# Patient Record
Sex: FEMALE | Race: WHITE | Employment: OTHER | URBAN - METROPOLITAN AREA
[De-identification: names, ages, dates, MRNs, and addresses within clinical notes are randomized per-mention and may not be internally consistent; named-entity substitution may affect disease eponyms.]

---

## 2021-06-20 ENCOUNTER — APPOINTMENT (OUTPATIENT)
Dept: CT IMAGING | Age: 61
End: 2021-06-20
Payer: MEDICAID

## 2021-06-20 ENCOUNTER — HOSPITAL ENCOUNTER (EMERGENCY)
Age: 61
Discharge: HOME | End: 2021-06-20
Attending: EMERGENCY MEDICINE
Payer: MEDICAID

## 2021-06-20 VITALS
DIASTOLIC BLOOD PRESSURE: 115 MMHG | RESPIRATION RATE: 18 BRPM | HEIGHT: 65 IN | BODY MASS INDEX: 40.32 KG/M2 | TEMPERATURE: 98.8 F | SYSTOLIC BLOOD PRESSURE: 169 MMHG | WEIGHT: 242 LBS | OXYGEN SATURATION: 99 % | HEART RATE: 100 BPM

## 2021-06-20 DIAGNOSIS — K45.8 RECURRENT ABDOMINAL HERNIA WITHOUT OBSTRUCTION OR GANGRENE, UNSPECIFIED HERNIA TYPE: Primary | ICD-10-CM

## 2021-06-20 DIAGNOSIS — R10.84 GENERALIZED ABDOMINAL PAIN: ICD-10-CM

## 2021-06-20 LAB
A/G RATIO: 1.3 (ref 1.1–2.2)
ALBUMIN SERPL-MCNC: 3.9 G/DL (ref 3.4–5)
ALP BLD-CCNC: 83 U/L (ref 40–129)
ALT SERPL-CCNC: 13 U/L (ref 10–40)
ANION GAP SERPL CALCULATED.3IONS-SCNC: 10 MMOL/L (ref 3–16)
AST SERPL-CCNC: 11 U/L (ref 15–37)
BACTERIA: ABNORMAL /HPF
BASE EXCESS VENOUS: 2.2 MMOL/L (ref -2–3)
BASOPHILS ABSOLUTE: 0 K/UL (ref 0–0.2)
BASOPHILS RELATIVE PERCENT: 0.4 %
BILIRUB SERPL-MCNC: <0.2 MG/DL (ref 0–1)
BILIRUBIN URINE: NEGATIVE
BLOOD, URINE: NEGATIVE
BUN BLDV-MCNC: 18 MG/DL (ref 7–20)
CALCIUM SERPL-MCNC: 9.3 MG/DL (ref 8.3–10.6)
CARBOXYHEMOGLOBIN: 1.3 % (ref 0–1.5)
CHLORIDE BLD-SCNC: 101 MMOL/L (ref 99–110)
CLARITY: CLEAR
CO2: 25 MMOL/L (ref 21–32)
COLOR: YELLOW
CREAT SERPL-MCNC: 0.8 MG/DL (ref 0.6–1.1)
EOSINOPHILS ABSOLUTE: 0.4 K/UL (ref 0–0.6)
EOSINOPHILS RELATIVE PERCENT: 6 %
EPITHELIAL CELLS, UA: ABNORMAL /HPF (ref 0–5)
GFR AFRICAN AMERICAN: >60
GFR NON-AFRICAN AMERICAN: >60
GLOBULIN: 2.9 G/DL
GLUCOSE BLD-MCNC: 305 MG/DL (ref 70–99)
GLUCOSE URINE: NEGATIVE MG/DL
HCO3 VENOUS: 27.9 MMOL/L (ref 24–28)
HCT VFR BLD CALC: 36.7 % (ref 36–48)
HEMOGLOBIN, VEN, REDUCED: 12.9 %
HEMOGLOBIN: 12.4 G/DL (ref 12–16)
KETONES, URINE: NEGATIVE MG/DL
LACTIC ACID, SEPSIS: 1.7 MMOL/L (ref 0.4–1.9)
LEUKOCYTE ESTERASE, URINE: ABNORMAL
LIPASE: 101 U/L (ref 13–60)
LYMPHOCYTES ABSOLUTE: 1.5 K/UL (ref 1–5.1)
LYMPHOCYTES RELATIVE PERCENT: 24.7 %
MCH RBC QN AUTO: 28.9 PG (ref 26–34)
MCHC RBC AUTO-ENTMCNC: 33.7 G/DL (ref 31–36)
MCV RBC AUTO: 85.7 FL (ref 80–100)
METHEMOGLOBIN VENOUS: 0.5 % (ref 0–1.5)
MICROSCOPIC EXAMINATION: YES
MONOCYTES ABSOLUTE: 0.5 K/UL (ref 0–1.3)
MONOCYTES RELATIVE PERCENT: 8 %
NEUTROPHILS ABSOLUTE: 3.8 K/UL (ref 1.7–7.7)
NEUTROPHILS RELATIVE PERCENT: 60.9 %
NITRITE, URINE: NEGATIVE
O2 SAT, VEN: 87 %
PCO2, VEN: 46.9 MMHG (ref 41–51)
PDW BLD-RTO: 13.8 % (ref 12.4–15.4)
PH UA: 6.5 (ref 5–8)
PH VENOUS: 7.38 (ref 7.35–7.45)
PLATELET # BLD: 153 K/UL (ref 135–450)
PMV BLD AUTO: 6.5 FL (ref 5–10.5)
PO2, VEN: 51.5 MMHG (ref 25–40)
POTASSIUM REFLEX MAGNESIUM: 4.3 MMOL/L (ref 3.5–5.1)
PROTEIN UA: NEGATIVE MG/DL
RBC # BLD: 4.28 M/UL (ref 4–5.2)
RBC UA: ABNORMAL /HPF (ref 0–4)
SODIUM BLD-SCNC: 136 MMOL/L (ref 136–145)
SPECIFIC GRAVITY UA: <=1.005 (ref 1–1.03)
TCO2 CALC VENOUS: 29 MMOL/L
TOTAL PROTEIN: 6.8 G/DL (ref 6.4–8.2)
URINE TYPE: ABNORMAL
UROBILINOGEN, URINE: 0.2 E.U./DL
WBC # BLD: 6.2 K/UL (ref 4–11)
WBC UA: ABNORMAL /HPF (ref 0–5)

## 2021-06-20 PROCEDURE — 85025 COMPLETE CBC W/AUTO DIFF WBC: CPT

## 2021-06-20 PROCEDURE — 6360000004 HC RX CONTRAST MEDICATION: Performed by: PHYSICIAN ASSISTANT

## 2021-06-20 PROCEDURE — 82803 BLOOD GASES ANY COMBINATION: CPT

## 2021-06-20 PROCEDURE — 99284 EMERGENCY DEPT VISIT MOD MDM: CPT

## 2021-06-20 PROCEDURE — 2580000003 HC RX 258: Performed by: PHYSICIAN ASSISTANT

## 2021-06-20 PROCEDURE — 83690 ASSAY OF LIPASE: CPT

## 2021-06-20 PROCEDURE — 80053 COMPREHEN METABOLIC PANEL: CPT

## 2021-06-20 PROCEDURE — 6370000000 HC RX 637 (ALT 250 FOR IP): Performed by: PHYSICIAN ASSISTANT

## 2021-06-20 PROCEDURE — 83605 ASSAY OF LACTIC ACID: CPT

## 2021-06-20 PROCEDURE — 81001 URINALYSIS AUTO W/SCOPE: CPT

## 2021-06-20 PROCEDURE — 74177 CT ABD & PELVIS W/CONTRAST: CPT

## 2021-06-20 PROCEDURE — 6360000002 HC RX W HCPCS: Performed by: PHYSICIAN ASSISTANT

## 2021-06-20 PROCEDURE — 96374 THER/PROPH/DIAG INJ IV PUSH: CPT

## 2021-06-20 RX ORDER — MORPHINE SULFATE 4 MG/ML
4 INJECTION, SOLUTION INTRAMUSCULAR; INTRAVENOUS ONCE
Status: COMPLETED | OUTPATIENT
Start: 2021-06-20 | End: 2021-06-20

## 2021-06-20 RX ORDER — 0.9 % SODIUM CHLORIDE 0.9 %
1000 INTRAVENOUS SOLUTION INTRAVENOUS ONCE
Status: COMPLETED | OUTPATIENT
Start: 2021-06-20 | End: 2021-06-20

## 2021-06-20 RX ORDER — OXYCODONE HYDROCHLORIDE 5 MG/1
5 TABLET ORAL ONCE
Status: COMPLETED | OUTPATIENT
Start: 2021-06-20 | End: 2021-06-20

## 2021-06-20 RX ADMIN — OXYCODONE HYDROCHLORIDE 5 MG: 5 TABLET ORAL at 20:15

## 2021-06-20 RX ADMIN — SODIUM CHLORIDE 1000 ML: 9 INJECTION, SOLUTION INTRAVENOUS at 17:01

## 2021-06-20 RX ADMIN — IOPAMIDOL 80 ML: 755 INJECTION, SOLUTION INTRAVENOUS at 18:07

## 2021-06-20 RX ADMIN — MORPHINE SULFATE 4 MG: 4 INJECTION INTRAVENOUS at 17:00

## 2021-06-20 ASSESSMENT — ENCOUNTER SYMPTOMS
CHEST TIGHTNESS: 0
ABDOMINAL PAIN: 1
DIARRHEA: 0
SHORTNESS OF BREATH: 0
COUGH: 0
NAUSEA: 0
VOMITING: 0
WHEEZING: 0

## 2021-06-20 ASSESSMENT — PAIN DESCRIPTION - LOCATION: LOCATION: ABDOMEN

## 2021-06-20 ASSESSMENT — PAIN SCALES - GENERAL
PAINLEVEL_OUTOF10: 9
PAINLEVEL_OUTOF10: 8

## 2021-06-20 ASSESSMENT — PAIN DESCRIPTION - ORIENTATION: ORIENTATION: RIGHT

## 2021-06-20 ASSESSMENT — PAIN DESCRIPTION - PAIN TYPE: TYPE: ACUTE PAIN

## 2021-06-20 NOTE — ED NOTES
Update per family-pt passed out at home; Dr Velazco Cure in to speak with daughter and feels based on overall picture not leaning toward a stroke diagnosis     Demarco Banda RN  06/20/21 5621

## 2021-06-20 NOTE — ED PROVIDER NOTES
[address] [facility] [address] ENCOUNTER          PHYSICIAN ASSISTANT NOTE       Date of evaluation: 6/20/2021    Chief Complaint     Abdominal Pain (sudden onset of RLQ pain 3 hours ago)    History of Present Illness     Sailaja Nance is a 62 y.o. female who presents with chief complaint of abdominal pain that started approximately 3 hours ago. Patient has had what sounds to be ischemic gut with colectomy and ultimate reversal of her colostomy \"several years ago\". States that she has also had multiple hernias for which she is scheduled to see a GI doctor this upcoming week. Endorses cholecystectomy and hysterectomy as well. States that approximately 3 hours ago she had sudden onset of right lower quadrant abdominal pain. This is in the area where her prior colectomy was performed. Denies any fever or chills. No shortness of breath or chest pain. No urinary symptoms. Normal bowel movement this morning. Review of Systems     Review of Systems   Constitutional: Negative for chills, diaphoresis, fatigue and fever. Respiratory: Negative for cough, chest tightness, shortness of breath and wheezing. Cardiovascular: Negative for chest pain and palpitations. Gastrointestinal: Positive for abdominal pain. Negative for diarrhea, nausea and vomiting. Genitourinary: Negative. Musculoskeletal: Negative. Skin: Negative for rash. Neurological: Negative for dizziness, weakness, light-headedness and headaches. All other systems reviewed and are negative. Past Medical, Surgical, Family, and Social History     She has a past medical history of Anxiety, Diabetes mellitus ([address] Hernia, abdominal, Hyperlipidemia, Hypertension, and PTSD (post-traumatic stress disorder). She has a past surgical history that includes Hysterectomy; Cholecystectomy; colectomy; and Revision Colostomy. Her family history is not on file. She reports that she has never smoked.  She does not have any smokeless tobacco history on file. She reports that she does not drink alcohol and does not use drugs. Medications     Previous Medications    No medications on file       Allergies     She is allergic to azithromycin, metronidazole, clindamycin, penicillin g, penicillin v, tetanus immune globulin, and codeine. Physical Exam     INITIAL VITALS: BP: (!) 185/106, Temp: 98.8 °F (37.1 °C), Pulse: 88, Resp: 18, SpO2: 99 %  Physical Exam  Vitals and nursing note reviewed. Constitutional:       General: She is not in acute distress. Appearance: She is well-developed. She is obese. She is not diaphoretic. HENT:      Head: Normocephalic and atraumatic. Eyes:      Conjunctiva/sclera: Conjunctivae normal.      Pupils: Pupils are equal, round, and reactive to light. Cardiovascular:      Rate and Rhythm: Normal rate and regular rhythm. Heart sounds: Normal heart sounds. No murmur heard. No friction rub. Pulmonary:      Effort: Pulmonary effort is normal. No respiratory distress. Breath sounds: Normal breath sounds. No wheezing or rales. Abdominal:      Palpations: Abdomen is soft. Tenderness: There is generalized abdominal tenderness and tenderness in the right lower quadrant. There is no guarding or rebound. Comments: Obese abdomen   Musculoskeletal:         General: Normal range of motion. Cervical back: Normal range of motion. Skin:     General: Skin is warm and dry. Neurological:      Mental Status: She is alert and oriented to person, place, and time. Psychiatric:         Behavior: Behavior normal.         Thought Content: Thought content normal.         Judgment: Judgment normal.       Diagnostic Results     EKG   none    RADIOLOGY:  CT ABDOMEN PELVIS W IV CONTRAST Additional Contrast? None   Final Result      1. Large right abdominal wall spigelian hernia containing bowel loops without evidence of acute complication.    2. Upper anterior abdominal wall right paramedian small fat-containing ventral hernia with fat stranding and small amount of fluid, nonspecific, may relate to incarceration/strangulation. Recommended clinical correlation. .   3. Additional moderate size ventral hernias as above without evidence of acute complication.           LABS:   Results for orders placed or performed during the hospital encounter of 06/20/21   CBC Auto Differential   Result Value Ref Range    WBC 6.2 4.0 - 11.0 K/uL    RBC 4.28 4.00 - 5.20 M/uL    Hemoglobin 12.4 12.0 - 16.0 g/dL    Hematocrit 36.7 36.0 - 48.0 %    MCV 85.7 80.0 - 100.0 fL    MCH 28.9 26.0 - 34.0 pg    MCHC 33.7 31.0 - 36.0 g/dL    RDW 13.8 12.4 - 15.4 %    Platelets 472 037 - 874 K/uL    MPV 6.5 5.0 - 10.5 fL    Neutrophils % 60.9 %    Lymphocytes % 24.7 %    Monocytes % 8.0 %    Eosinophils % 6.0 %    Basophils % 0.4 %    Neutrophils Absolute 3.8 1.7 - 7.7 K/uL    Lymphocytes Absolute 1.5 1.0 - 5.1 K/uL    Monocytes Absolute 0.5 0.0 - 1.3 K/uL    Eosinophils Absolute 0.4 0.0 - 0.6 K/uL    Basophils Absolute 0.0 0.0 - 0.2 K/uL   Comprehensive Metabolic Panel w/ Reflex to MG   Result Value Ref Range    Sodium 136 136 - 145 mmol/L    Potassium reflex Magnesium 4.3 3.5 - 5.1 mmol/L    Chloride 101 99 - 110 mmol/L    CO2 25 21 - 32 mmol/L    Anion Gap 10 3 - 16    Glucose 305 (H) 70 - 99 mg/dL    BUN 18 7 - 20 mg/dL    CREATININE 0.8 0.6 - 1.1 mg/dL    GFR Non-African American >60 >60    GFR African American >60 >60    Calcium 9.3 8.3 - 10.6 mg/dL    Total Protein 6.8 6.4 - 8.2 g/dL    Albumin 3.9 3.4 - 5.0 g/dL    Albumin/Globulin Ratio 1.3 1.1 - 2.2    Total Bilirubin <0.2 0.0 - 1.0 mg/dL    Alkaline Phosphatase 83 40 - 129 U/L    ALT 13 10 - 40 U/L    AST 11 (L) 15 - 37 U/L    Globulin 2.9 g/dL   Lipase   Result Value Ref Range    Lipase 101.0 (H) 13.0 - 60.0 U/L   Urinalysis, reflex to microscopic   Result Value Ref Range    Color, UA Yellow Straw/Yellow    Clarity, UA Clear Clear    Glucose, Ur Negative Negative mg/dL    Bilirubin Urine Negative Negative    Ketones, Urine Negative Negative mg/dL    Specific Gravity, UA <=1.005 1.005 - 1.030    Blood, Urine Negative Negative    pH, UA 6.5 5.0 - 8.0    Protein, UA Negative Negative mg/dL    Urobilinogen, Urine 0.2 <2.0 E.U./dL    Nitrite, Urine Negative Negative    Leukocyte Esterase, Urine SMALL (A) Negative    Microscopic Examination YES     Urine Type NotGiven    Blood Gas, Venous   Result Value Ref Range    pH, Ru 7.382 7.350 - 7.450    pCO2, Ru 46.9 41.0 - 51.0 mmHg    pO2, Ru 51.5 (H) 25 - 40 mmHg    HCO3, Venous 27.9 24.0 - 28.0 mmol/L    Base Excess, Ru 2.2 -2.0 - 3.0 mmol/L    O2 Sat, Ru 87 Not established %    Carboxyhemoglobin 1.3 0.0 - 1.5 %    MetHgb, Ru 0.5 0.0 - 1.5 %    TC02 (Calc), Ru 29 mmol/L    Hemoglobin, Ru, Reduced 12.90 %   Lactate, Sepsis   Result Value Ref Range    Lactic Acid, Sepsis 1.7 0.4 - 1.9 mmol/L   Microscopic Urinalysis   Result Value Ref Range    WBC, UA 0-2 0 - 5 /HPF    RBC, UA None seen 0 - 4 /HPF    Epithelial Cells, UA 0-1 0 - 5 /HPF    Bacteria, UA Rare (A) None Seen /HPF       ED BEDSIDE ULTRASOUND:  none    RECENT VITALS:  BP: (!) 196/86, Temp: 98.8 °F (37.1 °C), Pulse: 100, Resp: 18, SpO2: 100 %     Procedures     none    ED Course     Nursing Notes, Past Medical Hx,Past Surgical Hx, Social Hx, Allergies, and Family Hx were reviewed. The patient was given the following medications:  Orders Placed This Encounter   Medications    0.9 % sodium chloride bolus    morphine injection 4 mg    iopamidol (ISOVUE-370) 76 % injection 80 mL    oxyCODONE (ROXICODONE) immediate release tablet 5 mg       CONSULTS:  DAVE/ Franklyn He 19 / ASSESSMENT / Kathrine Melo is a 62 y.o. female presenting with abdominal pain in the setting of multiple prior abdominal surgeries.   She is overall very well-appearing and in no acute distress on arrival.  Abdominal examination does reveal multiple scars as well as a very large right-sided hernia which is entirely nontender to palpation. She does have some tenderness to palpation just superior to this large hernia. Remainder of abdomen is nonperitoneal.  IV access established and basic laboratory gnosis was initiated. These are overall reassuring including normal white blood cell count, negative lactate. Due to her multitude of surgeries, cross-sectional imaging of the abdomen and pelvis was initiated. This revealed the large hernia that was noted on physical examination as well as an upper anterior abdominal wall paramedian ventral hernia with potential fat stranding which could be related to incarceration/strangulation. Due to these findings, general surgery was consulted and evaluated patient in the emergency department. With reassuring physical examination and laboratory studies, there is no clinical evidence of obstruction or strangulation today. There is no indication for admission to the hospital at this time and patient will follow up electively with general surgery to discuss difficult hernia repair in the future. Patient is understanding of this plan. Return precautions emergency department in the meantime were discussed, discharged in stable condition    This patient was also evaluated by the attending physician. All care plans were discussed and agreed upon. Clinical Impression     1. Recurrent abdominal hernia without obstruction or gangrene, unspecified hernia type    2. Generalized abdominal pain        Disposition     PATIENT REFERRED TO:  Semaj Guillen MD  [address]  Sherley Bloch [address]  [phone]    Schedule an appointment as soon as possible for a visit         DISCHARGE MEDICATIONS:  New Prescriptions    No medications on file       [address], [address]  06/20/21 2022

## 2021-06-20 NOTE — CONSULTS
Resident Consult Note  General Surgery       Reason for Consult: Ventral hernia    Chief Complaint: Abdominal pain     History of Present Illness:   Adrienne Caruso is a 62 y.o. female with morbid obesity, HTN, DM, and a multiple hernias status post remote Mon's procedure with subsequent reversal, cholecystectomy, hysterectomy and 3  > 5 years ago. The patient reports that she has been experiencing abdominal pain for quite some time; however, she has had recent increase in pain which led her to seeking emergency medical attention today. The patient has been evaluated at [facility] in 2017, at which time she was recommended to pursue weight loss prior to operative intervention. Since that time she reports that she has lost approximately 150lbs. She states that she originally had plans to establish care at [address] [place] [address] for hernia repair this upcoming week, which would entail colonoscopic evaluation followed by outpatient Surgical consultation. She reports that her pain, however, acutely worsened to the point at which over-the-counter analgesics were not longer adequate for relief, leading her to seek further pain control. The patient reports no obstructive symptoms-- she is tolerating PO intake well, passing BMs, and passing flatus. She does have a fungal rash in the fold of her pannus, but no skin changes in the areas of her multiple ventral hernias. The patient also is reporting no fevers, no chills, no nausea, and no vomiting. The patient reports that she is a never smoker, does not drink alcohol, and has never used any other drugs. Prednisone 10 daily was listed in her medication list, but pt stated she has not taken this medication for sometime. She does not remember why she took it.      Past Medical History:        Diagnosis Date    Anxiety     Diabetes mellitus ([facility] [address]     Hernia, abdominal     Hyperlipidemia     Hypertension     PTSD (post-traumatic stress disorder)      Past Surgical History: Labs     06/20/21  1642      K 4.3      CO2 25   BUN 18   CREATININE 0.8     PT/INR: No results for input(s): PROTIME, INR in the last 72 hours. APTT: No results for input(s): APTT in the last 72 hours. Liver Profile:  Lab Results   Component Value Date    AST 11 06/20/2021    ALT 13 06/20/2021    BILITOT <0.2 06/20/2021    ALKPHOS 83 06/20/2021   No results found for: CHOL, HDL, TRIG  UA:   Lab Results   Component Value Date    COLORU Yellow 06/20/2021    PHUR 6.5 06/20/2021    WBCUA 0-2 06/20/2021    RBCUA None seen 06/20/2021    BACTERIA Rare 06/20/2021    CLARITYU Clear 06/20/2021    SPECGRAV <=1.005 06/20/2021    LEUKOCYTESUR SMALL 06/20/2021    UROBILINOGEN 0.2 06/20/2021    BILIRUBINUR Negative 06/20/2021    BLOODU Negative 06/20/2021    GLUCOSEU Negative 06/20/2021       Imaging:   CT ABDOMEN PELVIS W IV CONTRAST Additional Contrast? None   Final Result      1. Large right abdominal wall spigelian hernia containing bowel loops without evidence of acute complication. 2. Upper anterior abdominal wall right paramedian small fat-containing ventral hernia with fat stranding and small amount of fluid, nonspecific, may relate to incarceration/strangulation. Recommended clinical correlation. .   3. Additional moderate size ventral hernias as above without evidence of acute complication. Assessment/Plan: This is a 62 y.o. female with complex ventral hernia due to previous Mon's with reversal and other abdominal surgeries. Loop containing hernia in the midline and previous colostomy site are wide mouth and these bowel loops looks unremarkable without sign of strangulation. Multiple other fascial defect was noted to contain fat. No obstructive signs. Labs within normal limit. Therefore, there is no indication for surgical intervention. Patient would benefit from pre-habilitation prior elective hernia repair surgery.  This most likely will require component separation and TAR due to the

## 2021-06-20 NOTE — ED PROVIDER NOTES
ED Attending Attestation Note     Date of evaluation: 6/20/2021    This patient was seen by the advance practice provider. I have seen and examined the patient, agree with the workup, evaluation, management and diagnosis. The care plan has been discussed. My assessment reveals patient with prior ischemic bowel here with abdominal pain. Found to have questionable incarcerated abdominal wall hernia. Surgery consulted and patient determined not to have an impending surgical emergency and patient will be discharged.      Oneida Bass MD  06/22/21 4022

## 2021-07-05 ENCOUNTER — HOSPITAL ENCOUNTER (EMERGENCY)
Age: 61
Discharge: HOME | End: 2021-07-05
Attending: EMERGENCY MEDICINE
Payer: MEDICAID

## 2021-07-05 ENCOUNTER — HOSPITAL ENCOUNTER (EMERGENCY)
Age: 61
Discharge: HOME | End: 2021-07-06
Attending: EMERGENCY MEDICINE
Payer: MEDICAID

## 2021-07-05 VITALS
SYSTOLIC BLOOD PRESSURE: 148 MMHG | TEMPERATURE: 99 F | HEIGHT: 65 IN | HEART RATE: 87 BPM | OXYGEN SATURATION: 99 % | WEIGHT: 240 LBS | RESPIRATION RATE: 16 BRPM | DIASTOLIC BLOOD PRESSURE: 82 MMHG | BODY MASS INDEX: 39.99 KG/M2

## 2021-07-05 DIAGNOSIS — R22.0 FACIAL SWELLING: Primary | ICD-10-CM

## 2021-07-05 PROCEDURE — 6370000000 HC RX 637 (ALT 250 FOR IP): Performed by: NURSE PRACTITIONER

## 2021-07-05 PROCEDURE — 99284 EMERGENCY DEPT VISIT MOD MDM: CPT

## 2021-07-05 PROCEDURE — 99283 EMERGENCY DEPT VISIT LOW MDM: CPT

## 2021-07-05 RX ORDER — SULFAMETHOXAZOLE AND TRIMETHOPRIM 800; 160 MG/1; MG/1
1 TABLET ORAL 2 TIMES DAILY
Qty: 14 TABLET | Refills: 0 | Status: SHIPPED | OUTPATIENT
Start: 2021-07-05 | End: 2021-07-12

## 2021-07-05 RX ORDER — CHLORHEXIDINE GLUCONATE 0.12 MG/ML
10 RINSE ORAL 2 TIMES DAILY
Qty: 140 ML | Refills: 0 | Status: SHIPPED | OUTPATIENT
Start: 2021-07-05 | End: 2021-07-12

## 2021-07-05 RX ORDER — DIPHENHYDRAMINE HCL 25 MG
50 TABLET ORAL EVERY 6 HOURS PRN
Status: DISCONTINUED | OUTPATIENT
Start: 2021-07-05 | End: 2021-07-06 | Stop reason: HOSPADM

## 2021-07-05 RX ORDER — PREDNISONE 20 MG/1
60 TABLET ORAL ONCE
Status: COMPLETED | OUTPATIENT
Start: 2021-07-05 | End: 2021-07-05

## 2021-07-05 RX ORDER — SULFAMETHOXAZOLE AND TRIMETHOPRIM 800; 160 MG/1; MG/1
1 TABLET ORAL ONCE
Status: COMPLETED | OUTPATIENT
Start: 2021-07-05 | End: 2021-07-05

## 2021-07-05 RX ORDER — FAMOTIDINE 20 MG/1
20 TABLET, FILM COATED ORAL ONCE
Status: COMPLETED | OUTPATIENT
Start: 2021-07-05 | End: 2021-07-05

## 2021-07-05 RX ADMIN — DIPHENHYDRAMINE HYDROCHLORIDE 50 MG: 25 TABLET ORAL at 21:25

## 2021-07-05 RX ADMIN — FAMOTIDINE 20 MG: 20 TABLET, FILM COATED ORAL at 21:25

## 2021-07-05 RX ADMIN — PREDNISONE 60 MG: 20 TABLET ORAL at 21:25

## 2021-07-05 RX ADMIN — SULFAMETHOXAZOLE AND TRIMETHOPRIM 1 TABLET: 800; 160 TABLET ORAL at 21:30

## 2021-07-05 ASSESSMENT — ENCOUNTER SYMPTOMS
VOMITING: 0
EYES NEGATIVE: 1
RESPIRATORY NEGATIVE: 1
NAUSEA: 0

## 2021-07-05 NOTE — ED PROVIDER NOTES
ED Attending Attestation Note     Date of evaluation: 7/5/2021    This patient was seen by the advance practice provider. I have seen and examined the patient, agree with the workup, evaluation, management and diagnosis. The care plan has been discussed. My assessment reveals a woman who is awake and alert, no acute distress. Normal speech and swallowing. Mostly edentulous. Minimal left upper lip swelling, without focal abscess seen. Early dental infection suspected.      Dequan Millan MD  07/05/21 2844

## 2021-07-05 NOTE — ED NOTES
Patient prepared for and ready to be discharged. Patient discharged at this time to home in care of self in no acute distress after verbalizing understanding of discharge instructions. Patient left after receiving After Visit Summary instructions.         Eleonora Valentino RN  07/05/21 9942

## 2021-07-05 NOTE — ED PROVIDER NOTES
Notes, Past Medical Hx, Past Surgical Hx, Social Hx, Allergies, and Family Hx were reviewed. The patient was given the following medications:  Orders Placed This Encounter   Medications    sulfamethoxazole-trimethoprim (BACTRIM DS) 800-160 MG per tablet     Sig: Take 1 tablet by mouth 2 times daily for 7 days     Dispense:  14 tablet     Refill:  0    chlorhexidine (PERIDEX) 0.12 % solution     Sig: Take 10 mLs by mouth 2 times daily for 7 days     Dispense:  140 mL     Refill:  0            CONSULTS:  None    MEDICAL DECISION MAKING / Zuly Juarez / PLAN     Briefly, Rafael Wong is a 62 y.o. female who presented to the emergency department with left-sided facial swelling. On presentation, is well-appearing, no acute distress. His temp to 99 with otherwise stable vital signs. Suspect early dental infection. She did have a little bit of asymmetry of her left upper lip and considered angioedema, but not on any culprit meds, with no signs of respiratory compromise, swelling of the tongue or uvula. Think this is early infection. We will start her on antibiotics. Unfortunately, she has a long list of renal allergies including to penicillin, clindamycin, azithromycin. We ultimately decided on Bactrim and Peridex. She will be encouraged to follow-up with dentistry. At this point in time, patient is stable for discharge. Patient was given strict return precautions as outlined in the AVS. Patient was agreeable and understanding to this plan of care. Prior to discharge, patient was ambulatory and PO tolerant. The patient was evaluated by myself and the ED Attending Physician, Dr. Soham Tse All management and disposition plans were discussed and agreed upon. Clinical Impression     1.  Facial swelling        Disposition     DC    DISCHARGE MEDICATIONS:  Discharge Medication List as of 7/5/2021  4:00 PM      START taking these medications    Details   sulfamethoxazole-trimethoprim (BACTRIM DS) 800-160 MG per tablet Take 1 tablet by mouth 2 times daily for 7 days, Disp-14 tablet, R-0Print      chlorhexidine (PERIDEX) 0.12 % solution Take 10 mLs by mouth 2 times daily for 7 days, Disp-140 mL, R-0Print             PATIENT REFERRED TO:  No follow-up provider specified.     EUGENIE Scott - CNP, CNP  Department of Emergency Medicine     EUGENIE Scott - [facility]  07/05/21 4478

## 2021-07-06 VITALS
SYSTOLIC BLOOD PRESSURE: 163 MMHG | HEART RATE: 100 BPM | OXYGEN SATURATION: 99 % | TEMPERATURE: 98 F | RESPIRATION RATE: 18 BRPM | DIASTOLIC BLOOD PRESSURE: 143 MMHG

## 2021-07-06 RX ORDER — PREDNISONE 20 MG/1
40 TABLET ORAL DAILY
Qty: 6 TABLET | Refills: 0 | Status: SHIPPED | OUTPATIENT
Start: 2021-07-06 | End: 2021-07-09

## 2021-07-06 NOTE — ED PROVIDER NOTES
[facility] ENCOUNTER          NURSE PRACTITIONER NOTE       Date of evaluation: 7/5/2021    Chief Complaint     Facial Swelling (pt wass just d/c for c/o, states her pharmacy is closed and she could not get her prescription filled.)      History of Present Illness     Sheldon Lange is a 62 y.o. female with a past medical history of anxiety, diabetes, hyperlipidemia, hypertension; who presents to the emergency department with a complaint of worsening facial swelling. Patient was evaluated earlier today for left-sided facial swelling and upper lip swelling; diagnosed with a dental infection, given tenderness and poor dentition present. Patient is not on an ACE inhibitor or an ARB. She was prescribed Bactrim however states that she was unable to get this from her pharmacy due to it being closed. She denies history of angioedema, denies difficulty swallowing or breathing. However is concerned because her swelling has worsened, she also states she is having swelling of her chin and tenderness and firmness of the chin. Denies associated chest pain or shortness of breath, denies other complaints at this time. Review of Systems     Review of Systems   Constitutional: Negative. HENT:        Facial swelling, lip swelling, chin swelling   Eyes: Negative. Respiratory: Negative. Cardiovascular: Negative. Gastrointestinal: Negative for nausea and vomiting. Musculoskeletal: Negative. Skin: Negative. Allergic/Immunologic: Negative for immunocompromised state. Neurological: Negative. Hematological: Does not bruise/bleed easily. Psychiatric/Behavioral: Negative. Past Medical, Surgical, Family, and Social History     She has a past medical history of Anxiety, Diabetes mellitus ([address] Hernia, abdominal, Hyperlipidemia, Hypertension, and PTSD (post-traumatic stress disorder). She has a past surgical history that includes Hysterectomy;  Cholecystectomy; colectomy; and Revision Colostomy. Her family history is not on file. She reports that she has never smoked. She has never used smokeless tobacco. She reports that she does not drink alcohol and does not use drugs. Medications     Previous Medications    CHLORHEXIDINE (PERIDEX) 0.12 % SOLUTION    Take 10 mLs by mouth 2 times daily for 7 days    SULFAMETHOXAZOLE-TRIMETHOPRIM (BACTRIM DS) 800-160 MG PER TABLET    Take 1 tablet by mouth 2 times daily for 7 days       Allergies     She is allergic to azithromycin, metronidazole, clindamycin, penicillin g, penicillin v, tetanus immune globulin, and codeine. Physical Exam     INITIAL VITALS: BP: 139/76, Temp: 98 °F (36.7 °C), Pulse: 100, Resp: 18, SpO2: 98 %   Physical Exam  Vitals and nursing note reviewed. Constitutional:       Appearance: Normal appearance. She is obese. HENT:      Head: Normocephalic and atraumatic. Mouth/Throat:      Mouth: Mucous membranes are moist.      Pharynx: Oropharynx is clear. No oropharyngeal exudate or posterior oropharyngeal erythema. Comments: TTP of the left upper and lower jaw lines without fluctuance or stepoffs appreciated, no trismus or drooling. Edema noted of the upper lip, more pronounced on the right than left and edema and tenderness of the chin, floor of the mouth soft. Cardiovascular:      Rate and Rhythm: Normal rate and regular rhythm. Pulses: Normal pulses. Heart sounds: Normal heart sounds. Pulmonary:      Effort: Pulmonary effort is normal. No respiratory distress. Breath sounds: Normal breath sounds. Musculoskeletal:         General: Normal range of motion. Skin:     General: Skin is warm and dry. Capillary Refill: Capillary refill takes less than 2 seconds. Neurological:      Mental Status: She is alert and oriented to person, place, and time.    Psychiatric:         Mood and Affect: Mood normal.         Behavior: Behavior normal.           Diagnostic Results stable. I do feel patient is stable for d/c home. She has already received rx's for peridex and bactrim which can be picked up tomorrow. I will add prednisone for the next 3 days on the chance that there may be an idiopathic angioedema component to this as well. Patient was given strict return precautions which she verbalized understanding of. Patient was given strict return precautions as outlined in the AVS. Patient was agreeable and understanding to this plan of care. This patient was also evaluated by the attending physician. All care plans were discussed and agreed upon. Clinical Impression     1.  Facial swelling        Disposition     PATIENT REFERRED TO:  EUGENIE Waters CNP  [address] [place] [phone]      in 1 week, sooner if needed      DISCHARGE MEDICATIONS:  New Prescriptions    PREDNISONE (DELTASONE) 20 MG TABLET    Take 2 tablets by mouth daily for 3 days       DISPOSITION Decision To Discharge 07/06/2021 12:23:14 AM        EUGENIE Ordoñez CNP  07/06/21 0037

## 2021-08-02 ENCOUNTER — HOSPITAL ENCOUNTER (EMERGENCY)
Age: 61
Discharge: HOME | End: 2021-08-02
Attending: STUDENT IN AN ORGANIZED HEALTH CARE EDUCATION/TRAINING PROGRAM
Payer: MEDICAID

## 2021-08-02 ENCOUNTER — APPOINTMENT (OUTPATIENT)
Dept: CT IMAGING | Age: 61
End: 2021-08-02
Payer: MEDICAID

## 2021-08-02 VITALS
OXYGEN SATURATION: 99 % | RESPIRATION RATE: 16 BRPM | HEART RATE: 98 BPM | TEMPERATURE: 98.6 F | HEIGHT: 65 IN | SYSTOLIC BLOOD PRESSURE: 172 MMHG | BODY MASS INDEX: 42.5 KG/M2 | WEIGHT: 255.07 LBS | DIASTOLIC BLOOD PRESSURE: 74 MMHG

## 2021-08-02 DIAGNOSIS — L03.213 PRESEPTAL CELLULITIS OF LEFT EYE: Primary | ICD-10-CM

## 2021-08-02 LAB
ANION GAP SERPL CALCULATED.3IONS-SCNC: 11 MMOL/L (ref 3–16)
BASOPHILS ABSOLUTE: 0.1 K/UL (ref 0–0.2)
BASOPHILS RELATIVE PERCENT: 0.8 %
BUN BLDV-MCNC: 24 MG/DL (ref 7–20)
CALCIUM SERPL-MCNC: 8.8 MG/DL (ref 8.3–10.6)
CHLORIDE BLD-SCNC: 103 MMOL/L (ref 99–110)
CO2: 22 MMOL/L (ref 21–32)
CREAT SERPL-MCNC: 0.8 MG/DL (ref 0.6–1.1)
EOSINOPHILS ABSOLUTE: 1.3 K/UL (ref 0–0.6)
EOSINOPHILS RELATIVE PERCENT: 19.8 %
GFR AFRICAN AMERICAN: >60
GFR NON-AFRICAN AMERICAN: >60
GLUCOSE BLD-MCNC: 249 MG/DL (ref 70–99)
HCT VFR BLD CALC: 33 % (ref 36–48)
HEMOGLOBIN: 11.2 G/DL (ref 12–16)
LYMPHOCYTES ABSOLUTE: 1.3 K/UL (ref 1–5.1)
LYMPHOCYTES RELATIVE PERCENT: 19.6 %
MCH RBC QN AUTO: 29 PG (ref 26–34)
MCHC RBC AUTO-ENTMCNC: 34.1 G/DL (ref 31–36)
MCV RBC AUTO: 85 FL (ref 80–100)
MONOCYTES ABSOLUTE: 0.4 K/UL (ref 0–1.3)
MONOCYTES RELATIVE PERCENT: 5.7 %
NEUTROPHILS ABSOLUTE: 3.7 K/UL (ref 1.7–7.7)
NEUTROPHILS RELATIVE PERCENT: 54.1 %
PDW BLD-RTO: 13.2 % (ref 12.4–15.4)
PLATELET # BLD: 212 K/UL (ref 135–450)
PMV BLD AUTO: 6.5 FL (ref 5–10.5)
POTASSIUM REFLEX MAGNESIUM: 3.9 MMOL/L (ref 3.5–5.1)
RBC # BLD: 3.88 M/UL (ref 4–5.2)
SODIUM BLD-SCNC: 136 MMOL/L (ref 136–145)
WBC # BLD: 6.8 K/UL (ref 4–11)

## 2021-08-02 PROCEDURE — 80048 BASIC METABOLIC PNL TOTAL CA: CPT

## 2021-08-02 PROCEDURE — 6360000004 HC RX CONTRAST MEDICATION: Performed by: STUDENT IN AN ORGANIZED HEALTH CARE EDUCATION/TRAINING PROGRAM

## 2021-08-02 PROCEDURE — 96374 THER/PROPH/DIAG INJ IV PUSH: CPT

## 2021-08-02 PROCEDURE — 70487 CT MAXILLOFACIAL W/DYE: CPT

## 2021-08-02 PROCEDURE — 99282 EMERGENCY DEPT VISIT SF MDM: CPT

## 2021-08-02 PROCEDURE — 85025 COMPLETE CBC W/AUTO DIFF WBC: CPT

## 2021-08-02 PROCEDURE — 6360000002 HC RX W HCPCS: Performed by: STUDENT IN AN ORGANIZED HEALTH CARE EDUCATION/TRAINING PROGRAM

## 2021-08-02 RX ORDER — SULFAMETHOXAZOLE AND TRIMETHOPRIM 800; 160 MG/1; MG/1
2 TABLET ORAL 2 TIMES DAILY
Qty: 28 TABLET | Refills: 0 | Status: SHIPPED | OUTPATIENT
Start: 2021-08-02 | End: 2021-08-02 | Stop reason: CLARIF

## 2021-08-02 RX ORDER — CEFPODOXIME PROXETIL 200 MG/1
400 TABLET, FILM COATED ORAL 2 TIMES DAILY
Qty: 28 TABLET | Refills: 0 | Status: SHIPPED | OUTPATIENT
Start: 2021-08-02 | End: 2021-08-02 | Stop reason: CLARIF

## 2021-08-02 RX ORDER — SULFAMETHOXAZOLE AND TRIMETHOPRIM 800; 160 MG/1; MG/1
2 TABLET ORAL 2 TIMES DAILY
Qty: 28 TABLET | Refills: 0 | Status: SHIPPED | OUTPATIENT
Start: 2021-08-02 | End: 2021-08-09

## 2021-08-02 RX ORDER — DIPHENHYDRAMINE HCL 25 MG
25 CAPSULE ORAL EVERY 6 HOURS PRN
Qty: 1 CAPSULE | Refills: 0 | Status: SHIPPED | OUTPATIENT
Start: 2021-08-02 | End: 2021-08-12

## 2021-08-02 RX ORDER — DIPHENHYDRAMINE HYDROCHLORIDE 50 MG/ML
25 INJECTION INTRAMUSCULAR; INTRAVENOUS ONCE
Status: COMPLETED | OUTPATIENT
Start: 2021-08-02 | End: 2021-08-02

## 2021-08-02 RX ORDER — LEVOFLOXACIN 750 MG/1
750 TABLET ORAL DAILY
Qty: 7 TABLET | Refills: 0 | Status: SHIPPED | OUTPATIENT
Start: 2021-08-02 | End: 2021-08-09

## 2021-08-02 RX ADMIN — DIPHENHYDRAMINE HYDROCHLORIDE 25 MG: 50 INJECTION, SOLUTION INTRAMUSCULAR; INTRAVENOUS at 16:26

## 2021-08-02 RX ADMIN — IOPAMIDOL 80 ML: 755 INJECTION, SOLUTION INTRAVENOUS at 16:03

## 2021-08-02 ASSESSMENT — PAIN SCALES - GENERAL: PAINLEVEL_OUTOF10: 7

## 2021-08-02 NOTE — ED NOTES
Patient discharged to home via family. Written discharge instructions reviewed with understanding. Copy of AVS and signed prescription sent home with patient. Patient able to walk from ED without assistance.        Masha Cedeño RN  08/02/21 4503

## 2021-08-02 NOTE — ED NOTES
Patient complains of left eye pain with swelling since morning. Patient reports that this has happened before.      Hollace Seip, RN  08/02/21 7330

## 2021-08-02 NOTE — ED PROVIDER NOTES
Medications    No medications on file       Allergies     She is allergic to azithromycin, metronidazole, clindamycin, penicillin g, penicillin v, tetanus immune globulin, and codeine. Physical Exam     INITIAL VITALS: BP: (!) 172/74, Temp: 98.6 °F (37 °C), Pulse: 98, Resp: 16, SpO2: 99 %   Physical Exam  Constitutional:       General: She is not in acute distress. Appearance: She is obese. HENT:      Head: Normocephalic. Comments: L forehead edema and tenderness     Nose: Nose normal. No congestion or rhinorrhea. Mouth/Throat:      Mouth: Mucous membranes are moist.      Pharynx: Oropharynx is clear. No oropharyngeal exudate or posterior oropharyngeal erythema. Eyes:      General: No scleral icterus. Right eye: No discharge. Left eye: No discharge. Extraocular Movements: Extraocular movements intact. Conjunctiva/sclera: Conjunctivae normal.      Pupils: Pupils are equal, round, and reactive to light. Comments: L periorbital edema   Neck:      Comments: Tender anterior cervical adenopathy  Cardiovascular:      Rate and Rhythm: Normal rate and regular rhythm. Pulses: Normal pulses. Heart sounds: Normal heart sounds. Pulmonary:      Effort: Pulmonary effort is normal.      Breath sounds: Normal breath sounds. Abdominal:      General: Abdomen is flat. There is no distension. Palpations: Abdomen is soft. Tenderness: There is no guarding. Hernia: A hernia is present. Musculoskeletal:         General: No swelling or tenderness. Lymphadenopathy:      Cervical: Cervical adenopathy present. Skin:     General: Skin is warm and dry. Neurological:      General: No focal deficit present. Mental Status: She is alert and oriented to person, place, and time.    Psychiatric:         Mood and Affect: Mood normal.         Behavior: Behavior normal.         Diagnostic Results       RADIOLOGY:  CT FACIAL BONES W CONTRAST   Final Result Impression:       Left periorbital cellulitis without retrobulbar extension. Incidental finding of partially visualized cervical spine: Grade 1 anterolisthesis of C3 on C4. LABS:   Results for orders placed or performed during the hospital encounter of 06/89/58   Basic Metabolic Panel w/ Reflex to MG   Result Value Ref Range    Sodium 136 136 - 145 mmol/L    Potassium reflex Magnesium 3.9 3.5 - 5.1 mmol/L    Chloride 103 99 - 110 mmol/L    CO2 22 21 - 32 mmol/L    Anion Gap 11 3 - 16    Glucose 249 (H) 70 - 99 mg/dL    BUN 24 (H) 7 - 20 mg/dL    CREATININE 0.8 0.6 - 1.1 mg/dL    GFR Non-African American >60 >60    GFR African American >60 >60    Calcium 8.8 8.3 - 10.6 mg/dL   CBC auto differential   Result Value Ref Range    WBC 6.8 4.0 - 11.0 K/uL    RBC 3.88 (L) 4.00 - 5.20 M/uL    Hemoglobin 11.2 (L) 12.0 - 16.0 g/dL    Hematocrit 33.0 (L) 36.0 - 48.0 %    MCV 85.0 80.0 - 100.0 fL    MCH 29.0 26.0 - 34.0 pg    MCHC 34.1 31.0 - 36.0 g/dL    RDW 13.2 12.4 - 15.4 %    Platelets 124 303 - 950 K/uL    MPV 6.5 5.0 - 10.5 fL    Neutrophils % 54.1 %    Lymphocytes % 19.6 %    Monocytes % 5.7 %    Eosinophils % 19.8 %    Basophils % 0.8 %    Neutrophils Absolute 3.7 1.7 - 7.7 K/uL    Lymphocytes Absolute 1.3 1.0 - 5.1 K/uL    Monocytes Absolute 0.4 0.0 - 1.3 K/uL    Eosinophils Absolute 1.3 (H) 0.0 - 0.6 K/uL    Basophils Absolute 0.1 0.0 - 0.2 K/uL       RECENT VITALS:  BP: (!) 172/74, Temp: 98.6 °F (37 °C),Pulse: 98, Resp: 16, SpO2: 99 %       ED Course     Nursing Notes, Past Medical Hx, Past Surgical Hx, Social Hx, Allergies, and FamilyHx were reviewed.     The patient was given the following medications:  Orders Placed This Encounter   Medications    diphenhydrAMINE (BENADRYL) injection 25 mg    iopamidol (ISOVUE-370) 76 % injection 80 mL    DISCONTD: sulfamethoxazole-trimethoprim (BACTRIM DS;SEPTRA DS) 800-160 MG per tablet     Sig: Take 2 tablets by mouth 2 times daily for 7 days     Dispense:  28 tablet     Refill:  0    DISCONTD: cefpodoxime (VANTIN) 200 MG tablet     Sig: Take 2 tablets by mouth 2 times daily for 7 days     Dispense:  28 tablet     Refill:  0    diphenhydrAMINE (BENADRYL) 25 MG capsule     Sig: Take 1 capsule by mouth every 6 hours as needed for Itching (Swelling of eye)     Dispense:  1 capsule     Refill:  0    sulfamethoxazole-trimethoprim (BACTRIM DS;SEPTRA DS) 800-160 MG per tablet     Sig: Take 2 tablets by mouth 2 times daily for 7 days     Dispense:  28 tablet     Refill:  0    levoFLOXacin (LEVAQUIN) 750 MG tablet     Sig: Take 1 tablet by mouth daily for 7 days     Dispense:  7 tablet     Refill:  0       CONSULTS:  None    MEDICAL DECISION MAKING / ASSESSMENT / Dione Nava is a 62 y.o. female who presents with same day onset left eye swelling. She presented July 5 2021 to the ED for chin swelling, which did resolve with Bactrim and prednisone. Her presentation of unilateral eye swelling without any preceding trauma to the area is concerning for cellulitis. However, the inclusion of her forehead is abnormal for a diagnosis of preseptal cellulitis. No leukocytosis noted on CBC. She was given 25mg IV benadryl and underwent CT face with IV contrast, which showed L periorbital cellulitis without retrobulbar extension. CT scan confirmed the patient's diagnosis of preseptal cellulitis. Patient improved significantly with ice and benadryl. Given the patient's allergy to penicillin and clindamycin, she is being discharged on levaquin and bactrim as well as benadryl as needed. She was advised to keep a low threshhold for returning to the emergency room, and if she does not see improvement in 24 hrs she should return. This patient was also evaluated by the attending physician. All care plans were discussed and agreed upon. Clinical Impression     1.  Preseptal cellulitis of left eye        Disposition     PATIENT REFERRED TO:  Yessica Montenegro, APRN - CNP  [address]  [phone]    In 3 days  Or sooner if symptoms worsen      DISCHARGE MEDICATIONS:  New Prescriptions    DIPHENHYDRAMINE (BENADRYL) 25 MG CAPSULE    Take 1 capsule by mouth every 6 hours as needed for Itching (Swelling of eye)    LEVOFLOXACIN (LEVAQUIN) 750 MG TABLET    Take 1 tablet by mouth daily for 7 days    SULFAMETHOXAZOLE-TRIMETHOPRIM (BACTRIM DS;SEPTRA DS) 800-160 MG PER TABLET    Take 2 tablets by mouth 2 times daily for 7 days          Frederic Monique MD  Resident  08/02/21 2682

## 2021-08-02 NOTE — ED NOTES
PIV placed using aseptic technique per hospital policy. Blood collected and sent to lab.      Karolina Patricio RN  08/02/21 7778

## 2021-08-02 NOTE — ED PROVIDER NOTES
ED Attending Attestation Note     Date of evaluation: 8/2/2021    This patient was seen by the resident. I have seen and examined the patient, agree with the workup, evaluation, management and diagnosis. The care plan has been discussed. My assessment reveals mild periorbital edema and erythema, which has improved on reassessment at 1804. The patient does not have definite pain with eye movements and has full range of the eyes. She has no visual deficits. I have no suspicion for angioedema, and there is no evidence of concerning findings on the oral or other exams. I also have no suspicion for any type of anaphylactic reaction given overall presentation and lack of any other rash or anaphylactic symptoms. We will administer 25 mg of Benadryl IV for a possible local allergic kin response She is managing secretions easily, there is no dysphonia or stridor and no trismus. She is breathing easily and speaking in complete sentences. CT scan does not reveal definite evidence of orbital cellulitis, and at this point I feel that her clinical syndrome is most consistent with preseptal cellulitis. She has numerous drug allergies which are presented a challenge to typical management, but after review I feel the levofloxacin and Bactrim together provide adequate coverage for trial of outpatient management. I did discuss in detail the risks of progression of preseptal cellulitis, and encouraged patient to return if her symptoms do not improve or worsen over the next 12 to 24 hours. She indicated she would do so.       Yordan Santana MD  08/02/21 9718

## 2021-10-11 ENCOUNTER — OFFICE VISIT (OUTPATIENT)
Dept: SURGERY | Age: 61
End: 2021-10-11
Payer: MEDICAID

## 2021-10-11 VITALS
TEMPERATURE: 97.6 F | HEART RATE: 98 BPM | BODY MASS INDEX: 42.32 KG/M2 | HEIGHT: 65 IN | WEIGHT: 254 LBS | SYSTOLIC BLOOD PRESSURE: 150 MMHG | DIASTOLIC BLOOD PRESSURE: 81 MMHG

## 2021-10-11 DIAGNOSIS — K43.2 INCISIONAL HERNIA, WITHOUT OBSTRUCTION OR GANGRENE: Primary | ICD-10-CM

## 2021-10-11 PROBLEM — R55 SYNCOPE AND COLLAPSE: Status: ACTIVE | Noted: 2021-10-11

## 2021-10-11 PROBLEM — T50.902A INTENTIONAL DRUG OVERDOSE (HCC): Status: ACTIVE | Noted: 2021-03-24

## 2021-10-11 PROBLEM — R68.89 HEAD PROBLEM: Status: ACTIVE | Noted: 2021-10-11

## 2021-10-11 PROCEDURE — 99204 OFFICE O/P NEW MOD 45 MIN: CPT | Performed by: SURGERY

## 2021-10-11 PROCEDURE — 3017F COLORECTAL CA SCREEN DOC REV: CPT | Performed by: SURGERY

## 2021-10-11 PROCEDURE — 1036F TOBACCO NON-USER: CPT | Performed by: SURGERY

## 2021-10-11 PROCEDURE — G8427 DOCREV CUR MEDS BY ELIG CLIN: HCPCS | Performed by: SURGERY

## 2021-10-11 PROCEDURE — G8484 FLU IMMUNIZE NO ADMIN: HCPCS | Performed by: SURGERY

## 2021-10-11 PROCEDURE — G8419 CALC BMI OUT NRM PARAM NOF/U: HCPCS | Performed by: SURGERY

## 2021-10-11 RX ORDER — SERTRALINE HYDROCHLORIDE 100 MG/1
150 TABLET, FILM COATED ORAL DAILY
Status: ON HOLD | COMMUNITY
End: 2022-06-04 | Stop reason: SDUPTHER

## 2021-10-11 RX ORDER — HYDROXYZINE HYDROCHLORIDE 25 MG/1
25 TABLET, FILM COATED ORAL 3 TIMES DAILY PRN
Status: ON HOLD | COMMUNITY
End: 2021-12-24 | Stop reason: CLARIF

## 2021-10-11 RX ORDER — LOSARTAN POTASSIUM 100 MG/1
100 TABLET ORAL DAILY
Status: ON HOLD | COMMUNITY
End: 2022-06-04 | Stop reason: SDUPTHER

## 2021-10-11 RX ORDER — ACETAMINOPHEN 325 MG/1
325 TABLET ORAL EVERY 6 HOURS PRN
Status: ON HOLD | COMMUNITY
Start: 2020-11-10 | End: 2021-12-24 | Stop reason: CLARIF

## 2021-10-11 RX ORDER — BUPROPION HYDROCHLORIDE 100 MG/1
200 TABLET ORAL 2 TIMES DAILY
COMMUNITY
End: 2021-10-11

## 2021-10-11 RX ORDER — LOVASTATIN 10 MG/1
50 TABLET ORAL DAILY
Status: ON HOLD | COMMUNITY
Start: 2020-11-02 | End: 2021-12-24 | Stop reason: CLARIF

## 2021-10-11 RX ORDER — BUSPIRONE HYDROCHLORIDE 15 MG/1
15 TABLET ORAL 3 TIMES DAILY
COMMUNITY
Start: 2021-09-08

## 2021-10-11 RX ORDER — PANTOPRAZOLE SODIUM 40 MG/1
40 TABLET, DELAYED RELEASE ORAL DAILY
Status: ON HOLD | COMMUNITY
End: 2022-04-29 | Stop reason: SDUPTHER

## 2021-10-11 RX ORDER — CARVEDILOL 6.25 MG/1
6.25 TABLET ORAL 2 TIMES DAILY WITH MEALS
COMMUNITY
End: 2021-10-11

## 2021-10-11 RX ORDER — LIDOCAINE 50 MG/G
1 PATCH TOPICAL EVERY 24 HOURS
Status: ON HOLD | COMMUNITY
Start: 2021-05-26 | End: 2021-12-24 | Stop reason: CLARIF

## 2021-10-11 RX ORDER — QUETIAPINE FUMARATE 200 MG/1
300 TABLET, FILM COATED ORAL NIGHTLY
Status: ON HOLD | COMMUNITY
End: 2021-12-24 | Stop reason: CLARIF

## 2021-10-11 RX ORDER — CETIRIZINE HYDROCHLORIDE 10 MG/1
10 TABLET ORAL DAILY
Status: ON HOLD | COMMUNITY
End: 2021-12-24 | Stop reason: CLARIF

## 2021-10-11 RX ORDER — MONTELUKAST SODIUM 10 MG/1
10 TABLET ORAL NIGHTLY
Status: ON HOLD | COMMUNITY
End: 2021-12-24 | Stop reason: CLARIF

## 2021-10-11 RX ORDER — BUPROPION HYDROCHLORIDE 200 MG/1
200 TABLET, EXTENDED RELEASE ORAL 2 TIMES DAILY
COMMUNITY
Start: 2021-10-04

## 2021-10-11 RX ORDER — CARVEDILOL 6.25 MG/1
6.25 TABLET ORAL 2 TIMES DAILY WITH MEALS
Status: ON HOLD | COMMUNITY
Start: 2021-03-27 | End: 2021-12-24 | Stop reason: CLARIF

## 2021-10-11 RX ORDER — LIRAGLUTIDE 6 MG/ML
1.8 INJECTION SUBCUTANEOUS DAILY
Status: ON HOLD | COMMUNITY
Start: 2021-10-04 | End: 2021-12-27 | Stop reason: HOSPADM

## 2021-10-14 ENCOUNTER — APPOINTMENT (OUTPATIENT)
Dept: CT IMAGING | Age: 61
End: 2021-10-14
Payer: MEDICAID

## 2021-10-14 ENCOUNTER — HOSPITAL ENCOUNTER (EMERGENCY)
Age: 61
Discharge: HOME | End: 2021-10-14
Attending: EMERGENCY MEDICINE
Payer: MEDICAID

## 2021-10-14 VITALS
RESPIRATION RATE: 16 BRPM | HEIGHT: 65 IN | DIASTOLIC BLOOD PRESSURE: 74 MMHG | HEART RATE: 86 BPM | TEMPERATURE: 99.3 F | SYSTOLIC BLOOD PRESSURE: 146 MMHG | BODY MASS INDEX: 42.49 KG/M2 | OXYGEN SATURATION: 98 % | WEIGHT: 255 LBS

## 2021-10-14 DIAGNOSIS — K43.9 ABDOMINAL WALL HERNIA: Primary | ICD-10-CM

## 2021-10-14 LAB
ALBUMIN SERPL-MCNC: 4 G/DL (ref 3.4–5)
ALP BLD-CCNC: 85 U/L (ref 40–129)
ALT SERPL-CCNC: 21 U/L (ref 10–40)
ANION GAP SERPL CALCULATED.3IONS-SCNC: 12 MMOL/L (ref 3–16)
AST SERPL-CCNC: 17 U/L (ref 15–37)
BASE EXCESS VENOUS: 2.7 MMOL/L (ref -2–3)
BASOPHILS ABSOLUTE: 0 K/UL (ref 0–0.2)
BASOPHILS RELATIVE PERCENT: 0.5 %
BILIRUB SERPL-MCNC: <0.2 MG/DL (ref 0–1)
BILIRUBIN DIRECT: <0.2 MG/DL (ref 0–0.3)
BILIRUBIN, INDIRECT: NORMAL MG/DL (ref 0–1)
BUN BLDV-MCNC: 27 MG/DL (ref 7–20)
CALCIUM SERPL-MCNC: 8.7 MG/DL (ref 8.3–10.6)
CARBOXYHEMOGLOBIN: 1.1 % (ref 0–1.5)
CHLORIDE BLD-SCNC: 99 MMOL/L (ref 99–110)
CO2: 24 MMOL/L (ref 21–32)
CREAT SERPL-MCNC: 1 MG/DL (ref 0.6–1.1)
EOSINOPHILS ABSOLUTE: 0.4 K/UL (ref 0–0.6)
EOSINOPHILS RELATIVE PERCENT: 7.1 %
GFR AFRICAN AMERICAN: >60
GFR NON-AFRICAN AMERICAN: 57
GLUCOSE BLD-MCNC: 288 MG/DL (ref 70–99)
HCO3 VENOUS: 29.6 MMOL/L (ref 24–28)
HCT VFR BLD CALC: 33.3 % (ref 36–48)
HEMOGLOBIN, VEN, REDUCED: 75.1 %
HEMOGLOBIN: 11.1 G/DL (ref 12–16)
LACTIC ACID: 1.4 MMOL/L (ref 0.4–2)
LIPASE: 45 U/L (ref 13–60)
LYMPHOCYTES ABSOLUTE: 1.3 K/UL (ref 1–5.1)
LYMPHOCYTES RELATIVE PERCENT: 24.9 %
MCH RBC QN AUTO: 26.8 PG (ref 26–34)
MCHC RBC AUTO-ENTMCNC: 33.2 G/DL (ref 31–36)
MCV RBC AUTO: 80.7 FL (ref 80–100)
METHEMOGLOBIN VENOUS: 0.3 % (ref 0–1.5)
MONOCYTES ABSOLUTE: 0.4 K/UL (ref 0–1.3)
MONOCYTES RELATIVE PERCENT: 8.4 %
NEUTROPHILS ABSOLUTE: 3.1 K/UL (ref 1.7–7.7)
NEUTROPHILS RELATIVE PERCENT: 59.1 %
O2 SAT, VEN: 24 %
PCO2, VEN: 56 MMHG (ref 41–51)
PDW BLD-RTO: 13.5 % (ref 12.4–15.4)
PH VENOUS: 7.33 (ref 7.35–7.45)
PLATELET # BLD: 246 K/UL (ref 135–450)
PMV BLD AUTO: 7 FL (ref 5–10.5)
PO2, VEN: <30 MMHG (ref 25–40)
POTASSIUM REFLEX MAGNESIUM: 4.6 MMOL/L (ref 3.5–5.1)
RBC # BLD: 4.12 M/UL (ref 4–5.2)
SODIUM BLD-SCNC: 135 MMOL/L (ref 136–145)
TCO2 CALC VENOUS: 31 MMOL/L
TOTAL PROTEIN: 6.7 G/DL (ref 6.4–8.2)
WBC # BLD: 5.3 K/UL (ref 4–11)

## 2021-10-14 PROCEDURE — 6360000004 HC RX CONTRAST MEDICATION: Performed by: EMERGENCY MEDICINE

## 2021-10-14 PROCEDURE — 83690 ASSAY OF LIPASE: CPT

## 2021-10-14 PROCEDURE — 82803 BLOOD GASES ANY COMBINATION: CPT

## 2021-10-14 PROCEDURE — 83605 ASSAY OF LACTIC ACID: CPT

## 2021-10-14 PROCEDURE — 96374 THER/PROPH/DIAG INJ IV PUSH: CPT

## 2021-10-14 PROCEDURE — 80048 BASIC METABOLIC PNL TOTAL CA: CPT

## 2021-10-14 PROCEDURE — 6360000002 HC RX W HCPCS: Performed by: EMERGENCY MEDICINE

## 2021-10-14 PROCEDURE — 74177 CT ABD & PELVIS W/CONTRAST: CPT

## 2021-10-14 PROCEDURE — 80076 HEPATIC FUNCTION PANEL: CPT

## 2021-10-14 PROCEDURE — 36415 COLL VENOUS BLD VENIPUNCTURE: CPT

## 2021-10-14 PROCEDURE — 85025 COMPLETE CBC W/AUTO DIFF WBC: CPT

## 2021-10-14 PROCEDURE — 99285 EMERGENCY DEPT VISIT HI MDM: CPT

## 2021-10-14 RX ORDER — MORPHINE SULFATE 4 MG/ML
4 INJECTION, SOLUTION INTRAMUSCULAR; INTRAVENOUS ONCE
Status: COMPLETED | OUTPATIENT
Start: 2021-10-14 | End: 2021-10-14

## 2021-10-14 RX ORDER — SODIUM CHLORIDE, SODIUM LACTATE, POTASSIUM CHLORIDE, AND CALCIUM CHLORIDE .6; .31; .03; .02 G/100ML; G/100ML; G/100ML; G/100ML
1000 INJECTION, SOLUTION INTRAVENOUS ONCE
Status: DISCONTINUED | OUTPATIENT
Start: 2021-10-14 | End: 2021-10-15 | Stop reason: HOSPADM

## 2021-10-14 RX ADMIN — IOPAMIDOL 80 ML: 755 INJECTION, SOLUTION INTRAVENOUS at 21:41

## 2021-10-14 RX ADMIN — SODIUM CHLORIDE, SODIUM LACTATE, POTASSIUM CHLORIDE, AND CALCIUM CHLORIDE 1000 ML: .6; .31; .03; .02 INJECTION, SOLUTION INTRAVENOUS at 20:59

## 2021-10-14 RX ADMIN — MORPHINE SULFATE 4 MG: 4 INJECTION INTRAVENOUS at 20:58

## 2021-10-14 RX ADMIN — IOHEXOL 50 ML: 240 INJECTION, SOLUTION INTRATHECAL; INTRAVASCULAR; INTRAVENOUS; ORAL at 21:41

## 2021-10-14 ASSESSMENT — PAIN DESCRIPTION - ONSET: ONSET: GRADUAL

## 2021-10-14 ASSESSMENT — PAIN SCALES - GENERAL
PAINLEVEL_OUTOF10: 8
PAINLEVEL_OUTOF10: 8

## 2021-10-14 ASSESSMENT — PAIN DESCRIPTION - PAIN TYPE: TYPE: CHRONIC PAIN

## 2021-10-14 ASSESSMENT — PAIN DESCRIPTION - ORIENTATION: ORIENTATION: RIGHT;LOWER;UPPER

## 2021-10-14 ASSESSMENT — PAIN DESCRIPTION - PROGRESSION: CLINICAL_PROGRESSION: GRADUALLY WORSENING

## 2021-10-14 ASSESSMENT — PAIN - FUNCTIONAL ASSESSMENT: PAIN_FUNCTIONAL_ASSESSMENT: PREVENTS OR INTERFERES SOME ACTIVE ACTIVITIES AND ADLS

## 2021-10-14 ASSESSMENT — PAIN DESCRIPTION - LOCATION: LOCATION: ABDOMEN

## 2021-10-14 ASSESSMENT — PAIN DESCRIPTION - DIRECTION: RADIATING_TOWARDS: BOTH LEGS

## 2021-10-14 ASSESSMENT — PAIN DESCRIPTION - FREQUENCY: FREQUENCY: CONTINUOUS

## 2021-10-14 ASSESSMENT — PAIN DESCRIPTION - DESCRIPTORS: DESCRIPTORS: PRESSURE;CRAMPING

## 2021-10-15 NOTE — ED PROVIDER NOTES
[address] [facility]          ATTENDING PHYSICIAN NOTE       Date of evaluation: 10/14/2021    Chief Complaint     Abdominal Pain (RUQ/RLQ. Hx of 4 hernias)      History of Present Illness     Kelly Olson is a 62 y.o. female who presents with right-sided abdominal pain that started yesterday. Reports that she has known hernias that she is currently trying to lose weight so that she can get operative repair. Pain started yesterday and is severe, does not respond to ibuprofen or IcyHot. Worse with movement and palpation. She has had diarrhea with no blood. Denies fever, nausea, vomiting. No other aggravating relieving factors or associated symptoms. Review of Systems     Positive for abdominal pain. Negative for fever, vomiting, chest pain, shortness of breath. All other systems were reviewed and are negative, except as mentioned in HPI. Past Medical, Surgical, Family, and Social History     She has a past medical history of Anxiety, Diabetes mellitus ([address] Hernia, abdominal, Hyperlipidemia, Hypertension, and PTSD (post-traumatic stress disorder). She has a past surgical history that includes Hysterectomy; Cholecystectomy; colectomy; and Revision Colostomy. Her family history is not on file. She reports that she has never smoked. She has never used smokeless tobacco. She reports that she does not drink alcohol and does not use drugs.     Medications     Previous Medications    ACETAMINOPHEN (TYLENOL) 325 MG TABLET    Take 325 mg by mouth every 6 hours as needed    BUPROPION (WELLBUTRIN SR) 200 MG EXTENDED RELEASE TABLET        BUSPIRONE (BUSPAR) 15 MG TABLET        CARVEDILOL (COREG) 6.25 MG TABLET    Take 6.25 mg by mouth 2 times daily (with meals)    CETIRIZINE (ZYRTEC) 10 MG TABLET    Take 10 mg by mouth daily    HYDROXYZINE (ATARAX) 25 MG TABLET    Take 25 mg by mouth 3 times daily as needed for Itching    LIDOCAINE (LIDODERM) 5 %    Place 1 patch onto the skin every 24 hours    LOSARTAN (COZAAR) 50 MG TABLET    Take 50 mg by mouth daily    LOVASTATIN (MEVACOR) 10 MG TABLET    Take 50 mg by mouth daily    MONTELUKAST (SINGULAIR) 10 MG TABLET    Take 10 mg by mouth nightly    PANTOPRAZOLE (PROTONIX) 40 MG TABLET    Take 40 mg by mouth daily    PSYLLIUM (KONSYL) 28.3 % PACK    Take 1 packet by mouth daily    QUETIAPINE (SEROQUEL) 200 MG TABLET    Take 300 mg by mouth 2 times daily    SERTRALINE (ZOLOFT) 100 MG TABLET    Take 100 mg by mouth daily    VICTOZA 18 MG/3ML SOPN SC INJECTION           Allergies     She is allergic to azithromycin, metronidazole, clindamycin, penicillin g, penicillin v, tetanus immune globulin, and codeine. Physical Exam     INITIAL VITALS: BP: (!) 172/94, Temp: 99.3 °F (37.4 °C), Pulse: 91, Resp: 18, SpO2: 97 %       General:  No acute distress. Eyes:  Pupils reactive. No discharge from eyes. ENT:  No discharge from nose. Neck:  Supple. Pulmonary:   Non-labored breathing. Abdomen: Mildly distended, tender along the right side without rebound or guarding    Musculoskeletal:  No CVA tenderness. Skin:  No rash. Neuro:  Alert and oriented x4. Moves all four extremities to command. No focal deficit. Extremities:  Warm and perfused. No peripheral edema. Diagnostic Results     LABS:   Please see electronic medical record for laboratory tests performed in the ED. RADIOLOGY:  Please see electronic medical record for imaging studies performed in the ED. RECENT VITALS:  BP: (!) 172/94, Temp: 99.3 °F (37.4 °C), Pulse: 91, Resp: 18     Procedures         ED Course     Nursing Notes, Past Medical Hx, Past Surgical Hx, Social Hx, Allergies, and Family Hx were reviewed.     The patient was given the following medications:  Orders Placed This Encounter   Medications    morphine injection 4 mg    lactated ringers bolus       CONSULTS:  None    MEDICAL DECISION MAKING / ASSESSMENT / Roxana Hernandez is a 62 y.o. female presenting with 1 day of right-sided abdominal pain. She was given morphine for pain and a liter of fluid for hydration. CBC with white count of 5.3 and hemoglobin of 11.1. Hemoglobin is at the patient's baseline. Renal panel with sodium of 135, BUN elevated at 27, creatinine baseline at 1. LFTs and lipase normal.  Lactate normal.  VBG with mild respiratory acidosis with pH of 7.33 and PCO2 of 56. At this point I am awaiting CT abdomen and pelvis. The patient will be signed out to oncoming physician to follow-up on the results of this and complete the disposition.                 Karen Campo MD  10/14/21 2576

## 2021-10-15 NOTE — ED PROVIDER NOTES
[address] [facility] ENCOUNTER          ATTENDING PHYSICIAN NOTE       Date of evaluation: 10/14/2021    ADDENDUM:      Care of this patient was assumed from Dr. Levi Vallecillo. The patient was seen for Abdominal Pain (RUQ/RLQ. Hx of 4 hernias)  . The patient's initial evaluation and plan have been discussed with the prior provider who initially evaluated the patient. Nursing Notes, Past Medical Hx, Past Surgical Hx, Social Hx, Allergies, and Family Hx were all reviewed. Patient is a 41-year-old female with known multiple abdominal hernias presents complaining of right-sided abdominal pain that started yesterday. Patient denies any fevers, nausea, or vomiting associated with this. Patient is a soft abdomen on exam with mild distention and tenderness on the right side. Laboratory studies are unremarkable. At time of turnover, CT scan of the abdomen and pelvis was pending. Diagnostic Results     RADIOLOGY:  CT ABDOMEN PELVIS W IV CONTRAST Additional Contrast? Oral   Final Result         1. Total of 4 , stable ventral hernias, 3 of which contain bowel loops, the largest of which is a right-sided spigelian hernia containing multiple loops of small bowel as well as a short segment of proximal colon. 2. No evidence for bowel obstruction or strangulation.           LABS:   Results for orders placed or performed during the hospital encounter of 10/14/21   CBC Auto Differential   Result Value Ref Range    WBC 5.3 4.0 - 11.0 K/uL    RBC 4.12 4.00 - 5.20 M/uL    Hemoglobin 11.1 (L) 12.0 - 16.0 g/dL    Hematocrit 33.3 (L) 36.0 - 48.0 %    MCV 80.7 80.0 - 100.0 fL    MCH 26.8 26.0 - 34.0 pg    MCHC 33.2 31.0 - 36.0 g/dL    RDW 13.5 12.4 - 15.4 %    Platelets 847 306 - 601 K/uL    MPV 7.0 5.0 - 10.5 fL    Neutrophils % 59.1 %    Lymphocytes % 24.9 %    Monocytes % 8.4 %    Eosinophils % 7.1 %    Basophils % 0.5 %    Neutrophils Absolute 3.1 1.7 - 7.7 K/uL    Lymphocytes Absolute 1.3 1.0 - 5.1 K/uL Monocytes Absolute 0.4 0.0 - 1.3 K/uL    Eosinophils Absolute 0.4 0.0 - 0.6 K/uL    Basophils Absolute 0.0 0.0 - 0.2 K/uL   Basic Metabolic Panel w/ Reflex to MG   Result Value Ref Range    Sodium 135 (L) 136 - 145 mmol/L    Potassium reflex Magnesium 4.6 3.5 - 5.1 mmol/L    Chloride 99 99 - 110 mmol/L    CO2 24 21 - 32 mmol/L    Anion Gap 12 3 - 16    Glucose 288 (H) 70 - 99 mg/dL    BUN 27 (H) 7 - 20 mg/dL    CREATININE 1.0 0.6 - 1.1 mg/dL    GFR Non- 57 (A) >60    GFR African American >60 >60    Calcium 8.7 8.3 - 10.6 mg/dL   Hepatic Function Panel   Result Value Ref Range    Total Protein 6.7 6.4 - 8.2 g/dL    Albumin 4.0 3.4 - 5.0 g/dL    Alkaline Phosphatase 85 40 - 129 U/L    ALT 21 10 - 40 U/L    AST 17 15 - 37 U/L    Total Bilirubin <0.2 0.0 - 1.0 mg/dL    Bilirubin, Direct <0.2 0.0 - 0.3 mg/dL    Bilirubin, Indirect see below 0.0 - 1.0 mg/dL   Lipase   Result Value Ref Range    Lipase 45.0 13.0 - 60.0 U/L   Lactic Acid, Plasma   Result Value Ref Range    Lactic Acid 1.4 0.4 - 2.0 mmol/L   Blood Gas, Venous   Result Value Ref Range    pH, Ru 7.332 (L) 7.350 - 7.450    pCO2, Ru 56.0 (H) 41.0 - 51.0 mmHg    pO2, Ru <30.0 25 - 40 mmHg    HCO3, Venous 29.6 (H) 24.0 - 28.0 mmol/L    Base Excess, Ru 2.7 -2.0 - 3.0 mmol/L    O2 Sat, Ru 24 Not established %    Carboxyhemoglobin 1.1 0.0 - 1.5 %    MetHgb, Ru 0.3 0.0 - 1.5 %    TC02 (Calc), Ru 31 mmol/L    Hemoglobin, Ru, Reduced 75.10 %       RECENT VITALS:  BP: (!) 142/73, Temp: 99.3 °F (37.4 °C), Pulse: 91, Resp: 18, SpO2: 98 %     Procedures     N/A    ED Course     The patient was given the following medications:  Orders Placed This Encounter   Medications    morphine injection 4 mg    lactated ringers bolus    iohexol (OMNIPAQUE 240) injection 50 mL    iopamidol (ISOVUE-370) 76 % injection 80 mL       CONSULTS:  None    MEDICAL DECISION MAKING / ASSESSMENT / Araceli Rolanda is a 62 y.o. female with known history of multiple abdominal wall hernias presents to the emerge from a complaining of abdominal pain. Patient denies any associated symptoms with this. On arrival, patient has stable vital signs. She does have reproducible tenderness along the right side of the abdomen with no rebound or guarding present. Laboratory studies are unremarkable including normal white blood cell count and lactate. CT scan of the abdomen and pelvis does show her known four hernias with no change in appearance since CT scan from 4 months ago. There is no evidence of strangulation on exam.  I feel that this is nonspecific pain over her hernia sites and not due to incarceration or strangulation of her hernias. Patient will be instructed to continue her current medications and follow-up with her surgeon as previously scheduled for definitive management. Clinical Impression     1. Abdominal wall hernia        Disposition     PATIENT REFERRED TO:  EUGENIE Erickson - ELISHA  [address] [facility]  [address]  [address]  [address]            DISCHARGE MEDICATIONS:  Current Discharge Medication List          DISPOSITION          Art Bales MD  10/14/21 7314

## 2021-10-15 NOTE — ED NOTES
Bed: B15-15  Expected date:   Expected time:   Means of arrival:   Comments:  Sandy Loera RN  10/14/21 2003

## 2021-10-17 NOTE — PROGRESS NOTES
PATIENT NAME: Delano Caban     YOB: 1960     TODAY'S DATE: 10/17/2021    Reason for Visit:  Incisional hernias    Requesting Physician:  Leander Amador    HISTORY OF PRESENT ILLNESS:              The patient is a 62 y.o. female with a PMHx as delineated below who presents with complaints of episodic right sided abdominal pain. She denies any obstructive complaints.       Chief Complaint   Patient presents with   [employer] Patient     recurrent abd. hernia without obstruction or gangrene f/u ER 6/20       REVIEW OF SYSTEMS:  CONSTITUTIONAL:  negative  HEENT:  negative  RESPIRATORY:  negative  CARDIOVASCULAR:  negative  GASTROINTESTINAL:  negative except for abdominal pain  GENITOURINARY:  negative  HEMATOLOGIC/LYMPHATIC:  negative  MUSCULOSKELETAL: negative  NEUROLOGICAL:  negative    PMH  Past Medical History:   Diagnosis Date    Anxiety     Diabetes mellitus ([address]     Hernia, abdominal     Hyperlipidemia     Hypertension     PTSD (post-traumatic stress disorder)        PSH  Past Surgical History:   Procedure Laterality Date    CHOLECYSTECTOMY      COLECTOMY      HYSTERECTOMY      REVISION COLOSTOMY         Social History  Social History     Socioeconomic History    Marital status:      Spouse name: Not on file    Number of children: Not on file    Years of education: Not on file    Highest education level: Not on file   Occupational History    Not on file   Tobacco Use    Smoking status: Never Smoker    Smokeless tobacco: Never Used   Substance and Sexual Activity    Alcohol use: Never    Drug use: Never    Sexual activity: Not Currently   Other Topics Concern    Not on file   Social History Narrative    Not on file     Social Determinants of Health     Financial Resource Strain:     Difficulty of Paying Living Expenses:    Food Insecurity:     Worried About Running Out of Food in the Last Year:     [address] in the Last Year:    Transportation Needs:     Lack of Transportation (Medical):  Lack of Transportation (Non-Medical):    Physical Activity:     Days of Exercise per Week:     Minutes of Exercise per Session:    Stress:     Feeling of Stress :    Social Connections:     Frequency of Communication with Friends and Family:     Frequency of Social Gatherings with Friends and Family:     Attends [employer] Services:     Active Member of Clubs or Organizations:     Attends Club or Organization Meetings:     Marital Status:    Intimate Partner Violence:     Fear of Current or Ex-Partner:     Emotionally Abused:     Physically Abused:     Sexually Abused:        Family History:   No family history on file. Allergy:   Allergies   Allergen Reactions    Azithromycin      Other reaction(s): Itching, Swelling    Metronidazole Hives and Itching    Clindamycin     Penicillin G      Other reaction(s): Unknown    Penicillin V     Tetanus Immune Globulin     Codeine Rash       PHYSICAL EXAM:  VITALS:  BP (!) 150/81   Pulse 98   Temp 97.6 °F (36.4 °C)   Ht 5' 5\" (1.651 m)   Wt 254 lb (115.2 kg)   BMI 42.27 kg/m²     CONSTITUTIONAL:  alert, no apparent distress and morbidly obese  EYES:  sclera clear  ENT:  normocepalic, without obvious abnormality  NECK:  supple, symmetrical, trachea midline and no carotid bruits  LUNGS:  clear to auscultation  CARDIOVASCULAR:  regular rate and rhythm and no murmur noted  ABDOMEN:  scars noted, normal bowel sounds, soft, non-distended, non-tender, voluntary guarding absent, no masses palpated and multiple hernias present  MUSCULOSKELETAL:  0+ pitting edema lower extremities  NEUROLOGIC:  Mental Status Exam:  Level of Alertness:   awake  Orientation:   person, place, time  SKIN:  no bruising or bleeding and normal skin color, texture, turgor    Radiology Review:    1. Large right abdominal wall spigelian hernia containing bowel loops without evidence of acute complication.    2. Upper anterior abdominal wall right paramedian small fat-containing ventral hernia with fat stranding and small amount of fluid, nonspecific, may relate to incarceration/strangulation. Recommended clinical correlation. .   3. Additional moderate size ventral hernias as above without evidence of acute complication. IMPRESSION/RECOMMENDATIONS:    The patient has multiple abdominal wall hernia that are not producing any obstructive symptoms. The patient will need to have these hernia repair but we will need to optimize her before proceeding. She will need to loss weight and determine her level of BS control as her HA1C was 9.6 in March. When her BMI is at least below 38 with good BS control I will see her back in the office.      Irlanda Gudino MD

## 2021-11-04 ENCOUNTER — APPOINTMENT (OUTPATIENT)
Dept: CT IMAGING | Age: 61
End: 2021-11-04
Payer: MEDICAID

## 2021-11-04 ENCOUNTER — HOSPITAL ENCOUNTER (EMERGENCY)
Age: 61
Discharge: HOME | End: 2021-11-04
Attending: EMERGENCY MEDICINE
Payer: MEDICAID

## 2021-11-04 VITALS
BODY MASS INDEX: 43.54 KG/M2 | DIASTOLIC BLOOD PRESSURE: 75 MMHG | TEMPERATURE: 98.5 F | HEART RATE: 88 BPM | SYSTOLIC BLOOD PRESSURE: 155 MMHG | OXYGEN SATURATION: 98 % | HEIGHT: 64 IN | WEIGHT: 255 LBS | RESPIRATION RATE: 16 BRPM

## 2021-11-04 DIAGNOSIS — M51.36 DEGENERATIVE DISC DISEASE, LUMBAR: ICD-10-CM

## 2021-11-04 DIAGNOSIS — M54.32 SCIATICA OF LEFT SIDE: ICD-10-CM

## 2021-11-04 DIAGNOSIS — M54.6 ACUTE LEFT-SIDED THORACIC BACK PAIN: Primary | ICD-10-CM

## 2021-11-04 DIAGNOSIS — M54.42 ACUTE LEFT-SIDED LOW BACK PAIN WITH LEFT-SIDED SCIATICA: ICD-10-CM

## 2021-11-04 LAB
ALBUMIN SERPL-MCNC: 4.4 G/DL (ref 3.4–5)
ALP BLD-CCNC: 92 U/L (ref 40–129)
ALT SERPL-CCNC: 21 U/L (ref 10–40)
ANION GAP SERPL CALCULATED.3IONS-SCNC: 13 MMOL/L (ref 3–16)
AST SERPL-CCNC: 16 U/L (ref 15–37)
BASE EXCESS VENOUS: 4.4 MMOL/L (ref -2–3)
BASOPHILS ABSOLUTE: 0 K/UL (ref 0–0.2)
BASOPHILS RELATIVE PERCENT: 0.7 %
BILIRUB SERPL-MCNC: <0.2 MG/DL (ref 0–1)
BILIRUBIN DIRECT: <0.2 MG/DL (ref 0–0.3)
BILIRUBIN, INDIRECT: NORMAL MG/DL (ref 0–1)
BUN BLDV-MCNC: 22 MG/DL (ref 7–20)
CALCIUM SERPL-MCNC: 9.1 MG/DL (ref 8.3–10.6)
CARBOXYHEMOGLOBIN: 1.8 % (ref 0–1.5)
CHLORIDE BLD-SCNC: 94 MMOL/L (ref 99–110)
CO2: 26 MMOL/L (ref 21–32)
CREAT SERPL-MCNC: 1 MG/DL (ref 0.6–1.1)
EKG ATRIAL RATE: 91 BPM
EKG DIAGNOSIS: NORMAL
EKG P AXIS: 71 DEGREES
EKG P-R INTERVAL: 182 MS
EKG Q-T INTERVAL: 354 MS
EKG QRS DURATION: 92 MS
EKG QTC CALCULATION (BAZETT): 435 MS
EKG R AXIS: 15 DEGREES
EKG T AXIS: 42 DEGREES
EKG VENTRICULAR RATE: 91 BPM
EOSINOPHILS ABSOLUTE: 0.4 K/UL (ref 0–0.6)
EOSINOPHILS RELATIVE PERCENT: 7.4 %
GFR AFRICAN AMERICAN: >60
GFR NON-AFRICAN AMERICAN: 57
GLUCOSE BLD-MCNC: 296 MG/DL (ref 70–99)
HCO3 VENOUS: 31.1 MMOL/L (ref 24–28)
HCT VFR BLD CALC: 34.7 % (ref 36–48)
HEMOGLOBIN, VEN, REDUCED: 53.4 %
HEMOGLOBIN: 11.1 G/DL (ref 12–16)
LACTIC ACID: 1.6 MMOL/L (ref 0.4–2)
LYMPHOCYTES ABSOLUTE: 1.4 K/UL (ref 1–5.1)
LYMPHOCYTES RELATIVE PERCENT: 24.2 %
MCH RBC QN AUTO: 25.1 PG (ref 26–34)
MCHC RBC AUTO-ENTMCNC: 31.9 G/DL (ref 31–36)
MCV RBC AUTO: 78.7 FL (ref 80–100)
METHEMOGLOBIN VENOUS: 0.8 % (ref 0–1.5)
MONOCYTES ABSOLUTE: 0.4 K/UL (ref 0–1.3)
MONOCYTES RELATIVE PERCENT: 7.5 %
NEUTROPHILS ABSOLUTE: 3.5 K/UL (ref 1.7–7.7)
NEUTROPHILS RELATIVE PERCENT: 60.2 %
O2 SAT, VEN: 45 %
PCO2, VEN: 56 MMHG (ref 41–51)
PDW BLD-RTO: 13.9 % (ref 12.4–15.4)
PH VENOUS: 7.35 (ref 7.35–7.45)
PLATELET # BLD: 242 K/UL (ref 135–450)
PMV BLD AUTO: 6.9 FL (ref 5–10.5)
PO2, VEN: <30 MMHG (ref 25–40)
POTASSIUM REFLEX MAGNESIUM: 4.4 MMOL/L (ref 3.5–5.1)
RBC # BLD: 4.4 M/UL (ref 4–5.2)
SODIUM BLD-SCNC: 133 MMOL/L (ref 136–145)
TCO2 CALC VENOUS: 33 MMOL/L
TOTAL PROTEIN: 7.3 G/DL (ref 6.4–8.2)
TROPONIN: <0.01 NG/ML
WBC # BLD: 5.8 K/UL (ref 4–11)

## 2021-11-04 PROCEDURE — 72131 CT LUMBAR SPINE W/O DYE: CPT

## 2021-11-04 PROCEDURE — 80048 BASIC METABOLIC PNL TOTAL CA: CPT

## 2021-11-04 PROCEDURE — 72128 CT CHEST SPINE W/O DYE: CPT

## 2021-11-04 PROCEDURE — 93005 ELECTROCARDIOGRAM TRACING: CPT | Performed by: STUDENT IN AN ORGANIZED HEALTH CARE EDUCATION/TRAINING PROGRAM

## 2021-11-04 PROCEDURE — 2580000003 HC RX 258: Performed by: STUDENT IN AN ORGANIZED HEALTH CARE EDUCATION/TRAINING PROGRAM

## 2021-11-04 PROCEDURE — 71275 CT ANGIOGRAPHY CHEST: CPT

## 2021-11-04 PROCEDURE — 36415 COLL VENOUS BLD VENIPUNCTURE: CPT

## 2021-11-04 PROCEDURE — 99284 EMERGENCY DEPT VISIT MOD MDM: CPT

## 2021-11-04 PROCEDURE — 6360000002 HC RX W HCPCS: Performed by: STUDENT IN AN ORGANIZED HEALTH CARE EDUCATION/TRAINING PROGRAM

## 2021-11-04 PROCEDURE — 85025 COMPLETE CBC W/AUTO DIFF WBC: CPT

## 2021-11-04 PROCEDURE — 6370000000 HC RX 637 (ALT 250 FOR IP): Performed by: STUDENT IN AN ORGANIZED HEALTH CARE EDUCATION/TRAINING PROGRAM

## 2021-11-04 PROCEDURE — 82803 BLOOD GASES ANY COMBINATION: CPT

## 2021-11-04 PROCEDURE — 80076 HEPATIC FUNCTION PANEL: CPT

## 2021-11-04 PROCEDURE — 6360000004 HC RX CONTRAST MEDICATION: Performed by: STUDENT IN AN ORGANIZED HEALTH CARE EDUCATION/TRAINING PROGRAM

## 2021-11-04 PROCEDURE — 83605 ASSAY OF LACTIC ACID: CPT

## 2021-11-04 PROCEDURE — 96374 THER/PROPH/DIAG INJ IV PUSH: CPT

## 2021-11-04 PROCEDURE — 84484 ASSAY OF TROPONIN QUANT: CPT

## 2021-11-04 RX ORDER — METHOCARBAMOL 500 MG/1
1000 TABLET, FILM COATED ORAL 3 TIMES DAILY
Qty: 60 TABLET | Refills: 0 | Status: SHIPPED | OUTPATIENT
Start: 2021-11-04 | End: 2021-11-14

## 2021-11-04 RX ORDER — KETOROLAC TROMETHAMINE 30 MG/ML
15 INJECTION, SOLUTION INTRAMUSCULAR; INTRAVENOUS ONCE
Status: COMPLETED | OUTPATIENT
Start: 2021-11-04 | End: 2021-11-04

## 2021-11-04 RX ORDER — SODIUM CHLORIDE, SODIUM LACTATE, POTASSIUM CHLORIDE, AND CALCIUM CHLORIDE .6; .31; .03; .02 G/100ML; G/100ML; G/100ML; G/100ML
1000 INJECTION, SOLUTION INTRAVENOUS ONCE
Status: COMPLETED | OUTPATIENT
Start: 2021-11-04 | End: 2021-11-04

## 2021-11-04 RX ORDER — ACETAMINOPHEN 500 MG
1000 TABLET ORAL ONCE
Status: COMPLETED | OUTPATIENT
Start: 2021-11-04 | End: 2021-11-04

## 2021-11-04 RX ORDER — METHYLPREDNISOLONE 4 MG/1
TABLET ORAL
Qty: 21 TABLET | Refills: 0 | Status: SHIPPED | OUTPATIENT
Start: 2021-11-04 | End: 2021-11-10

## 2021-11-04 RX ADMIN — SODIUM CHLORIDE, SODIUM LACTATE, POTASSIUM CHLORIDE, AND CALCIUM CHLORIDE 1000 ML: .6; .31; .03; .02 INJECTION, SOLUTION INTRAVENOUS at 19:32

## 2021-11-04 RX ADMIN — KETOROLAC TROMETHAMINE 15 MG: 30 INJECTION, SOLUTION INTRAMUSCULAR at 19:32

## 2021-11-04 RX ADMIN — ACETAMINOPHEN 1000 MG: 500 TABLET ORAL at 19:33

## 2021-11-04 RX ADMIN — IOPAMIDOL 80 ML: 755 INJECTION, SOLUTION INTRAVENOUS at 19:07

## 2021-11-04 ASSESSMENT — PAIN SCALES - GENERAL
PAINLEVEL_OUTOF10: 7

## 2021-11-04 ASSESSMENT — PAIN DESCRIPTION - FREQUENCY: FREQUENCY: INTERMITTENT

## 2021-11-04 ASSESSMENT — PAIN DESCRIPTION - PAIN TYPE: TYPE: ACUTE PAIN

## 2021-11-04 ASSESSMENT — PAIN DESCRIPTION - LOCATION: LOCATION: RIB CAGE

## 2021-11-04 ASSESSMENT — PAIN DESCRIPTION - ORIENTATION: ORIENTATION: RIGHT;LEFT

## 2021-11-04 ASSESSMENT — PAIN DESCRIPTION - DESCRIPTORS: DESCRIPTORS: SHARP

## 2021-11-04 NOTE — ED PROVIDER NOTES
[address] [facility] RESIDENT NOTE       Date of evaluation: 11/4/2021    Chief Complaint     Shortness of Breath (sudden onset this afternoon while sitting on the couch, sharp pain with inhalation, -fever, -cough)    History of Present Illness     Wanda Mtz is a 62 y.o. female who presents for back pain. Patient states that around 2 pm today she had sudden onset of left thoracic back pain that gets much worse with deep breaths. She states that the pain shoots down from her back into her legs. She has been having weakness in her legs, states that her legs feel like jello, for months but has not been able to get into the neurologist. She has had multiple falls secondary to her legs giving out from under her. She also thinks she may have had a syncopal event yesterday. She denies fever, chills, chest pain, significant SOB, abdominal pain, nausea, vomiting, diarrhea. Normal BM today. No blood. No urinary retention or incontinence. Review of Systems     Review of Systems   Complete review of systems was negative except as noted in HPI. Past Medical, Surgical, Family, and Social History     She has a past medical history of Anxiety, Diabetes mellitus ([address] Hernia, abdominal, Hyperlipidemia, Hypertension, and PTSD (post-traumatic stress disorder). She has a past surgical history that includes Hysterectomy; Cholecystectomy; colectomy; and Revision Colostomy. Her family history is not on file. She reports that she has never smoked. She has never used smokeless tobacco. She reports that she does not drink alcohol and does not use drugs.     Medications     Previous Medications    ACETAMINOPHEN (TYLENOL) 325 MG TABLET    Take 325 mg by mouth every 6 hours as needed    BUPROPION (WELLBUTRIN SR) 200 MG EXTENDED RELEASE TABLET        BUSPIRONE (BUSPAR) 15 MG TABLET        CARVEDILOL (COREG) 6.25 MG TABLET    Take 6.25 mg by mouth 2 times daily (with meals) CETIRIZINE (ZYRTEC) 10 MG TABLET    Take 10 mg by mouth daily    HYDROXYZINE (ATARAX) 25 MG TABLET    Take 25 mg by mouth 3 times daily as needed for Itching    LIDOCAINE (LIDODERM) 5 %    Place 1 patch onto the skin every 24 hours    LOSARTAN (COZAAR) 50 MG TABLET    Take 50 mg by mouth daily    LOVASTATIN (MEVACOR) 10 MG TABLET    Take 50 mg by mouth daily    MONTELUKAST (SINGULAIR) 10 MG TABLET    Take 10 mg by mouth nightly    PANTOPRAZOLE (PROTONIX) 40 MG TABLET    Take 40 mg by mouth daily    PSYLLIUM (KONSYL) 28.3 % PACK    Take 1 packet by mouth daily    QUETIAPINE (SEROQUEL) 200 MG TABLET    Take 300 mg by mouth 2 times daily    SERTRALINE (ZOLOFT) 100 MG TABLET    Take 100 mg by mouth daily    VICTOZA 18 MG/3ML SOPN SC INJECTION           Allergies     She is allergic to azithromycin, metronidazole, clindamycin, penicillin g, penicillin v, tetanus immune globulin, and codeine. Physical Exam     INITIAL VITALS: BP: (!) 173/66, Temp: 98.5 °F (36.9 °C), Pulse: 90, Resp: 15, SpO2: 98 %   Physical Exam  Constitutional:       General: She is not in acute distress. Appearance: She is well-developed. She is obese. HENT:      Head: Normocephalic and atraumatic. Mouth/Throat:      Pharynx: No oropharyngeal exudate. Eyes:      Conjunctiva/sclera: Conjunctivae normal.      Pupils: Pupils are equal, round, and reactive to light. Cardiovascular:      Rate and Rhythm: Normal rate and regular rhythm. Heart sounds: No murmur heard. No friction rub. No gallop. Pulmonary:      Effort: Pulmonary effort is normal.      Breath sounds: Normal breath sounds. No wheezing or rales. Abdominal:      General: There is no distension. Palpations: Abdomen is soft. Tenderness: There is no abdominal tenderness. Musculoskeletal:      Cervical back: Neck supple. Right lower leg: No edema. Left lower leg: No edema.       Comments: Tenderness of thoracic and lumbar spine, left paravertebral tenderness of thoracic spine. Lymphadenopathy:      Cervical: No cervical adenopathy. Skin:     General: Skin is warm and dry. Capillary Refill: Capillary refill takes less than 2 seconds. Findings: No erythema or rash. Neurological:      Mental Status: She is alert and oriented to person, place, and time. DiagnosticResults     EKG   NSR 91 bpm, normal axis, normal intervals.  No ST elevations or T-wave inversions     RADIOLOGY:  CTA PULMONARY W CONTRAST    (Results Pending)   CT THORACIC SPINE WO CONTRAST    (Results Pending)   CT LUMBAR SPINE WO CONTRAST    (Results Pending)       LABS:   Results for orders placed or performed during the hospital encounter of 11/04/21   CBC Auto Differential   Result Value Ref Range    WBC 5.8 4.0 - 11.0 K/uL    RBC 4.40 4.00 - 5.20 M/uL    Hemoglobin 11.1 (L) 12.0 - 16.0 g/dL    Hematocrit 34.7 (L) 36.0 - 48.0 %    MCV 78.7 (L) 80.0 - 100.0 fL    MCH 25.1 (L) 26.0 - 34.0 pg    MCHC 31.9 31.0 - 36.0 g/dL    RDW 13.9 12.4 - 15.4 %    Platelets 107 856 - 348 K/uL    MPV 6.9 5.0 - 10.5 fL    Neutrophils % 60.2 %    Lymphocytes % 24.2 %    Monocytes % 7.5 %    Eosinophils % 7.4 %    Basophils % 0.7 %    Neutrophils Absolute 3.5 1.7 - 7.7 K/uL    Lymphocytes Absolute 1.4 1.0 - 5.1 K/uL    Monocytes Absolute 0.4 0.0 - 1.3 K/uL    Eosinophils Absolute 0.4 0.0 - 0.6 K/uL    Basophils Absolute 0.0 0.0 - 0.2 K/uL   Basic Metabolic Panel w/ Reflex to MG   Result Value Ref Range    Sodium 133 (L) 136 - 145 mmol/L    Potassium reflex Magnesium 4.4 3.5 - 5.1 mmol/L    Chloride 94 (L) 99 - 110 mmol/L    CO2 26 21 - 32 mmol/L    Anion Gap 13 3 - 16    Glucose 296 (H) 70 - 99 mg/dL    BUN 22 (H) 7 - 20 mg/dL    CREATININE 1.0 0.6 - 1.1 mg/dL    GFR Non- 57 (A) >60    GFR African American >60 >60    Calcium 9.1 8.3 - 10.6 mg/dL   Hepatic Function Panel   Result Value Ref Range    Total Protein 7.3 6.4 - 8.2 g/dL    Albumin 4.4 3.4 - 5.0 g/dL    Alkaline Phosphatase 92 40 - 129 U/L    ALT 21 10 - 40 U/L    AST 16 15 - 37 U/L    Total Bilirubin <0.2 0.0 - 1.0 mg/dL    Bilirubin, Direct <0.2 0.0 - 0.3 mg/dL    Bilirubin, Indirect see below 0.0 - 1.0 mg/dL   Troponin   Result Value Ref Range    Troponin <0.01 <0.01 ng/mL   Blood Gas, Venous   Result Value Ref Range    pH, Ru 7.353 7.350 - 7.450    pCO2, Ru 56.0 (H) 41.0 - 51.0 mmHg    pO2, Ru <30.0 25 - 40 mmHg    HCO3, Venous 31.1 (H) 24.0 - 28.0 mmol/L    Base Excess, Ru 4.4 (H) -2.0 - 3.0 mmol/L    O2 Sat, Ru 45 Not established %    Carboxyhemoglobin 1.8 (H) 0.0 - 1.5 %    MetHgb, Ru 0.8 0.0 - 1.5 %    TC02 (Calc), Ru 33 mmol/L    Hemoglobin, Ru, Reduced 53.40 %   Lactic Acid, Plasma   Result Value Ref Range    Lactic Acid 1.6 0.4 - 2.0 mmol/L   EKG 12 Lead   Result Value Ref Range    Ventricular Rate 91 BPM    Atrial Rate 91 BPM    P-R Interval 182 ms    QRS Duration 92 ms    Q-T Interval 354 ms    QTc Calculation (Bazett) 435 ms    P Axis 71 degrees    R Axis 15 degrees    T Axis 42 degrees    Diagnosis       EKG performed in ER and to be interpreted by ER physician. Confirmed by MD, ER ([id number]),  Mynor Chew ([phone]) on 11/4/2021 6:56:32 PM       RECENT VITALS:  BP: (!) 156/74, Temp: 98.5 °F (36.9 °C), Pulse: 85,Resp: 15, SpO2: 99 %     Procedures     None    ED Course     Nursing Notes, Past Medical Hx, Past Surgical Hx, Social Hx, Allergies, and Family Hx were reviewed. The patient was given the followingmedications:  Orders Placed This Encounter   Medications    acetaminophen (TYLENOL) tablet 1,000 mg    ketorolac (TORADOL) injection 15 mg    lactated ringers bolus    iopamidol (ISOVUE-370) 76 % injection 80 mL       CONSULTS:  None    MEDICAL Yo Watters / CECILIA / Claudia Uma is a 62 y.o. female who presents with pleuritic back pain sudden in onset earlier today.  She also has long-standing pain in bilteral lower extremities and legs thst feel like jello leading to multiple falls at home. On exam she is in no distress, cardiopulmonary exam is normal. She has no abdominal tenderness. She does have some left paravertebral tenderness in the thoracic region. Ddx includes PE (with pleuritic back pain), T or L-spine fx given multiple recent falls, rib fx. Ddx for possible syncope includes cardiac arrhythmia, ACS, hypovolemia. We will perform labs, CTPA, CT T and L spine and reassess. Labs significant for mildly low sodium and chloride, elevated glucose, normal troponin. Hgb at baseline. VBG with chronic compensated respiratory acidosis. Signed out to my Marshall Nieto PA-C who will follow up:   - CTPA   - CT T-spine   - CT L-spine   - reassessment, appropriate disposition    This patient was also evaluated by the attending physician. All care plans werediscussed and agreed upon. Clinical Impression     1. Acute left-sided thoracic back pain        Disposition     PATIENT REFERRED TO:  No follow-up provider specified.     DISCHARGE MEDICATIONS:  New Prescriptions    No medications on file       Neno Payan MD  Resident  11/04/21 8184

## 2021-11-04 NOTE — ED NOTES
Bed: B17-17  Expected date:   Expected time:   Means of arrival:   Comments:  [facility], [facility]  11/04/21 3029

## 2021-11-05 NOTE — ED PROVIDER NOTES
ED Attending Attestation Note     Date of evaluation: 11/4/2021    This patient was seen by the resident. I have seen and examined the patient, agree with the workup, evaluation, management and diagnosis. The care plan has been discussed. I have reviewed the ECG and concur with the resident's interpretation. My assessment reveals a somewhat uncomfortable appearing patient, in no acute respiratory distress. She presents today with subacute complaints regarding vague weakness and occasional \"giving out\" of her legs, which she relates the onset of to having received her COVID-19 vaccination in July of this year. She is being evaluated for this as an outpatient by her primary care provider, and has been referred to neurology for further testing. More acutely, she complains of sudden onset of sharp, pleuritic, posterior chest wall pain, which began at rest while seated on the couch today. She otherwise does not have any respiratory infectious symptoms, or any exertional chest pain symptoms. Given the patient's relatively sedentary behavior, concern is high for possible PE.  CTPA does not show any evidence of pulmonary embolus. Imaging of the spine was performed at the same time, as the patient describes some relatively recent falls with increase in chronic mid to low back pain, and no acute traumatic injuries are noted. The patient was able to ambulate at her baseline level of mobility with her cane, without any associated hypoxia.        Virginia Casanova MD  11/04/21 4410

## 2021-11-05 NOTE — ED NOTES
Pt walked to the bathroom with her cane without abnormal gait, pulse ox lowest at 94%. Karlie Ma RN  11/04/21 2030

## 2021-11-30 NOTE — ED PROVIDER NOTES
[facility]          PHYSICIAN ASSISTANT NOTE     Date of evaluation: 11/4/2021    ADDENDUM:      Care of this patient was assumed from my colleague, Dr. Sherrin Rinne. The patient was seen for Shortness of Breath (sudden onset this afternoon while sitting on the couch, sharp pain with inhalation, -fever, -cough)  . The patient's initial evaluation and plan have been discussed with the prior provider who initially evaluated the patient. Nursing Notes, Past Medical Hx, Past Surgical Hx, Social Hx, Allergies, and Family Hx were all reviewed. Diagnostic Results     EKG   Please see the prior providers note for any ECG interpretation. RADIOLOGY:  CT LUMBAR SPINE WO CONTRAST   Final Result   1. No acute osseous abnormality of the thoracic or lumbar spine. 2. Lower lumbar degenerative disc disease most pronounced at L2-L3 and L3-L4 without definite central canal narrowing. This could be evaluated further with MRI if clinically warranted. 3. Multifactorial multilevel foraminal narrowing most pronounced bilaterally at L4-L5 with severe narrowing. This also could be evaluated further with MRI if clinically warranted. CT THORACIC SPINE WO CONTRAST   Final Result   1. No acute osseous abnormality of the thoracic or lumbar spine. 2. Lower lumbar degenerative disc disease most pronounced at L2-L3 and L3-L4 without definite central canal narrowing. This could be evaluated further with MRI if clinically warranted. 3. Multifactorial multilevel foraminal narrowing most pronounced bilaterally at L4-L5 with severe narrowing. This also could be evaluated further with MRI if clinically warranted. CTA PULMONARY W CONTRAST   Final Result      1. No evidence of pulmonary thromboembolism. No acute cardiopulmonary abnormality identified. 2. Small focus of bronchiolitis in the left lung base.           LABS:   Results for orders placed or performed during the hospital encounter of 11/04/21   CBC Auto Differential   Result Value Ref Range    WBC 5.8 4.0 - 11.0 K/uL    RBC 4.40 4.00 - 5.20 M/uL    Hemoglobin 11.1 (L) 12.0 - 16.0 g/dL    Hematocrit 34.7 (L) 36.0 - 48.0 %    MCV 78.7 (L) 80.0 - 100.0 fL    MCH 25.1 (L) 26.0 - 34.0 pg    MCHC 31.9 31.0 - 36.0 g/dL    RDW 13.9 12.4 - 15.4 %    Platelets 775 964 - 629 K/uL    MPV 6.9 5.0 - 10.5 fL    Neutrophils % 60.2 %    Lymphocytes % 24.2 %    Monocytes % 7.5 %    Eosinophils % 7.4 %    Basophils % 0.7 %    Neutrophils Absolute 3.5 1.7 - 7.7 K/uL    Lymphocytes Absolute 1.4 1.0 - 5.1 K/uL    Monocytes Absolute 0.4 0.0 - 1.3 K/uL    Eosinophils Absolute 0.4 0.0 - 0.6 K/uL    Basophils Absolute 0.0 0.0 - 0.2 K/uL   Basic Metabolic Panel w/ Reflex to MG   Result Value Ref Range    Sodium 133 (L) 136 - 145 mmol/L    Potassium reflex Magnesium 4.4 3.5 - 5.1 mmol/L    Chloride 94 (L) 99 - 110 mmol/L    CO2 26 21 - 32 mmol/L    Anion Gap 13 3 - 16    Glucose 296 (H) 70 - 99 mg/dL    BUN 22 (H) 7 - 20 mg/dL    CREATININE 1.0 0.6 - 1.1 mg/dL    GFR Non- 57 (A) >60    GFR African American >60 >60    Calcium 9.1 8.3 - 10.6 mg/dL   Hepatic Function Panel   Result Value Ref Range    Total Protein 7.3 6.4 - 8.2 g/dL    Albumin 4.4 3.4 - 5.0 g/dL    Alkaline Phosphatase 92 40 - 129 U/L    ALT 21 10 - 40 U/L    AST 16 15 - 37 U/L    Total Bilirubin <0.2 0.0 - 1.0 mg/dL    Bilirubin, Direct <0.2 0.0 - 0.3 mg/dL    Bilirubin, Indirect see below 0.0 - 1.0 mg/dL   Troponin   Result Value Ref Range    Troponin <0.01 <0.01 ng/mL   Blood Gas, Venous   Result Value Ref Range    pH, Ru 7.353 7.350 - 7.450    pCO2, Ru 56.0 (H) 41.0 - 51.0 mmHg    pO2, Ru <30.0 25.0 - 40.0 mmHg    HCO3, Venous 31.1 (H) 24.0 - 28.0 mmol/L    Base Excess, Ru 4.4 (H) -2.0 - 3.0 mmol/L    O2 Sat, Ru 45 Not established %    Carboxyhemoglobin 1.8 (H) 0.0 - 1.5 %    MetHgb, Ru 0.8 0.0 - 1.5 %    TC02 (Calc), Ru 33 mmol/L    Hemoglobin, Ru, Reduced 53.40 %   Lactic Acid, Plasma   Result Value Ref Range    Lactic Acid 1.6 0.4 - 2.0 mmol/L   EKG 12 Lead   Result Value Ref Range    Ventricular Rate 91 BPM    Atrial Rate 91 BPM    P-R Interval 182 ms    QRS Duration 92 ms    Q-T Interval 354 ms    QTc Calculation (Bazett) 435 ms    P Axis 71 degrees    R Axis 15 degrees    T Axis 42 degrees    Diagnosis       EKG performed in ER and to be interpreted by ER physician. Confirmed by MD, ER ([id number]),  Jeanmarie Swain ([phone]) on 11/4/2021 6:56:32 PM       RECENT VITALS:  BP: (!) 155/75, Temp: 98.5 °F (36.9 °C), Pulse: 88, Resp: 16, SpO2: 98 %     Procedures     N/A    ED Course     The patient was given the following medications:  Orders Placed This Encounter   Medications    acetaminophen (TYLENOL) tablet 1,000 mg    ketorolac (TORADOL) injection 15 mg    lactated ringers bolus    iopamidol (ISOVUE-370) 76 % injection 80 mL    methocarbamol (ROBAXIN) 500 MG tablet     Sig: Take 2 tablets by mouth 3 times daily for 10 days     Dispense:  60 tablet     Refill:  0    methylPREDNISolone (MEDROL, MARVA,) 4 MG tablet     Sig: Take 6 tablets by mouth daily for 1 day, THEN 5 tablets daily for 1 day, THEN 4 tablets daily for 1 day, THEN 3 tablets daily for 1 day, THEN 2 tablets daily for 1 day, THEN 1 tablet daily for 1 day. Take by mouth. .     Dispense:  21 tablet     Refill:  0       CONSULTS:  None    MEDICAL DECISION MAKING / ASSESSMENT / Alis Kiki is admitted to the Emergency Department for evaluation of her chief complaint as described in the history of present illness. Complete history and physical was performed by me and my attending. Nursing notes, past medical history, surgical history, family history and social history were reviewed and addressed in the HPI.     Bridgette Holland is a 62 y.o. female who presents to the emergency department with a complaint of acute thoracolumbar back pain in the setting of longstanding lower back pain and bilateral lower extremity pain.  Patient has experienced several falls in the past.  The initial emergency department team had worked the patient up for musculoskeletal back pain, cardiac related illness and she was pending CT scans of the thoracic spine, lumbar spine and a CT pulmonary angiogram to rule out pleuritic nature of the pain secondary to pulmonary embolism or thoracic aortic aneurysm. The patient CT scan demonstrated degenerative disc disease most pronounced at L2-L3 and L3-L4 without central canal stenosis. She also demonstrated multifactorial multilevel foraminal narrowing most pronounced at L4 over 5 with severe narrowing though without spinal nerve signal loss, which is likely a strong candidate for the cause of the patient's pain. There is no evidence of pulmonary embolism. The patient has been seen and evaluated for their complaint of back pain. Patient denies recent trauma. The patient denies urinary incontinence, urinary retention, and numbness in the extremities. The patient is neurologically intact, sensation is intact in the lower extremities, dorsalis pedal pulses 2+ bilaterally, and achilles reflex intact bilaterally. I do not feel imaging is necessary at this point bases on absence of trauma, and negative midline tenderness to the spine. It is my impression that based on history and negative physical exam findings the patient is not at risk for Cauda Equina Syndrome, or other spinal injury. At this point, the patient's nontraumatic back pain is likely musculoskeletal in nature. The patient will be instructed to continue taking anti-inflammatory medication, and given a Rx for short course of muscle relaxers and pain medication to include methocarbamol and a Medrol Dosepak. The patient will be given back stretching exercises, and instructed to follow up with their PCP or community clinic for further evaluation.   The patient should return to the ED if the back pain worsens, or if they experience incontinence, numbness or tingling in the legs, or inability to ambulate. The patient is in agreement with this plan. I discussed this plan at length the patient who verbalizes understanding and is in agreement. The patient is currently stable and will be discharged home for continued self-care. Please see patient's AVS for additional discharge instructions. The patient was seen and evaluated by myself and the attending physician, Abhinav Crook MD, who agrees with my assessment, treatment and plan. Clinical Impression     1. Acute left-sided thoracic back pain    2. Degenerative disc disease, lumbar    3. Sciatica of left side    4. Acute left-sided low back pain with left-sided sciatica        Disposition     PATIENT REFERRED TO:  Princess Segrua,     Go to   [address] [place] [address]; [phone], If symptoms worsen      DISCHARGE MEDICATIONS:  Discharge Medication List as of 11/4/2021  8:38 PM      START taking these medications    Details   methocarbamol (ROBAXIN) 500 MG tablet Take 2 tablets by mouth 3 times daily for 10 days, Disp-60 tablet, R-0Print      methylPREDNISolone (MEDROL, MARVA,) 4 MG tablet Take 6 tablets by mouth daily for 1 day, THEN 5 tablets daily for 1 day, THEN 4 tablets daily for 1 day, THEN 3 tablets daily for 1 day, THEN 2 tablets daily for 1 day, THEN 1 tablet daily for 1 day. Take by mouth. ., Disp-21 tablet, R-0Print             DISPOSITION Decision To Discharge 11/04/2021 08:27:49 PM       [address] [place] [address]  11/30/21 2137

## 2021-12-23 ENCOUNTER — APPOINTMENT (OUTPATIENT)
Dept: GENERAL RADIOLOGY | Age: 61
DRG: 720 | End: 2021-12-23
Payer: MEDICAID

## 2021-12-23 ENCOUNTER — APPOINTMENT (OUTPATIENT)
Dept: CT IMAGING | Age: 61
DRG: 720 | End: 2021-12-23
Payer: MEDICAID

## 2021-12-23 ENCOUNTER — HOSPITAL ENCOUNTER (INPATIENT)
Age: 61
LOS: 5 days | Discharge: NURSING, REHAB OR LONG-TERM CARE | DRG: 720 | End: 2021-12-28
Attending: EMERGENCY MEDICINE | Admitting: INTERNAL MEDICINE
Payer: MEDICAID

## 2021-12-23 DIAGNOSIS — R50.81 FEVER IN OTHER DISEASES: Primary | ICD-10-CM

## 2021-12-23 LAB
ACETAMINOPHEN LEVEL: 7 UG/ML (ref 10–30)
ALBUMIN SERPL-MCNC: 4.1 G/DL (ref 3.4–5)
ALP BLD-CCNC: 139 U/L (ref 40–129)
ALT SERPL-CCNC: 20 U/L (ref 10–40)
AMPHETAMINE SCREEN, URINE: NORMAL
ANION GAP SERPL CALCULATED.3IONS-SCNC: 16 MMOL/L (ref 3–16)
AST SERPL-CCNC: 17 U/L (ref 15–37)
BACTERIA: ABNORMAL /HPF
BARBITURATE SCREEN URINE: NORMAL
BASE EXCESS VENOUS: 0.1 MMOL/L (ref -2–3)
BASOPHILS ABSOLUTE: 0 K/UL (ref 0–0.2)
BASOPHILS RELATIVE PERCENT: 0.1 %
BENZODIAZEPINE SCREEN, URINE: NORMAL
BILIRUB SERPL-MCNC: 0.4 MG/DL (ref 0–1)
BILIRUBIN DIRECT: <0.2 MG/DL (ref 0–0.3)
BILIRUBIN URINE: NEGATIVE
BILIRUBIN, INDIRECT: ABNORMAL MG/DL (ref 0–1)
BLOOD, URINE: ABNORMAL
BUN BLDV-MCNC: 25 MG/DL (ref 7–20)
CALCIUM SERPL-MCNC: 9.3 MG/DL (ref 8.3–10.6)
CANNABINOID SCREEN URINE: NORMAL
CARBOXYHEMOGLOBIN: 1.1 % (ref 0–1.5)
CHLORIDE BLD-SCNC: 95 MMOL/L (ref 99–110)
CLARITY: CLEAR
CO2: 23 MMOL/L (ref 21–32)
COCAINE METABOLITE SCREEN URINE: NORMAL
COLOR: YELLOW
CREAT SERPL-MCNC: 1.2 MG/DL (ref 0.6–1.2)
EOSINOPHILS ABSOLUTE: 0 K/UL (ref 0–0.6)
EOSINOPHILS RELATIVE PERCENT: 0.1 %
EPITHELIAL CELLS, UA: ABNORMAL /HPF (ref 0–5)
GFR AFRICAN AMERICAN: 55
GFR NON-AFRICAN AMERICAN: 46
GLUCOSE BLD-MCNC: 451 MG/DL (ref 70–99)
GLUCOSE URINE: >=1000 MG/DL
HCO3 VENOUS: 25.6 MMOL/L (ref 24–28)
HCT VFR BLD CALC: 33.2 % (ref 36–48)
HEMOGLOBIN, VEN, REDUCED: 72 %
HEMOGLOBIN: 10.3 G/DL (ref 12–16)
INR BLD: 1.28 (ref 0.88–1.12)
KETONES, URINE: ABNORMAL MG/DL
LACTIC ACID: 3.4 MMOL/L (ref 0.4–2)
LEUKOCYTE ESTERASE, URINE: NEGATIVE
LYMPHOCYTES ABSOLUTE: 0.6 K/UL (ref 1–5.1)
LYMPHOCYTES RELATIVE PERCENT: 2.7 %
Lab: NORMAL
MAGNESIUM: 1.1 MG/DL (ref 1.8–2.4)
MCH RBC QN AUTO: 23.9 PG (ref 26–34)
MCHC RBC AUTO-ENTMCNC: 31.2 G/DL (ref 31–36)
MCV RBC AUTO: 76.7 FL (ref 80–100)
METHADONE SCREEN, URINE: NORMAL
METHEMOGLOBIN VENOUS: 0.4 % (ref 0–1.5)
MICROSCOPIC EXAMINATION: YES
MONOCYTES ABSOLUTE: 1.4 K/UL (ref 0–1.3)
MONOCYTES RELATIVE PERCENT: 6 %
NEUTROPHILS ABSOLUTE: 20.6 K/UL (ref 1.7–7.7)
NEUTROPHILS RELATIVE PERCENT: 91.1 %
NITRITE, URINE: POSITIVE
O2 SAT, VEN: 27 %
OPIATE SCREEN URINE: NORMAL
OXYCODONE URINE: NORMAL
PCO2, VEN: 44.2 MMHG (ref 41–51)
PDW BLD-RTO: 15.6 % (ref 12.4–15.4)
PH UA: 6
PH UA: 6 (ref 5–8)
PH VENOUS: 7.37 (ref 7.35–7.45)
PHENCYCLIDINE SCREEN URINE: NORMAL
PLATELET # BLD: 253 K/UL (ref 135–450)
PMV BLD AUTO: 7.4 FL (ref 5–10.5)
PO2, VEN: <30 MMHG (ref 25–40)
POTASSIUM REFLEX MAGNESIUM: 4.7 MMOL/L (ref 3.5–5.1)
PROPOXYPHENE SCREEN: NORMAL
PROTEIN UA: 30 MG/DL
PROTHROMBIN TIME: 14.6 SEC (ref 9.9–12.7)
RAPID INFLUENZA  B AGN: NEGATIVE
RAPID INFLUENZA A AGN: NEGATIVE
RBC # BLD: 4.32 M/UL (ref 4–5.2)
RBC UA: ABNORMAL /HPF (ref 0–4)
SALICYLATE, SERUM: <0.3 MG/DL (ref 15–30)
SODIUM BLD-SCNC: 134 MMOL/L (ref 136–145)
SPECIFIC GRAVITY UA: 1.02 (ref 1–1.03)
TCO2 CALC VENOUS: 27 MMOL/L
TOTAL PROTEIN: 7.5 G/DL (ref 6.4–8.2)
TROPONIN: <0.01 NG/ML
URINE REFLEX TO CULTURE: YES
URINE TYPE: ABNORMAL
UROBILINOGEN, URINE: 0.2 E.U./DL
WBC # BLD: 22.6 K/UL (ref 4–11)
WBC UA: ABNORMAL /HPF (ref 0–5)

## 2021-12-23 PROCEDURE — U0003 INFECTIOUS AGENT DETECTION BY NUCLEIC ACID (DNA OR RNA); SEVERE ACUTE RESPIRATORY SYNDROME CORONAVIRUS 2 (SARS-COV-2) (CORONAVIRUS DISEASE [COVID-19]), AMPLIFIED PROBE TECHNIQUE, MAKING USE OF HIGH THROUGHPUT TECHNOLOGIES AS DESCRIBED BY CMS-2020-01-R: HCPCS

## 2021-12-23 PROCEDURE — 84439 ASSAY OF FREE THYROXINE: CPT

## 2021-12-23 PROCEDURE — 81001 URINALYSIS AUTO W/SCOPE: CPT

## 2021-12-23 PROCEDURE — 85025 COMPLETE CBC W/AUTO DIFF WBC: CPT

## 2021-12-23 PROCEDURE — 83605 ASSAY OF LACTIC ACID: CPT

## 2021-12-23 PROCEDURE — 87186 SC STD MICRODIL/AGAR DIL: CPT

## 2021-12-23 PROCEDURE — 85610 PROTHROMBIN TIME: CPT

## 2021-12-23 PROCEDURE — 80048 BASIC METABOLIC PNL TOTAL CA: CPT

## 2021-12-23 PROCEDURE — 1200000000 HC SEMI PRIVATE

## 2021-12-23 PROCEDURE — 71045 X-RAY EXAM CHEST 1 VIEW: CPT

## 2021-12-23 PROCEDURE — U0005 INFEC AGEN DETEC AMPLI PROBE: HCPCS

## 2021-12-23 PROCEDURE — 6370000000 HC RX 637 (ALT 250 FOR IP): Performed by: STUDENT IN AN ORGANIZED HEALTH CARE EDUCATION/TRAINING PROGRAM

## 2021-12-23 PROCEDURE — 84484 ASSAY OF TROPONIN QUANT: CPT

## 2021-12-23 PROCEDURE — 87150 DNA/RNA AMPLIFIED PROBE: CPT

## 2021-12-23 PROCEDURE — 80143 DRUG ASSAY ACETAMINOPHEN: CPT

## 2021-12-23 PROCEDURE — 87040 BLOOD CULTURE FOR BACTERIA: CPT

## 2021-12-23 PROCEDURE — 87077 CULTURE AEROBIC IDENTIFY: CPT

## 2021-12-23 PROCEDURE — 80076 HEPATIC FUNCTION PANEL: CPT

## 2021-12-23 PROCEDURE — 72100 X-RAY EXAM L-S SPINE 2/3 VWS: CPT

## 2021-12-23 PROCEDURE — 80179 DRUG ASSAY SALICYLATE: CPT

## 2021-12-23 PROCEDURE — 84443 ASSAY THYROID STIM HORMONE: CPT

## 2021-12-23 PROCEDURE — 6360000002 HC RX W HCPCS: Performed by: EMERGENCY MEDICINE

## 2021-12-23 PROCEDURE — 80307 DRUG TEST PRSMV CHEM ANLYZR: CPT

## 2021-12-23 PROCEDURE — 2580000003 HC RX 258: Performed by: EMERGENCY MEDICINE

## 2021-12-23 PROCEDURE — 83735 ASSAY OF MAGNESIUM: CPT

## 2021-12-23 PROCEDURE — 82803 BLOOD GASES ANY COMBINATION: CPT

## 2021-12-23 PROCEDURE — 6370000000 HC RX 637 (ALT 250 FOR IP): Performed by: EMERGENCY MEDICINE

## 2021-12-23 PROCEDURE — 73521 X-RAY EXAM HIPS BI 2 VIEWS: CPT

## 2021-12-23 PROCEDURE — 93005 ELECTROCARDIOGRAM TRACING: CPT | Performed by: EMERGENCY MEDICINE

## 2021-12-23 PROCEDURE — 87804 INFLUENZA ASSAY W/OPTIC: CPT

## 2021-12-23 PROCEDURE — 36415 COLL VENOUS BLD VENIPUNCTURE: CPT

## 2021-12-23 PROCEDURE — 96365 THER/PROPH/DIAG IV INF INIT: CPT

## 2021-12-23 PROCEDURE — 99284 EMERGENCY DEPT VISIT MOD MDM: CPT

## 2021-12-23 PROCEDURE — 70450 CT HEAD/BRAIN W/O DYE: CPT

## 2021-12-23 PROCEDURE — 87086 URINE CULTURE/COLONY COUNT: CPT

## 2021-12-23 RX ORDER — LOSARTAN POTASSIUM 50 MG/1
50 TABLET ORAL DAILY
Status: DISCONTINUED | OUTPATIENT
Start: 2021-12-24 | End: 2021-12-28 | Stop reason: HOSPADM

## 2021-12-23 RX ORDER — SODIUM CHLORIDE 9 MG/ML
INJECTION, SOLUTION INTRAVENOUS CONTINUOUS
Status: DISCONTINUED | OUTPATIENT
Start: 2021-12-23 | End: 2021-12-26

## 2021-12-23 RX ORDER — SODIUM CHLORIDE 9 MG/ML
INJECTION, SOLUTION INTRAVENOUS CONTINUOUS
Status: ACTIVE | OUTPATIENT
Start: 2021-12-23 | End: 2021-12-24

## 2021-12-23 RX ORDER — SODIUM CHLORIDE 9 MG/ML
25 INJECTION, SOLUTION INTRAVENOUS PRN
Status: DISCONTINUED | OUTPATIENT
Start: 2021-12-23 | End: 2021-12-28 | Stop reason: HOSPADM

## 2021-12-23 RX ORDER — INSULIN LISPRO 100 [IU]/ML
0-6 INJECTION, SOLUTION INTRAVENOUS; SUBCUTANEOUS NIGHTLY
Status: DISCONTINUED | OUTPATIENT
Start: 2021-12-24 | End: 2021-12-28 | Stop reason: HOSPADM

## 2021-12-23 RX ORDER — DEXTROSE MONOHYDRATE 25 G/50ML
12.5 INJECTION, SOLUTION INTRAVENOUS PRN
Status: DISCONTINUED | OUTPATIENT
Start: 2021-12-23 | End: 2021-12-28 | Stop reason: HOSPADM

## 2021-12-23 RX ORDER — ACETAMINOPHEN 500 MG
1000 TABLET ORAL ONCE
Status: COMPLETED | OUTPATIENT
Start: 2021-12-23 | End: 2021-12-23

## 2021-12-23 RX ORDER — SODIUM CHLORIDE, SODIUM LACTATE, POTASSIUM CHLORIDE, AND CALCIUM CHLORIDE .6; .31; .03; .02 G/100ML; G/100ML; G/100ML; G/100ML
1000 INJECTION, SOLUTION INTRAVENOUS ONCE
Status: COMPLETED | OUTPATIENT
Start: 2021-12-23 | End: 2021-12-23

## 2021-12-23 RX ORDER — ACETAMINOPHEN 650 MG/1
650 SUPPOSITORY RECTAL EVERY 6 HOURS PRN
Status: DISCONTINUED | OUTPATIENT
Start: 2021-12-23 | End: 2021-12-28 | Stop reason: HOSPADM

## 2021-12-23 RX ORDER — ACETAMINOPHEN 325 MG/1
650 TABLET ORAL EVERY 6 HOURS PRN
Status: DISCONTINUED | OUTPATIENT
Start: 2021-12-23 | End: 2021-12-28 | Stop reason: HOSPADM

## 2021-12-23 RX ORDER — INSULIN LISPRO 100 [IU]/ML
0-12 INJECTION, SOLUTION INTRAVENOUS; SUBCUTANEOUS
Status: DISCONTINUED | OUTPATIENT
Start: 2021-12-24 | End: 2021-12-28 | Stop reason: HOSPADM

## 2021-12-23 RX ORDER — 0.9 % SODIUM CHLORIDE 0.9 %
1000 INTRAVENOUS SOLUTION INTRAVENOUS ONCE
Status: COMPLETED | OUTPATIENT
Start: 2021-12-23 | End: 2021-12-24

## 2021-12-23 RX ORDER — SODIUM CHLORIDE 0.9 % (FLUSH) 0.9 %
5-40 SYRINGE (ML) INJECTION PRN
Status: DISCONTINUED | OUTPATIENT
Start: 2021-12-23 | End: 2021-12-28 | Stop reason: HOSPADM

## 2021-12-23 RX ORDER — NICOTINE POLACRILEX 4 MG
15 LOZENGE BUCCAL PRN
Status: DISCONTINUED | OUTPATIENT
Start: 2021-12-23 | End: 2021-12-28 | Stop reason: HOSPADM

## 2021-12-23 RX ORDER — CARVEDILOL 6.25 MG/1
6.25 TABLET ORAL 2 TIMES DAILY WITH MEALS
Status: DISCONTINUED | OUTPATIENT
Start: 2021-12-24 | End: 2021-12-24

## 2021-12-23 RX ORDER — HYDROXYZINE 50 MG/1
50 TABLET, FILM COATED ORAL 2 TIMES DAILY
Status: ON HOLD | COMMUNITY
Start: 2021-11-01 | End: 2021-12-24 | Stop reason: CLARIF

## 2021-12-23 RX ORDER — METOPROLOL SUCCINATE 50 MG/1
50 TABLET, EXTENDED RELEASE ORAL DAILY
Status: ON HOLD | COMMUNITY
Start: 2021-12-07 | End: 2021-12-27 | Stop reason: HOSPADM

## 2021-12-23 RX ORDER — DEXTROSE MONOHYDRATE 50 MG/ML
100 INJECTION, SOLUTION INTRAVENOUS PRN
Status: DISCONTINUED | OUTPATIENT
Start: 2021-12-23 | End: 2021-12-28 | Stop reason: HOSPADM

## 2021-12-23 RX ORDER — HYDROXYZINE HYDROCHLORIDE 25 MG/1
25 TABLET, FILM COATED ORAL 3 TIMES DAILY PRN
Status: DISCONTINUED | OUTPATIENT
Start: 2021-12-23 | End: 2021-12-28 | Stop reason: HOSPADM

## 2021-12-23 RX ORDER — SODIUM CHLORIDE 0.9 % (FLUSH) 0.9 %
5-40 SYRINGE (ML) INJECTION EVERY 12 HOURS SCHEDULED
Status: DISCONTINUED | OUTPATIENT
Start: 2021-12-23 | End: 2021-12-28 | Stop reason: HOSPADM

## 2021-12-23 RX ORDER — SERTRALINE HYDROCHLORIDE 100 MG/1
100 TABLET, FILM COATED ORAL DAILY
Status: DISCONTINUED | OUTPATIENT
Start: 2021-12-24 | End: 2021-12-28 | Stop reason: HOSPADM

## 2021-12-23 RX ORDER — INSULIN LISPRO 100 [IU]/ML
0-12 INJECTION, SOLUTION INTRAVENOUS; SUBCUTANEOUS
Status: DISCONTINUED | OUTPATIENT
Start: 2021-12-23 | End: 2021-12-23

## 2021-12-23 RX ADMIN — CEFEPIME HYDROCHLORIDE 2000 MG: 2 INJECTION, POWDER, FOR SOLUTION INTRAVENOUS at 20:48

## 2021-12-23 RX ADMIN — ACETAMINOPHEN 1000 MG: 500 TABLET ORAL at 20:45

## 2021-12-23 RX ADMIN — SODIUM CHLORIDE, POTASSIUM CHLORIDE, SODIUM LACTATE AND CALCIUM CHLORIDE 1000 ML: 600; 310; 30; 20 INJECTION, SOLUTION INTRAVENOUS at 20:47

## 2021-12-23 RX ADMIN — QUETIAPINE FUMARATE 300 MG: 200 TABLET ORAL at 23:54

## 2021-12-23 RX ADMIN — SODIUM CHLORIDE, SODIUM LACTATE, POTASSIUM CHLORIDE, AND CALCIUM CHLORIDE 1000 ML: .6; .31; .03; .02 INJECTION, SOLUTION INTRAVENOUS at 20:48

## 2021-12-23 ASSESSMENT — PAIN DESCRIPTION - LOCATION: LOCATION: BACK

## 2021-12-23 ASSESSMENT — PAIN DESCRIPTION - PAIN TYPE: TYPE: ACUTE PAIN

## 2021-12-23 ASSESSMENT — PAIN DESCRIPTION - DESCRIPTORS: DESCRIPTORS: ACHING

## 2021-12-23 ASSESSMENT — PAIN DESCRIPTION - FREQUENCY: FREQUENCY: CONTINUOUS

## 2021-12-23 ASSESSMENT — PAIN SCALES - GENERAL: PAINLEVEL_OUTOF10: 9

## 2021-12-23 ASSESSMENT — PAIN DESCRIPTION - ORIENTATION: ORIENTATION: LOWER

## 2021-12-23 NOTE — ED PROVIDER NOTES
[address] [facility]          ATTENDING PHYSICIAN NOTE       Date of evaluation: 12/23/2021    Chief Complaint     Fall and Leg Pain    History of Present Illness     Diego Chan is a 62 y.o. female past medical history of anxiety, diabetes, hyperlipidemia, hypertension and PTSD who presents today after two different falls. She is a difficult historian at times and appears very anxious and she states that is due to her anxiety. States that she was initially ill starting last week headache and had some body aches. Then she fell down twice today, EMS was called for lift assist months but then fell down again. She feels like she slipped and her legs gave out on her. Somewhat of a different called historian hearing tangential but she was found to be febrile to 103.1 tachycardic to the 130s. States she actually has been feeling some fevers possibly even the past week. Endorses a mild cough states she has chronic diarrhea. Of note on chart review in March 2012 when she had her present to [facility] emergency department with suicidal ideation there reportedly she was febrile tachycardic hypertensive there was concern for sympathomimetic ingestion and it came back positive for amphetimines. She denies any ingestion use here denies any suicidal ideation. Medications at home include Seroquel, Zoloft. She states she is taking these pills once today and adamantly denies taking any more than she was supposed to. Denies suicidal ideation either. She can tell me exactly what she takes from a medication standpoint including Seroquel and Zoloft states she does not take BuSpar but there was discussion about placing her back on it per her doctor. She can tell me all this and is alert oriented denying any ingestions. Review of Systems     Review of Systems  A full 10 point review of systems was obtained.    Pertinent positives and negatives as documented in the HPI, otherwise all other systems were reviewed and were negative. Past Medical, Surgical, Family, and Social History     She has a past medical history of Anxiety, Diabetes mellitus ([address] Hernia, abdominal, Hyperlipidemia, Hypertension, and PTSD (post-traumatic stress disorder). She has a past surgical history that includes Hysterectomy; Cholecystectomy; colectomy; and Revision Colostomy. Her family history is not on file. She reports that she has never smoked. She has never used smokeless tobacco. She reports that she does not drink alcohol and does not use drugs. Medications     Previous Medications    ACETAMINOPHEN (TYLENOL) 325 MG TABLET    Take 325 mg by mouth every 6 hours as needed    BUPROPION (WELLBUTRIN SR) 200 MG EXTENDED RELEASE TABLET        BUSPIRONE (BUSPAR) 15 MG TABLET        CARVEDILOL (COREG) 6.25 MG TABLET    Take 6.25 mg by mouth 2 times daily (with meals)    CETIRIZINE (ZYRTEC) 10 MG TABLET    Take 10 mg by mouth daily    HYDROXYZINE (ATARAX) 25 MG TABLET    Take 25 mg by mouth 3 times daily as needed for Itching    LIDOCAINE (LIDODERM) 5 %    Place 1 patch onto the skin every 24 hours    LOSARTAN (COZAAR) 50 MG TABLET    Take 50 mg by mouth daily    LOVASTATIN (MEVACOR) 10 MG TABLET    Take 50 mg by mouth daily    MONTELUKAST (SINGULAIR) 10 MG TABLET    Take 10 mg by mouth nightly    PANTOPRAZOLE (PROTONIX) 40 MG TABLET    Take 40 mg by mouth daily    PSYLLIUM (KONSYL) 28.3 % PACK    Take 1 packet by mouth daily    QUETIAPINE (SEROQUEL) 200 MG TABLET    Take 300 mg by mouth 2 times daily    SERTRALINE (ZOLOFT) 100 MG TABLET    Take 100 mg by mouth daily    VICTOZA 18 MG/3ML SOPN SC INJECTION           Allergies     She is allergic to azithromycin, metronidazole, clindamycin, penicillin g, penicillin v, tetanus immune globulin, and codeine.     Physical Exam     INITIAL VITALS: BP: (!) 153/90, Temp: 103.1 °F (39.5 °C), Pulse: 134, Resp: 20, SpO2: 95 %   Physical Exam  General: Chronically ill-appearing, anxious but speaking in full sentences  HEENT:  head is atraumatic, sclera are clear, oropharynx is nonerythematous, mucus membranes are moist  Neck: Trachea midline, full range of motion of the neck with no nuchal rigidity  Chest: Nonlabored respirations, clear to auscultation bilaterally  Cardiovascular: tachycardic rate, 2+ radial pulses bilaterally   exam without evidence of cellulitis/fornieirs gangrene  Abdominal: Nondistended abdomen, soft, nontender without rebound or gaurding  Skin: Warm, dry well perfused, no rashes  Extremities: no obvious deformities, no tenderness to palpation diffusely  Neurologic:  Alert and oriented, speech is clear and intact without dysarthria, bilateral upper and lower extremities spontaneously moving, no lower extremity weakness noted  Psychologic: appropriate mood and affect, at times tangential speech and will talk about things randomly, but able to refocus she has some mild twitching constantly moving upper and lower extremities. Diagnostic Results     EKG   Sinus tachycardia with a ventricular rate of 119, intervals within normal limits QTc 458 which appears unchanged from baseline no ST or T wave changes. RADIOLOGY:  CT Head WO Contrast   Final Result      1. No acute intracranial process. XR CHEST PORTABLE   Final Result      No acute pulmonary disease. XR LUMBAR SPINE (2-3 VIEWS)   Final Result   Impression:   No acute osseous injury. Moderate lumbar spondylosis better characterized on prior CT lumbar spine dated 11/4/2021. XR HIP BILATERAL W AP PELVIS (2 VIEWS)   Final Result   Impression:   No acute osseous injury. Severe right and mild left hip osteoarthritis.           LABS:   Results for orders placed or performed during the hospital encounter of 12/23/21   CBC Auto Differential   Result Value Ref Range    WBC 22.6 (H) 4.0 - 11.0 K/uL    RBC 4.32 4.00 - 5.20 M/uL    Hemoglobin 10.3 (L) 12.0 - 16.0 g/dL    Hematocrit 33.2 (L) 36.0 - 48.0 %    MCV 76.7 (L) 80.0 - 100.0 fL    MCH 23.9 (L) 26.0 - 34.0 pg    MCHC 31.2 31.0 - 36.0 g/dL    RDW 15.6 (H) 12.4 - 15.4 %    Platelets 634 169 - 426 K/uL    MPV 7.4 5.0 - 10.5 fL    Neutrophils % 91.1 %    Lymphocytes % 2.7 %    Monocytes % 6.0 %    Eosinophils % 0.1 %    Basophils % 0.1 %    Neutrophils Absolute 20.6 (H) 1.7 - 7.7 K/uL    Lymphocytes Absolute 0.6 (L) 1.0 - 5.1 K/uL    Monocytes Absolute 1.4 (H) 0.0 - 1.3 K/uL    Eosinophils Absolute 0.0 0.0 - 0.6 K/uL    Basophils Absolute 0.0 0.0 - 0.2 K/uL   Basic Metabolic Panel w/ Reflex to MG   Result Value Ref Range    Sodium 134 (L) 136 - 145 mmol/L    Potassium reflex Magnesium 4.7 3.5 - 5.1 mmol/L    Chloride 95 (L) 99 - 110 mmol/L    CO2 23 21 - 32 mmol/L    Anion Gap 16 3 - 16    Glucose 451 (H) 70 - 99 mg/dL    BUN 25 (H) 7 - 20 mg/dL    CREATININE 1.2 0.6 - 1.2 mg/dL    GFR Non- 46 (A) >60    GFR  55 (A) >60    Calcium 9.3 8.3 - 10.6 mg/dL   Troponin   Result Value Ref Range    Troponin <0.01 <0.01 ng/mL   Hepatic Function Panel   Result Value Ref Range    Total Protein 7.5 6.4 - 8.2 g/dL    Albumin 4.1 3.4 - 5.0 g/dL    Alkaline Phosphatase 139 (H) 40 - 129 U/L    ALT 20 10 - 40 U/L    AST 17 15 - 37 U/L    Total Bilirubin 0.4 0.0 - 1.0 mg/dL    Bilirubin, Direct <0.2 0.0 - 0.3 mg/dL    Bilirubin, Indirect see below 0.0 - 1.0 mg/dL   PT - INR   Result Value Ref Range    Protime 14.6 (H) 9.9 - 12.7 sec    INR 1.28 (H) 0.88 - 1.12   Lactic Acid, Plasma   Result Value Ref Range    Lactic Acid 3.4 (H) 0.4 - 2.0 mmol/L   Blood Gas, Venous   Result Value Ref Range    pH, Ru 7.370 7.350 - 7.450    pCO2, Ru 44.2 41.0 - 51.0 mmHg    pO2, Ru <30.0 25.0 - 40.0 mmHg    HCO3, Venous 25.6 24.0 - 28.0 mmol/L    Base Excess, Ru 0.1 -2.0 - 3.0 mmol/L    O2 Sat, Ru 27 Not established %    Carboxyhemoglobin 1.1 0.0 - 1.5 %    MetHgb, Ru 0.4 0.0 - 1.5 %    TC02 (Calc), Ru 27 mmol/L    Hemoglobin, Ru, Reduced 72.00 %   Acetaminophen level   Result Value Ref Range    Acetaminophen Level 7 (L) 10 - 30 ug/mL   Salicylate   Result Value Ref Range    Salicylate, Serum <1.4 (L) 15.0 - 30.0 mg/dL       ED BEDSIDE ULTRASOUND:    RECENT VITALS:  BP: (!) 103/48,Temp: 99.8 °F (37.7 °C), Pulse: 118, Resp: 20, SpO2: 95 %     Procedures       ED Course     Nursing Notes, Past Medical Hx, Past Surgical Hx, Social Hx,Allergies, and Family Hx were reviewed. patient was given the following medications:  Orders Placed This Encounter   Medications    lactated ringers bolus    acetaminophen (TYLENOL) tablet 1,000 mg    DISCONTD: cefepime (MAXIPIME) 2000 mg IVPB minibag     Order Specific Question:   Antimicrobial Indications     Answer:   Sepsis of Unknown Etiology    lactated ringers bolus    vancomycin (VANCOCIN) 2,000 mg in dextrose 5 % 500 mL IVPB     Order Specific Question:   Antimicrobial Indications     Answer:   Sepsis of Unknown Etiology    cefepime (MAXIPIME) 2000 mg IVPB minibag     Order Specific Question:   Antimicrobial Indications     Answer:   Sepsis of Unknown Etiology       CONSULTS:  PHARMACY TO DOSE VANCOMYCIN  IP CONSULT TO HOSPITALIST    MEDICAL DECISIONMAKING / ASSESSMENT / Stephani Josué is a 62 y.o. female who presented here after falls today with fever tachycardia. She was alert and oriented. Had no jerking and no myoclonus. Differential was broad including sepsis and potential source. She really endorsed just fever and fatigue no URI no cough. Will obtain urinalysis, chest x-ray, blood cultures, broad laboratory work-up. Differential does include serotonin syndrome however she has no obvious myoclonus she is not significantly altered at this time. Meningitis was discussed however she has no nuchal rigidity fully alert and oriented here. White blood cell came back at 22 vancomycin cefepime ordered she was given 2 L of lactated Ringer's and tylenol.      Here lactate was 3.5, white blood cell count of 22. She did defervescing temp was 99.8 blood pressure systolic in the 018P and heart rate came down to the low 110s. Reevaluation she is completely alert oriented in no acute distress. She will be given cefepime with concern for sepsis of unknown origin while urinalysis is pending no signs of rash or cellulitis. Possible bacteremia as well or still in the differential would be a sympathomimetic intoxication however she actually is fully alert and oriented now and rapidly improved from being somewhat altered on arrival. [place] time will admit to the hospitalist in stable condition. Clinical Impression     1. Fever in other diseases        Disposition     PATIENT REFERRED TO:  No follow-up provider specified.     DISCHARGE MEDICATIONS:  New Prescriptions    No medications on file       [id number] Sofia Fernandez MD  12/23/21 8012

## 2021-12-23 NOTE — ED NOTES
Bed: B16-16  Expected date:   Expected time:   Means of arrival:   Comments:  Avel Dietrich RN  12/23/21 8444

## 2021-12-24 LAB
ALBUMIN SERPL-MCNC: 3 G/DL (ref 3.4–5)
ALBUMIN SERPL-MCNC: 3.2 G/DL (ref 3.4–5)
ALP BLD-CCNC: 79 U/L (ref 40–129)
ALT SERPL-CCNC: 15 U/L (ref 10–40)
ANION GAP SERPL CALCULATED.3IONS-SCNC: 10 MMOL/L (ref 3–16)
ANION GAP SERPL CALCULATED.3IONS-SCNC: 13 MMOL/L (ref 3–16)
AST SERPL-CCNC: 12 U/L (ref 15–37)
BASOPHILS ABSOLUTE: 0 K/UL (ref 0–0.2)
BASOPHILS ABSOLUTE: 0.1 K/UL (ref 0–0.2)
BASOPHILS RELATIVE PERCENT: 0.3 %
BASOPHILS RELATIVE PERCENT: 0.9 %
BILIRUB SERPL-MCNC: 0.3 MG/DL (ref 0–1)
BILIRUBIN DIRECT: <0.2 MG/DL (ref 0–0.3)
BILIRUBIN, INDIRECT: ABNORMAL MG/DL (ref 0–1)
BUN BLDV-MCNC: 29 MG/DL (ref 7–20)
BUN BLDV-MCNC: 29 MG/DL (ref 7–20)
CALCIUM IONIZED: 1 MMOL/L (ref 1.12–1.32)
CALCIUM SERPL-MCNC: 7.6 MG/DL (ref 8.3–10.6)
CALCIUM SERPL-MCNC: 7.9 MG/DL (ref 8.3–10.6)
CHLORIDE BLD-SCNC: 101 MMOL/L (ref 99–110)
CHLORIDE BLD-SCNC: 104 MMOL/L (ref 99–110)
CO2: 19 MMOL/L (ref 21–32)
CO2: 21 MMOL/L (ref 21–32)
CREAT SERPL-MCNC: 1.2 MG/DL (ref 0.6–1.2)
CREAT SERPL-MCNC: 1.2 MG/DL (ref 0.6–1.2)
EKG ATRIAL RATE: 119 BPM
EKG DIAGNOSIS: NORMAL
EKG P AXIS: 65 DEGREES
EKG P-R INTERVAL: 158 MS
EKG Q-T INTERVAL: 326 MS
EKG QRS DURATION: 92 MS
EKG QTC CALCULATION (BAZETT): 458 MS
EKG R AXIS: 20 DEGREES
EKG T AXIS: 46 DEGREES
EKG VENTRICULAR RATE: 119 BPM
EOSINOPHILS ABSOLUTE: 0 K/UL (ref 0–0.6)
EOSINOPHILS ABSOLUTE: 0 K/UL (ref 0–0.6)
EOSINOPHILS RELATIVE PERCENT: 0.1 %
EOSINOPHILS RELATIVE PERCENT: 0.1 %
FERRITIN: 83.9 NG/ML (ref 15–150)
GAMMA GLUTAMYL TRANSFERASE: 14 U/L (ref 5–36)
GFR AFRICAN AMERICAN: 55
GFR AFRICAN AMERICAN: 55
GFR NON-AFRICAN AMERICAN: 46
GFR NON-AFRICAN AMERICAN: 46
GLUCOSE BLD-MCNC: 241 MG/DL (ref 70–99)
GLUCOSE BLD-MCNC: 243 MG/DL (ref 70–99)
GLUCOSE BLD-MCNC: 261 MG/DL (ref 70–99)
GLUCOSE BLD-MCNC: 263 MG/DL (ref 70–99)
GLUCOSE BLD-MCNC: 292 MG/DL (ref 70–99)
GLUCOSE BLD-MCNC: 312 MG/DL (ref 70–99)
GLUCOSE BLD-MCNC: 341 MG/DL (ref 70–99)
GLUCOSE BLD-MCNC: 348 MG/DL (ref 70–99)
GLUCOSE BLD-MCNC: 365 MG/DL (ref 70–99)
GLUCOSE BLD-MCNC: 496 MG/DL (ref 70–99)
HCT VFR BLD CALC: 24.5 % (ref 36–48)
HCT VFR BLD CALC: 25.9 % (ref 36–48)
HEMOGLOBIN: 7.9 G/DL (ref 12–16)
HEMOGLOBIN: 8.3 G/DL (ref 12–16)
IRON SATURATION: 3 % (ref 15–50)
IRON: 6 UG/DL (ref 37–145)
LACTIC ACID, SEPSIS: 3.9 MMOL/L (ref 0.4–1.9)
LACTIC ACID: 1.6 MMOL/L (ref 0.4–2)
LYMPHOCYTES ABSOLUTE: 0.5 K/UL (ref 1–5.1)
LYMPHOCYTES ABSOLUTE: 0.7 K/UL (ref 1–5.1)
LYMPHOCYTES RELATIVE PERCENT: 3.2 %
LYMPHOCYTES RELATIVE PERCENT: 5 %
MAGNESIUM: 1.8 MG/DL (ref 1.8–2.4)
MCH RBC QN AUTO: 24.4 PG (ref 26–34)
MCH RBC QN AUTO: 24.5 PG (ref 26–34)
MCHC RBC AUTO-ENTMCNC: 32 G/DL (ref 31–36)
MCHC RBC AUTO-ENTMCNC: 32.1 G/DL (ref 31–36)
MCV RBC AUTO: 76.2 FL (ref 80–100)
MCV RBC AUTO: 76.3 FL (ref 80–100)
MONOCYTES ABSOLUTE: 0.7 K/UL (ref 0–1.3)
MONOCYTES ABSOLUTE: 0.8 K/UL (ref 0–1.3)
MONOCYTES RELATIVE PERCENT: 4.4 %
MONOCYTES RELATIVE PERCENT: 6.2 %
NEUTROPHILS ABSOLUTE: 11.7 K/UL (ref 1.7–7.7)
NEUTROPHILS ABSOLUTE: 13.7 K/UL (ref 1.7–7.7)
NEUTROPHILS RELATIVE PERCENT: 88.4 %
NEUTROPHILS RELATIVE PERCENT: 91.4 %
PDW BLD-RTO: 15.7 % (ref 12.4–15.4)
PDW BLD-RTO: 16 % (ref 12.4–15.4)
PERFORMED ON: ABNORMAL
PH VENOUS: 7.38 (ref 7.35–7.45)
PHOSPHORUS: 2.1 MG/DL (ref 2.5–4.9)
PHOSPHORUS: 2.3 MG/DL (ref 2.5–4.9)
PLATELET # BLD: 157 K/UL (ref 135–450)
PLATELET # BLD: 183 K/UL (ref 135–450)
PMV BLD AUTO: 7 FL (ref 5–10.5)
PMV BLD AUTO: 7 FL (ref 5–10.5)
POTASSIUM SERPL-SCNC: 3.6 MMOL/L (ref 3.5–5.1)
POTASSIUM SERPL-SCNC: 4.2 MMOL/L (ref 3.5–5.1)
RBC # BLD: 3.22 M/UL (ref 4–5.2)
RBC # BLD: 3.4 M/UL (ref 4–5.2)
REPORT: NORMAL
SARS-COV-2: NOT DETECTED
SODIUM BLD-SCNC: 133 MMOL/L (ref 136–145)
SODIUM BLD-SCNC: 135 MMOL/L (ref 136–145)
T4 FREE: 1.1 NG/DL (ref 0.9–1.8)
TOTAL IRON BINDING CAPACITY: 230 UG/DL (ref 260–445)
TOTAL PROTEIN: 5.5 G/DL (ref 6.4–8.2)
TROPONIN: <0.01 NG/ML
TSH SERPL DL<=0.05 MIU/L-ACNC: 1 UIU/ML (ref 0.27–4.2)
WBC # BLD: 13.3 K/UL (ref 4–11)
WBC # BLD: 15 K/UL (ref 4–11)

## 2021-12-24 PROCEDURE — 6370000000 HC RX 637 (ALT 250 FOR IP): Performed by: STUDENT IN AN ORGANIZED HEALTH CARE EDUCATION/TRAINING PROGRAM

## 2021-12-24 PROCEDURE — 82728 ASSAY OF FERRITIN: CPT

## 2021-12-24 PROCEDURE — 93005 ELECTROCARDIOGRAM TRACING: CPT | Performed by: STUDENT IN AN ORGANIZED HEALTH CARE EDUCATION/TRAINING PROGRAM

## 2021-12-24 PROCEDURE — 36415 COLL VENOUS BLD VENIPUNCTURE: CPT

## 2021-12-24 PROCEDURE — 93005 ELECTROCARDIOGRAM TRACING: CPT | Performed by: INTERNAL MEDICINE

## 2021-12-24 PROCEDURE — 80069 RENAL FUNCTION PANEL: CPT

## 2021-12-24 PROCEDURE — 80076 HEPATIC FUNCTION PANEL: CPT

## 2021-12-24 PROCEDURE — 83605 ASSAY OF LACTIC ACID: CPT

## 2021-12-24 PROCEDURE — 83735 ASSAY OF MAGNESIUM: CPT

## 2021-12-24 PROCEDURE — 2580000003 HC RX 258: Performed by: STUDENT IN AN ORGANIZED HEALTH CARE EDUCATION/TRAINING PROGRAM

## 2021-12-24 PROCEDURE — 85025 COMPLETE CBC W/AUTO DIFF WBC: CPT

## 2021-12-24 PROCEDURE — 6360000002 HC RX W HCPCS: Performed by: STUDENT IN AN ORGANIZED HEALTH CARE EDUCATION/TRAINING PROGRAM

## 2021-12-24 PROCEDURE — 84484 ASSAY OF TROPONIN QUANT: CPT

## 2021-12-24 PROCEDURE — 1200000000 HC SEMI PRIVATE

## 2021-12-24 PROCEDURE — 83550 IRON BINDING TEST: CPT

## 2021-12-24 PROCEDURE — 83540 ASSAY OF IRON: CPT

## 2021-12-24 PROCEDURE — 82977 ASSAY OF GGT: CPT

## 2021-12-24 PROCEDURE — 83036 HEMOGLOBIN GLYCOSYLATED A1C: CPT

## 2021-12-24 PROCEDURE — 51798 US URINE CAPACITY MEASURE: CPT

## 2021-12-24 RX ORDER — METOPROLOL SUCCINATE 25 MG/1
25 TABLET, EXTENDED RELEASE ORAL DAILY
Status: DISCONTINUED | OUTPATIENT
Start: 2021-12-25 | End: 2021-12-28 | Stop reason: HOSPADM

## 2021-12-24 RX ORDER — BUPROPION HYDROCHLORIDE 100 MG/1
200 TABLET, EXTENDED RELEASE ORAL 2 TIMES DAILY
Status: DISCONTINUED | OUTPATIENT
Start: 2021-12-24 | End: 2021-12-28 | Stop reason: HOSPADM

## 2021-12-24 RX ORDER — GABAPENTIN 300 MG/1
300 CAPSULE ORAL 3 TIMES DAILY
Status: DISCONTINUED | OUTPATIENT
Start: 2021-12-24 | End: 2021-12-28 | Stop reason: HOSPADM

## 2021-12-24 RX ORDER — HYDROXYZINE 50 MG/1
100 TABLET, FILM COATED ORAL 2 TIMES DAILY
Status: ON HOLD | COMMUNITY
End: 2022-06-14 | Stop reason: HOSPADM

## 2021-12-24 RX ORDER — 0.9 % SODIUM CHLORIDE 0.9 %
500 INTRAVENOUS SOLUTION INTRAVENOUS ONCE
Status: DISCONTINUED | OUTPATIENT
Start: 2021-12-25 | End: 2021-12-28 | Stop reason: HOSPADM

## 2021-12-24 RX ORDER — SODIUM CHLORIDE, SODIUM LACTATE, POTASSIUM CHLORIDE, AND CALCIUM CHLORIDE .6; .31; .03; .02 G/100ML; G/100ML; G/100ML; G/100ML
1000 INJECTION, SOLUTION INTRAVENOUS ONCE
Status: DISCONTINUED | OUTPATIENT
Start: 2021-12-24 | End: 2021-12-26

## 2021-12-24 RX ORDER — 0.9 % SODIUM CHLORIDE 0.9 %
1000 INTRAVENOUS SOLUTION INTRAVENOUS ONCE
Status: COMPLETED | OUTPATIENT
Start: 2021-12-24 | End: 2021-12-24

## 2021-12-24 RX ORDER — GABAPENTIN 300 MG/1
600 CAPSULE ORAL 3 TIMES DAILY
COMMUNITY

## 2021-12-24 RX ORDER — PANTOPRAZOLE SODIUM 40 MG/1
40 TABLET, DELAYED RELEASE ORAL DAILY
Status: DISCONTINUED | OUTPATIENT
Start: 2021-12-24 | End: 2021-12-25

## 2021-12-24 RX ORDER — METOPROLOL SUCCINATE 25 MG/1
25 TABLET, EXTENDED RELEASE ORAL DAILY
Status: DISCONTINUED | OUTPATIENT
Start: 2021-12-24 | End: 2021-12-24

## 2021-12-24 RX ORDER — QUETIAPINE FUMARATE 300 MG/1
300 TABLET, FILM COATED ORAL NIGHTLY
Status: ON HOLD | COMMUNITY
End: 2022-06-04 | Stop reason: HOSPADM

## 2021-12-24 RX ORDER — ACETAMINOPHEN 500 MG
1000 TABLET ORAL EVERY 6 HOURS PRN
COMMUNITY

## 2021-12-24 RX ORDER — SODIUM CHLORIDE, SODIUM LACTATE, POTASSIUM CHLORIDE, AND CALCIUM CHLORIDE .6; .31; .03; .02 G/100ML; G/100ML; G/100ML; G/100ML
1000 INJECTION, SOLUTION INTRAVENOUS ONCE
Status: COMPLETED | OUTPATIENT
Start: 2021-12-24 | End: 2021-12-24

## 2021-12-24 RX ORDER — MAGNESIUM SULFATE IN WATER 40 MG/ML
4000 INJECTION, SOLUTION INTRAVENOUS ONCE
Status: COMPLETED | OUTPATIENT
Start: 2021-12-24 | End: 2021-12-24

## 2021-12-24 RX ADMIN — BUPROPION HYDROCHLORIDE 200 MG: 100 TABLET, EXTENDED RELEASE ORAL at 20:33

## 2021-12-24 RX ADMIN — MAGNESIUM SULFATE HEPTAHYDRATE 4000 MG: 4 INJECTION, SOLUTION INTRAVENOUS at 01:03

## 2021-12-24 RX ADMIN — CALCIUM GLUCONATE 1000 MG: 98 INJECTION, SOLUTION INTRAVENOUS at 20:33

## 2021-12-24 RX ADMIN — MEROPENEM 1000 MG: 1 INJECTION, POWDER, FOR SOLUTION INTRAVENOUS at 18:46

## 2021-12-24 RX ADMIN — INSULIN LISPRO 6 UNITS: 100 INJECTION, SOLUTION INTRAVENOUS; SUBCUTANEOUS at 12:13

## 2021-12-24 RX ADMIN — VANCOMYCIN HYDROCHLORIDE 2000 MG: 10 INJECTION, POWDER, LYOPHILIZED, FOR SOLUTION INTRAVENOUS at 00:10

## 2021-12-24 RX ADMIN — ACETAMINOPHEN 650 MG: 325 TABLET ORAL at 01:58

## 2021-12-24 RX ADMIN — SODIUM CHLORIDE 25 ML: 900 INJECTION INTRAVENOUS at 06:12

## 2021-12-24 RX ADMIN — INSULIN HUMAN 10 UNITS: 100 INJECTION, SOLUTION PARENTERAL at 00:12

## 2021-12-24 RX ADMIN — SODIUM CHLORIDE: 9 INJECTION, SOLUTION INTRAVENOUS at 02:00

## 2021-12-24 RX ADMIN — INSULIN HUMAN 10 UNITS: 100 INJECTION, SOLUTION PARENTERAL at 10:21

## 2021-12-24 RX ADMIN — SODIUM CHLORIDE: 9 INJECTION, SOLUTION INTRAVENOUS at 11:00

## 2021-12-24 RX ADMIN — ACETAMINOPHEN 650 MG: 325 TABLET ORAL at 17:47

## 2021-12-24 RX ADMIN — CEFEPIME HYDROCHLORIDE 2000 MG: 2 INJECTION, POWDER, FOR SOLUTION INTRAVENOUS at 13:41

## 2021-12-24 RX ADMIN — SERTRALINE HYDROCHLORIDE 100 MG: 100 TABLET ORAL at 08:01

## 2021-12-24 RX ADMIN — ACETAMINOPHEN 650 MG: 325 TABLET ORAL at 23:37

## 2021-12-24 RX ADMIN — BUSPIRONE HYDROCHLORIDE 15 MG: 10 TABLET ORAL at 20:33

## 2021-12-24 RX ADMIN — QUETIAPINE FUMARATE 300 MG: 200 TABLET ORAL at 20:34

## 2021-12-24 RX ADMIN — SODIUM CHLORIDE: 9 INJECTION, SOLUTION INTRAVENOUS at 16:22

## 2021-12-24 RX ADMIN — BUSPIRONE HYDROCHLORIDE 15 MG: 10 TABLET ORAL at 13:41

## 2021-12-24 RX ADMIN — INSULIN LISPRO 8 UNITS: 100 INJECTION, SOLUTION INTRAVENOUS; SUBCUTANEOUS at 09:11

## 2021-12-24 RX ADMIN — ACETAMINOPHEN 650 MG: 325 TABLET ORAL at 15:21

## 2021-12-24 RX ADMIN — INSULIN LISPRO 3 UNITS: 100 INJECTION, SOLUTION INTRAVENOUS; SUBCUTANEOUS at 20:35

## 2021-12-24 RX ADMIN — CEFEPIME HYDROCHLORIDE 2000 MG: 2 INJECTION, POWDER, FOR SOLUTION INTRAVENOUS at 06:12

## 2021-12-24 RX ADMIN — GABAPENTIN 300 MG: 300 CAPSULE ORAL at 20:33

## 2021-12-24 RX ADMIN — PANTOPRAZOLE SODIUM 40 MG: 40 TABLET, DELAYED RELEASE ORAL at 12:10

## 2021-12-24 RX ADMIN — SODIUM CHLORIDE 1000 ML: 9 INJECTION, SOLUTION INTRAVENOUS at 06:17

## 2021-12-24 RX ADMIN — GABAPENTIN 300 MG: 300 CAPSULE ORAL at 13:41

## 2021-12-24 RX ADMIN — SODIUM CHLORIDE 1000 ML: 9 INJECTION, SOLUTION INTRAVENOUS at 00:07

## 2021-12-24 RX ADMIN — SODIUM CHLORIDE, POTASSIUM CHLORIDE, SODIUM LACTATE AND CALCIUM CHLORIDE 1000 ML: 600; 310; 30; 20 INJECTION, SOLUTION INTRAVENOUS at 16:31

## 2021-12-24 RX ADMIN — BUPROPION HYDROCHLORIDE 200 MG: 100 TABLET, EXTENDED RELEASE ORAL at 12:10

## 2021-12-24 RX ADMIN — QUETIAPINE FUMARATE 300 MG: 200 TABLET ORAL at 08:01

## 2021-12-24 RX ADMIN — INSULIN HUMAN 10 UNITS: 100 INJECTION, SOLUTION PARENTERAL at 16:29

## 2021-12-24 RX ADMIN — INSULIN LISPRO 6 UNITS: 100 INJECTION, SOLUTION INTRAVENOUS; SUBCUTANEOUS at 17:41

## 2021-12-24 ASSESSMENT — PAIN SCALES - GENERAL
PAINLEVEL_OUTOF10: 0
PAINLEVEL_OUTOF10: 3

## 2021-12-24 ASSESSMENT — ENCOUNTER SYMPTOMS
COUGH: 1
ABDOMINAL PAIN: 0
NAUSEA: 1
BLOOD IN STOOL: 0
SHORTNESS OF BREATH: 0
VOMITING: 1
ABDOMINAL DISTENTION: 0

## 2021-12-24 ASSESSMENT — PAIN DESCRIPTION - DESCRIPTORS: DESCRIPTORS: HEADACHE

## 2021-12-24 NOTE — CONSULTS
Pharmacy Consult Note - ED Vancomycin          Pharmacy consulted by Dr. Shruthi Fernandez in ED to order first dose of vancomycin. Indication :   Sepsis    Patient Data:      Height:  5' 5\" (165.1 cm)  Weight: 262 lb (118.8 kg)   Adjusted wt = 81.4 kg    Estimated Creatinine Clearance: 64 mL/min (based on SCr of 1.2 mg/dL). Plan: Will order vancomycin  2000 mg  ( 25 mg/kg x adjusted wt of 81 kg) x 1 dose, to be administered in ED. Please note this consult covers ED vancomycin dose only. If admitting provider would like further vancomycin dose management by pharmacy, please place additional consult order. Thank you for the consult  Mark BROWN  12/23/2021    8:54 PM

## 2021-12-24 NOTE — PROGRESS NOTES
Patient sustaining 130's, temp 103.1, very confused. Tylenol given. MD notified. 1L LR bolus ordered. EKG done showed sinus tachycardia. Blood glucose 314, 10 units regular insulin given IV. Inna Eli did not catch last output, brief saturated. Bladder scan showed 150mL.

## 2021-12-24 NOTE — H&P
History & Physical      CC falls    History Obtained From:  patient    HISTORY OF PRESENT ILLNESS:  71-year-old female with past medical history of anxiety, DM 2, HTN, PTSD presented to the ED with chief complaint of back pain and left hip bilateral hip pain after endorsing multiple falls. Patient reports she walks with a walker but has a diabetic neuropathy which is why endorses multiple falls, reported she fell down 4 times at home yesterday. She denied having any lightheadedness/dizziness, palpitations, loss of consciousness prior to or after the fall. She does endorse nausea and had an episode of vomiting yesterday. Denies any chest pain, abdominal discomfort, burning urination, increased urinary frequency. In the ED, vitals febrile 103.1, pulse 134, RR 20, /90, SPO2 95% on room air. X-ray chest nonrevealing, CT head without contrast nonrevealing. X-ray lumbar spine moderate lumbar spondylosis. X-ray of bilateral hip revealing labs revealing WBC 22, LA 3.4, UA revealing +3 bacteria nitrates positive, WBC 10-20. Severe right and mild left hip osteoarthritis. Patient received 2 L LR bolus and a dose of vancomycin in the ED. Patient was admitted to the general medicine floor for the further management of urosepsis. Patient is a limited codex1, no to chest compressions. Past Medical History:        Diagnosis Date    Anxiety     Diabetes mellitus ([address]     Hernia, abdominal     Hyperlipidemia     Hypertension     PTSD (post-traumatic stress disorder)    ·     Past Surgical History:        Procedure Laterality Date    CHOLECYSTECTOMY      COLECTOMY      HYSTERECTOMY      REVISION COLOSTOMY     ·     Medications Priorto Admission:    · Not in a hospital admission. Allergies:  Azithromycin, Metronidazole, Clindamycin, Penicillin g, Penicillin v, Tetanus immune globulin, and Codeine    Social History:   · TOBACCO:   reports that she has never smoked.  She has never used smokeless tobacco.  · ETOH:   reports no history of alcohol use. · DRUGS : None  · Patient currently lives at home with roommate  ·   Family History:   · No family history on file. Review of Systems    ROS: A 10 point review of systems was conducted, significant findings as noted in HPI. Physical Exam  Constitutional:       Appearance: She is obese. She is ill-appearing. Comments: Drowsy   HENT:      Head: Normocephalic and atraumatic. Nose: Nose normal.      Mouth/Throat:      Mouth: Mucous membranes are dry. Pharynx: Oropharynx is clear. Eyes:      Extraocular Movements: Extraocular movements intact. Conjunctiva/sclera: Conjunctivae normal.      Pupils: Pupils are equal, round, and reactive to light. Cardiovascular:      Rate and Rhythm: Regular rhythm. Tachycardia present. Pulses: Normal pulses. Heart sounds: Normal heart sounds. Pulmonary:      Effort: Pulmonary effort is normal.      Breath sounds: Normal breath sounds. Abdominal:      General: Bowel sounds are normal.      Palpations: Abdomen is soft. Tenderness: There is abdominal tenderness. Musculoskeletal:         General: Tenderness (Bilateral lower extremity) present. Cervical back: Normal range of motion and neck supple. Comments: Decreased range of motion bilateral lower limbs. Unable to get up from bed given back pain   Skin:     General: Skin is warm.    Neurological:      Comments: Bilateral sciatica; radiculopathy       Physical exam:       Vitals:    12/23/21 2108   BP: 111/62   Pulse: 116   Resp:    Temp:    SpO2:        DATA:    Labs:  CBC:   Recent Labs     12/23/21 1940   WBC 22.6*   HGB 10.3*   HCT 33.2*          BMP:   Recent Labs     12/23/21 1940   *   K 4.7   CL 95*   CO2 23   BUN 25*   CREATININE 1.2   GLUCOSE 451*     LFT's:   Recent Labs     12/23/21 1940   AST 17   ALT 20   BILITOT 0.4   ALKPHOS 139*     Troponin:   Recent Labs     12/23/21 1940   TROPONINI <0.01 BNP:No results for input(s): BNP in the last 72 hours. ABGs: No results for input(s): PHART, ITM9YJD, PO2ART in the last 72 hours. INR:   Recent Labs     12/23/21 1940   INR 1.28*       U/A:  Recent Labs     12/23/21 2035   COLORU Yellow   PHUR 6.0   WBCUA 10-20*   RBCUA 0-2   BACTERIA 3+*   CLARITYU Clear   SPECGRAV 1.025   LEUKOCYTESUR Negative   UROBILINOGEN 0.2   BILIRUBINUR Negative   BLOODU SMALL*   GLUCOSEU >=1000*       CT Head WO Contrast   Final Result      1. No acute intracranial process. XR CHEST PORTABLE   Final Result      No acute pulmonary disease. XR LUMBAR SPINE (2-3 VIEWS)   Final Result   Impression:   No acute osseous injury. Moderate lumbar spondylosis better characterized on prior CT lumbar spine dated 11/4/2021. XR HIP BILATERAL W AP PELVIS (2 VIEWS)   Final Result   Impression:   No acute osseous injury. Severe right and mild left hip osteoarthritis. ASSESSMENT AND PLAN:  71-year-old female with past medical history of anxiety, diabetes type 2, neuropathy, hyperlipidemia, hypertension and PTSD presented to the ED after endorsing multiple falls at home. Patient is admitted the following concerns    Severe sepsis 2/2 uncomplicated UTI  Febrile to 103.1, tachycardic 134, WBC 22  LA 3.4  UA revealing +3 bacteria, nitrites positive, WBC 10-20  S/p 2 L LR bolus in ED  A dose of Vanc in ED  Follow-up on urine cultures, blood cultures x2  Maintenance IV fluids: NS at 100 mL/h  Start on cefepime  PT/OT    DM type II  Blood glucose 451  Received 10 units of regular insulin IV in ED  Lantus 10 units nightly  Low-dose sliding scale  Hypoglycemia treatment protocol  Follow-up on A1c    Diabetic neuropathy  Endorses multiple falls attributes to diabetic neuropathy, decreased sensation     Back pain  X-ray lumbar spine no acute osseous injury. Moderate lumbar spondylosis. Bilateral hip pain  X-ray hip bilateral no acute osseous injury. Severe right and mild left hip OA     HTN  Holding home meds    Depression/anxiety  Continue sertraline, Atarax, Seroquel       Will discuss with attending physician.      Code Status:limited   FEN: Carb controlled diet  PPX: Lovenox  DISPO: KANU Whipple MD  12/23/2021,  10:16 PM

## 2021-12-24 NOTE — PROGRESS NOTES
Oriented to room and call light use:   4 Eyes Admission Assessment     I agree as the admission nurse that 2 RN's have performed a thorough Head to Toe Skin Assessment on the patient. ALL assessment sites listed below have been assessed on admission. Areas assessed by both nurses:   [x]   Head, Face, and Ears   [x]   Shoulders, Back, and Chest  [x]   Arms, Elbows, and Hands   [x]   Coccyx, Sacrum, and Ischium - skin tear L abd fold, redness under breasts, blanchable redness to buttocks  [x]   Legs, Feet, and Heels - scattered flea bites BLE        Does the Patient have Skin Breakdown?   Yes a wound was noted on the Admission Assessment and an LDA was Initiated documentation include the Radha-wound, Wound Assessment, Measurements, Dressing Treatment, Drainage, and Color\",         Regan Prevention initiated:  No   Wound Care Orders initiated:  No      [facility] nurse consulted for Pressure Injury (Stage 3,4, Unstageable, DTI, NWPT, and Complex wounds) or Regan score 18 or lower:  No      Nurse 1 eSignature: Electronically signed by Raul Sales RN on 12/23/21 at 10:44 PM EST    **SHARE this note so that the co-signing nurse is able to place an eSignature**    Nurse 2 eSignature: Electronically signed by Armani Mccormick RN on 12/23/21 at 11:10 PM EST

## 2021-12-24 NOTE — PROGRESS NOTES
Pharmacy Note - Extended Infusion Beta-Lactam Adjustment    Merrem ordered for treatment of UTI. Per [facility] Extended Infusion Beta-Lactam Policy, merrem will be changed to 1000 mg q8h EI. Estimated Creatinine Clearance: Estimated Creatinine Clearance: 65 mL/min (based on SCr of 1.2 mg/dL). Dialysis Status, SHAYLA, CKD: none    Rationale for Adjustment: Agent demonstrates time-dependent effect on bacterial eradication. Extended-infusion dosing strategy aims to enhance microbiologic and clinical efficacy. Pharmacy will continue to monitor renal function, cultures and sensitivities and adjust dose as necessary.       Please call with any questions    Cleveland Castaneda PharmD, [facility] 12/24/2021 6:24 PM

## 2021-12-24 NOTE — PROGRESS NOTES
Pharmacy Note     Cefepime 2000 mg every 12 hours ordered for treatment of sepsis 2/2 UTI. Per [address] Renal Dose Adjustment Policy and Extended Infusion Beta-Lactam Policy, Cefepime will be changed to 2000 mg every 8 hours infused over 4 hours. Estimated Creatinine Clearance: Estimated Creatinine Clearance: 65 mL/min (based on SCr of 1.2 mg/dL). Dialysis Status, SHAYLA, CKD:     Rationale for Adjustment: Agent is renally eliminated and demonstrates time-dependent effect on bacterial eradication. Extended-infusion dosing strategy aims to enhance microbiologic and clinical efficacy. Pharmacy will continue to monitor renal function, cultures and sensitivities and adjust dose as necessary. Please call with any questions. Thanks!     Yoni Rosales, PharmD, BCPS

## 2021-12-24 NOTE — PROGRESS NOTES
Progress Note    Admit Date: 12/23/2021  Diet: ADULT DIET; Regular; 4 carb choices (60 gm/meal)    CC: fall    Interval history:   - sugars still elevated to 340s this AM: given another 10U reg insulin  - on continuous fluids + vanc/cefepime  - reports continued midline back and hip spencer    HPI: 61yoF with PMHx anxiety, T2DM, HTN, PTSD pw back pain and bilateral hip pain after endorsing multiple falls. Patient reports she walks with a walker but has diabetic neuropathy which is why endorses multiple falls, reported she fell down 4 times at home yesterday. She denied having any lightheadedness/dizziness, palpitations, LOC prior to or after the fall. She does endorse nausea and had an episode of vomiting yesterday. Denies any chest pain, abdominal discomfort, burning urination, increased urinary frequency. In the ED, vitals febrile 103.1, pulse 134, RR 20, /90, SPO2 95% on room air. Imaging largely unrevealing for acute pathology. WBC 22, LA 3.4, UA revealing +3 bacteria nitrates positive, WBC 10-20. Patient received 2 L LR bolus and a dose of vancomycin + cefepime in the ED. Patient was admitted to the general medicine floor for the further management of urosepsis.     Medications:     Scheduled Meds:   cefepime  2,000 mg IntraVENous Q8H    sodium chloride  1,000 mL IntraVENous Once    [Held by provider] losartan  50 mg Oral Daily    [Held by provider] carvedilol  6.25 mg Oral BID WC    QUEtiapine  300 mg Oral BID    sertraline  100 mg Oral Daily    sodium chloride flush  5-40 mL IntraVENous 2 times per day    [Held by provider] enoxaparin  40 mg SubCUTAneous Daily    insulin lispro  0-6 Units SubCUTAneous Nightly    insulin lispro  0-12 Units SubCUTAneous TID WC     Continuous Infusions:   sodium chloride      sodium chloride      sodium chloride 150 mL/hr at 12/24/21 0200    dextrose       PRN Meds:hydrOXYzine, sodium chloride flush, sodium chloride, acetaminophen **OR** acetaminophen, glucose, dextrose, glucagon (rDNA), dextrose    Objective:   Vitals:   T-max:  Patient Vitals for the past 8 hrs:   BP Temp Temp src Pulse Resp SpO2 Weight   12/24/21 0448 126/63  Oral 120 17 94 %    12/24/21 0202       270 lb 1 oz (122.5 kg)   12/23/21 2239 (!) 90/57 98.1 °F (36.7 °C) Oral 105 15 96 %    12/23/21 2215 91/69   112 16 93 %      No intake or output data in the 24 hours ending 12/24/21 0552    Review of Systems   Constitutional: Positive for chills, fatigue and fever. Respiratory: Positive for cough. Negative for shortness of breath. Cardiovascular: Positive for chest pain. Negative for leg swelling. Gastrointestinal: Positive for nausea and vomiting. Negative for abdominal distention, abdominal pain and blood in stool. Genitourinary: Positive for urgency. Negative for dysuria. Neurological: Positive for headaches. Negative for light-headedness. Physical Exam  Constitutional:       General: She is not in acute distress. Appearance: She is obese. HENT:      Mouth/Throat:      Mouth: Mucous membranes are dry. Pharynx: Oropharynx is clear. Eyes:      Extraocular Movements: Extraocular movements intact. Cardiovascular:      Rate and Rhythm: Regular rhythm. Tachycardia present. Pulses: Normal pulses. Heart sounds: Normal heart sounds. Pulmonary:      Effort: Pulmonary effort is normal. No respiratory distress. Breath sounds: Normal breath sounds. No wheezing, rhonchi or rales. Abdominal:      General: Bowel sounds are normal. There is distension (mild). Palpations: Abdomen is soft. Tenderness: There is no abdominal tenderness. Musculoskeletal:      Comments: Trace LE edema bilaterally   Neurological:      General: No focal deficit present. Mental Status: She is alert and oriented to person, place, and time.          LABS:    CBC:   Recent Labs     12/23/21 1940   WBC 22.6*   HGB 10.3*   HCT 33.2*      MCV 76.7*     Renal: treatment protocol  FU HbA1c    Microcytic Anemia: slowly progressive over past few months. Drop from 10.3 to 8.3 likely from correction of hemoconcentration. Reports no bleeding including GI or melena. Due for next colonoscopy for colon cancer screening in 2025  - FOBT  - iron studies  - hold AC     Diabetic neuropathy: Endorses multiple falls attributes to diabetic neuropathy, decreased sensation   Back, hip pain: Imaging negative for acute process. Severe right and mild left hip OA   - home gabapentin     HTN: pressures soft on arrival but have improved with IVF resuscitation  - toprol XR 25mg daily  - hold losartan     Depression/anxiety  PTSD  Continue sertraline, seroquel, bupropion, buspirone    Code Status: Full Code  FEN: ADULT DIET; Regular; 4 carb choices (60 gm/meal)  PPX: SCDs, protonix  DISPO: GMF    Natanael Kothari MD, PGY-1  12/24/21  5:52 AM    This patient has been staffed and discussed with Dr. Trev Nagel.

## 2021-12-24 NOTE — CONSULTS
Clinical Pharmacy Progress Note  Medication History     Admit Date: 12/24    Asked to verify home medications by Dr. Reina Riggs. List of of current medications patient is taking is complete. Home Medication list in EPIC updated to reflect changes noted below. Source of information: Patient gets weekly med packs filled by [facility] made to medication list:   Medications removed (no longer taking):  · Lovastatin - per pharmacy, had a prescription on hold but has never been filled  · Montelukast   · Cetirizine   · Lidocaine patch     Medications added:   · Gabapentin 300 mg TID  · Hydroxyzine 100 mg HS  · Metformin 1000 mg BID    Medication doses / instructions adjusted:   · Bupropion ER - increased to 200 mg BID  · Buspirone - increased to 15 mg TID  · Quetiapine - adjusted to 300 mg HS  · Sertraline - increased to 150 mg daily  · Victoza - increased to 1.8 mg daily  · Tylenol - 1000 mg q6h prn    Complete Home Medication List:  Current Outpatient Medications   Medication Instructions    acetaminophen (TYLENOL) 1,000 mg, Oral, EVERY 6 HOURS PRN    buPROPion (WELLBUTRIN SR) 200 mg, Oral, 2 TIMES DAILY    busPIRone (BUSPAR) 15 mg, Oral, 3 TIMES DAILY    gabapentin (NEURONTIN) 300 mg, Oral, 3 TIMES DAILY    hydrOXYzine (ATARAX) 100 mg, Oral, NIGHTLY    losartan (COZAAR) 50 mg, Oral, DAILY    metFORMIN (GLUCOPHAGE) 1,000 mg, Oral, 2 TIMES DAILY WITH MEALS    metoprolol succinate (TOPROL XL) 50 mg, Oral, DAILY    pantoprazole (PROTONIX) 40 mg, Oral, DAILY    QUEtiapine (SEROQUEL) 300 mg, Oral, NIGHTLY    sertraline (ZOLOFT) 150 mg, Oral, DAILY    Victoza 1.8 mg, SubCUTAneous, DAILY     Please call with questions!     Jo Ann Chawla, PharmD  Main Pharmacy: [id number]  12/24/2021 10:53 AM

## 2021-12-25 ENCOUNTER — APPOINTMENT (OUTPATIENT)
Dept: CT IMAGING | Age: 61
DRG: 720 | End: 2021-12-25
Payer: MEDICAID

## 2021-12-25 LAB
ALBUMIN SERPL-MCNC: 2.7 G/DL (ref 3.4–5)
ANION GAP SERPL CALCULATED.3IONS-SCNC: 10 MMOL/L (ref 3–16)
BASOPHILS ABSOLUTE: 0 K/UL (ref 0–0.2)
BASOPHILS RELATIVE PERCENT: 0.3 %
BUN BLDV-MCNC: 26 MG/DL (ref 7–20)
CALCIUM IONIZED: 1.08 MMOL/L (ref 1.12–1.32)
CALCIUM SERPL-MCNC: 7.7 MG/DL (ref 8.3–10.6)
CHLORIDE BLD-SCNC: 106 MMOL/L (ref 99–110)
CO2: 21 MMOL/L (ref 21–32)
CREAT SERPL-MCNC: 1 MG/DL (ref 0.6–1.2)
EKG ATRIAL RATE: 135 BPM
EKG ATRIAL RATE: 160 BPM
EKG DIAGNOSIS: NORMAL
EKG DIAGNOSIS: NORMAL
EKG P AXIS: 72 DEGREES
EKG P AXIS: 73 DEGREES
EKG P-R INTERVAL: 116 MS
EKG P-R INTERVAL: 128 MS
EKG Q-T INTERVAL: 290 MS
EKG Q-T INTERVAL: 308 MS
EKG QRS DURATION: 86 MS
EKG QRS DURATION: 94 MS
EKG QTC CALCULATION (BAZETT): 435 MS
EKG QTC CALCULATION (BAZETT): 502 MS
EKG R AXIS: 32 DEGREES
EKG R AXIS: 50 DEGREES
EKG T AXIS: 63 DEGREES
EKG T AXIS: 64 DEGREES
EKG VENTRICULAR RATE: 135 BPM
EKG VENTRICULAR RATE: 160 BPM
EOSINOPHILS ABSOLUTE: 0 K/UL (ref 0–0.6)
EOSINOPHILS RELATIVE PERCENT: 0.5 %
ESTIMATED AVERAGE GLUCOSE: 274.7 MG/DL
ESTIMATED AVERAGE GLUCOSE: 274.7 MG/DL
GFR AFRICAN AMERICAN: >60
GFR NON-AFRICAN AMERICAN: 57
GLUCOSE BLD-MCNC: 307 MG/DL (ref 70–99)
GLUCOSE BLD-MCNC: 312 MG/DL (ref 70–99)
GLUCOSE BLD-MCNC: 341 MG/DL (ref 70–99)
HBA1C MFR BLD: 11.2 %
HBA1C MFR BLD: 11.2 %
HCT VFR BLD CALC: 23.1 % (ref 36–48)
HEMOGLOBIN: 7.6 G/DL (ref 12–16)
LYMPHOCYTES ABSOLUTE: 0.6 K/UL (ref 1–5.1)
LYMPHOCYTES RELATIVE PERCENT: 6 %
MAGNESIUM: 1.9 MG/DL (ref 1.8–2.4)
MCH RBC QN AUTO: 25 PG (ref 26–34)
MCHC RBC AUTO-ENTMCNC: 33.1 G/DL (ref 31–36)
MCV RBC AUTO: 75.7 FL (ref 80–100)
MONOCYTES ABSOLUTE: 0.7 K/UL (ref 0–1.3)
MONOCYTES RELATIVE PERCENT: 6.3 %
NEUTROPHILS ABSOLUTE: 9 K/UL (ref 1.7–7.7)
NEUTROPHILS RELATIVE PERCENT: 86.9 %
OCCULT BLOOD DIAGNOSTIC: ABNORMAL
ORGANISM: ABNORMAL
PDW BLD-RTO: 16.1 % (ref 12.4–15.4)
PERFORMED ON: ABNORMAL
PERFORMED ON: ABNORMAL
PH VENOUS: 7.37 (ref 7.35–7.45)
PHOSPHORUS: 2.5 MG/DL (ref 2.5–4.9)
PLATELET # BLD: 153 K/UL (ref 135–450)
PMV BLD AUTO: 7.6 FL (ref 5–10.5)
POTASSIUM SERPL-SCNC: 3.9 MMOL/L (ref 3.5–5.1)
RBC # BLD: 3.05 M/UL (ref 4–5.2)
SODIUM BLD-SCNC: 137 MMOL/L (ref 136–145)
URINE CULTURE, ROUTINE: ABNORMAL
WBC # BLD: 10.4 K/UL (ref 4–11)

## 2021-12-25 PROCEDURE — 2580000003 HC RX 258: Performed by: STUDENT IN AN ORGANIZED HEALTH CARE EDUCATION/TRAINING PROGRAM

## 2021-12-25 PROCEDURE — 6370000000 HC RX 637 (ALT 250 FOR IP): Performed by: STUDENT IN AN ORGANIZED HEALTH CARE EDUCATION/TRAINING PROGRAM

## 2021-12-25 PROCEDURE — 97530 THERAPEUTIC ACTIVITIES: CPT

## 2021-12-25 PROCEDURE — 97116 GAIT TRAINING THERAPY: CPT

## 2021-12-25 PROCEDURE — 6360000002 HC RX W HCPCS: Performed by: STUDENT IN AN ORGANIZED HEALTH CARE EDUCATION/TRAINING PROGRAM

## 2021-12-25 PROCEDURE — 97535 SELF CARE MNGMENT TRAINING: CPT

## 2021-12-25 PROCEDURE — 80069 RENAL FUNCTION PANEL: CPT

## 2021-12-25 PROCEDURE — 85025 COMPLETE CBC W/AUTO DIFF WBC: CPT

## 2021-12-25 PROCEDURE — 51798 US URINE CAPACITY MEASURE: CPT

## 2021-12-25 PROCEDURE — 82330 ASSAY OF CALCIUM: CPT

## 2021-12-25 PROCEDURE — 83735 ASSAY OF MAGNESIUM: CPT

## 2021-12-25 PROCEDURE — 82270 OCCULT BLOOD FECES: CPT

## 2021-12-25 PROCEDURE — 1200000000 HC SEMI PRIVATE

## 2021-12-25 PROCEDURE — 93010 ELECTROCARDIOGRAM REPORT: CPT | Performed by: INTERNAL MEDICINE

## 2021-12-25 PROCEDURE — 36415 COLL VENOUS BLD VENIPUNCTURE: CPT

## 2021-12-25 PROCEDURE — C9113 INJ PANTOPRAZOLE SODIUM, VIA: HCPCS | Performed by: STUDENT IN AN ORGANIZED HEALTH CARE EDUCATION/TRAINING PROGRAM

## 2021-12-25 PROCEDURE — 97162 PT EVAL MOD COMPLEX 30 MIN: CPT

## 2021-12-25 PROCEDURE — 74176 CT ABD & PELVIS W/O CONTRAST: CPT

## 2021-12-25 PROCEDURE — 97166 OT EVAL MOD COMPLEX 45 MIN: CPT

## 2021-12-25 RX ORDER — INSULIN LISPRO 100 [IU]/ML
7 INJECTION, SOLUTION INTRAVENOUS; SUBCUTANEOUS
Status: DISCONTINUED | OUTPATIENT
Start: 2021-12-25 | End: 2021-12-26

## 2021-12-25 RX ORDER — PANTOPRAZOLE SODIUM 40 MG/10ML
40 INJECTION, POWDER, LYOPHILIZED, FOR SOLUTION INTRAVENOUS 2 TIMES DAILY
Status: DISCONTINUED | OUTPATIENT
Start: 2021-12-25 | End: 2021-12-28 | Stop reason: HOSPADM

## 2021-12-25 RX ORDER — FERROUS SULFATE 325(65) MG
325 TABLET ORAL
Status: DISCONTINUED | OUTPATIENT
Start: 2021-12-26 | End: 2021-12-28 | Stop reason: HOSPADM

## 2021-12-25 RX ORDER — OXYCODONE HYDROCHLORIDE AND ACETAMINOPHEN 5; 325 MG/1; MG/1
1 TABLET ORAL ONCE
Status: COMPLETED | OUTPATIENT
Start: 2021-12-25 | End: 2021-12-25

## 2021-12-25 RX ADMIN — INSULIN LISPRO 7 UNITS: 100 INJECTION, SOLUTION INTRAVENOUS; SUBCUTANEOUS at 13:57

## 2021-12-25 RX ADMIN — BUPROPION HYDROCHLORIDE 200 MG: 100 TABLET, EXTENDED RELEASE ORAL at 20:25

## 2021-12-25 RX ADMIN — INSULIN LISPRO 4 UNITS: 100 INJECTION, SOLUTION INTRAVENOUS; SUBCUTANEOUS at 20:27

## 2021-12-25 RX ADMIN — BUSPIRONE HYDROCHLORIDE 15 MG: 10 TABLET ORAL at 09:38

## 2021-12-25 RX ADMIN — MEROPENEM 1000 MG: 1 INJECTION, POWDER, FOR SOLUTION INTRAVENOUS at 09:35

## 2021-12-25 RX ADMIN — CEFTRIAXONE SODIUM 2000 MG: 2 INJECTION, POWDER, FOR SOLUTION INTRAMUSCULAR; INTRAVENOUS at 13:38

## 2021-12-25 RX ADMIN — BUSPIRONE HYDROCHLORIDE 15 MG: 10 TABLET ORAL at 13:36

## 2021-12-25 RX ADMIN — GABAPENTIN 300 MG: 300 CAPSULE ORAL at 13:44

## 2021-12-25 RX ADMIN — MEROPENEM 1000 MG: 1 INJECTION, POWDER, FOR SOLUTION INTRAVENOUS at 03:34

## 2021-12-25 RX ADMIN — QUETIAPINE FUMARATE 300 MG: 200 TABLET ORAL at 20:25

## 2021-12-25 RX ADMIN — SODIUM CHLORIDE 25 ML: 900 INJECTION INTRAVENOUS at 08:04

## 2021-12-25 RX ADMIN — PANTOPRAZOLE SODIUM 40 MG: 40 INJECTION, POWDER, FOR SOLUTION INTRAVENOUS at 20:26

## 2021-12-25 RX ADMIN — SERTRALINE HYDROCHLORIDE 100 MG: 100 TABLET ORAL at 09:38

## 2021-12-25 RX ADMIN — INSULIN LISPRO 8 UNITS: 100 INJECTION, SOLUTION INTRAVENOUS; SUBCUTANEOUS at 13:56

## 2021-12-25 RX ADMIN — BUPROPION HYDROCHLORIDE 200 MG: 100 TABLET, EXTENDED RELEASE ORAL at 09:37

## 2021-12-25 RX ADMIN — GABAPENTIN 300 MG: 300 CAPSULE ORAL at 09:37

## 2021-12-25 RX ADMIN — BUSPIRONE HYDROCHLORIDE 15 MG: 10 TABLET ORAL at 20:25

## 2021-12-25 RX ADMIN — SODIUM CHLORIDE: 9 INJECTION, SOLUTION INTRAVENOUS at 17:41

## 2021-12-25 RX ADMIN — INSULIN LISPRO 8 UNITS: 100 INJECTION, SOLUTION INTRAVENOUS; SUBCUTANEOUS at 17:44

## 2021-12-25 RX ADMIN — INSULIN LISPRO 7 UNITS: 100 INJECTION, SOLUTION INTRAVENOUS; SUBCUTANEOUS at 17:44

## 2021-12-25 RX ADMIN — ACETAMINOPHEN 650 MG: 325 TABLET ORAL at 08:00

## 2021-12-25 RX ADMIN — SODIUM CHLORIDE: 9 INJECTION, SOLUTION INTRAVENOUS at 07:05

## 2021-12-25 RX ADMIN — OXYCODONE HYDROCHLORIDE AND ACETAMINOPHEN 1 TABLET: 5; 325 TABLET ORAL at 13:36

## 2021-12-25 RX ADMIN — SODIUM CHLORIDE 25 ML: 900 INJECTION INTRAVENOUS at 03:33

## 2021-12-25 RX ADMIN — INSULIN LISPRO 4 UNITS: 100 INJECTION, SOLUTION INTRAVENOUS; SUBCUTANEOUS at 09:45

## 2021-12-25 RX ADMIN — SODIUM CHLORIDE, PRESERVATIVE FREE 10 ML: 5 INJECTION INTRAVENOUS at 09:40

## 2021-12-25 RX ADMIN — METOPROLOL SUCCINATE 25 MG: 25 TABLET, EXTENDED RELEASE ORAL at 09:37

## 2021-12-25 RX ADMIN — QUETIAPINE FUMARATE 300 MG: 200 TABLET ORAL at 09:37

## 2021-12-25 RX ADMIN — CALCIUM GLUCONATE 1000 MG: 98 INJECTION, SOLUTION INTRAVENOUS at 08:07

## 2021-12-25 RX ADMIN — SODIUM CHLORIDE 25 ML: 900 INJECTION INTRAVENOUS at 09:33

## 2021-12-25 RX ADMIN — PANTOPRAZOLE SODIUM 40 MG: 40 INJECTION, POWDER, FOR SOLUTION INTRAVENOUS at 08:00

## 2021-12-25 RX ADMIN — SODIUM CHLORIDE: 9 INJECTION, SOLUTION INTRAVENOUS at 00:43

## 2021-12-25 RX ADMIN — ACETAMINOPHEN 650 MG: 325 TABLET ORAL at 20:53

## 2021-12-25 RX ADMIN — SODIUM CHLORIDE, PRESERVATIVE FREE 10 ML: 5 INJECTION INTRAVENOUS at 20:25

## 2021-12-25 RX ADMIN — GABAPENTIN 300 MG: 300 CAPSULE ORAL at 20:25

## 2021-12-25 ASSESSMENT — ENCOUNTER SYMPTOMS
ABDOMINAL DISTENTION: 0
VOMITING: 1
ABDOMINAL PAIN: 0
SHORTNESS OF BREATH: 0
NAUSEA: 1
BLOOD IN STOOL: 0
COUGH: 1

## 2021-12-25 ASSESSMENT — PAIN SCALES - GENERAL
PAINLEVEL_OUTOF10: 7
PAINLEVEL_OUTOF10: 7
PAINLEVEL_OUTOF10: 0
PAINLEVEL_OUTOF10: 7
PAINLEVEL_OUTOF10: 0
PAINLEVEL_OUTOF10: 5
PAINLEVEL_OUTOF10: 5

## 2021-12-25 ASSESSMENT — PAIN DESCRIPTION - LOCATION: LOCATION: BACK;LEG

## 2021-12-25 ASSESSMENT — PAIN DESCRIPTION - PAIN TYPE: TYPE: CHRONIC PAIN

## 2021-12-25 NOTE — PLAN OF CARE
Problem: Falls - Risk of:  Goal: Will remain free from falls  Description: Will remain free from falls  Outcome: Ongoing     Problem: Urinary Elimination:  Goal: Signs and symptoms of infection will decrease  Description: Signs and symptoms of infection will decrease  Outcome: Ongoing  Note: Patient remains on IV antibiotic and strict I&O and IVF, will continue to monitor.      Problem: Urinary Elimination:  Goal: Ability to reestablish a normal urinary elimination pattern will improve - after catheter removal  Description: Ability to reestablish a normal urinary elimination pattern will improve  Outcome: Ongoing     Problem: Urinary Elimination:  Goal: Complications related to the disease process, condition or treatment will be avoided or minimized  Description: Complications related to the disease process, condition or treatment will be avoided or minimized  Outcome: Ongoing

## 2021-12-25 NOTE — PROGRESS NOTES
Progress Note    Admit Date: 12/23/2021  Diet: ADULT DIET; Regular; 4 carb choices (60 gm/meal)    CC: fall    Interval history:   - multiple cultures positive for E coli  - was transitioned to merem due to worsening clinical status with fever 103 and sinus tach to 130s-160s  - given extra 10U reg insulin overnight for glucose in 340s  - straight cathed due to urinary retention  - reports feeling a little better this AM    HPI: 61yoF with PMHx anxiety, T2DM, HTN, PTSD pw back pain and bilateral hip pain after endorsing multiple falls. Patient reports she walks with a walker but has diabetic neuropathy which is why endorses multiple falls, reported she fell down 4 times at home yesterday. She denied having any lightheadedness/dizziness, palpitations, LOC prior to or after the fall. She does endorse nausea and had an episode of vomiting yesterday. Denies any chest pain, abdominal discomfort, burning urination, increased urinary frequency. In the ED, vitals febrile 103.1, pulse 134, RR 20, /90, SPO2 95% on room air. Imaging largely unrevealing for acute pathology. WBC 22, LA 3.4, UA revealing +3 bacteria nitrates positive, WBC 10-20. Patient received 2 L LR bolus and a dose of vancomycin + cefepime in the ED. Patient was admitted to the general medicine floor for the further management of urosepsis.     Medications:     Scheduled Meds:   metoprolol succinate  25 mg Oral Daily    gabapentin  300 mg Oral TID    buPROPion  200 mg Oral BID    busPIRone  15 mg Oral TID    pantoprazole  40 mg Oral Daily    lactated ringers bolus  1,000 mL IntraVENous Once    meropenem  1,000 mg IntraVENous Q8H    sodium chloride  500 mL IntraVENous Once    [Held by provider] losartan  50 mg Oral Daily    QUEtiapine  300 mg Oral BID    sertraline  100 mg Oral Daily    sodium chloride flush  5-40 mL IntraVENous 2 times per day    [Held by provider] enoxaparin  40 mg SubCUTAneous Daily    insulin lispro  0-6 Units SubCUTAneous Nightly    insulin lispro  0-12 Units SubCUTAneous TID WC     Continuous Infusions:   sodium chloride 25 mL (12/25/21 0333)    sodium chloride 150 mL/hr at 12/25/21 0043    dextrose       PRN Meds:hydrOXYzine, sodium chloride flush, sodium chloride, acetaminophen **OR** acetaminophen, glucose, dextrose, glucagon (rDNA), dextrose    Objective:   Vitals:   T-max:  Patient Vitals for the past 8 hrs:   BP Temp Temp src Pulse Resp SpO2   12/25/21 0507 (!) 121/52 98.3 °F (36.8 °C) Oral 110 15 93 %   12/25/21 0032 (!) 140/116 100.1 °F (37.8 °C) Oral 133 18 95 %   12/24/21 2328 (!) 152/50 103.1 °F (39.5 °C) Axillary 160 20    12/24/21 2315 (!) 185/142 99 °F (37.2 °C) Oral 162 24 (!) 88 %       Intake/Output Summary (Last 24 hours) at 12/25/2021 7210  Last data filed at 12/25/2021 0507  Gross per 24 hour   Intake 5680 ml   Output 850 ml   Net 4830 ml       Review of Systems   Constitutional: Positive for chills, fatigue and fever. Respiratory: Positive for cough. Negative for shortness of breath. Cardiovascular: Positive for chest pain. Negative for leg swelling. Gastrointestinal: Positive for nausea and vomiting. Negative for abdominal distention, abdominal pain and blood in stool. Genitourinary: Positive for urgency. Negative for dysuria. Neurological: Positive for headaches. Negative for light-headedness. Physical Exam  Constitutional:       General: She is not in acute distress. Appearance: She is obese. HENT:      Mouth/Throat:      Mouth: Mucous membranes are dry. Pharynx: Oropharynx is clear. Eyes:      Extraocular Movements: Extraocular movements intact. Cardiovascular:      Rate and Rhythm: Regular rhythm. Tachycardia present. Pulses: Normal pulses. Heart sounds: Normal heart sounds. Pulmonary:      Effort: Pulmonary effort is normal. No respiratory distress. Breath sounds: Normal breath sounds. No wheezing, rhonchi or rales.    Abdominal: General: Bowel sounds are normal. There is no distension. Palpations: Abdomen is soft. Tenderness: There is no abdominal tenderness. Musculoskeletal:      Comments: Trace LE edema bilaterally   Neurological:      General: No focal deficit present. Mental Status: She is alert and oriented to person, place, and time. LABS:    CBC:   Recent Labs     12/23/21 1940 12/24/21 0758 12/24/21 2033   WBC 22.6* 15.0* 13.3*   HGB 10.3* 8.3* 7.9*   HCT 33.2* 25.9* 24.5*    183 157   MCV 76.7* 76.3* 76.2*     Renal:    Recent Labs     12/23/21 1940 12/24/21 0758 12/24/21 2033   * 135* 133*   K 4.7 4.2 3.6   CL 95* 101 104   CO2 23 21 19*   BUN 25* 29* 29*   CREATININE 1.2 1.2 1.2   GLUCOSE 451* 341* 243*   CALCIUM 9.3 7.9* 7.6*   MG 1.10* 1.80  --    PHOS  --  2.3* 2.1*   ANIONGAP 16 13 10     Hepatic:   Recent Labs     12/23/21 1940 12/24/21 0758 12/24/21 2033   AST 17  --  12*   ALT 20  --  15   BILITOT 0.4  --  0.3   BILIDIR <0.2  --  <0.2   PROT 7.5  --  5.5*   LABALBU 4.1 3.2* 3.0*   ALKPHOS 139*  --  79     Troponin:   Recent Labs     12/23/21 1940 12/24/21 2030   TROPONINI <0.01 <0.01     BNP: No results for input(s): BNP in the last 72 hours. Lipids: No results for input(s): CHOL, HDL in the last 72 hours. Invalid input(s): LDLCALCU, TRIGLYCERIDE  ABGs:  No results for input(s): PHART, EDR2MLC, PO2ART, OEU9BEE, BEART, THGBART, H7WVLGMW, QLP0NAY in the last 72 hours. INR:   Recent Labs     12/23/21 1940   INR 1.28*     Lactate: No results for input(s): LACTATE in the last 72 hours. Cultures:  -----------------------------------------------------------------  RAD:   CT Head WO Contrast   Final Result      1. No acute intracranial process. XR CHEST PORTABLE   Final Result      No acute pulmonary disease. XR LUMBAR SPINE (2-3 VIEWS)   Final Result   Impression:   No acute osseous injury.    Moderate lumbar spondylosis better characterized on prior CT lumbar spine dated 11/4/2021. XR HIP BILATERAL W AP PELVIS (2 VIEWS)   Final Result   Impression:   No acute osseous injury. Severe right and mild left hip osteoarthritis. Assessment/Plan:   76-year-old female with past medical history of anxiety, diabetes type 2, neuropathy, hyperlipidemia, hypertension and PTSD presented to the ED after endorsing multiple falls at home     Urosepsis 2/2 E coli: Febrile to 103.1, tachycardic 134, WBC 22, LA 3.4. UA revealing +3 bacteria, nitrites positive, WBC 10-20. 2 blood and urine cultures positive for E coli. Not improving with appropriate abx based on sensitivities concern for complicated pyelo  - transition merem to CTX based on sensitivities  decrease NS to 75ml/hr  PT/OT  - ID c/s  - CT abd pel wo con     DM type II: Blood glucose 451 on arrival with HbA1c 11.2.   - lantus 20U nightly + 7U lispro w/ meals  MDSS  Hypoglycemia treatment protocol  - needs close OP FU    Microcytic Anemia: slowly progressive over past few months. Drop from 10.3 to 8.3 likely from correction of hemoconcentration. FOBT positive with labs suggestive of SLY. Last colonoscopy in 2016  - hold AC  - will need OP GI FU for colonoscopy  - iron supps     Diabetic neuropathy: Endorses multiple falls attributes to diabetic neuropathy, decreased sensation   Back, hip pain: Imaging negative for acute process. Severe right and mild left hip OA   - home gabapentin     HTN: pressures soft on arrival but have improved with IVF resuscitation  - toprol XR 25mg daily  - hold losartan     Depression/anxiety  PTSD  Continue sertraline, seroquel, bupropion, buspirone    Code Status: Full Code  FEN: ADULT DIET; Regular; 4 carb choices (60 gm/meal)  PPX: SCDs, protonix  DISPO: GMF    Evelina Paiz MD, PGY-1  12/25/21  6:14 AM    This patient has been staffed and discussed with Dr. Chelsea Aguirre.

## 2021-12-25 NOTE — PROGRESS NOTES
Physical Therapy    Facility/Department: [facility] [address] [facility]  Initial Assessment    NAME: Darlyn Sales  : 1960  MRN: [id number]    Date of Service: 2021    Discharge Recommendations:Cheyenne Gaines scored a 13/24 on the AM-PAC short mobility form. Current research shows that an AM-PAC score of 17 or less is typically not associated with a discharge to the patient's home setting. Based on the patient's AM-PAC score and their current functional mobility deficits, it is recommended that the patient have 3-5 sessions per week of Physical Therapy at d/c to increase the patient's independence. Please see assessment section for further patient specific details. If patient discharges prior to next session this note will serve as a discharge summary. Please see below for the latest assessment towards goals. PT Equipment Recommendations  Equipment Needed:  (rolling walker if home)    Assessment   Assessment: 63 yo admitted 22 for severe sepsis. Pt demo mobility well below her reported baseline of independent at home with cane. Pt plans to return home at discharge with assist PRN of roommate. If home, recommend capable 24 hour assist initially, home PT, use of RW (needs). Pt may benefit from continued skilled IP PT to maximize her functional independence prior to d/c home. Treatment Diagnosis: mobility impairment due to severe sepsis  Decision Making: Medium Complexity  Patient Education: Pt educated on PT role, importance of OOB mobility, need to call for assist to get up and she verbalized understanding. REQUIRES PT FOLLOW UP: Yes  Activity Tolerance  Activity Tolerance: Patient limited by pain; Patient limited by fatigue       Patient Diagnosis(es): The encounter diagnosis was Fever in other diseases. has a past medical history of Anxiety, Diabetes mellitus ([address] Hernia, abdominal, Hyperlipidemia, Hypertension, and PTSD (post-traumatic stress disorder).    has a past surgical history that includes Hysterectomy; Cholecystectomy; colectomy; and Revision Colostomy. Restrictions  Position Activity Restriction  Other position/activity restrictions: up with assist     Vision/Hearing  Vision: Within Functional Limits  Hearing: Within functional limits       Subjective  General  Chart Reviewed: Yes  Additional Pertinent Hx: 61yoF with PMHx anxiety, T2DM, HTN, PTSD pw back pain and bilateral hip pain admitted 12/23/21 for multiple falls. Head CT/CXR/imaging all negative; UA positive UTI. Family / Caregiver Present: No  Diagnosis: severe sepsis due to UTI  Follows Commands: Within Functional Limits  Subjective  Subjective: Pt found supine in bed and agreeable to PT. \" My legs are killing me. \"  Pain Screening  Patient Currently in Pain: Yes (rates B LE pain 5/10, RN aware)         Orientation  Orientation  Overall Orientation Status: Within Functional Limits     Social/Functional History  Social/Functional History  Lives With:  (roommate)  Type of Home: Apartment  Home Layout: One level  Home Access: Stairs to enter with rails (3  to enter with B rails)  Bathroom Shower/Tub: Tub/Shower unit  Bathroom Toilet: Standard (sink nearby)  Bathroom Equipment: Hand-held shower  Home Equipment: Cane (using cane all the time)  ADL Assistance: Independent  Homemaking Assistance: Independent  Ambulation Assistance: Independent (with cane)  Transfer Assistance: Independent  Active : No  Mode of Transportation: Bus  Occupation: On disability  Leisure & Hobbies: likes to listen to music and write poems  Additional Comments: Pt report  over a dozen falls in past few months.          Objective          AROM RLE (degrees)  RLE AROM: WFL (limited by body habitus)  RLE General AROM: min edema throughout  AROM LLE (degrees)  LLE AROM : WFL (limited by body habitus)  LLE General AROM: min edema throughout     Strength RLE  Strength RLE:  (3+/5 grossly)  Strength LLE  Strength LLE:  (3+/5 grossly throughout)        Bed mobility  Supine to Sit: Contact guard assistance (HOB up and use of rail; slow and effortful)  Scooting: Supervision  Comment: Pt c/o dizziness at bedside. B/P 107/52 supine, 135/74 sitting (RN aware)     Transfers  Sit to Stand: 2 Person Assistance;Minimal Assistance (x2 trials; slow and effortful)  Stand to sit: Minimal Assistance;2 Person Assistance (x2 trials; vc for safety)     Ambulation  Device: Rolling Walker  Assistance: 2 Person assistance;Minimal assistance  Quality of Gait: quick tameka with decreased step length/height; slight posterior lean and pt c/o marked increase in LE pain with WB (did not rate)  Distance: 5 steps to chair; 7 steps to stretcher  Stairs/Curb  Stairs? :  (unsafe to assess this date)              Plan   Plan  Times per week: 2-5  Current Treatment Recommendations: Balance Training,Strengthening,Functional Mobility Training,Gait Training,Transfer Training  Safety Devices  Type of devices:  (pt left floor with transporter, RN aware)           AM-PAC Score  AM-PAC Inpatient Mobility Raw Score : 13 (12/25/21 1316)  AM-PAC Inpatient T-Scale Score : 36.74 (12/25/21 1316)  Mobility Inpatient CMS 0-100% Score: 64.91 (12/25/21 1316)  Mobility Inpatient CMS G-Code Modifier : CL (12/25/21 1316)          Goals  Short term goals  Time Frame for Short term goals: discharge  Short term goal 1: sit to/from supine mod I  Short term goal 2: sit to/from stand supervision  Short term goal 3: ambulate 50 ft with LRAD supervision  Patient Goals   Patient goals : return home       Therapy Time   Individual Concurrent Group Co-treatment   Time In 1225         Time Out 1305         Minutes 40           Timed Code Treatment Minutes:30       Total Treatment Minutes: Cal Bolanos, PT

## 2021-12-25 NOTE — PROGRESS NOTES
Hypertension, and PTSD (post-traumatic stress disorder). has a past surgical history that includes Hysterectomy; Cholecystectomy; colectomy; and Revision Colostomy. Treatment Diagnosis: impaired ADLs and mobility      Restrictions  Position Activity Restriction  Other position/activity restrictions: up with assist    Subjective   General  Chart Reviewed: Yes  Additional Pertinent Hx: 26-year-old female with past medical history of anxiety, DM 2, HTN, PTSD. presents with back pain  Referring Practitioner: Alba Grief  Diagnosis: Pt after falling several times, dx of severe sepsis secondary to uncomlicated  UTI-B hip XR=neg, lumbar spine XR=neg, CXR=neg, head CT=neg  Patient Currently in Pain: Yes (rates B LE pain 5/10, RN aware)    Social/Functional History  Social/Functional History  Lives With:  (roommate)  Type of Home: Apartment  Home Layout: One level  Home Access: Stairs to enter with rails (3  to enter with B rails)  Bathroom Shower/Tub: Tub/Shower unit  Bathroom Toilet: Standard (sink nearby)  Bathroom Equipment: Hand-held shower  Home Equipment: Cane (using cane all the time)  ADL Assistance: Independent  Homemaking Assistance: Independent  Ambulation Assistance: Independent (with cane)  Transfer Assistance: Independent  Active : No  Mode of Transportation: Bus  Occupation: On [address]: likes to listen to music and write poems  Additional Comments: Pt report  over a dozen falls in past few months. Objective        Orientation  Overall Orientation Status: Within Normal Limits     Toilet Transfers  Toilet - Technique: Stand step  Equipment Used:  (simulated 3 in 1 commode)  Toilet Transfer: 2 Person assistance (Alfa of 2)  ADL  UE Bathing: Setup (dep washing back)  LE Bathing:  Moderate assistance  UE Dressing: Contact guard assistance (to change hospital gown, has IV)  LE Dressing: Dependent/Total (dep donning socks)        Bed mobility  Supine to Sit: Contact guard assistance (head of bed elevated, use of bed rail, increased time/effort)  Scooting: Supervision  Transfers  Sit to stand: 2 Person assistance (Alfa of 2, cues for hand placement)  Stand to sit: 2 Person assistance (cues for safe positioning, cues for hand placement)     Cognition  Overall Cognitive Status: WFL                 LUE AROM (degrees)  LUE AROM : WFL  Left Hand AROM (degrees)  Left Hand AROM: WFL  RUE AROM (degrees)  RUE AROM : WFL  Right Hand AROM (degrees)  Right Hand AROM: WFL        Hand Dominance  Hand Dominance: Right             Plan   Plan  Times per week: 2-5  Current Treatment Recommendations: Balance Training,Functional Mobility Training,Endurance Training,Self-Care / ADL    G-Code     OutComes Score                                                  AM-PAC Score        AM-PAC Inpatient Daily Activity Raw Score: 14 (12/25/21 1310)  AM-PAC Inpatient ADL T-Scale Score : 33.39 (12/25/21 1310)  ADL Inpatient CMS 0-100% Score: 59.67 (12/25/21 1310)  ADL Inpatient CMS G-Code Modifier : CK (12/25/21 1310)    Goals  Short term goals  Time Frame for Short term goals: discharge  Short term goal 1: pt to transfer to commode with CGA  Short term goal 2: pt to don brief with CGA  Short term goal 3: pt to stand for 2-4 minutes with SBA while doing ADLs/functional tasks  Short term goal 4: pt to tolerate 5-10 reps B UE AROM exerc to increase activity tolerance  Patient Goals   Patient goals : not stated       Therapy Time   Individual Concurrent Group Co-treatment   Time In 1220         Time Out 1305         Minutes 45              Timed Code Treatment Minutes:   30    Total Treatment Minutes:  [address] [place] [address], OTR/L [place] [address]

## 2021-12-25 NOTE — PLAN OF CARE
Problem: Falls - Risk of:  Goal: Will remain free from falls  Description: Will remain free from falls  Outcome: Met This Shift  Pt worked with PT and did not tolerate transferring well, they will continue to work with her  Problem: Pain:  Goal: Pain level will decrease  Description: Pain level will decrease  Outcome: Ongoing  Pt had x1 dose of percocet today for back and leg pain and was effective. Goal: Control of acute pain  Description: Control of acute pain  Outcome: Ongoing  Pt is requesting PRN pain meds  Goal: Control of chronic pain  Description: Control of chronic pain  Outcome: Ongoing  Pt requesting something routine for chronic pain     Problem: Sensory:  Goal: General experience of comfort will improve  Description: General experience of comfort will improve  Outcome: Ongoing     Problem: Urinary Elimination:  Goal: Signs and symptoms of infection will decrease  Description: Signs and symptoms of infection will decrease  Outcome: Ongoing  Pt reported that the burning during urination has decreased. Goal: Ability to reestablish a normal urinary elimination pattern will improve - after catheter removal  Description: Ability to reestablish a normal urinary elimination pattern will improve  Outcome: Ongoing  Pt uses purewick, but is able to tell the nurse when she has to go and makes sure it is in place  Goal: Complications related to the disease process, condition or treatment will be avoided or minimized  Description: Complications related to the disease process, condition or treatment will be avoided or minimized  Outcome: Ongoing  Pt is not having complications of COVID at this time.  Denies SOB     Goal: Absence of physical injury  Description: Absence of physical injury  Outcome: Met This Shift  Pt worked with PT/OT this shift and went for CT scan, no new areas to the skin     Problem: Skin Integrity:  Goal: Will show no infection signs and symptoms  Description: Will show no infection signs and symptoms  Outcome: Met This Shift  No new s/s of infection noteed  Goal: Absence of new skin breakdown  Description: Absence of new skin breakdown  Outcome: Met This Shift  No redness or skin breakdown noted this shift

## 2021-12-25 NOTE — PROGRESS NOTES
Department of Pharmacy    Notification received from laboratory of positive blood culture results.     Organism(s) detected: Escherichia coli     Patient on Merrem, provides coverage for E.coli

## 2021-12-25 NOTE — PROGRESS NOTES
Called daughter Mary Garcia at the bedside provided updates and allowed her to speak to the patient.

## 2021-12-26 LAB
ALBUMIN SERPL-MCNC: 3 G/DL (ref 3.4–5)
ANION GAP SERPL CALCULATED.3IONS-SCNC: 8 MMOL/L (ref 3–16)
ATYPICAL LYMPHOCYTE RELATIVE PERCENT: 1 % (ref 0–6)
BASOPHILS ABSOLUTE: 0 K/UL (ref 0–0.2)
BASOPHILS RELATIVE PERCENT: 0 %
BLOOD CULTURE, ROUTINE: ABNORMAL
BLOOD CULTURE, ROUTINE: ABNORMAL
BUN BLDV-MCNC: 19 MG/DL (ref 7–20)
CALCIUM SERPL-MCNC: 8.2 MG/DL (ref 8.3–10.6)
CHLORIDE BLD-SCNC: 103 MMOL/L (ref 99–110)
CO2: 25 MMOL/L (ref 21–32)
CREAT SERPL-MCNC: 0.6 MG/DL (ref 0.6–1.2)
CULTURE, BLOOD 2: ABNORMAL
EOSINOPHILS ABSOLUTE: 0.3 K/UL (ref 0–0.6)
EOSINOPHILS RELATIVE PERCENT: 3 %
GFR AFRICAN AMERICAN: >60
GFR NON-AFRICAN AMERICAN: >60
GLUCOSE BLD-MCNC: 135 MG/DL (ref 70–99)
GLUCOSE BLD-MCNC: 139 MG/DL (ref 70–99)
GLUCOSE BLD-MCNC: 231 MG/DL (ref 70–99)
GLUCOSE BLD-MCNC: 264 MG/DL (ref 70–99)
HCT VFR BLD CALC: 24.6 % (ref 36–48)
HEMOGLOBIN: 8.3 G/DL (ref 12–16)
LYMPHOCYTES ABSOLUTE: 0.7 K/UL (ref 1–5.1)
LYMPHOCYTES RELATIVE PERCENT: 7 %
MAGNESIUM: 1.7 MG/DL (ref 1.8–2.4)
MCH RBC QN AUTO: 25.4 PG (ref 26–34)
MCHC RBC AUTO-ENTMCNC: 33.6 G/DL (ref 31–36)
MCV RBC AUTO: 75.6 FL (ref 80–100)
MONOCYTES ABSOLUTE: 0.5 K/UL (ref 0–1.3)
MONOCYTES RELATIVE PERCENT: 5 %
NEUTROPHILS ABSOLUTE: 7.6 K/UL (ref 1.7–7.7)
NEUTROPHILS RELATIVE PERCENT: 84 %
ORGANISM: ABNORMAL
PDW BLD-RTO: 16.2 % (ref 12.4–15.4)
PERFORMED ON: ABNORMAL
PHOSPHORUS: 1.6 MG/DL (ref 2.5–4.9)
PLATELET # BLD: 191 K/UL (ref 135–450)
PLATELET SLIDE REVIEW: ADEQUATE
PMV BLD AUTO: 7.9 FL (ref 5–10.5)
POTASSIUM SERPL-SCNC: 3.8 MMOL/L (ref 3.5–5.1)
RBC # BLD: 3.25 M/UL (ref 4–5.2)
RBC # BLD: NORMAL 10*6/UL
SODIUM BLD-SCNC: 136 MMOL/L (ref 136–145)
WBC # BLD: 9.1 K/UL (ref 4–11)

## 2021-12-26 PROCEDURE — 1200000000 HC SEMI PRIVATE

## 2021-12-26 PROCEDURE — 80069 RENAL FUNCTION PANEL: CPT

## 2021-12-26 PROCEDURE — 2500000003 HC RX 250 WO HCPCS: Performed by: STUDENT IN AN ORGANIZED HEALTH CARE EDUCATION/TRAINING PROGRAM

## 2021-12-26 PROCEDURE — 36415 COLL VENOUS BLD VENIPUNCTURE: CPT

## 2021-12-26 PROCEDURE — 99255 IP/OBS CONSLTJ NEW/EST HI 80: CPT | Performed by: INTERNAL MEDICINE

## 2021-12-26 PROCEDURE — 6360000002 HC RX W HCPCS: Performed by: STUDENT IN AN ORGANIZED HEALTH CARE EDUCATION/TRAINING PROGRAM

## 2021-12-26 PROCEDURE — 6370000000 HC RX 637 (ALT 250 FOR IP): Performed by: STUDENT IN AN ORGANIZED HEALTH CARE EDUCATION/TRAINING PROGRAM

## 2021-12-26 PROCEDURE — 2580000003 HC RX 258: Performed by: STUDENT IN AN ORGANIZED HEALTH CARE EDUCATION/TRAINING PROGRAM

## 2021-12-26 PROCEDURE — 85025 COMPLETE CBC W/AUTO DIFF WBC: CPT

## 2021-12-26 PROCEDURE — 87040 BLOOD CULTURE FOR BACTERIA: CPT

## 2021-12-26 PROCEDURE — 83735 ASSAY OF MAGNESIUM: CPT

## 2021-12-26 PROCEDURE — C9113 INJ PANTOPRAZOLE SODIUM, VIA: HCPCS | Performed by: STUDENT IN AN ORGANIZED HEALTH CARE EDUCATION/TRAINING PROGRAM

## 2021-12-26 RX ORDER — INSULIN LISPRO 100 [IU]/ML
10 INJECTION, SOLUTION INTRAVENOUS; SUBCUTANEOUS
Status: DISCONTINUED | OUTPATIENT
Start: 2021-12-26 | End: 2021-12-28 | Stop reason: HOSPADM

## 2021-12-26 RX ORDER — OXYCODONE HYDROCHLORIDE AND ACETAMINOPHEN 5; 325 MG/1; MG/1
1 TABLET ORAL EVERY 8 HOURS PRN
Status: DISCONTINUED | OUTPATIENT
Start: 2021-12-26 | End: 2021-12-28 | Stop reason: HOSPADM

## 2021-12-26 RX ORDER — MAGNESIUM SULFATE 1 G/100ML
1000 INJECTION INTRAVENOUS ONCE
Status: COMPLETED | OUTPATIENT
Start: 2021-12-26 | End: 2021-12-26

## 2021-12-26 RX ADMIN — BUSPIRONE HYDROCHLORIDE 15 MG: 10 TABLET ORAL at 13:39

## 2021-12-26 RX ADMIN — SODIUM CHLORIDE, PRESERVATIVE FREE 20 ML: 5 INJECTION INTRAVENOUS at 08:52

## 2021-12-26 RX ADMIN — CEFTRIAXONE SODIUM 2000 MG: 2 INJECTION, POWDER, FOR SOLUTION INTRAMUSCULAR; INTRAVENOUS at 14:49

## 2021-12-26 RX ADMIN — FERROUS SULFATE TAB 325 MG (65 MG ELEMENTAL FE) 325 MG: 325 (65 FE) TAB at 08:23

## 2021-12-26 RX ADMIN — GABAPENTIN 300 MG: 300 CAPSULE ORAL at 13:40

## 2021-12-26 RX ADMIN — GABAPENTIN 300 MG: 300 CAPSULE ORAL at 20:47

## 2021-12-26 RX ADMIN — INSULIN LISPRO 10 UNITS: 100 INJECTION, SOLUTION INTRAVENOUS; SUBCUTANEOUS at 14:49

## 2021-12-26 RX ADMIN — METOPROLOL SUCCINATE 25 MG: 25 TABLET, EXTENDED RELEASE ORAL at 08:23

## 2021-12-26 RX ADMIN — INSULIN LISPRO 8 UNITS: 100 INJECTION, SOLUTION INTRAVENOUS; SUBCUTANEOUS at 09:32

## 2021-12-26 RX ADMIN — BUPROPION HYDROCHLORIDE 200 MG: 100 TABLET, EXTENDED RELEASE ORAL at 20:47

## 2021-12-26 RX ADMIN — INSULIN LISPRO 10 UNITS: 100 INJECTION, SOLUTION INTRAVENOUS; SUBCUTANEOUS at 09:33

## 2021-12-26 RX ADMIN — BUSPIRONE HYDROCHLORIDE 15 MG: 10 TABLET ORAL at 08:23

## 2021-12-26 RX ADMIN — SERTRALINE HYDROCHLORIDE 100 MG: 100 TABLET ORAL at 08:23

## 2021-12-26 RX ADMIN — OXYCODONE HYDROCHLORIDE AND ACETAMINOPHEN 1 TABLET: 5; 325 TABLET ORAL at 09:29

## 2021-12-26 RX ADMIN — INSULIN LISPRO 5 UNITS: 100 INJECTION, SOLUTION INTRAVENOUS; SUBCUTANEOUS at 18:06

## 2021-12-26 RX ADMIN — INSULIN LISPRO 6 UNITS: 100 INJECTION, SOLUTION INTRAVENOUS; SUBCUTANEOUS at 13:40

## 2021-12-26 RX ADMIN — QUETIAPINE FUMARATE 300 MG: 200 TABLET ORAL at 08:23

## 2021-12-26 RX ADMIN — PANTOPRAZOLE SODIUM 40 MG: 40 INJECTION, POWDER, FOR SOLUTION INTRAVENOUS at 08:23

## 2021-12-26 RX ADMIN — SODIUM CHLORIDE 25 ML: 900 INJECTION INTRAVENOUS at 08:33

## 2021-12-26 RX ADMIN — GABAPENTIN 300 MG: 300 CAPSULE ORAL at 08:23

## 2021-12-26 RX ADMIN — SODIUM CHLORIDE 25 ML: 900 INJECTION INTRAVENOUS at 10:05

## 2021-12-26 RX ADMIN — MAGNESIUM SULFATE HEPTAHYDRATE 1000 MG: 1 INJECTION, SOLUTION INTRAVENOUS at 08:36

## 2021-12-26 RX ADMIN — POTASSIUM PHOSPHATE, MONOBASIC AND POTASSIUM PHOSPHATE, DIBASIC 20 MMOL: 224; 236 INJECTION, SOLUTION, CONCENTRATE INTRAVENOUS at 10:07

## 2021-12-26 RX ADMIN — PANTOPRAZOLE SODIUM 40 MG: 40 INJECTION, POWDER, FOR SOLUTION INTRAVENOUS at 21:07

## 2021-12-26 RX ADMIN — BUPROPION HYDROCHLORIDE 200 MG: 100 TABLET, EXTENDED RELEASE ORAL at 08:23

## 2021-12-26 RX ADMIN — SODIUM CHLORIDE 25 ML: 900 INJECTION INTRAVENOUS at 14:48

## 2021-12-26 RX ADMIN — BUSPIRONE HYDROCHLORIDE 15 MG: 10 TABLET ORAL at 20:48

## 2021-12-26 RX ADMIN — QUETIAPINE FUMARATE 300 MG: 200 TABLET ORAL at 20:47

## 2021-12-26 RX ADMIN — ACETAMINOPHEN 650 MG: 325 TABLET ORAL at 05:51

## 2021-12-26 RX ADMIN — SODIUM CHLORIDE, PRESERVATIVE FREE 10 ML: 5 INJECTION INTRAVENOUS at 20:48

## 2021-12-26 RX ADMIN — ACETAMINOPHEN 650 MG: 325 TABLET ORAL at 20:48

## 2021-12-26 RX ADMIN — INSULIN GLARGINE 20 UNITS: 100 INJECTION, SOLUTION SUBCUTANEOUS at 00:09

## 2021-12-26 ASSESSMENT — PAIN DESCRIPTION - PAIN TYPE
TYPE: CHRONIC PAIN

## 2021-12-26 ASSESSMENT — ENCOUNTER SYMPTOMS
ABDOMINAL DISTENTION: 0
SHORTNESS OF BREATH: 0
ABDOMINAL PAIN: 0
BLOOD IN STOOL: 0
VOMITING: 0
NAUSEA: 0
COUGH: 1

## 2021-12-26 ASSESSMENT — PAIN DESCRIPTION - PROGRESSION
CLINICAL_PROGRESSION: GRADUALLY WORSENING

## 2021-12-26 ASSESSMENT — PAIN DESCRIPTION - DESCRIPTORS
DESCRIPTORS: ACHING
DESCRIPTORS: ACHING

## 2021-12-26 ASSESSMENT — PAIN SCALES - GENERAL
PAINLEVEL_OUTOF10: 5
PAINLEVEL_OUTOF10: 3
PAINLEVEL_OUTOF10: 6
PAINLEVEL_OUTOF10: 5
PAINLEVEL_OUTOF10: 0
PAINLEVEL_OUTOF10: 8
PAINLEVEL_OUTOF10: 0

## 2021-12-26 ASSESSMENT — PAIN DESCRIPTION - LOCATION
LOCATION: BACK;LEG
LOCATION: BACK;LEG

## 2021-12-26 ASSESSMENT — PAIN DESCRIPTION - ORIENTATION: ORIENTATION: LOWER

## 2021-12-26 ASSESSMENT — PAIN DESCRIPTION - FREQUENCY
FREQUENCY: CONTINUOUS
FREQUENCY: CONTINUOUS

## 2021-12-26 ASSESSMENT — PAIN DESCRIPTION - ONSET: ONSET: ON-GOING

## 2021-12-26 NOTE — PROGRESS NOTES
Progress Note    Admit Date: 12/23/2021  Diet: ADULT DIET; Regular; 3 carb choices (45 gm/meal)    CC: fall    Interval history:   NAEON. Patient feeling better today. Afebrile overnight. HPI: 58yoF with PMHx anxiety, T2DM, HTN, PTSD pw back pain and bilateral hip pain after endorsing multiple falls. Patient reports she walks with a walker but has diabetic neuropathy which is why endorses multiple falls, reported she fell down 4 times at home yesterday. She denied having any lightheadedness/dizziness, palpitations, LOC prior to or after the fall. She does endorse nausea and had an episode of vomiting yesterday. Denies any chest pain, abdominal discomfort, burning urination, increased urinary frequency. In the ED, vitals febrile 103.1, pulse 134, RR 20, /90, SPO2 95% on room air. Imaging largely unrevealing for acute pathology. WBC 22, LA 3.4, UA revealing +3 bacteria nitrates positive, WBC 10-20. Patient received 2 L LR bolus and a dose of vancomycin + cefepime in the ED. Patient was admitted to the general medicine floor for the further management of urosepsis.     Medications:     Scheduled Meds:   insulin glargine  30 Units SubCUTAneous Nightly    insulin lispro  10 Units SubCUTAneous TID WC    potassium phosphate IVPB  20 mmol IntraVENous Once    pantoprazole  40 mg IntraVENous BID    ferrous sulfate  325 mg Oral Daily with breakfast    cefTRIAXone (ROCEPHIN) IV  2,000 mg IntraVENous Q24H    metoprolol succinate  25 mg Oral Daily    gabapentin  300 mg Oral TID    buPROPion  200 mg Oral BID    busPIRone  15 mg Oral TID    sodium chloride  500 mL IntraVENous Once    [Held by provider] losartan  50 mg Oral Daily    QUEtiapine  300 mg Oral BID    sertraline  100 mg Oral Daily    sodium chloride flush  5-40 mL IntraVENous 2 times per day    [Held by provider] enoxaparin  40 mg SubCUTAneous Daily    insulin lispro  0-6 Units SubCUTAneous Nightly    insulin lispro  0-12 Units deficit present. Mental Status: She is alert and oriented to person, place, and time. LABS:    CBC:   Recent Labs     12/24/21 2033 12/25/21  0514 12/26/21  0555   WBC 13.3* 10.4 9.1   HGB 7.9* 7.6* 8.3*   HCT 24.5* 23.1* 24.6*    153 191   MCV 76.2* 75.7* 75.6*     Renal:    Recent Labs     12/24/21 0758 12/24/21 0758 12/24/21 2033 12/25/21  0514 12/26/21  0555   *   < > 133* 137 136   K 4.2   < > 3.6 3.9 3.8      < > 104 106 103   CO2 21   < > 19* 21 25   BUN 29*   < > 29* 26* 19   CREATININE 1.2   < > 1.2 1.0 0.6   GLUCOSE 341*   < > 243* 312* 231*   CALCIUM 7.9*   < > 7.6* 7.7* 8.2*   MG 1.80  --   --  1.90 1.70*   PHOS 2.3*   < > 2.1* 2.5 1.6*   ANIONGAP 13   < > 10 10 8    < > = values in this interval not displayed. Hepatic:   Recent Labs     12/23/21 1940 12/24/21 0758 12/24/21 2033 12/25/21  0514 12/26/21  0555   AST 17  --  12*  --   --    ALT 20  --  15  --   --    BILITOT 0.4  --  0.3  --   --    BILIDIR <0.2  --  <0.2  --   --    PROT 7.5  --  5.5*  --   --    LABALBU 4.1   < > 3.0* 2.7* 3.0*   ALKPHOS 139*  --  79  --   --     < > = values in this interval not displayed. Troponin:   Recent Labs     12/23/21 1940 12/24/21 2030   TROPONINI <0.01 <0.01     BNP: No results for input(s): BNP in the last 72 hours. Lipids: No results for input(s): CHOL, HDL in the last 72 hours. Invalid input(s): LDLCALCU, TRIGLYCERIDE  ABGs:  No results for input(s): PHART, AHJ8QFB, PO2ART, QVT5IUH, BEART, THGBART, I4OZWNCA, ETK7KXB in the last 72 hours. INR:   Recent Labs     12/23/21 1940   INR 1.28*     Lactate: No results for input(s): LACTATE in the last 72 hours. Cultures:  -----------------------------------------------------------------  RAD:   CT ABDOMEN PELVIS WO CONTRAST Additional Contrast? None   Final Result      New bilateral perinephric fat stranding which could be seen in the setting of pyelonephritis.          CT Head WO Contrast   Final Result Regular; 3 carb choices (45 gm/meal)  PPX: SCDs, protonix  DISPO: GMF    Irma Duarte MD, PGY-3  12/26/21  11:05 AM    This patient has been staffed and discussed with Dr. Charan Pete

## 2021-12-26 NOTE — PROGRESS NOTES
Talked with pt and daughter Cheryl Morton on the phone about new orders for Magnesium and Percocet. Pt had first doses without any adverse reactions. Pt stated pain is much better and she is able to rest. Denied any other questions or concerns.

## 2021-12-26 NOTE — PLAN OF CARE
Problem: Falls - Risk of:  Goal: Will remain free from falls  Description: Will remain free from falls  Outcome: Met This Shift  Pt worked with PT and did not tolerate transferring well, they will continue to work with her  Problem: Pain:  Goal: Pain level will decrease  Description: Pain level will decrease  Outcome: Ongoing  Pt had x1 dose of tylenol today for back and leg pain and was effective. Goal: Control of acute pain  Description: Control of acute pain  Outcome: Ongoing  Pt is requesting PRN pain meds     Problem: Sensory:  Goal: General experience of comfort will improve  Description: General experience of comfort will improve  Outcome: Ongoing     Problem: Urinary Elimination:  Goal: Signs and symptoms of infection will decrease  Description: Signs and symptoms of infection will decrease  Outcome: Ongoing  Pt reported that the burning during urination has decreased.   Goal: Ability to reestablish a normal urinary elimination pattern will improve - after catheter removal  Description: Ability to reestablish a normal urinary elimination pattern will improve  Outcome: Ongoing  Pt uses purewick, but is able to tell the nurse when she has to go and makes sure it is in place     Goal: Absence of physical injury  Description: Absence of physical injury  Outcome: Met This Shift  Pt worked with PT/OT this shift and no new areas to the skin     Problem: Skin Integrity:  Goal: Will show no infection signs and symptoms  Description: Will show no infection signs and symptoms  Outcome: Met This Shift  No new s/s of infection noteed  Goal: Absence of new skin breakdown  Description: Absence of new skin breakdown  Outcome: Met This Shift  No redness or skin breakdown noted this shift

## 2021-12-26 NOTE — CONSULTS
Infectious Diseases Inpatient Consult Note      Reason for Consult: Sepsis high fever bilateral pyelonephritis    Requesting Physician: Mic [address] [place] [address]     Primary Care Physician:  EUGENIE Neri CNP    History Obtained From:  Epic and patient     CHIEF COMPLAINT:     Chief Complaint   Patient presents with    Fall    Leg Pain       Fevers and chills      HISTORY OF PRESENT ILLNESS:  62 y.o. woman with a past medical history of anxiety, diabetes,  Hypertension, also has history of chronic back pain and multiple falls using walker for ambulation reported to she fell down at home but no loss of consciousness and given this she was admitted to the hospital.  Noted a T-max of 103 on admission with high pulse rate WBC elevation urinalysis very abnormal UA revealing 3+ bacteria positive for E. coli blood cultures on admission positive for E. coli with some resistance pattern she required fluid resuscitation and antibiotics for sepsis. CT abdomen pelvis indicate bilateral pyonephritis but no obstruction. CT head negative, IV antibiotic WBC seems to be improving we are consulted for antibiotic recommendation given the sepsis with E. coli bacteremia      Past Medical History:    Past Medical History:   Diagnosis Date    Anxiety     Diabetes mellitus ([address]     Hernia, abdominal     Hyperlipidemia     Hypertension     PTSD (post-traumatic stress disorder)        Past Surgical History:    Past Surgical History:   Procedure Laterality Date    CHOLECYSTECTOMY      COLECTOMY      HYSTERECTOMY      REVISION COLOSTOMY         Current Medications:    Outpatient Medications Marked as Taking for the 12/23/21 encounter [facility] HOSPITAL Encounter)   Medication Sig Dispense Refill    QUEtiapine (SEROQUEL) 300 MG tablet Take 300 mg by mouth nightly      acetaminophen (TYLENOL) 500 MG tablet Take 1,000 mg by mouth every 6 hours as needed for Pain      gabapentin (NEURONTIN) 300 MG capsule Take 300 mg by mouth 3 times daily.  hydrOXYzine (ATARAX) 50 MG tablet Take 100 mg by mouth nightly      metFORMIN (GLUCOPHAGE) 1000 MG tablet Take 1,000 mg by mouth 2 times daily (with meals)      metoprolol succinate (TOPROL XL) 50 MG extended release tablet Take 50 mg by mouth daily       losartan (COZAAR) 50 MG tablet Take 50 mg by mouth daily      pantoprazole (PROTONIX) 40 MG tablet Take 40 mg by mouth daily      sertraline (ZOLOFT) 100 MG tablet Take 150 mg by mouth daily       busPIRone (BUSPAR) 15 MG tablet Take 15 mg by mouth 3 times daily       VICTOZA 18 MG/3ML SOPN SC injection Inject 1.8 mg into the skin daily       buPROPion (WELLBUTRIN SR) 200 MG extended release tablet Take 200 mg by mouth 2 times daily          Allergies:  Azithromycin, Metronidazole, Clindamycin, Penicillin g, Penicillin v, Tetanus immune globulin, and Codeine    Immunizations :   Immunization History   Administered Date(s) Administered    COVID-19, J&J, PF, 0.5 mL 05/18/2021    COVID-19, Pfizer, PF, 30mcg/0.3mL 10/28/2021    Influenza, MDCK Quadv, IM, PF (Flucelvax 2 yrs and older) 10/28/2021         Social History:   Social History     Tobacco Use    Smoking status: Never Smoker    Smokeless tobacco: Never Used   Substance Use Topics    Alcohol use: Never    Drug use: Never     Social History     Tobacco Use   Smoking Status Never Smoker   Smokeless Tobacco Never Used      Family History : no DVT no COPD       REVIEW OF SYSTEMS:    Constitutional: fevers + , chills+, night sweats  Eyes:  negative for blurred vision, eye discharge, visual disturbance   HEENT:  negative for hearing loss, ear drainage,nasal congestion  Respiratory:  negative for cough, shortness of breath or hemoptysis   Cardiovascular:  negative for chest pain, palpitations, syncope  Gastrointestinal:  negative for nausea, vomiting, diarrhea, constipation, abdominal pain  Genitourinary:  negative for frequency, dysuria, urinary incontinence, hematuria  Hematologic/Lymphatic: negative for easy bruising, bleeding and lymphadenopathy  Allergic/Immunologic:  negative for recurrent infections, angioedema, anaphylaxis   Endocrine:  negative for weight changes, polyuria, polydipsia and polyphagia  Musculoskeletal:  negative for joint  pain, swelling, decreased range of motion  Integumentary: No rashes, skin lesions  Neurological:  negative for headaches, slurred speech, unilateral weakness  Psychiatric: negative for hallucinations,confusion,agitation.      PHYSICAL EXAM:      Vitals:  t MAX  103.1   BP (!) 185/98   Pulse 108   Temp 98.3 °F (36.8 °C) (Oral)   Resp 20   Ht 5' 5\" (1.651 m)   Wt 279 lb 1.6 oz (126.6 kg)   SpO2 95%   BMI 46.45 kg/m²     General Appearance: alert,in no acute distress, no pallor, no icterus   Skin: warm and dry, no rash or erythema  Head: normocephalic and atraumatic  Eyes: pupils equal, round, and reactive to light, conjunctivae normal  ENT: tympanic membrane, external ear and ear canal normal bilaterally, nose without deformity, nasal mucosa and turbinates normal without polyps  Neck: supple and non-tender without mass, no thyromegaly  no cervical lymphadenopathy  Pulmonary/Chest: clear to auscultation bilaterally- no wheezes, rales or rhonchi, normal air movement, no respiratory distress  Cardiovascular: normal rate, regular rhythm, normal S1 and S2, no murmurs, rubs, clicks, or gallops, no carotid bruits  Abdomen: soft, non-tender, non-distended, normal bowel sounds, no masses or organomegaly  Extremities: no cyanosis, clubbing or edema  Musculoskeletal: normal range of motion, no joint swelling, deformity or tenderness  Integumentary: No rashes, no abnormal skin lesions, no petechiae  Neurologic: reflexes normal and symmetric, no cranial nerve deficit  Psych:  Orientation, sensorium, mood normal   Lines: IV    DATA:    CBC:   Lab Results   Component Value Date    WBC 9.1 12/26/2021    HGB 8.3 (L) 12/26/2021    HCT 24.6 (L) 12/26/2021    MCV 75.6 (L) 12/26/2021     12/26/2021     RENAL:   Lab Results   Component Value Date    CREATININE 0.6 12/26/2021    BUN 19 12/26/2021     12/26/2021    K 3.8 12/26/2021     12/26/2021    CO2 25 12/26/2021     SED RATE: No results found for: SEDRATE  CK: No results found for: CKTOTAL  CRP: No results found for: CRP  Hepatic Function Panel:   Lab Results   Component Value Date    ALKPHOS 79 12/24/2021    ALT 15 12/24/2021    AST 12 12/24/2021    PROT 5.5 12/24/2021    BILITOT 0.3 12/24/2021    BILIDIR <0.2 12/24/2021    IBILI see below 12/24/2021    LABALBU 3.0 12/26/2021     UA:  Lab Results   Component Value Date    COLORU Yellow 12/23/2021    CLARITYU Clear 12/23/2021    GLUCOSEU >=1000 12/23/2021    BILIRUBINUR Negative 12/23/2021    KETUA TRACE 12/23/2021    SPECGRAV 1.025 12/23/2021    BLOODU SMALL 12/23/2021    PHUR 6.0 12/23/2021    PROTEINU 30 12/23/2021    UROBILINOGEN 0.2 12/23/2021    NITRU POSITIVE 12/23/2021    LEUKOCYTESUR Negative 12/23/2021    LABMICR YES 12/23/2021    URINETYPE NotGiven 12/23/2021      Urine Microscopic:   Lab Results   Component Value Date    BACTERIA 3+ 12/23/2021    WBCUA 10-20 12/23/2021    RBCUA 0-2 12/23/2021    EPIU 0-1 12/23/2021     Urine Reflex to Culture:   Lab Results   Component Value Date    URRFLXCULT Yes 12/23/2021       Lactic acid 3.4    WBC  22.6    Hemoglobin A1C  Hemoglobin A1c  Collected: 12/24/21 0758   Result status: Final   Resulting lab: [address] [place] [address] LAB   Reference range: See comment %   Value: 11.2   Comment: Comment:   Diagnosis of Diabetes: > or = 6.5%   Increased risk of diabetes (Prediabetes): 5.7-6.4%   Glycemic Control: Nonpregnant Adults: <7.0%                     Pregnant: <6.0%          MICRO: cultures reviewed and updated by dorinda reynoso       Culture, Blood 2 [[id number]] (Abnormal) Collected: 12/23/21 1940   Order Status: Completed Specimen: Blood Updated: 12/26/21 0806    Organism Escherichia coli Abnormal     Culture, Blood clean catch Updated: 12/25/21 0719    Organism Escherichia coli Abnormal     Urine Culture, Routine --    >100,000 CFU/ml   Multiple Resistant Drug Organism    Narrative:     ORDER#: [id number]                          ORDERED BY: VIPUL REYNOLDS   SOURCE: Urine Clean Catch                  COLLECTED:  12/23/21 19:30   ANTIBIOTICS AT [facility] :                      RECEIVED :  12/24/21 04:32   CALL Allegra Reed  [id number] tel. [phone],   Microbiology results called to and read back by Veronique Saul RN, 12/25/2021   07:19, by [facility]   Performed at:   [place] [address]   Phone [phone]   Culture, Blood, PCR ID Panel Results Report [[id number]] Collected: 12/23/21 1940   Order Status: Completed Updated: 12/24/21 1935    Report SEE IMAGE   Narrative:     Scout Serena [id number],   Microbiology results called to and read back by Rigoberto Fritz,   12/24/2021 19:34, by ANGIE   Microbiology results called to and read back by RAMON Milan, 12/24/2021   19:34, by EDEN   Referred out by:   [place] [address]   Phone [phone]   Rapid influenza A/B antigens [[id number]] Collected: 12/23/21 1939   Order Status: Completed Specimen: Nasopharyngeal Updated: 12/23/21 2122    Rapid Influenza A Ag Negative    Rapid Influenza B Ag Negative   Narrative:     Performed at:    [facility]   [address] [facility], [address] [place] [address], [address]   Phone [phone]     Resulting Agency [address] Lab          Susceptibility     Escherichia coli     BACTERIAL SUSCEPTIBILITY PANEL BY OLI     ampicillin >=32 mcg/mL Resistant     ampicillin-sulbactam >=32 mcg/mL Resistant     ceFAZolin 8 mcg/mL Sensitive     cefepime <=0.12 mcg/mL Sensitive     cefTRIAXone <=0.25 mcg/mL Sensitive     ciprofloxacin >=4 mcg/mL Resistant     ertapenem <=0.12 mcg/mL Sensitive     gentamicin <=1 mcg/mL Sensitive     levofloxacin >=8 mcg/mL Resistant     piperacillin-tazobactam 8 mcg/mL Sensitive     trimethoprim-sulfamethoxazole >=320 mcg/mL Resistant                   Blood Culture:   Lab Results   Component Value Date    [facility] See additional report for complete BCID panel.  12/23/2021    BC  12/23/2021     POSITIVE for  Isolated two of two sets  Multiple Resistant Drug Organism      BLOODCULT2  12/23/2021     POSITIVE for  No further workup  Isolated two of two sets  Refer to culture [id number] for sensitivity results         Viral Culture:    Lab Results   Component Value Date    COVID19 Not Detected 12/23/2021     Urine Culture:   Recent Labs     12/23/21  1930   LABURIN >100,000 CFU/ml  Multiple Resistant Drug Organism         Scheduled Meds:   insulin glargine  30 Units SubCUTAneous Nightly    insulin lispro  10 Units SubCUTAneous TID WC    potassium phosphate IVPB  20 mmol IntraVENous Once    pantoprazole  40 mg IntraVENous BID    ferrous sulfate  325 mg Oral Daily with breakfast    cefTRIAXone (ROCEPHIN) IV  2,000 mg IntraVENous Q24H    metoprolol succinate  25 mg Oral Daily    gabapentin  300 mg Oral TID    buPROPion  200 mg Oral BID    busPIRone  15 mg Oral TID    sodium chloride  500 mL IntraVENous Once    [Held by provider] losartan  50 mg Oral Daily    QUEtiapine  300 mg Oral BID    sertraline  100 mg Oral Daily    sodium chloride flush  5-40 mL IntraVENous 2 times per day    [Held by provider] enoxaparin  40 mg SubCUTAneous Daily    insulin lispro  0-6 Units SubCUTAneous Nightly    insulin lispro  0-12 Units SubCUTAneous TID WC       Continuous Infusions:   sodium chloride 25 mL (12/26/21 1005)    dextrose         PRN Meds:  oxyCODONE-acetaminophen, hydrOXYzine, sodium chloride flush, sodium chloride, acetaminophen **OR** acetaminophen, glucose, dextrose, glucagon (rDNA), dextrose    Imaging:   CT ABDOMEN PELVIS WO CONTRAST Additional Contrast? None Final Result      New bilateral perinephric fat stranding which could be seen in the setting of pyelonephritis. CT Head WO Contrast   Final Result      1. No acute intracranial process. XR CHEST PORTABLE   Final Result      No acute pulmonary disease. XR LUMBAR SPINE (2-3 VIEWS)   Final Result   Impression:   No acute osseous injury. Moderate lumbar spondylosis better characterized on prior CT lumbar spine dated 11/4/2021. XR HIP BILATERAL W AP PELVIS (2 VIEWS)   Final Result   Impression:   No acute osseous injury. Severe right and mild left hip osteoarthritis. All pertinent images and reports for the current Hospitalization were reviewed by me. IMPRESSION:    Patient Active Problem List   Diagnosis    Syncope and collapse    Refractory migraine with aura    Psychogenic headache    Neck pain    Int[facility] drug overdose ([facility]    Hypertension    Hyperplastic polyp of intestine    Head problem    Depressive disorder    Severe sepsis (HCC)    Type 2 diabetes mellitus (HCC)    Morbid (severe) obesity due to excess calories (HCC)       Sever sepsis on admission  High fevers and chills on admission  History of falls at home  Bilateral pyelonephritis  E. coli bacteremia  Urinary tract infection  Complicated urinary tract infection  Diabetes poor control with hemoglobin A1c elevated 11.2  Morbid obesity BMI at 46.5    She was very ill on admission with E. coli bacteremia and high fever since initiation of IV antibiotics slowly improving WBC trending down. Repeat blood cultures to document clearance agree with IV ceftriaxone therapy as an inpatient at this time. Given sensitivity  profile will be able to choose oral third-generation cephalosporin at discharge. Labs, Microbiology, Radiology and pertinent results from current hospitalization and care every where were reviewed by me as a part of the consultation.     PLAN :  1. Cont IV Ceftriaxone x 2 gm q 24 HR as in patient   2. Repeat Blood cx   3. WBC trending down   4. Home on oral Omnicef x 300 mG q 12 hr x 7 days   5. Will sign off call with questions    6. Needs to control DM d/w pt      Discussed with patient/Family and Nursing   Risk of Complications/Morbidity: High      · Illness(es)/ Infection present that pose threat to bodily function. · There is potential for severe exacerbation of infection/side effects of treatment. · Therapy requires intensive monitoring for antimicrobial agent toxicity. Thanks for allowing me to participate in your patient's care please call me with any questions or concerns.     Dr. Naga Verdin MD  [address] [place] [address] Physician  Phone: [phone]   Fax : [phone]

## 2021-12-26 NOTE — PLAN OF CARE
Pt is alert with intermittent confusion. She was able to recall some conversations from yesterday, but confused about place and time today. She continues to be incontinent, purewick in place, reminded pt to use call light to go on the bedpan. New order for percocet Q8 PRN today and pt states she feels much better but pain is still around 5-7. Continues to be on room air, no distress noted. Phone and call light in place, pt denies any other needs or concerns.    Problem: Pain:  Goal: Pain level will decrease  Description: Pain level will decrease  Outcome: Ongoing  Goal: Control of acute pain  Description: Control of acute pain  Outcome: Ongoing  Goal: Control of chronic pain  Description: Control of chronic pain  Outcome: Ongoing     Problem: Sensory:  Goal: General experience of comfort will improve  Description: General experience of comfort will improve  Outcome: Ongoing     Problem: Urinary Elimination:  Goal: Signs and symptoms of infection will decrease  Description: Signs and symptoms of infection will decrease  Outcome: Ongoing  Goal: Ability to reestablish a normal urinary elimination pattern will improve - after catheter removal  Description: Ability to reestablish a normal urinary elimination pattern will improve  Outcome: Ongoing  Goal: Complications related to the disease process, condition or treatment will be avoided or minimized  Description: Complications related to the disease process, condition or treatment will be avoided or minimized  Outcome: Ongoing     Problem: Skin Integrity:  Goal: Will show no infection signs and symptoms  Description: Will show no infection signs and symptoms  Outcome: Ongoing     Problem: Falls - Risk of:  Goal: Will remain free from falls  Description: Will remain free from falls  Outcome: Met This Shift  Goal: Absence of physical injury  Description: Absence of physical injury  Outcome: Met This Shift     Problem: Skin Integrity:  Goal: Absence of new skin

## 2021-12-27 LAB
ALBUMIN SERPL-MCNC: 2.8 G/DL (ref 3.4–5)
ANION GAP SERPL CALCULATED.3IONS-SCNC: 13 MMOL/L (ref 3–16)
BASOPHILS ABSOLUTE: 0 K/UL (ref 0–0.2)
BASOPHILS RELATIVE PERCENT: 0.1 %
BUN BLDV-MCNC: 16 MG/DL (ref 7–20)
CALCIUM SERPL-MCNC: 7.9 MG/DL (ref 8.3–10.6)
CHLORIDE BLD-SCNC: 105 MMOL/L (ref 99–110)
CO2: 20 MMOL/L (ref 21–32)
CREAT SERPL-MCNC: 0.7 MG/DL (ref 0.6–1.2)
EOSINOPHILS ABSOLUTE: 0.3 K/UL (ref 0–0.6)
EOSINOPHILS RELATIVE PERCENT: 4.7 %
GFR AFRICAN AMERICAN: >60
GFR NON-AFRICAN AMERICAN: >60
GLUCOSE BLD-MCNC: 139 MG/DL (ref 70–99)
GLUCOSE BLD-MCNC: 166 MG/DL (ref 70–99)
GLUCOSE BLD-MCNC: 192 MG/DL (ref 70–99)
GLUCOSE BLD-MCNC: 207 MG/DL (ref 70–99)
GLUCOSE BLD-MCNC: 92 MG/DL (ref 70–99)
GLUCOSE BLD-MCNC: 93 MG/DL (ref 70–99)
HCT VFR BLD CALC: 25.9 % (ref 36–48)
HEMOGLOBIN: 8.4 G/DL (ref 12–16)
LYMPHOCYTES ABSOLUTE: 0.8 K/UL (ref 1–5.1)
LYMPHOCYTES RELATIVE PERCENT: 13.6 %
MAGNESIUM: 1.4 MG/DL (ref 1.8–2.4)
MCH RBC QN AUTO: 24.6 PG (ref 26–34)
MCHC RBC AUTO-ENTMCNC: 32.2 G/DL (ref 31–36)
MCV RBC AUTO: 76.2 FL (ref 80–100)
MONOCYTES ABSOLUTE: 0.7 K/UL (ref 0–1.3)
MONOCYTES RELATIVE PERCENT: 11.8 %
NEUTROPHILS ABSOLUTE: 4 K/UL (ref 1.7–7.7)
NEUTROPHILS RELATIVE PERCENT: 69.8 %
PDW BLD-RTO: 16.4 % (ref 12.4–15.4)
PERFORMED ON: ABNORMAL
PERFORMED ON: NORMAL
PERFORMED ON: NORMAL
PHOSPHORUS: 2.5 MG/DL (ref 2.5–4.9)
PLATELET # BLD: 191 K/UL (ref 135–450)
PMV BLD AUTO: 7.6 FL (ref 5–10.5)
POTASSIUM SERPL-SCNC: 3.8 MMOL/L (ref 3.5–5.1)
RBC # BLD: 3.4 M/UL (ref 4–5.2)
SODIUM BLD-SCNC: 138 MMOL/L (ref 136–145)
WBC # BLD: 5.7 K/UL (ref 4–11)

## 2021-12-27 PROCEDURE — 6360000002 HC RX W HCPCS: Performed by: STUDENT IN AN ORGANIZED HEALTH CARE EDUCATION/TRAINING PROGRAM

## 2021-12-27 PROCEDURE — 6370000000 HC RX 637 (ALT 250 FOR IP): Performed by: STUDENT IN AN ORGANIZED HEALTH CARE EDUCATION/TRAINING PROGRAM

## 2021-12-27 PROCEDURE — 6360000002 HC RX W HCPCS: Performed by: INTERNAL MEDICINE

## 2021-12-27 PROCEDURE — 83735 ASSAY OF MAGNESIUM: CPT

## 2021-12-27 PROCEDURE — C9113 INJ PANTOPRAZOLE SODIUM, VIA: HCPCS | Performed by: STUDENT IN AN ORGANIZED HEALTH CARE EDUCATION/TRAINING PROGRAM

## 2021-12-27 PROCEDURE — 2580000003 HC RX 258: Performed by: STUDENT IN AN ORGANIZED HEALTH CARE EDUCATION/TRAINING PROGRAM

## 2021-12-27 PROCEDURE — 85025 COMPLETE CBC W/AUTO DIFF WBC: CPT

## 2021-12-27 PROCEDURE — 1200000000 HC SEMI PRIVATE

## 2021-12-27 PROCEDURE — 80069 RENAL FUNCTION PANEL: CPT

## 2021-12-27 PROCEDURE — 36415 COLL VENOUS BLD VENIPUNCTURE: CPT

## 2021-12-27 RX ORDER — METOPROLOL SUCCINATE 25 MG/1
25 TABLET, EXTENDED RELEASE ORAL DAILY
Qty: 30 TABLET | Refills: 3 | Status: ON HOLD
Start: 2021-12-28 | End: 2022-02-25 | Stop reason: SDUPTHER

## 2021-12-27 RX ORDER — MAGNESIUM SULFATE IN WATER 40 MG/ML
4000 INJECTION, SOLUTION INTRAVENOUS ONCE
Status: COMPLETED | OUTPATIENT
Start: 2021-12-27 | End: 2021-12-27

## 2021-12-27 RX ORDER — INSULIN LISPRO 100 [IU]/ML
10 INJECTION, SOLUTION INTRAVENOUS; SUBCUTANEOUS
Qty: 9 ML | Refills: 0
Start: 2021-12-27 | End: 2022-02-23 | Stop reason: ALTCHOICE

## 2021-12-27 RX ORDER — FERROUS SULFATE 325(65) MG
325 TABLET ORAL
Qty: 30 TABLET | Refills: 3
Start: 2021-12-28

## 2021-12-27 RX ORDER — CEFDINIR 300 MG/1
300 CAPSULE ORAL 2 TIMES DAILY
Qty: 14 CAPSULE | Refills: 0
Start: 2021-12-27 | End: 2022-01-03

## 2021-12-27 RX ADMIN — SODIUM CHLORIDE 25 ML: 900 INJECTION INTRAVENOUS at 13:54

## 2021-12-27 RX ADMIN — ACETAMINOPHEN 650 MG: 325 TABLET ORAL at 10:04

## 2021-12-27 RX ADMIN — BUPROPION HYDROCHLORIDE 200 MG: 100 TABLET, EXTENDED RELEASE ORAL at 08:44

## 2021-12-27 RX ADMIN — INSULIN LISPRO 10 UNITS: 100 INJECTION, SOLUTION INTRAVENOUS; SUBCUTANEOUS at 13:57

## 2021-12-27 RX ADMIN — BUSPIRONE HYDROCHLORIDE 15 MG: 10 TABLET ORAL at 21:18

## 2021-12-27 RX ADMIN — MAGNESIUM SULFATE HEPTAHYDRATE 4000 MG: 4 INJECTION, SOLUTION INTRAVENOUS at 15:25

## 2021-12-27 RX ADMIN — SODIUM CHLORIDE, PRESERVATIVE FREE 10 ML: 5 INJECTION INTRAVENOUS at 10:13

## 2021-12-27 RX ADMIN — BUSPIRONE HYDROCHLORIDE 15 MG: 10 TABLET ORAL at 13:50

## 2021-12-27 RX ADMIN — SODIUM CHLORIDE 25 ML: 900 INJECTION INTRAVENOUS at 15:21

## 2021-12-27 RX ADMIN — SODIUM CHLORIDE, PRESERVATIVE FREE 10 ML: 5 INJECTION INTRAVENOUS at 10:07

## 2021-12-27 RX ADMIN — GABAPENTIN 300 MG: 300 CAPSULE ORAL at 13:50

## 2021-12-27 RX ADMIN — FERROUS SULFATE TAB 325 MG (65 MG ELEMENTAL FE) 325 MG: 325 (65 FE) TAB at 08:45

## 2021-12-27 RX ADMIN — GABAPENTIN 300 MG: 300 CAPSULE ORAL at 08:45

## 2021-12-27 RX ADMIN — INSULIN LISPRO 10 UNITS: 100 INJECTION, SOLUTION INTRAVENOUS; SUBCUTANEOUS at 18:35

## 2021-12-27 RX ADMIN — SERTRALINE HYDROCHLORIDE 100 MG: 100 TABLET ORAL at 08:45

## 2021-12-27 RX ADMIN — INSULIN LISPRO 4 UNITS: 100 INJECTION, SOLUTION INTRAVENOUS; SUBCUTANEOUS at 10:08

## 2021-12-27 RX ADMIN — BUSPIRONE HYDROCHLORIDE 15 MG: 10 TABLET ORAL at 08:45

## 2021-12-27 RX ADMIN — QUETIAPINE FUMARATE 300 MG: 200 TABLET ORAL at 21:18

## 2021-12-27 RX ADMIN — GABAPENTIN 300 MG: 300 CAPSULE ORAL at 21:18

## 2021-12-27 RX ADMIN — BUPROPION HYDROCHLORIDE 200 MG: 100 TABLET, EXTENDED RELEASE ORAL at 21:18

## 2021-12-27 RX ADMIN — INSULIN LISPRO 2 UNITS: 100 INJECTION, SOLUTION INTRAVENOUS; SUBCUTANEOUS at 13:57

## 2021-12-27 RX ADMIN — OXYCODONE HYDROCHLORIDE AND ACETAMINOPHEN 1 TABLET: 5; 325 TABLET ORAL at 04:00

## 2021-12-27 RX ADMIN — CEFTRIAXONE SODIUM 2000 MG: 2 INJECTION, POWDER, FOR SOLUTION INTRAMUSCULAR; INTRAVENOUS at 13:55

## 2021-12-27 RX ADMIN — QUETIAPINE FUMARATE 300 MG: 200 TABLET ORAL at 08:44

## 2021-12-27 RX ADMIN — METOPROLOL SUCCINATE 25 MG: 25 TABLET, EXTENDED RELEASE ORAL at 08:44

## 2021-12-27 RX ADMIN — INSULIN LISPRO 10 UNITS: 100 INJECTION, SOLUTION INTRAVENOUS; SUBCUTANEOUS at 10:08

## 2021-12-27 RX ADMIN — PANTOPRAZOLE SODIUM 40 MG: 40 INJECTION, POWDER, FOR SOLUTION INTRAVENOUS at 10:07

## 2021-12-27 ASSESSMENT — PAIN DESCRIPTION - LOCATION: LOCATION: BACK

## 2021-12-27 ASSESSMENT — PAIN DESCRIPTION - PROGRESSION
CLINICAL_PROGRESSION: GRADUALLY WORSENING

## 2021-12-27 ASSESSMENT — PAIN SCALES - GENERAL
PAINLEVEL_OUTOF10: 0
PAINLEVEL_OUTOF10: 0
PAINLEVEL_OUTOF10: 7
PAINLEVEL_OUTOF10: 7
PAINLEVEL_OUTOF10: 0
PAINLEVEL_OUTOF10: 0
PAINLEVEL_OUTOF10: 7
PAINLEVEL_OUTOF10: 0
PAINLEVEL_OUTOF10: 0

## 2021-12-27 ASSESSMENT — PAIN DESCRIPTION - PAIN TYPE: TYPE: CHRONIC PAIN

## 2021-12-27 ASSESSMENT — PAIN DESCRIPTION - FREQUENCY: FREQUENCY: CONTINUOUS

## 2021-12-27 ASSESSMENT — PAIN DESCRIPTION - ORIENTATION: ORIENTATION: LOWER

## 2021-12-27 ASSESSMENT — PAIN DESCRIPTION - DESCRIPTORS: DESCRIPTORS: ACHING

## 2021-12-27 ASSESSMENT — PAIN DESCRIPTION - ONSET: ONSET: ON-GOING

## 2021-12-27 NOTE — DISCHARGE INSTR - COC
Continuity of Care Form    Patient Name: Kieran Sylvester   :  1960  MRN:  [id number]    Admit date:  2021  Discharge date:  2021    Code Status Order: Full Code   Advance Directives:      Admitting Physician:  Russel Pearson MD  PCP: EUGENIE Julian - CNP    Discharging Nurse: [facility]/Room#: 6503/2672-35  Discharging Unit Phone Number: [phone]    Emergency Contact:   Extended Emergency Contact Information  Primary Emergency Contact: Stas Schumacher, [facility] Phone: [phone]  Relation: Child  Preferred language: English   needed?  No    Past Surgical History:  Past Surgical History:   Procedure Laterality Date    CHOLECYSTECTOMY      COLECTOMY      HYSTERECTOMY      REVISION COLOSTOMY         Immunization History:   Immunization History   Administered Date(s) Administered    COVID-19, J&J, PF, 0.5 mL 2021    COVID-19, Pfizer, PF, 30mcg/0.3mL 10/28/2021    Influenza, MDCK Quadv, IM, PF (Flucelvax 2 yrs and older) 10/28/2021       Active Problems:  Patient Active Problem List   Diagnosis Code    Syncope and collapse R55    Refractory migraine with aura G43.919    Psychogenic headache F45.41    Neck pain M54.2    Intentional drug overdose ([address] T50.902A    Hypertension I10    Hyperplastic polyp of intestine K63.5    Head problem R68.89    Depressive disorder F32.9    Severe sepsis ([address] A41.9, R65.20    Type 2 diabetes mellitus ([address] E11.9    Morbid (severe) obesity due to excess calories ([address] E66.01       Isolation/Infection:   Isolation            Contact          Patient Infection Status       Infection Onset Added Last Indicated Last Indicated By Review Planned Expiration Resolved Resolved By    MDRO (multi-drug resistant organism) 21 Culture, Urine        Resolved    COVID-19 (Rule Out) 21 COVID-19 (Ordered)   21 Rule-Out Test Resulted            Nurse Assessment:  Last Vital Signs: BP (!) MANAGEMENT/SOCIAL WORK SECTION    Inpatient Status Date: 12/23/2021    Readmission Risk Assessment Score:  Readmission Risk              Risk of Unplanned Readmission:  26           Discharging to Facility/ Agency   Name: Maikol Agustin  Address: [address], [address]  Phone: [phone]  Fax: [phone]    Dialysis Facility (if applicable)   N/a    / signature: Electronically signed by KEYONA Ventura, NASIRW on 12/27/21 at 4:13 PM EST    PHYSICIAN SECTION    Prognosis: Fair    Condition at Discharge: Stable    Rehab Potential (if transferring to Rehab): Fair    Recommended Labs or Other Treatments After Discharge: pt/ot    Physician Certification: I certify the above information and transfer of Nate Bowden  is necessary for the continuing treatment of the diagnosis listed and that she requires [facility] for less 30 days.      Update Admission H&P: No change in H&P    PHYSICIAN SIGNATURE:  Electronically signed by Jessica Car MD on 12/27/21 at 11:51 AM EST

## 2021-12-27 NOTE — CARE COORDINATION
Case Management Assessment            Discharge Note                    Date / Time of Note: 12/27/2021 4:12 PM                  Discharge Note Completed by: KEYONA Johns, NASIRW    Patient Name: Kelechi Flores   YOB: 1960  Diagnosis: Severe sepsis ([facility] [A41.9, R65.20]  Fever in other diseases [R50.81]   Date / Time: 12/23/2021  6:23 PM    Current PCP: [place] patient: No    Hospitalization in the last 30 days: No    Advance Directives:  Code Status: Full Code  [place] DNR form completed and on chart: No    Financial:  Payor: Anabel Ovalle / Plan: Jose Campbell / Product Type: *No Product type* /      Pharmacy:    [address] [place] [address]  Phone: [phone] Fax: [phone]      Assistance purchasing medications?: Potential Assistance Purchasing Medications: No  Assistance provided by Case Management: None at this time    Does patient want to participate in local refill/ meds to beds program?:      Meds To Beds General Rules:  1. Can ONLY be done Monday- Friday between 8:30am-5pm  2. Prescription(s) must be in pharmacy by 3pm to be filled same day  3. Copy of patient's insurance/ prescription drug card and patient face sheet must be sent along with the prescription(s)  4. Cost of Rx cannot be added to hospital bill. If financial assistance is needed, please contact unit  or ;  or  CANNOT provide pharmacy voucher for patients co-pays  5.  Patients can then  the prescription on their way out of the hospital at discharge, or pharmacy can deliver to the bedside if staff is available. (payment due at time of pick-up or delivery - cash, check, or card accepted)     Able to afford home medications/ co-pay costs: Yes    ADLS:  Current PT AM-PAC Score: 13 /24  Current OT AM-PAC Score: 14 /24      DISCHARGE Disposition: [facility] (SNF): [facility]  Phone: [phone] Fax: [phone]    LOC at discharge: Skilled  EMIL Completed: Yes    Notification completed in HENS/PAS?:  Yes : CM has completed HENS online through secure website for SNF admission at [facility]. Document ID #: [id number]    IMM Completed:   Not Indicated    Transportation:  Transportation PLAN for discharge: EMS transportation   Mode of Transport: Ambulance stretcher - BLS  Reason for medical transport: Bed confined: Meets the following criteria 1) unable to get out of bed without assistance or ambulate, 2) unable to safely sit up in a wheelchair, 3) unable to maintain erect seating position in a chair for time needed for transport  Name of [address] [place] [address],[facility]: [facility]  Phone: [phone]  Time of Transport: 7:30pm     Transport form completed: Yes    Home Care:  [address] ordered at discharge: No    Durable Medical Equipment:  DME Provider: none  Equipment obtained during hospitalization: none    Home Oxygen and Respiratory Equipment:  Oxygen needed at discharge?: Yes, No    Dialysis:  Dialysis patient: No      Referrals made at [facility] for outpatient continued care:  Not Applicable    Additional CM Notes: SW met w/Pt at bedside. Pt is wanting to go to [facility]. CAMERON scheduled transport at 7:30 via [facility]. CAMERON LVM for Yasmin Kick w/transport time. Pt has no other needs at this time. The Plan for Transition of Care is related to the following treatment goals of Severe sepsis ([address] [A41.9, R65.20]  Fever in other diseases [R50.81]    The Patient and/or patient representative Omkar and her family were provided with a choice of provider and agrees with the discharge plan Yes    Freedom of choice list was provided with basic dialogue that supports the patient's individualized plan of care/goals and shares the quality data associated with the providers.  Yes    Care Transitions patient: No    Jessica Russo KEYONA Nath, [facility]  Case Management Department  Ph: [phone]  Fax: [phone]

## 2021-12-27 NOTE — PROGRESS NOTES
Pt's daughter David Alford updated on pt's current status and plan of care. All questions answered.  Electronically signed by Do Alanis RN on 12/27/2021 at 8:10 AM

## 2021-12-27 NOTE — PROGRESS NOTES
Hospitalist Progress Note      PCP: Eunice Sexton, APRN - CNP    Date of Admission: 12/23/2021    Chief Complaint: [facility] Course: 61yoF with PMHx anxiety, T2DM, HTN, PTSD pw back pain and bilateral hip pain after endorsing multiple falls. Subjective: No acute events reported overnight. Patient doing okay this morning. Still feels pretty weak and elevated admitted herself. Medications:  Reviewed    Infusion Medications    sodium chloride 25 mL (12/26/21 1448)    dextrose       Scheduled Medications    insulin glargine  30 Units SubCUTAneous Nightly    insulin lispro  10 Units SubCUTAneous TID WC    pantoprazole  40 mg IntraVENous BID    ferrous sulfate  325 mg Oral Daily with breakfast    cefTRIAXone (ROCEPHIN) IV  2,000 mg IntraVENous Q24H    metoprolol succinate  25 mg Oral Daily    gabapentin  300 mg Oral TID    buPROPion  200 mg Oral BID    busPIRone  15 mg Oral TID    sodium chloride  500 mL IntraVENous Once    [Held by provider] losartan  50 mg Oral Daily    QUEtiapine  300 mg Oral BID    sertraline  100 mg Oral Daily    sodium chloride flush  5-40 mL IntraVENous 2 times per day    [Held by provider] enoxaparin  40 mg SubCUTAneous Daily    insulin lispro  0-6 Units SubCUTAneous Nightly    insulin lispro  0-12 Units SubCUTAneous TID WC     PRN Meds: oxyCODONE-acetaminophen, hydrOXYzine, sodium chloride flush, sodium chloride, acetaminophen **OR** acetaminophen, glucose, dextrose, glucagon (rDNA), dextrose      Intake/Output Summary (Last 24 hours) at 12/27/2021 1152  Last data filed at 12/27/2021 1006  Gross per 24 hour   Intake 860 ml   Output 601 ml   Net 259 ml       Physical Exam Performed:    BP (!) 157/82   Pulse 94   Temp 98.5 °F (36.9 °C) (Oral)   Resp 20   Ht 5' 5\" (1.651 m)   Wt 268 lb 15.4 oz (122 kg)   SpO2 96%   BMI 44.76 kg/m²     General appearance: No apparent distress, appears stated age and cooperative.   HEENT: Pupils equal, round, and reactive to light. Conjunctivae/corneas clear. Neck: Supple, with full range of motion. No jugular venous distention. Trachea midline. Respiratory:  Normal respiratory effort. Clear to auscultation, bilaterally without Rales/Wheezes/Rhonchi. Cardiovascular: Regular rate and rhythm with normal S1/S2 without murmurs, rubs or gallops. Abdomen: Soft, non-tender, non-distended with normal bowel sounds. Musculoskeletal: Trace bilateral lower extremity edema  Skin: Skin color, texture, turgor normal.  No rashes or lesions. Neurologic: Generalized weakness without any focal motor deficits  Psychiatric: Alert and oriented, thought content appropriate, normal insight  Capillary Refill: Brisk,3 seconds, normal   Peripheral Pulses: +2 palpable, equal bilaterally       Labs:   Recent Labs     12/25/21  0514 12/26/21  0555 12/27/21  0621   WBC 10.4 9.1 5.7   HGB 7.6* 8.3* 8.4*   HCT 23.1* 24.6* 25.9*    191 191     Recent Labs     12/25/21  0514 12/26/21  0555 12/27/21  0621    136 138   K 3.9 3.8 3.8    103 105   CO2 21 25 20*   BUN 26* 19 16   CREATININE 1.0 0.6 0.7   CALCIUM 7.7* 8.2* 7.9*   PHOS 2.5 1.6* 2.5     Recent Labs     12/24/21 2033   AST 12*   ALT 15   BILIDIR <0.2   BILITOT 0.3   ALKPHOS 79     No results for input(s): INR in the last 72 hours. Recent Labs     12/24/21 2030   TROPONINI <0.01       Urinalysis:      Lab Results   Component Value Date    NITRU POSITIVE 12/23/2021    WBCUA 10-20 12/23/2021    BACTERIA 3+ 12/23/2021    RBCUA 0-2 12/23/2021    BLOODU SMALL 12/23/2021    SPECGRAV 1.025 12/23/2021    GLUCOSEU >=1000 12/23/2021       Radiology:  CT ABDOMEN PELVIS WO CONTRAST Additional Contrast? None   Final Result      New bilateral perinephric fat stranding which could be seen in the setting of pyelonephritis. CT Head WO Contrast   Final Result      1. No acute intracranial process. XR CHEST PORTABLE   Final Result      No acute pulmonary disease.          XR LUMBAR SPINE (2-3 VIEWS)   Final Result   Impression:   No acute osseous injury. Moderate lumbar spondylosis better characterized on prior CT lumbar spine dated 11/4/2021. XR HIP BILATERAL W AP PELVIS (2 VIEWS)   Final Result   Impression:   No acute osseous injury. Severe right and mild left hip osteoarthritis. Assessment/Plan:    Active Hospital Problems    Diagnosis     Type 2 diabetes mellitus ([facility] [address] [E11.9]     Morbid (severe) obesity due to excess calories ([facility] [address] [E66.01]     Severe sepsis ([facility] [address] [A41.9, R65.20]     Depressive disorder [F32.9]      Sepsis secondary to pyelonephritis  Cultures positive for E. coli sensitive to Rocephin  CT abdomen showing bilateral perinephric stranding consistent with pyelonephritis  ID was consulted, recommend Omnicef 300 mg twice daily x7 days at discharge    Hypomagnesemia  Replace and recheck    Diabetes mellitus  Hemoglobin A1c of 11.2  Continue Lantus 30 units nightly, insulin sliding scale  Stop Metformin/Victoza at discharge. Continue with insulin therapy as above    Iron deficiency anemia  Continue ferrous sulfate    Diabetic neuropathy  On gabapentin    Hypertension  Continue Toprol, losartan    DVT Prophylaxis: Lovenox  Diet: ADULT DIET; Regular; 3 carb choices (45 gm/meal)  Code Status: Full Code    PT/OT Eval Status: Ongoing    Dispo -patient medically ready for discharge. She is unable to ambulate due to generalized weakness. PT OT scores are low. Spoke with social work.   Will try for SNF placement today    Melony Burger MD

## 2021-12-27 NOTE — DISCHARGE SUMMARY
Hospitalist Discharge Summary    Patient ID:  Marjorie Vitale  [id number]  23 y.o.  1960    Admit date: 12/23/2021    Discharge date: 12/27/2021    Disposition: SNF    Admission Diagnoses:   Patient Active Problem List   Diagnosis    Syncope and collapse    Refractory migraine with aura    Psychogenic headache    Neck pain    Int[facility] drug overdose ([facility] [address]    Hypertension    Hyperplastic polyp of intestine    Head problem    Depressive disorder    Severe sepsis (HCC)    Type 2 diabetes mellitus (HCC)    Morbid (severe) obesity due to excess calories [facility])       Discharge Diagnoses: Active Problems:    Depressive disorder    Severe sepsis (HCC)    Type 2 diabetes mellitus (HCC)    Morbid (severe) obesity due to excess calories ([facility] [address]  Resolved Problems:    * No resolved hospital problems. *      Code Status:  Full Code    Condition:  Stable    Discharge Diet: Diet:  ADULT DIET; Regular; 3 carb choices (45 gm/meal)    PCP to do list: Follow for improvement of symptoms    Hospital Course: As per HPI:62year-old female with past medical history of anxiety, DM 2, HTN, PTSD presented to the ED with chief complaint of back pain and left hip bilateral hip pain after endorsing multiple falls. Patient reports she walks with a walker but has a diabetic neuropathy which is why endorses multiple falls, reported she fell down 4 times at home yesterday. She denied having any lightheadedness/dizziness, palpitations, loss of consciousness prior to or after the fall. She does endorse nausea and had an episode of vomiting yesterday. Denies any chest pain, abdominal discomfort, burning urination, increased urinary frequency. In the ED, vitals febrile 103.1, pulse 134, RR 20, /90, SPO2 95% on room air. X-ray chest nonrevealing, CT head without contrast nonrevealing. X-ray lumbar spine moderate lumbar spondylosis.   X-ray of bilateral hip revealing labs revealing WBC 22, LA 3.4, Details   QUEtiapine (SEROQUEL) 300 MG tablet Take 300 mg by mouth nightly      acetaminophen (TYLENOL) 500 MG tablet Take 1,000 mg by mouth every 6 hours as needed for Pain      gabapentin (NEURONTIN) 300 MG capsule Take 300 mg by mouth 3 times daily. hydrOXYzine (ATARAX) 50 MG tablet Take 100 mg by mouth nightly      losartan (COZAAR) 50 MG tablet Take 50 mg by mouth daily      pantoprazole (PROTONIX) 40 MG tablet Take 40 mg by mouth daily      sertraline (ZOLOFT) 100 MG tablet Take 150 mg by mouth daily       busPIRone (BUSPAR) 15 MG tablet Take 15 mg by mouth 3 times daily       buPROPion (WELLBUTRIN SR) 200 MG extended release tablet Take 200 mg by mouth 2 times daily            Current Discharge Medication List      STOP taking these medications       metFORMIN (GLUCOPHAGE) 1000 MG tablet Comments:   Reason for Stopping:         VICTOZA 18 MG/3ML SOPN SC injection Comments:   Reason for Stopping:                   Procedures: none    Assessment on Discharge: Stable, improved     Discharge ROS:  A complete review of systems was asked and negative except for none    Discharge Exam:  BP (!) 158/92   Pulse 88   Temp 98.2 °F (36.8 °C) (Oral)   Resp 18   Ht 5' 5\" (1.651 m)   Wt 268 lb 15.4 oz (122 kg)   SpO2 96%   BMI 44.76 kg/m²     General appearance: No apparent distress, appears stated age and cooperative. HEENT: Pupils equal, round, and reactive to light. Conjunctivae/corneas clear. Neck: Supple, with full range of motion. No jugular venous distention. Trachea midline. Respiratory:  Normal respiratory effort. Clear to auscultation, bilaterally without Rales/Wheezes/Rhonchi. Cardiovascular: Regular rate and rhythm with normal S1/S2 without murmurs, rubs or gallops. Abdomen: Soft, non-tender, non-distended with normal bowel sounds. Musculoskeletal: Trace bilateral lower extremity edema  Skin: Skin color, texture, turgor normal.  No rashes or lesions.   Neurologic: Generalized weakness without any focal motor deficits  Psychiatric: Alert and oriented, thought content appropriate, normal insight  Capillary Refill: Brisk,3 seconds, normal   Peripheral Pulses: +2 palpable, equal bilaterally     Pertinent Studies During Hospital Stay:  Radiology:  CT ABDOMEN PELVIS WO CONTRAST Additional Contrast? None    Result Date: 12/25/2021  EXAM: CT ABDOMEN AND PELVIS WITHOUT CONTRAST INDICATION: concern for pyelo, COMPARISON: 10/14/2021. TECHNIQUE: Axial CT imaging obtained from lung bases through pelvis. Axial images and multiplanar reformatted images are provided for review. Individualized dose optimization technique was used in order to meet ALARA standards for radiation dose reduction. In addition to vendor specific dose reduction algorithms, the dose reduction techniques vary based on the specific scanner utilized but frequently include automated exposure control, adjustment of the mA and/or kV according to patient size, and use of iterative reconstruction technique. IV Contrast: None Oral Contrast: None FINDINGS: LUNG BASES: Small left pleural effusion. Mild right basilar opacities. LIVER: Unremarkable. GALLBLADDER AND BILIARY TREE: Cholecystectomy. No intra- or extrahepatic biliary dilatation. PANCREAS: Unremarkable. SPLEEN: Unremarkable. ADRENAL GLANDS: Unremarkable. KIDNEYS AND URETERS: No hydronephrosis. New bilateral perinephric fat stranding. Punctate nonobstructing left renal calculus. URINARY BLADDER: Unremarkable. REPRODUCTIVE ORGANS: Unremarkable. BOWEL: Normal caliber. LYMPH NODES: No abnormally enlarged nodes. PERITONEUM/RETROPERITONEUM: No ascites or free air. VESSELS: Unremarkable. ABDOMINAL WALL: Redemonstration of fat and nonobstructed bowel containing ventral hernias. BONES: No destructive process. New bilateral perinephric fat stranding which could be seen in the setting of pyelonephritis.      XR LUMBAR SPINE (2-3 VIEWS)    Result Date: 12/23/2021  Exam: Lumbar spine, 3 views History: lumbar pain Comparison: 11/4/2021 Findings: There is stable grade 1 anterolisthesis of L4 on L5. No acute fracture is identified. The vertebral body heights are maintained. There is severe L2-3 degenerative disc disease and moderate bilateral L4-5 facet osteoarthritis. The sacroiliac joints are normal.     Impression: No acute osseous injury. Moderate lumbar spondylosis better characterized on prior CT lumbar spine dated 11/4/2021. XR HIP BILATERAL W AP PELVIS (2 VIEWS)    Result Date: 12/23/2021  Exam: XR HIP BILATERAL W AP PELVIS (2 VIEWS) History: pain, fall Comparison: 10/14/2021 Findings: The pelvic ring is intact. There is no acute fracture or dislocation. There is no widening or displacement of the sacroiliac joints. There is severe right mild left hip osteoarthritis. Surgical clips overlie the pelvis. Impression: No acute osseous injury. Severe right and mild left hip osteoarthritis. CT Head WO Contrast    Result Date: 12/23/2021  PROCEDURE: CT head without contrast INDICATION: AMS; COMPARISON: 8/2/2021 TECHNIQUE: CT head was performed without contrast according to standard protocol. Axial images and multiplanar reformatted images reviewed. Up-to-date CT equipment and radiation dose reduction techniques were employed. FINDINGS: No acute intra- or extra-axial fluid collections are identified. The ventricles are of normal size, shape, and morphology. The basilar cisterns are patent. No mass effect or midline shift is seen. The gray-white matter differentiation is normal. No cerebral density abnormality is seen. Visualized portions of the orbits, paranasal sinuses, and mastoids appear normal. No acute fracture is identified. 1.  No acute intracranial process. XR CHEST PORTABLE    Result Date: 12/23/2021  EXAM: PORTABLE AP CHEST X-RAY, 1847 hours INDICATION: cough, fever COMPARISON: none FINDINGS: There is no focal consolidation, pleural effusion, or pneumothorax.  The cardiomediastinal silhouette is normal. The visible bony thorax is intact. No acute pulmonary disease.            Last Labs on Discharge:     Recent Results (from the past 24 hour(s))   POCT Glucose    Collection Time: 12/26/21  5:42 PM   Result Value Ref Range    POC Glucose 135 (H) 70 - 99 mg/dl    Performed on ACCU-CHEK    POCT Glucose    Collection Time: 12/26/21  8:45 PM   Result Value Ref Range    POC Glucose 139 (H) 70 - 99 mg/dl    Performed on ACCU-CHEK    POCT Glucose    Collection Time: 12/27/21  3:59 AM   Result Value Ref Range    POC Glucose 139 (H) 70 - 99 mg/dl    Performed on ACCU-CHEK    CBC auto differential    Collection Time: 12/27/21  6:21 AM   Result Value Ref Range    WBC 5.7 4.0 - 11.0 K/uL    RBC 3.40 (L) 4.00 - 5.20 M/uL    Hemoglobin 8.4 (L) 12.0 - 16.0 g/dL    Hematocrit 25.9 (L) 36.0 - 48.0 %    MCV 76.2 (L) 80.0 - 100.0 fL    MCH 24.6 (L) 26.0 - 34.0 pg    MCHC 32.2 31.0 - 36.0 g/dL    RDW 16.4 (H) 12.4 - 15.4 %    Platelets 696 225 - 961 K/uL    MPV 7.6 5.0 - 10.5 fL    Neutrophils % 69.8 %    Lymphocytes % 13.6 %    Monocytes % 11.8 %    Eosinophils % 4.7 %    Basophils % 0.1 %    Neutrophils Absolute 4.0 1.7 - 7.7 K/uL    Lymphocytes Absolute 0.8 (L) 1.0 - 5.1 K/uL    Monocytes Absolute 0.7 0.0 - 1.3 K/uL    Eosinophils Absolute 0.3 0.0 - 0.6 K/uL    Basophils Absolute 0.0 0.0 - 0.2 K/uL   Magnesium    Collection Time: 12/27/21  6:21 AM   Result Value Ref Range    Magnesium 1.40 (L) 1.80 - 2.40 mg/dL   Renal function panel    Collection Time: 12/27/21  6:21 AM   Result Value Ref Range    Sodium 138 136 - 145 mmol/L    Potassium 3.8 3.5 - 5.1 mmol/L    Chloride 105 99 - 110 mmol/L    CO2 20 (L) 21 - 32 mmol/L    Anion Gap 13 3 - 16    Glucose 166 (H) 70 - 99 mg/dL    BUN 16 7 - 20 mg/dL    CREATININE 0.7 0.6 - 1.2 mg/dL    GFR Non-African American >60 >60    GFR African American >60 >60    Calcium 7.9 (L) 8.3 - 10.6 mg/dL    Phosphorus 2.5 2.5 - 4.9 mg/dL    Albumin 2.8 (L) 3.4 - 5.0 g/dL   POCT Glucose Collection Time: 12/27/21  9:51 AM   Result Value Ref Range    POC Glucose 207 (H) 70 - 99 mg/dl    Performed on ACCU-CHEK    POCT Glucose    Collection Time: 12/27/21 12:21 PM   Result Value Ref Range    POC Glucose 192 (H) 70 - 99 mg/dl    Performed on ACCU-CHEK          Follow up: with Davida Osler, APRN - CNP    Note that over 30 minutes was spent in preparing discharge papers, discussing discharge with patient, medication review, etc.    Thank you EUGENIE Alvarez CNP for the opportunity to be involved in this patient's care. If you have any questions or concerns please feel free to contact me at [phone].     Electronically signed by Jessica Car MD on 12/27/2021 at 5:16 PM

## 2021-12-28 VITALS
WEIGHT: 268.96 LBS | BODY MASS INDEX: 44.81 KG/M2 | RESPIRATION RATE: 20 BRPM | OXYGEN SATURATION: 96 % | TEMPERATURE: 98.7 F | HEART RATE: 86 BPM | SYSTOLIC BLOOD PRESSURE: 176 MMHG | HEIGHT: 65 IN | DIASTOLIC BLOOD PRESSURE: 85 MMHG

## 2021-12-28 LAB
ALBUMIN SERPL-MCNC: 3.3 G/DL (ref 3.4–5)
ANION GAP SERPL CALCULATED.3IONS-SCNC: 14 MMOL/L (ref 3–16)
ANISOCYTOSIS: ABNORMAL
BASOPHILS ABSOLUTE: 0 K/UL (ref 0–0.2)
BASOPHILS RELATIVE PERCENT: 0 %
BUN BLDV-MCNC: 12 MG/DL (ref 7–20)
CALCIUM SERPL-MCNC: 8.2 MG/DL (ref 8.3–10.6)
CHLORIDE BLD-SCNC: 101 MMOL/L (ref 99–110)
CO2: 28 MMOL/L (ref 21–32)
CREAT SERPL-MCNC: 0.7 MG/DL (ref 0.6–1.2)
EOSINOPHILS ABSOLUTE: 0 K/UL (ref 0–0.6)
EOSINOPHILS RELATIVE PERCENT: 0 %
GFR AFRICAN AMERICAN: >60
GFR NON-AFRICAN AMERICAN: >60
GLUCOSE BLD-MCNC: 178 MG/DL (ref 70–99)
GLUCOSE BLD-MCNC: 55 MG/DL (ref 70–99)
GLUCOSE BLD-MCNC: 79 MG/DL (ref 70–99)
GLUCOSE BLD-MCNC: 81 MG/DL (ref 70–99)
GLUCOSE BLD-MCNC: 97 MG/DL (ref 70–99)
HCT VFR BLD CALC: 25.9 % (ref 36–48)
HEMOGLOBIN: 8.4 G/DL (ref 12–16)
LYMPHOCYTES ABSOLUTE: 0.8 K/UL (ref 1–5.1)
LYMPHOCYTES RELATIVE PERCENT: 15 %
MAGNESIUM: 1.3 MG/DL (ref 1.8–2.4)
MCH RBC QN AUTO: 24.5 PG (ref 26–34)
MCHC RBC AUTO-ENTMCNC: 32.5 G/DL (ref 31–36)
MCV RBC AUTO: 75.6 FL (ref 80–100)
MICROCYTES: ABNORMAL
MONOCYTES ABSOLUTE: 0.6 K/UL (ref 0–1.3)
MONOCYTES RELATIVE PERCENT: 11 %
NEUTROPHILS ABSOLUTE: 4.1 K/UL (ref 1.7–7.7)
NEUTROPHILS RELATIVE PERCENT: 74 %
PDW BLD-RTO: 16.3 % (ref 12.4–15.4)
PERFORMED ON: ABNORMAL
PERFORMED ON: ABNORMAL
PERFORMED ON: NORMAL
PERFORMED ON: NORMAL
PHOSPHORUS: 3.1 MG/DL (ref 2.5–4.9)
PLATELET # BLD: 245 K/UL (ref 135–450)
PMV BLD AUTO: 7.2 FL (ref 5–10.5)
POTASSIUM SERPL-SCNC: 3.4 MMOL/L (ref 3.5–5.1)
RBC # BLD: 3.42 M/UL (ref 4–5.2)
SODIUM BLD-SCNC: 143 MMOL/L (ref 136–145)
WBC # BLD: 5.5 K/UL (ref 4–11)

## 2021-12-28 PROCEDURE — 2580000003 HC RX 258: Performed by: STUDENT IN AN ORGANIZED HEALTH CARE EDUCATION/TRAINING PROGRAM

## 2021-12-28 PROCEDURE — 83735 ASSAY OF MAGNESIUM: CPT

## 2021-12-28 PROCEDURE — 6370000000 HC RX 637 (ALT 250 FOR IP): Performed by: STUDENT IN AN ORGANIZED HEALTH CARE EDUCATION/TRAINING PROGRAM

## 2021-12-28 PROCEDURE — 97535 SELF CARE MNGMENT TRAINING: CPT

## 2021-12-28 PROCEDURE — 97530 THERAPEUTIC ACTIVITIES: CPT

## 2021-12-28 PROCEDURE — 6360000002 HC RX W HCPCS: Performed by: STUDENT IN AN ORGANIZED HEALTH CARE EDUCATION/TRAINING PROGRAM

## 2021-12-28 PROCEDURE — 97110 THERAPEUTIC EXERCISES: CPT

## 2021-12-28 PROCEDURE — 6370000000 HC RX 637 (ALT 250 FOR IP): Performed by: INTERNAL MEDICINE

## 2021-12-28 PROCEDURE — 2580000003 HC RX 258: Performed by: INTERNAL MEDICINE

## 2021-12-28 PROCEDURE — 85025 COMPLETE CBC W/AUTO DIFF WBC: CPT

## 2021-12-28 PROCEDURE — 6360000002 HC RX W HCPCS: Performed by: INTERNAL MEDICINE

## 2021-12-28 PROCEDURE — 80069 RENAL FUNCTION PANEL: CPT

## 2021-12-28 PROCEDURE — 36415 COLL VENOUS BLD VENIPUNCTURE: CPT

## 2021-12-28 RX ORDER — POTASSIUM CHLORIDE 20 MEQ/1
40 TABLET, EXTENDED RELEASE ORAL ONCE
Status: COMPLETED | OUTPATIENT
Start: 2021-12-28 | End: 2021-12-28

## 2021-12-28 RX ADMIN — GABAPENTIN 300 MG: 300 CAPSULE ORAL at 07:56

## 2021-12-28 RX ADMIN — CEFTRIAXONE SODIUM 2000 MG: 2 INJECTION, POWDER, FOR SOLUTION INTRAMUSCULAR; INTRAVENOUS at 11:54

## 2021-12-28 RX ADMIN — BUPROPION HYDROCHLORIDE 200 MG: 100 TABLET, EXTENDED RELEASE ORAL at 07:57

## 2021-12-28 RX ADMIN — POTASSIUM CHLORIDE 40 MEQ: 1500 TABLET, EXTENDED RELEASE ORAL at 09:59

## 2021-12-28 RX ADMIN — METOPROLOL SUCCINATE 25 MG: 25 TABLET, EXTENDED RELEASE ORAL at 07:56

## 2021-12-28 RX ADMIN — INSULIN LISPRO 10 UNITS: 100 INJECTION, SOLUTION INTRAVENOUS; SUBCUTANEOUS at 10:08

## 2021-12-28 RX ADMIN — ENOXAPARIN SODIUM 40 MG: 100 INJECTION SUBCUTANEOUS at 07:57

## 2021-12-28 RX ADMIN — BUSPIRONE HYDROCHLORIDE 15 MG: 10 TABLET ORAL at 07:56

## 2021-12-28 RX ADMIN — FERROUS SULFATE TAB 325 MG (65 MG ELEMENTAL FE) 325 MG: 325 (65 FE) TAB at 07:56

## 2021-12-28 RX ADMIN — INSULIN LISPRO 10 UNITS: 100 INJECTION, SOLUTION INTRAVENOUS; SUBCUTANEOUS at 13:07

## 2021-12-28 RX ADMIN — MAGNESIUM SULFATE HEPTAHYDRATE 6000 MG: 500 INJECTION, SOLUTION INTRAMUSCULAR; INTRAVENOUS at 10:06

## 2021-12-28 RX ADMIN — BUSPIRONE HYDROCHLORIDE 15 MG: 10 TABLET ORAL at 13:10

## 2021-12-28 RX ADMIN — ACETAMINOPHEN 650 MG: 325 TABLET ORAL at 04:21

## 2021-12-28 RX ADMIN — SERTRALINE HYDROCHLORIDE 100 MG: 100 TABLET ORAL at 07:56

## 2021-12-28 RX ADMIN — INSULIN LISPRO 2 UNITS: 100 INJECTION, SOLUTION INTRAVENOUS; SUBCUTANEOUS at 13:08

## 2021-12-28 RX ADMIN — GABAPENTIN 300 MG: 300 CAPSULE ORAL at 13:10

## 2021-12-28 RX ADMIN — QUETIAPINE FUMARATE 300 MG: 200 TABLET ORAL at 07:56

## 2021-12-28 RX ADMIN — ACETAMINOPHEN 650 MG: 325 TABLET ORAL at 11:47

## 2021-12-28 ASSESSMENT — PAIN DESCRIPTION - PAIN TYPE
TYPE: ACUTE PAIN
TYPE: ACUTE PAIN

## 2021-12-28 ASSESSMENT — PAIN DESCRIPTION - PROGRESSION
CLINICAL_PROGRESSION: GRADUALLY WORSENING

## 2021-12-28 ASSESSMENT — PAIN SCALES - GENERAL
PAINLEVEL_OUTOF10: 0
PAINLEVEL_OUTOF10: 7
PAINLEVEL_OUTOF10: 5
PAINLEVEL_OUTOF10: 0

## 2021-12-28 ASSESSMENT — PAIN DESCRIPTION - LOCATION
LOCATION: BACK
LOCATION: BACK

## 2021-12-28 ASSESSMENT — PAIN DESCRIPTION - FREQUENCY
FREQUENCY: CONTINUOUS
FREQUENCY: CONTINUOUS

## 2021-12-28 ASSESSMENT — PAIN - FUNCTIONAL ASSESSMENT
PAIN_FUNCTIONAL_ASSESSMENT: PREVENTS OR INTERFERES SOME ACTIVE ACTIVITIES AND ADLS
PAIN_FUNCTIONAL_ASSESSMENT: PREVENTS OR INTERFERES SOME ACTIVE ACTIVITIES AND ADLS

## 2021-12-28 ASSESSMENT — PAIN DESCRIPTION - ORIENTATION
ORIENTATION: LOWER
ORIENTATION: LOWER

## 2021-12-28 ASSESSMENT — PAIN DESCRIPTION - ONSET
ONSET: ON-GOING
ONSET: ON-GOING

## 2021-12-28 NOTE — DISCHARGE SUMMARY
UA revealing +3 bacteria nitrates positive, WBC 10-20. Severe right and mild left hip osteoarthritis. Patient received 2 L LR bolus and a dose of vancomycin in the ED. Patient was admitted to the general medicine floor for the further management of urosepsis. Patient is a limited codex1, no to chest compressions. Patient was admitted to the hospital following problems were addressed       Sepsis secondary to pyelonephritis  Cultures positive for E. coli sensitive to Rocephin  CT abdomen showing bilateral perinephric stranding consistent with pyelonephritis  ID was consulted, recommend Omnicef 300 mg twice daily x7 days at discharge     Hypomagnesemia  Replaced     Diabetes mellitus  Hemoglobin A1c of 11.2  Continue Lantus 30 units nightly, insulin sliding scale  Stop Metformin/Victoza at discharge.   Continue with insulin therapy as above     Iron deficiency anemia  Continue ferrous sulfate     Diabetic neuropathy  On gabapentin     Hypertension  Continue Toprol, losartan       Discharge Medications:   Current Discharge Medication List      START taking these medications    Details   ferrous sulfate (IRON 325) 325 (65 Fe) MG tablet Take 1 tablet by mouth daily (with breakfast)  Qty: 30 tablet, Refills: 3      insulin glargine (LANTUS;BASAGLAR) 100 UNIT/ML injection pen Inject 30 Units into the skin nightly  Qty: 5 pen, Refills: 3      cefdinir (OMNICEF) 300 MG capsule Take 1 capsule by mouth 2 times daily for 7 days  Qty: 14 capsule, Refills: 0      insulin lispro, 1 Unit Dial, 100 UNIT/ML SOPN Inject 10 Units into the skin 3 times daily (with meals)  Qty: 9 mL, Refills: 0           Current Discharge Medication List      CONTINUE these medications which have CHANGED    Details   metoprolol succinate (TOPROL XL) 25 MG extended release tablet Take 1 tablet by mouth daily  Qty: 30 tablet, Refills: 3           Current Discharge Medication List      CONTINUE these medications which have NOT CHANGED Details   QUEtiapine (SEROQUEL) 300 MG tablet Take 300 mg by mouth nightly      acetaminophen (TYLENOL) 500 MG tablet Take 1,000 mg by mouth every 6 hours as needed for Pain      gabapentin (NEURONTIN) 300 MG capsule Take 300 mg by mouth 3 times daily. hydrOXYzine (ATARAX) 50 MG tablet Take 100 mg by mouth nightly      losartan (COZAAR) 50 MG tablet Take 50 mg by mouth daily      pantoprazole (PROTONIX) 40 MG tablet Take 40 mg by mouth daily      sertraline (ZOLOFT) 100 MG tablet Take 150 mg by mouth daily       busPIRone (BUSPAR) 15 MG tablet Take 15 mg by mouth 3 times daily       buPROPion (WELLBUTRIN SR) 200 MG extended release tablet Take 200 mg by mouth 2 times daily            Current Discharge Medication List      STOP taking these medications       metFORMIN (GLUCOPHAGE) 1000 MG tablet Comments:   Reason for Stopping:         VICTOZA 18 MG/3ML SOPN SC injection Comments:   Reason for Stopping:                   Procedures: none    Assessment on Discharge: Stable, improved     Discharge ROS:  A complete review of systems was asked and negative except for none    Discharge Exam:  BP (!) 172/93   Pulse 88   Temp 98 °F (36.7 °C) (Oral)   Resp 18   Ht 5' 5\" (1.651 m)   Wt 268 lb 15.4 oz (122 kg)   SpO2 93%   BMI 44.76 kg/m²     General appearance: No apparent distress, appears stated age and cooperative. HEENT: Pupils equal, round, and reactive to light. Conjunctivae/corneas clear. Neck: Supple, with full range of motion. No jugular venous distention. Trachea midline. Respiratory:  Normal respiratory effort. Clear to auscultation, bilaterally without Rales/Wheezes/Rhonchi. Cardiovascular: Regular rate and rhythm with normal S1/S2 without murmurs, rubs or gallops. Abdomen: Soft, non-tender, non-distended with normal bowel sounds. Musculoskeletal: Trace bilateral lower extremity edema  Skin: Skin color, texture, turgor normal.  No rashes or lesions.   Neurologic: Generalized weakness without any focal motor deficits  Psychiatric: Alert and oriented, thought content appropriate, normal insight  Capillary Refill: Brisk,3 seconds, normal   Peripheral Pulses: +2 palpable, equal bilaterally     Pertinent Studies During Hospital Stay:  Radiology:  CT ABDOMEN PELVIS WO CONTRAST Additional Contrast? None    Result Date: 12/25/2021  EXAM: CT ABDOMEN AND PELVIS WITHOUT CONTRAST INDICATION: concern for pyelo, COMPARISON: 10/14/2021. TECHNIQUE: Axial CT imaging obtained from lung bases through pelvis. Axial images and multiplanar reformatted images are provided for review. Individualized dose optimization technique was used in order to meet ALARA standards for radiation dose reduction. In addition to vendor specific dose reduction algorithms, the dose reduction techniques vary based on the specific scanner utilized but frequently include automated exposure control, adjustment of the mA and/or kV according to patient size, and use of iterative reconstruction technique. IV Contrast: None Oral Contrast: None FINDINGS: LUNG BASES: Small left pleural effusion. Mild right basilar opacities. LIVER: Unremarkable. GALLBLADDER AND BILIARY TREE: Cholecystectomy. No intra- or extrahepatic biliary dilatation. PANCREAS: Unremarkable. SPLEEN: Unremarkable. ADRENAL GLANDS: Unremarkable. KIDNEYS AND URETERS: No hydronephrosis. New bilateral perinephric fat stranding. Punctate nonobstructing left renal calculus. URINARY BLADDER: Unremarkable. REPRODUCTIVE ORGANS: Unremarkable. BOWEL: Normal caliber. LYMPH NODES: No abnormally enlarged nodes. PERITONEUM/RETROPERITONEUM: No ascites or free air. VESSELS: Unremarkable. ABDOMINAL WALL: Redemonstration of fat and nonobstructed bowel containing ventral hernias. BONES: No destructive process. New bilateral perinephric fat stranding which could be seen in the setting of pyelonephritis.      XR LUMBAR SPINE (2-3 VIEWS)    Result Date: 12/23/2021  Exam: Lumbar spine, 3 views History: lumbar pain Comparison: 11/4/2021 Findings: There is stable grade 1 anterolisthesis of L4 on L5. No acute fracture is identified. The vertebral body heights are maintained. There is severe L2-3 degenerative disc disease and moderate bilateral L4-5 facet osteoarthritis. The sacroiliac joints are normal.     Impression: No acute osseous injury. Moderate lumbar spondylosis better characterized on prior CT lumbar spine dated 11/4/2021. XR HIP BILATERAL W AP PELVIS (2 VIEWS)    Result Date: 12/23/2021  Exam: XR HIP BILATERAL W AP PELVIS (2 VIEWS) History: pain, fall Comparison: 10/14/2021 Findings: The pelvic ring is intact. There is no acute fracture or dislocation. There is no widening or displacement of the sacroiliac joints. There is severe right mild left hip osteoarthritis. Surgical clips overlie the pelvis. Impression: No acute osseous injury. Severe right and mild left hip osteoarthritis. CT Head WO Contrast    Result Date: 12/23/2021  PROCEDURE: CT head without contrast INDICATION: AMS; COMPARISON: 8/2/2021 TECHNIQUE: CT head was performed without contrast according to standard protocol. Axial images and multiplanar reformatted images reviewed. Up-to-date CT equipment and radiation dose reduction techniques were employed. FINDINGS: No acute intra- or extra-axial fluid collections are identified. The ventricles are of normal size, shape, and morphology. The basilar cisterns are patent. No mass effect or midline shift is seen. The gray-white matter differentiation is normal. No cerebral density abnormality is seen. Visualized portions of the orbits, paranasal sinuses, and mastoids appear normal. No acute fracture is identified. 1.  No acute intracranial process. XR CHEST PORTABLE    Result Date: 12/23/2021  EXAM: PORTABLE AP CHEST X-RAY, 1847 hours INDICATION: cough, fever COMPARISON: none FINDINGS: There is no focal consolidation, pleural effusion, or pneumothorax.  The cardiomediastinal silhouette is 3.4 - 5.0 g/dL   POCT Glucose    Collection Time: 12/28/21  7:52 AM   Result Value Ref Range    POC Glucose 97 70 - 99 mg/dl    Performed on ACCU-CHEK          Follow up: with EUGENIE Pineda CNP    Note that over 30 minutes was spent in preparing discharge papers, discussing discharge with patient, medication review, etc.    Thank you EUGENIE Alvarez CNP for the opportunity to be involved in this patient's care. If you have any questions or concerns please feel free to contact me at [phone].     Electronically signed by Anish Barr MD on 12/28/2021 at 10:52 AM

## 2021-12-28 NOTE — PROGRESS NOTES
Called and gave report to [employer] from [place]. All questions answered at this time.    Electronically signed by Gill Gosselin, RN on 12/27/2021 at 7:42 PM

## 2021-12-28 NOTE — CARE COORDINATION
Case Management Note     Pt didn't DC last night due to [facility] in admissions at [facility], [phone], not receiving SW voicemail messages until 1:45am. [facility] also was concerned the Pt needed updated PT/OT evals for pre-cert even though it is currently waived. SW spoke w/Kevin this morning, [phone], she has confirmed Pt's admission to [facility] for any time after 2pm today as long as Pt is able to receive updated PT/OT evals. CAMERON scheduled transport via [facility] at 3pm, due to conversation w/RN Pt's magnesium is low, Pt's IV site was removed last night for DC-Pt will need a PICC and the PICC team is running behind. CAMERON pushed back transport to 5pm and notified RN. Please see full DC note on 12/27 for more details.     Velda Litten, MSW, LSW  Social Work/Case Management   ICU/PCU - [facility]   Ph: [phone]

## 2021-12-28 NOTE — PROGRESS NOTES
Occupational Therapy  Daily Treatment Note  Patient Name: Wilfrid Reynoso  MRN: [id number]     Assessment: Pt continues to require assist for mobility. It is very effortful for her to transfer and she can only ambulate a few feet at a time. Due to impaired balance and endurance, pt also requires assist with most ADLs. She will benefit from continued inpt therapies at d/c to increase independence. Discharge Recommendations: Wilfrid Reynoso scored a 14/24 on the AM-PAC ADL Inpatient form. Current research shows that an AM-PAC score of 17 or less is typically not associated with a discharge to the patient's home setting. Based on the patient's AM-PAC score and their current ADL deficits, it is recommended that the patient have 3-5 sessions per week of Occupational Therapy at d/c to increase the patient's independence. Please see assessment section for further patient specific details. Equipment Needs:  No    Chart Reviewed: Yes     Other position/activity restrictions: up with assist   Additional Pertinent Hx: 61yoF with PMHx anxiety, T2DM, HTN, PTSD pw back pain and bilateral hip pain admitted 12/23/21 for multiple falls. Head CT/CXR/imaging all negative; UA positive UTI. Diagnosis: Pt after falling several times, dx of severe sepsis secondary to uncomlicated  UTI-B hip XR=neg, lumbar spine XR=neg, CXR=neg, head CT=neg  Treatment Diagnosis: impaired ADLs and mobility    Subjective: Pt in bed on entry sleeping. Cooperative with therapy, but expresses being very tired and needs encouragement to increase activity.     Pain: c/o BLE pain generalized and throughout entire LEs, RN aware    Objective:    Cognition/Orientation: impaired - decreased insight, short term memory, problem solving    Bed mobility   Supine to sit: SBA (very effortful, HOB raised; dizzy immediately upon sitting up, improved with time)  Scooting: SBA    Functional Mobility   Sit to Stand: CGA, min cues (from EOB x1) min assist, min cues (chair x2)  Stand to Sit: CGA, min cues  Bed to Chair Transfer: CGA, min cues (effortful with rolling walker)  Static stance: CGA (at walker 30 sec + 30 sec)  Ambulation: tolerated only 3 steps forward and back with rolling walker and min assist, very effortful    ADLs   Grooming: Spvn, set up, min cues (oral hygiene, wash face); Min assist (comb hair after OT assisted with shower cap)  Bathing: Mod assist (pt washed underarms and arms, OT washed periarea)  UB dressing: Pt assisted to doff/don gown  LB dressing: Dependent (brief)  Toileting: using purwick    Activity Tolerance: fair - fatigues quickly, needs encouragement to increase activity levels    Patient Education: Activity promotion, role of therapy, progress and goals - needs reinforcement    Safety Devices in Place: left in chair, alarm on, needs in reach, RN in room and aware of assist level    Goals:  Short term goals  Time Frame for Short term goals: discharge  Short term goal 1: pt to transfer to commode with CGA - Not met  Short term goal 2: pt to don brief with CGA - Not met  Short term goal 3: pt to stand for 2-4 minutes with SBA while doing ADLs/functional tasks - Not met  Short term goal 4: pt to tolerate 5-10 reps B UE AROM exerc to increase activity tolerance - Not met         Plan:      Times per week: 2-5        If patient is discharged prior to next treatment, this note will serve as the discharge summary.     Therapy Time   Individual Concurrent Group Co-treatment   Time In 0912         Time Out 1005         Minutes 53           Timed Code Treatment Minutes: 53  Total Treatment Time:  Lynda Escobar 76, OT

## 2021-12-28 NOTE — PLAN OF CARE
Problem: Falls - Risk of:  Goal: Will remain free from falls  Description: Will remain free from falls  Outcome: Ongoing  Note: Pt free from injury or falls at this time, fall precautions in place, bed in low position, side rail up x2, [place] Fall Risk: High, bed alarm on, reoriented to room and call light, reminded not to get up without assistance. Pt verbalizes understanding of fall risk procedures. Will continue with hourly rounds for PO intake, pain needs, toileting, and repositioning as needed. No needs expressed at this time. Call light in reach, will continue to monitor. Problem: Skin Integrity:  Goal: Will show no infection signs and symptoms  Description: Will show no infection signs and symptoms  Outcome: Ongoing  Note: No new skin breakdown noted during this shift. Pt anali assessed q shift, will continue to monitor skin checks q4 and prn and check bony prominences for breakdown.  Heels elevated off mattress, skin remains clean and dry, will continue to monitor

## 2021-12-28 NOTE — PROGRESS NOTES
in chair: 12 reps B heel raises, toe raises; 10 reps B LAQ. Pt needing SBA to CGA with cues. While reclined in chair: 8 reps B hip abd/add with min assist    Balance  Static stance with walker CGA  Ambulation with wheeled walker Min assist    Patient Education  Role of PT. Calling for assist with needs. Expressed understanding. Hand placement with transfers when using walker. Needs cues/reinforcement. Safety Devices  Pt left with needs in reach. In chair (reclined) with chair alarm on. RN updated. AM-PAC score  AM-PAC Inpatient Mobility Raw Score : 15  AM-PAC Inpatient T-Scale Score : 39.45  Mobility Inpatient CMS 0-100% Score: 57.7  Mobility Inpatient CMS G-Code Modifier : CK    Goals: (as determined and assessed by primary PT)  Time Frame for Short term goals: discharge  Short term goal 1: sit to/from supine mod I  ongoing  Short term goal 2: sit to/from stand supervision   ongoing  Short term goal 3: ambulate 50 ft with LRAD supervision  ongoing      Plan:  Times per week: 2-5;    Current Treatment Recommendations: Balance Training,Strengthening,Functional Mobility Training,Gait Training,Transfer Training    Therapy Time    Individual  Concurrent  Group  Co-treatment    Time In  942            Time Out  1007            Minutes  25              Timed Code Treatment Minutes: 25  Total Treatment Minutes: 25    Will continue per plan of care if not D/[place]. If patient is discharged prior to next treatment, this note will serve as the discharge summary. Mellissa [place] #[id number]    Addendum: 0 goals met. Continue per POC. This note will serve as a discharge summary if patient is discharged from hospital before next treatment session.    Yanet Sherwood, PT

## 2021-12-28 NOTE — PROGRESS NOTES
Roby Castano who was taking care of patient discharge called to say that [facility] cant accept patient and that nothing was told to her and that patient was supposed to go to [facility] instead if [facility]. Patient isn't discharging tonight. Will have to reevaluate in the am on where patient is to be discharged.

## 2021-12-28 NOTE — PROGRESS NOTES
Pt being discharged to facility. IV and tele removed. All questions answered.  AVS will be reviewed receiving RN at [facility]

## 2021-12-30 LAB
BLOOD CULTURE, ROUTINE: NORMAL
CULTURE, BLOOD 2: NORMAL

## 2022-02-23 ENCOUNTER — APPOINTMENT (OUTPATIENT)
Dept: GENERAL RADIOLOGY | Age: 62
End: 2022-02-23
Payer: MEDICAID

## 2022-02-23 ENCOUNTER — HOSPITAL ENCOUNTER (OUTPATIENT)
Age: 62
Setting detail: OBSERVATION
Discharge: HOME | End: 2022-02-25
Attending: EMERGENCY MEDICINE | Admitting: INTERNAL MEDICINE
Payer: MEDICAID

## 2022-02-23 DIAGNOSIS — R07.9 CHEST PAIN, UNSPECIFIED TYPE: Primary | ICD-10-CM

## 2022-02-23 LAB
ANION GAP SERPL CALCULATED.3IONS-SCNC: 9 MMOL/L (ref 3–16)
BASOPHILS ABSOLUTE: 0 K/UL (ref 0–0.2)
BASOPHILS RELATIVE PERCENT: 0.4 %
BUN BLDV-MCNC: 28 MG/DL (ref 7–20)
CALCIUM SERPL-MCNC: 9.1 MG/DL (ref 8.3–10.6)
CHLORIDE BLD-SCNC: 103 MMOL/L (ref 99–110)
CO2: 24 MMOL/L (ref 21–32)
CREAT SERPL-MCNC: 0.9 MG/DL (ref 0.6–1.2)
EOSINOPHILS ABSOLUTE: 0.8 K/UL (ref 0–0.6)
EOSINOPHILS RELATIVE PERCENT: 14.9 %
GFR AFRICAN AMERICAN: >60
GFR NON-AFRICAN AMERICAN: >60
GLUCOSE BLD-MCNC: 125 MG/DL (ref 70–99)
GLUCOSE BLD-MCNC: 221 MG/DL (ref 70–99)
GLUCOSE BLD-MCNC: 316 MG/DL (ref 70–99)
HCT VFR BLD CALC: 31 % (ref 36–48)
HEMOGLOBIN: 10.4 G/DL (ref 12–16)
LV EF: 80 %
LVEF MODALITY: NORMAL
LYMPHOCYTES ABSOLUTE: 1.2 K/UL (ref 1–5.1)
LYMPHOCYTES RELATIVE PERCENT: 23.2 %
MCH RBC QN AUTO: 27.2 PG (ref 26–34)
MCHC RBC AUTO-ENTMCNC: 33.5 G/DL (ref 31–36)
MCV RBC AUTO: 81.2 FL (ref 80–100)
MONOCYTES ABSOLUTE: 0.4 K/UL (ref 0–1.3)
MONOCYTES RELATIVE PERCENT: 7.1 %
NEUTROPHILS ABSOLUTE: 2.8 K/UL (ref 1.7–7.7)
NEUTROPHILS RELATIVE PERCENT: 54.4 %
PDW BLD-RTO: 17.9 % (ref 12.4–15.4)
PERFORMED ON: ABNORMAL
PERFORMED ON: ABNORMAL
PLATELET # BLD: 276 K/UL (ref 135–450)
PMV BLD AUTO: 7.2 FL (ref 5–10.5)
POTASSIUM REFLEX MAGNESIUM: 5.3 MMOL/L (ref 3.5–5.1)
POTASSIUM SERPL-SCNC: 4.7 MMOL/L (ref 3.5–5.1)
PRO-BNP: 131 PG/ML (ref 0–124)
RBC # BLD: 3.82 M/UL (ref 4–5.2)
SODIUM BLD-SCNC: 136 MMOL/L (ref 136–145)
TROPONIN: <0.01 NG/ML
TROPONIN: <0.01 NG/ML
WBC # BLD: 5.2 K/UL (ref 4–11)

## 2022-02-23 PROCEDURE — 6360000002 HC RX W HCPCS: Performed by: PHYSICIAN ASSISTANT

## 2022-02-23 PROCEDURE — 83880 ASSAY OF NATRIURETIC PEPTIDE: CPT

## 2022-02-23 PROCEDURE — 6370000000 HC RX 637 (ALT 250 FOR IP): Performed by: INTERNAL MEDICINE

## 2022-02-23 PROCEDURE — 71046 X-RAY EXAM CHEST 2 VIEWS: CPT

## 2022-02-23 PROCEDURE — 84132 ASSAY OF SERUM POTASSIUM: CPT

## 2022-02-23 PROCEDURE — 78452 HT MUSCLE IMAGE SPECT MULT: CPT

## 2022-02-23 PROCEDURE — 6360000002 HC RX W HCPCS: Performed by: INTERNAL MEDICINE

## 2022-02-23 PROCEDURE — 99284 EMERGENCY DEPT VISIT MOD MDM: CPT

## 2022-02-23 PROCEDURE — 93017 CV STRESS TEST TRACING ONLY: CPT

## 2022-02-23 PROCEDURE — G0378 HOSPITAL OBSERVATION PER HR: HCPCS

## 2022-02-23 PROCEDURE — 2580000003 HC RX 258: Performed by: INTERNAL MEDICINE

## 2022-02-23 PROCEDURE — 85025 COMPLETE CBC W/AUTO DIFF WBC: CPT

## 2022-02-23 PROCEDURE — 2580000003 HC RX 258: Performed by: PHYSICIAN ASSISTANT

## 2022-02-23 PROCEDURE — A9502 TC99M TETROFOSMIN: HCPCS | Performed by: PHYSICIAN ASSISTANT

## 2022-02-23 PROCEDURE — 80048 BASIC METABOLIC PNL TOTAL CA: CPT

## 2022-02-23 PROCEDURE — 84484 ASSAY OF TROPONIN QUANT: CPT

## 2022-02-23 PROCEDURE — 36415 COLL VENOUS BLD VENIPUNCTURE: CPT

## 2022-02-23 PROCEDURE — 96372 THER/PROPH/DIAG INJ SC/IM: CPT

## 2022-02-23 PROCEDURE — 3430000000 HC RX DIAGNOSTIC RADIOPHARMACEUTICAL: Performed by: PHYSICIAN ASSISTANT

## 2022-02-23 RX ORDER — GABAPENTIN 300 MG/1
600 CAPSULE ORAL 3 TIMES DAILY
Status: DISCONTINUED | OUTPATIENT
Start: 2022-02-23 | End: 2022-02-25 | Stop reason: HOSPADM

## 2022-02-23 RX ORDER — FERROUS SULFATE 325(65) MG
325 TABLET ORAL
Status: DISCONTINUED | OUTPATIENT
Start: 2022-02-24 | End: 2022-02-25 | Stop reason: HOSPADM

## 2022-02-23 RX ORDER — INSULIN LISPRO 100 [IU]/ML
0-6 INJECTION, SOLUTION INTRAVENOUS; SUBCUTANEOUS
Status: DISCONTINUED | OUTPATIENT
Start: 2022-02-23 | End: 2022-02-25 | Stop reason: HOSPADM

## 2022-02-23 RX ORDER — INSULIN LISPRO 100 [IU]/ML
0.05 INJECTION, SOLUTION INTRAVENOUS; SUBCUTANEOUS
Status: DISCONTINUED | OUTPATIENT
Start: 2022-02-23 | End: 2022-02-25 | Stop reason: HOSPADM

## 2022-02-23 RX ORDER — ACETAMINOPHEN 325 MG/1
650 TABLET ORAL EVERY 6 HOURS PRN
Status: DISCONTINUED | OUTPATIENT
Start: 2022-02-23 | End: 2022-02-25 | Stop reason: HOSPADM

## 2022-02-23 RX ORDER — BUPROPION HYDROCHLORIDE 100 MG/1
200 TABLET, EXTENDED RELEASE ORAL 2 TIMES DAILY
Status: DISCONTINUED | OUTPATIENT
Start: 2022-02-23 | End: 2022-02-25 | Stop reason: HOSPADM

## 2022-02-23 RX ORDER — ACETAMINOPHEN 650 MG/1
650 SUPPOSITORY RECTAL EVERY 6 HOURS PRN
Status: DISCONTINUED | OUTPATIENT
Start: 2022-02-23 | End: 2022-02-25 | Stop reason: HOSPADM

## 2022-02-23 RX ORDER — LIRAGLUTIDE 6 MG/ML
3 INJECTION SUBCUTANEOUS DAILY
COMMUNITY

## 2022-02-23 RX ORDER — LOSARTAN POTASSIUM 50 MG/1
100 TABLET ORAL DAILY
Status: DISCONTINUED | OUTPATIENT
Start: 2022-02-24 | End: 2022-02-25 | Stop reason: HOSPADM

## 2022-02-23 RX ORDER — DEXTROSE MONOHYDRATE 50 MG/ML
100 INJECTION, SOLUTION INTRAVENOUS PRN
Status: DISCONTINUED | OUTPATIENT
Start: 2022-02-23 | End: 2022-02-25 | Stop reason: HOSPADM

## 2022-02-23 RX ORDER — ONDANSETRON 2 MG/ML
4 INJECTION INTRAMUSCULAR; INTRAVENOUS EVERY 6 HOURS PRN
Status: DISCONTINUED | OUTPATIENT
Start: 2022-02-23 | End: 2022-02-25 | Stop reason: HOSPADM

## 2022-02-23 RX ORDER — NICOTINE POLACRILEX 4 MG
15 LOZENGE BUCCAL PRN
Status: DISCONTINUED | OUTPATIENT
Start: 2022-02-23 | End: 2022-02-25 | Stop reason: HOSPADM

## 2022-02-23 RX ORDER — PANTOPRAZOLE SODIUM 40 MG/1
40 TABLET, DELAYED RELEASE ORAL DAILY
Status: DISCONTINUED | OUTPATIENT
Start: 2022-02-24 | End: 2022-02-25 | Stop reason: HOSPADM

## 2022-02-23 RX ORDER — QUETIAPINE FUMARATE 300 MG/1
300 TABLET, FILM COATED ORAL NIGHTLY
Status: DISCONTINUED | OUTPATIENT
Start: 2022-02-23 | End: 2022-02-25 | Stop reason: HOSPADM

## 2022-02-23 RX ORDER — ROSUVASTATIN CALCIUM 20 MG/1
20 TABLET, COATED ORAL NIGHTLY
Status: DISCONTINUED | OUTPATIENT
Start: 2022-02-23 | End: 2022-02-25 | Stop reason: HOSPADM

## 2022-02-23 RX ORDER — LOVASTATIN 20 MG/1
20 TABLET ORAL NIGHTLY
Status: ON HOLD | COMMUNITY
End: 2022-02-25 | Stop reason: HOSPADM

## 2022-02-23 RX ORDER — HYDROCHLOROTHIAZIDE 25 MG/1
25 TABLET ORAL DAILY
Status: ON HOLD | COMMUNITY
End: 2022-06-04 | Stop reason: SDUPTHER

## 2022-02-23 RX ORDER — HYDROXYZINE HYDROCHLORIDE 25 MG/1
100 TABLET, FILM COATED ORAL 2 TIMES DAILY
Status: DISCONTINUED | OUTPATIENT
Start: 2022-02-23 | End: 2022-02-25 | Stop reason: HOSPADM

## 2022-02-23 RX ORDER — SODIUM CHLORIDE 0.9 % (FLUSH) 0.9 %
10 SYRINGE (ML) INJECTION PRN
Status: DISCONTINUED | OUTPATIENT
Start: 2022-02-23 | End: 2022-02-25 | Stop reason: HOSPADM

## 2022-02-23 RX ORDER — ONDANSETRON 4 MG/1
4 TABLET, ORALLY DISINTEGRATING ORAL EVERY 8 HOURS PRN
Status: DISCONTINUED | OUTPATIENT
Start: 2022-02-23 | End: 2022-02-25 | Stop reason: HOSPADM

## 2022-02-23 RX ORDER — INSULIN LISPRO 100 [IU]/ML
0-3 INJECTION, SOLUTION INTRAVENOUS; SUBCUTANEOUS NIGHTLY
Status: DISCONTINUED | OUTPATIENT
Start: 2022-02-23 | End: 2022-02-25 | Stop reason: HOSPADM

## 2022-02-23 RX ORDER — DEXTROSE MONOHYDRATE 25 G/50ML
12.5 INJECTION, SOLUTION INTRAVENOUS PRN
Status: DISCONTINUED | OUTPATIENT
Start: 2022-02-23 | End: 2022-02-23 | Stop reason: SDUPTHER

## 2022-02-23 RX ORDER — METOPROLOL SUCCINATE 25 MG/1
25 TABLET, EXTENDED RELEASE ORAL DAILY
Status: DISCONTINUED | OUTPATIENT
Start: 2022-02-23 | End: 2022-02-24

## 2022-02-23 RX ORDER — ASPIRIN 81 MG/1
81 TABLET, CHEWABLE ORAL DAILY
Status: DISCONTINUED | OUTPATIENT
Start: 2022-02-24 | End: 2022-02-25 | Stop reason: HOSPADM

## 2022-02-23 RX ORDER — HYDROCHLOROTHIAZIDE 25 MG/1
25 TABLET ORAL DAILY
Status: DISCONTINUED | OUTPATIENT
Start: 2022-02-23 | End: 2022-02-25 | Stop reason: HOSPADM

## 2022-02-23 RX ORDER — NYSTATIN 100000 U/G
CREAM TOPICAL 2 TIMES DAILY
Status: ON HOLD | COMMUNITY
Start: 2022-05-20 | End: 2022-06-04 | Stop reason: HOSPADM

## 2022-02-23 RX ORDER — SODIUM CHLORIDE 0.9 % (FLUSH) 0.9 %
5-40 SYRINGE (ML) INJECTION EVERY 12 HOURS SCHEDULED
Status: DISCONTINUED | OUTPATIENT
Start: 2022-02-23 | End: 2022-02-25 | Stop reason: HOSPADM

## 2022-02-23 RX ORDER — SODIUM CHLORIDE 9 MG/ML
25 INJECTION, SOLUTION INTRAVENOUS PRN
Status: DISCONTINUED | OUTPATIENT
Start: 2022-02-23 | End: 2022-02-25 | Stop reason: HOSPADM

## 2022-02-23 RX ORDER — SODIUM CHLORIDE 0.9 % (FLUSH) 0.9 %
5-40 SYRINGE (ML) INJECTION PRN
Status: DISCONTINUED | OUTPATIENT
Start: 2022-02-23 | End: 2022-02-25 | Stop reason: HOSPADM

## 2022-02-23 RX ORDER — NITROGLYCERIN 0.4 MG/1
0.4 TABLET SUBLINGUAL EVERY 5 MIN PRN
Status: DISCONTINUED | OUTPATIENT
Start: 2022-02-23 | End: 2022-02-25 | Stop reason: HOSPADM

## 2022-02-23 RX ORDER — NAPROXEN 500 MG/1
500 TABLET ORAL 2 TIMES DAILY PRN
Status: ON HOLD | COMMUNITY
End: 2022-04-29 | Stop reason: HOSPADM

## 2022-02-23 RX ORDER — POLYETHYLENE GLYCOL 3350 17 G/17G
17 POWDER, FOR SOLUTION ORAL DAILY PRN
Status: DISCONTINUED | OUTPATIENT
Start: 2022-02-23 | End: 2022-02-25 | Stop reason: HOSPADM

## 2022-02-23 RX ADMIN — SODIUM CHLORIDE, PRESERVATIVE FREE 10 ML: 5 INJECTION INTRAVENOUS at 21:16

## 2022-02-23 RX ADMIN — INSULIN LISPRO 6 UNITS: 100 INJECTION, SOLUTION INTRAVENOUS; SUBCUTANEOUS at 19:01

## 2022-02-23 RX ADMIN — QUETIAPINE FUMARATE 300 MG: 300 TABLET ORAL at 21:17

## 2022-02-23 RX ADMIN — TETROFOSMIN 30 MILLICURIE: 1.38 INJECTION, POWDER, LYOPHILIZED, FOR SOLUTION INTRAVENOUS at 15:51

## 2022-02-23 RX ADMIN — BUPROPION HYDROCHLORIDE 200 MG: 100 TABLET, EXTENDED RELEASE ORAL at 23:05

## 2022-02-23 RX ADMIN — BUSPIRONE HYDROCHLORIDE 15 MG: 10 TABLET ORAL at 21:17

## 2022-02-23 RX ADMIN — Medication 10 ML: at 15:51

## 2022-02-23 RX ADMIN — HYDROXYZINE HYDROCHLORIDE 100 MG: 25 TABLET ORAL at 21:17

## 2022-02-23 RX ADMIN — INSULIN LISPRO 2 UNITS: 100 INJECTION, SOLUTION INTRAVENOUS; SUBCUTANEOUS at 19:00

## 2022-02-23 RX ADMIN — ENOXAPARIN SODIUM 30 MG: 100 INJECTION SUBCUTANEOUS at 21:17

## 2022-02-23 RX ADMIN — INSULIN GLARGINE 10 UNITS: 100 INJECTION, SOLUTION SUBCUTANEOUS at 23:06

## 2022-02-23 RX ADMIN — GABAPENTIN 600 MG: 300 CAPSULE ORAL at 21:17

## 2022-02-23 RX ADMIN — REGADENOSON 0.4 MG: 0.08 INJECTION, SOLUTION INTRAVENOUS at 15:51

## 2022-02-23 RX ADMIN — ROSUVASTATIN CALCIUM 20 MG: 20 TABLET, COATED ORAL at 21:17

## 2022-02-23 RX ADMIN — METOPROLOL SUCCINATE 25 MG: 25 TABLET, EXTENDED RELEASE ORAL at 16:42

## 2022-02-23 ASSESSMENT — PAIN DESCRIPTION - LOCATION: LOCATION: GENERALIZED

## 2022-02-23 ASSESSMENT — PAIN DESCRIPTION - ONSET: ONSET: ON-GOING

## 2022-02-23 ASSESSMENT — PAIN DESCRIPTION - PROGRESSION
CLINICAL_PROGRESSION: NOT CHANGED

## 2022-02-23 ASSESSMENT — HEART SCORE: ECG: 0

## 2022-02-23 ASSESSMENT — ENCOUNTER SYMPTOMS
DIARRHEA: 0
COUGH: 0
NAUSEA: 0
ABDOMINAL PAIN: 0
SHORTNESS OF BREATH: 0
VOMITING: 0

## 2022-02-23 ASSESSMENT — PAIN SCALES - GENERAL
PAINLEVEL_OUTOF10: 4
PAINLEVEL_OUTOF10: 8

## 2022-02-23 ASSESSMENT — PAIN DESCRIPTION - ORIENTATION: ORIENTATION: RIGHT;LEFT

## 2022-02-23 ASSESSMENT — PAIN - FUNCTIONAL ASSESSMENT: PAIN_FUNCTIONAL_ASSESSMENT: PREVENTS OR INTERFERES SOME ACTIVE ACTIVITIES AND ADLS

## 2022-02-23 ASSESSMENT — PAIN DESCRIPTION - DESCRIPTORS: DESCRIPTORS: ACHING

## 2022-02-23 ASSESSMENT — PAIN DESCRIPTION - PAIN TYPE
TYPE: CHRONIC PAIN
TYPE: CHRONIC PAIN

## 2022-02-23 ASSESSMENT — PAIN DESCRIPTION - FREQUENCY: FREQUENCY: CONTINUOUS

## 2022-02-23 NOTE — ED NOTES
Bed: A03-03  Expected date:   Expected time:   Means of arrival:   Comments:  Sonu Rg RN  02/23/22 [id number]

## 2022-02-23 NOTE — H&P
Hospital Medicine History & Physical      PCP: EUGENIE Parmar - CNP    Date of Admission: 2/23/2022    Date of Service: Pt seen/examined on 02/23/22 and  Placed in Observation. Chief Complaint:  Chest pain    History Of Present Illness:      62 y.o. female Sam Peng  - with pmhx of anxiety, uncontrolled DM 2 complicated by neuropathy, HTN, PTSD, 12/2021 admitted for sepsis due to pyelonephritis  - complains of Left sided chest pain, radiating to left shoulder.  - She lives in group home and manager was concerned and sent her to the ED  - She says her BP @ home was systolic 829N  - Still having pain but not as bad  - Describes pain as pressure in the left side, 6/10 at worst  - Because of BMI she will need 2 days testing  - EKG w/o no acute ST changes, inverted P wave in lead I  - XR without acute finding  - Labs ok, proBNP 131  - Regarding her blood pressure, she is on Toprol XL, losartan, HCTZ per chart (she says she takes one med in am, one @ noon and one in afternoon, says likely she take metoprolol in afternoon)      Past Medical History:          Diagnosis Date    Anxiety     Diabetes mellitus ([facility] [address]     Hernia, abdominal     Hyperlipidemia     Hypertension     PTSD (post-traumatic stress disorder)        Past Surgical History:          Procedure Laterality Date    CHOLECYSTECTOMY      COLECTOMY      HYSTERECTOMY      REVISION COLOSTOMY         Medications Prior to Admission:      Prior to Admission medications    Medication Sig Start Date End Date Taking?  Authorizing Provider   Liraglutide (VICTOZA) 18 MG/3ML SOPN SC injection Inject 3 mg into the skin daily   Yes Historical Provider, MD   lovastatin (MEVACOR) 20 MG tablet Take 20 mg by mouth nightly   Yes Historical Provider, MD   naproxen (NAPROSYN) 500 MG tablet Take 500 mg by mouth 2 times daily as needed for Pain   Yes Historical Provider, MD   nystatin (MYCOSTATIN) 752983 UNIT/GM cream Apply topically 2 times daily Apply topically 2 times daily. Yes Historical Provider, MD   hydroCHLOROthiazide (HYDRODIURIL) 25 MG tablet Take 25 mg by mouth daily   Yes Historical Provider, MD   metoprolol succinate (TOPROL XL) 25 MG extended release tablet Take 1 tablet by mouth daily 12/28/21  Yes Lisa Cedeno MD   ferrous sulfate (IRON 325) 325 (65 Fe) MG tablet Take 1 tablet by mouth daily (with breakfast) 12/28/21  Yes Lisa Cedeno MD   QUEtiapine (SEROQUEL) 300 MG tablet Take 300 mg by mouth nightly   Yes Historical Provider, MD   acetaminophen (TYLENOL) 500 MG tablet Take 1,000 mg by mouth every 6 hours as needed for Pain   Yes Historical Provider, MD   gabapentin (NEURONTIN) 300 MG capsule Take 600 mg by mouth 3 times daily. Yes Historical Provider, MD   hydrOXYzine (ATARAX) 50 MG tablet Take 100 mg by mouth in the morning and at bedtime    Yes Historical Provider, MD   losartan (COZAAR) 100 MG tablet Take 100 mg by mouth daily    Yes Historical Provider, MD   pantoprazole (PROTONIX) 40 MG tablet Take 40 mg by mouth daily   Yes Historical Provider, MD   sertraline (ZOLOFT) 100 MG tablet Take 150 mg by mouth daily    Yes Historical Provider, MD   busPIRone (BUSPAR) 15 MG tablet Take 15 mg by mouth 3 times daily  9/8/21  Yes Historical Provider, MD   buPROPion (WELLBUTRIN SR) 200 MG extended release tablet Take 200 mg by mouth 2 times daily  10/4/21  Yes Historical Provider, MD       Allergies:  Azithromycin, Metronidazole, Clindamycin, Penicillin g, Penicillin v, Tetanus immune globulin, and Codeine    Social History:      The patient currently lives at home    TOBACCO:   reports that she has never smoked. She has never used smokeless tobacco.  ETOH:   reports no history of alcohol use. Family History:      Reviewed    History reviewed. No pertinent family history. REVIEW OF SYSTEMS:   Pertinent positives as noted in the HPI. All other systems reviewed and negative.     PHYSICAL EXAM PERFORMED:    BP (!) 159/83   Pulse 91 Temp 99.8 °F (37.7 °C) (Oral)   Resp 13   Ht 5' 4\" (1.626 m)   Wt 268 lb (121.6 kg)   SpO2 98%   BMI 46.00 kg/m²     General appearance:  No apparent distress, appears stated age and cooperative. HEENT:  Normal cephalic, atraumatic without obvious deformity. Pupils equal, round, and reactive to light. Extra ocular muscles intact. Conjunctivae/corneas clear. Neck: Supple, with full range of motion. No jugular venous distention. Trachea midline. Respiratory:  Normal respiratory effort. Clear to auscultation, bilaterally without Rales/Wheezes/Rhonchi. Cardiovascular:  Regular rate and rhythm with normal S1/S2 without murmurs, rubs or gallops. Abdomen: Soft, non-tender, non-distended with normal bowel sounds. Musculoskeletal:  No clubbing, cyanosis or edema bilaterally. Full range of motion without deformity. Skin: Skin color, texture, turgor normal.  No rashes or lesions. Neurologic:  Neurovascularly intact without any focal sensory/motor deficits. Cranial nerves: II-XII intact, grossly non-focal.  Psychiatric:  Alert and oriented, thought content appropriate, normal insight  Capillary Refill: Brisk,< 3 seconds   Peripheral Pulses: +2 palpable, equal bilaterally       Labs:     Recent Labs     02/23/22  1123   WBC 5.2   HGB 10.4*   HCT 31.0*        Recent Labs     02/23/22  1123 02/23/22  1348     --    K 5.3* 4.7     --    CO2 24  --    BUN 28*  --    CREATININE 0.9  --    CALCIUM 9.1  --      No results for input(s): AST, ALT, BILIDIR, BILITOT, ALKPHOS in the last 72 hours. No results for input(s): INR in the last 72 hours.   Recent Labs     02/23/22  1123 02/23/22  1348   TROPONINI <0.01 <0.01       Urinalysis:      Lab Results   Component Value Date    NITRU POSITIVE 12/23/2021    WBCUA 10-20 12/23/2021    BACTERIA 3+ 12/23/2021    RBCUA 0-2 12/23/2021    BLOODU SMALL 12/23/2021    SPECGRAV 1.025 12/23/2021    GLUCOSEU >=1000 12/23/2021       Radiology:     CXR: I have reviewed the CXR with the following interpretation: see HPI  EKG:  I have reviewed the EKG with the following interpretation: see HPI    XR CHEST (2 VW)   Final Result      No acute cardiopulmonary disease      NM MYOCARDIAL SPECT REST EXERCISE OR RX    (Results Pending)       ASSESSMENT:    Active Hospital Problems    Diagnosis Date Noted    Chest pain [R07.9] 02/23/2022     Chest pain concern for ACS, multiple risk factors including hypertension, uncontrolled diabetes, morbid obesity, hyperlipidemia  Initial troponin x2 -  Initial EKG with T wave inversions in lead I but otherwise no acute ST changes appreciated  Due to her morbid obesity, she will need to do stress testing which has been initiated today  Aspirin 81 mg  High intensity statin Crestor 20 mg nightly  Check Lipid profile, recent A1c per patient was 8.6  Monitor on telemetry    Hypertensive urgency, she is on multiple medications at home, including losartan hydrochlorothiazide, metoprolol  - she is unsure which medications she took in am  She takes metoprolol in the afternoon  - Restart meds except metoprolol with stress test to be done    Lipid profile  High intensity statin rosuvastatin 20 mg once daily    Anxiety/PTSD  -Restart home medications including hydroxyzine, BuSpar, bupropion, Seroquel    Type 2 diabetes, uncontrolled, last A1c 11.2 12/2021, I do not see insulin prior to admission medications, she is on liraglutide  Says she was discharged to [address] [place] [address] in 12/2021, and when dc'd from rehab to home she was no on insulin  A few days back PCP checked her A1c and it was 8.6  Blood glucose on admission 316  While in the hospital hold liraglutide  Start Lantus, prandial, sliding scale insulin  Hypotension protocol  Point-of-care glucose checks    Hyperlipidemia, recheck  Morbid obesity due to excess calories Body mass index is 46 kg/m². - Complicating assessment and treatment.  Placing patient at risk for multiple co-morbidities as well as early death and contributing to the patient's presentation.  on weight loss when appropriate. DVT Prophylaxis: Lovenox  Diet: 4 carb per meal, no caffeine, low-sodium  Code Status: Full code    PT/OT Eval Status: Evaluate daily order if needed    Dispo -admit to  Valentina Fernandez MD   Internal Medicine Hospitalist    Thank you [address] [place] [address] Dr, APRN - CNP for the opportunity to be involved in this patient's care. If you have any questions or concerns please feel free to contact me at [phone].

## 2022-02-23 NOTE — ED PROVIDER NOTES
[facility] ENCOUNTER          PHYSICIAN ASSISTANT NOTE       Date of evaluation: 2/23/2022    Chief Complaint     Hypertension and Chest Pain      History of Present Illness     Yina Dickinson is a 62 y.o. female with a history of anxiety, diabetes, hyperlipidemia, hypertension who comes to the emergency department by squad today complaining of left-sided chest pain radiating into her left shoulder ongoing since she awoke this morning. Patient reports that she took her blood pressure at home with a home monitor and it was in the 239J systolically. She takes metoprolol daily in the afternoon and has not taken this medication today. Patient denies any other symptoms at this time including fevers, cough, shortness of breath, abdominal pain, dizziness, nausea, vomiting, change in bowel or bladder function. Review of Systems     Review of Systems   Constitutional: Negative for chills and fever. Respiratory: Negative for cough and shortness of breath. Cardiovascular: Positive for chest pain. Gastrointestinal: Negative for abdominal pain, diarrhea, nausea and vomiting. Genitourinary: Negative for dysuria. Neurological: Negative for dizziness and headaches. Past Medical, Surgical, Family, and Social History     She has a past medical history of Anxiety, Diabetes mellitus ([address] Hernia, abdominal, Hyperlipidemia, Hypertension, and PTSD (post-traumatic stress disorder). She has a past surgical history that includes Hysterectomy; Cholecystectomy; colectomy; and Revision Colostomy. Her family history is not on file. She reports that she has never smoked. She has never used smokeless tobacco. She reports that she does not drink alcohol and does not use drugs.     Medications     Previous Medications    ACETAMINOPHEN (TYLENOL) 500 MG TABLET    Take 1,000 mg by mouth every 6 hours as needed for Pain    BUPROPION (WELLBUTRIN SR) 200 MG EXTENDED RELEASE TABLET    Take 200 mg Mental Status: She is alert and oriented to person, place, and time. Psychiatric:         Mood and Affect: Mood normal.         Behavior: Behavior normal.         Diagnostic Results     EKG   EKG shows sinus rhythm at a rate of 94 bpm.  Nonischemic and without ST elevation or depression. Patient does have inverted P waves in lead I only which is new from prior EKG. MD interval 178. QRS 94.       RADIOLOGY:  XR CHEST (2 VW)   Final Result      No acute cardiopulmonary disease      NM MYOCARDIAL SPECT REST EXERCISE OR RX    (Results Pending)       LABS:   Results for orders placed or performed during the hospital encounter of 02/23/22   CBC with Auto Differential   Result Value Ref Range    WBC 5.2 4.0 - 11.0 K/uL    RBC 3.82 (L) 4.00 - 5.20 M/uL    Hemoglobin 10.4 (L) 12.0 - 16.0 g/dL    Hematocrit 31.0 (L) 36.0 - 48.0 %    MCV 81.2 80.0 - 100.0 fL    MCH 27.2 26.0 - 34.0 pg    MCHC 33.5 31.0 - 36.0 g/dL    RDW 17.9 (H) 12.4 - 15.4 %    Platelets 663 502 - 267 K/uL    MPV 7.2 5.0 - 10.5 fL    Neutrophils % 54.4 %    Lymphocytes % 23.2 %    Monocytes % 7.1 %    Eosinophils % 14.9 %    Basophils % 0.4 %    Neutrophils Absolute 2.8 1.7 - 7.7 K/uL    Lymphocytes Absolute 1.2 1.0 - 5.1 K/uL    Monocytes Absolute 0.4 0.0 - 1.3 K/uL    Eosinophils Absolute 0.8 (H) 0.0 - 0.6 K/uL    Basophils Absolute 0.0 0.0 - 0.2 K/uL   Basic Metabolic Panel w/ Reflex to MG   Result Value Ref Range    Sodium 136 136 - 145 mmol/L    Potassium reflex Magnesium 5.3 (H) 3.5 - 5.1 mmol/L    Chloride 103 99 - 110 mmol/L    CO2 24 21 - 32 mmol/L    Anion Gap 9 3 - 16    Glucose 316 (H) 70 - 99 mg/dL    BUN 28 (H) 7 - 20 mg/dL    CREATININE 0.9 0.6 - 1.2 mg/dL    GFR Non-African American >60 >60    GFR African American >60 >60    Calcium 9.1 8.3 - 10.6 mg/dL   Troponin   Result Value Ref Range    Troponin <0.01 <0.01 ng/mL   Brain Natriuretic Peptide   Result Value Ref Range    Pro- (H) 0 - 124 pg/mL       ED BEDSIDE ULTRASOUND:      RECENT VITALS:  BP: (!) 149/99, Temp: 99.8 °F (37.7 °C), Pulse: 88, Resp: 13, SpO2: 97 %     Procedures         ED Course     Nursing Notes, Past Medical Hx,Past Surgical Hx, Social Hx, Allergies, and Family Hx were reviewed. The patient was given the following medications:  No orders of the defined types were placed in this encounter. CONSULTS:  None    MEDICAL DECISION MAKING / ASSESSMENT / PLAN     Sofia Alexandria is a 62 y.o. female with a history of anxiety, diabetes, hyperlipidemia, hypertension who comes to the emergency department by squad today complaining of left-sided chest pain radiating into her left shoulder ongoing since she awoke this morning. Patient reports that she took her blood pressure at home with a home monitor and it was in the 680C systolically. She takes metoprolol daily in the afternoon and has not taken this medication today. Patient denies any other symptoms at this time including fevers, cough, shortness of breath, abdominal pain, dizziness, nausea, vomiting, change in bowel or bladder function. She is alert and oriented x4. Blood pressure is 153/77. All other vital signs are within normal limits. On exam lungs are clear to auscultation bilaterally. BMP showed glucose 316, potassium 5.3 however the potassium is hemolyzed; repeat potassium showed 4.7., CBC showed hemoglobin 10.4 which is above baseline for this patient; troponin is negative, repeat troponin is negative;     EKG shows sinus rhythm at a rate of 94 bpm.  Nonischemic and without ST elevation or depression. Patient does have inverted P waves in lead I only which is new from prior EKG. [place] interval 178. QRS 94.     As the patient has a heart score of 4-5 she will be admitted for cardiac stress test.  Based on her BMI she will need the 2-day stress test and was admitted to medicine with this plan. This patient was also evaluated by the attending physician.  All care plans were discussed and agreed upon. Clinical Impression     1. Chest pain, unspecified type        Disposition     PATIENT REFERRED TO:  No follow-up provider specified.     DISCHARGE MEDICATIONS:  New Prescriptions    No medications on file       [facility]  02/23/22 0171

## 2022-02-23 NOTE — ED PROVIDER NOTES
ED Attending Attestation Note     Date of evaluation: 2/23/2022    This patient was seen by the advance practice provider. I have seen and examined the patient, agree with the workup, evaluation, management and diagnosis. The care plan has been discussed. I have reviewed the ECG and concur with the JOSE's interpretation. My assessment reveals patient presents emerge department with elevated blood pressure and chest discomfort. She describes her chest is left-sided aching going into the left shoulder area. She notes her blood pressure was quite high 198/111 at home earlier today. She does take a beta-blocker every day but is not taking it today. She never had a stress test.  Is a history diabetes. On my exam respiratory effort symmetrical.  No dysrhythmias. Abdomen was soft.   Cardiac work-up ordered     Valeriy Dunham MD  02/23/22 7217

## 2022-02-23 NOTE — PROGRESS NOTES
Pharmacy Note     Enoxaparin Dose Adjustment    Xenia Guido is a 62 y.o. female. Pharmacist assessment of enoxaparin dose for VTE prophylaxis. Recent Labs     02/23/22  1123   BUN 28*       Recent Labs     02/23/22  1123   CREATININE 0.9       Estimated Creatinine Clearance: 86 mL/min (based on SCr of 0.9 mg/dL).     Height:   Ht Readings from Last 1 Encounters:   02/23/22 5' 4\" (1.626 m)     Weight:  Wt Readings from Last 1 Encounters:   02/23/22 268 lb (121.6 kg)       The following enoxaparin dose has been adjusted based upon renal function and/or patient weight per P&T Guidelines:             Enoxaparin 40mg SQ daily to 30mg SQ BID for CrCl >30 and weight between 101-150 kg    Haylie Curran  PharmD  [phone]

## 2022-02-23 NOTE — PROGRESS NOTES
4 Eyes Admission Assessment     I agree as the admission nurse that 2 RN's have performed a thorough Head to Toe Skin Assessment on the patient. ALL assessment sites listed below have been assessed on admission. Areas assessed by both nurses: estrella and av  [x]   Head, Face, and Ears   [x]   Shoulders, Back, and Chest  [x]   Arms, Elbows, and Hands   [x]   Coccyx, Sacrum, and Ischium  [x]   Legs, Feet, and Heels        Does the Patient have Skin Breakdown? No very reddened area of groin folds and a little red in butt.       Regan Prevention initiated:  No   Wound Care Orders initiated:  No      [facility] nurse consulted for Pressure Injury (Stage 3,4, Unstageable, DTI, NWPT, and Complex wounds) or Reagn score 18 or lower:  No      Nurse 1 eSignature: Electronically signed by Justino Tejada RN on 2/23/22 at 6:09 PM EST    **SHARE this note so that the co-signing nurse is able to place an eSignature**    Nurse 2 eSignature: Electronically signed by Winston Toledo RN on 2/23/22 at 6:15 PM EST

## 2022-02-23 NOTE — ED NOTES
Clinical Pharmacy Progress Note  Medication History     Admit Date: 2/23/2022    List of current medications the patient is taking is complete, and home medication list in Epic has been updated to reflect the changes noted below. Source(s) of information: Patient and fill history    Changes made to medication list:    Medications removed (no longer taking): · Lantus  · Lispro    Medications added:  · Victoza  · Lovastatin  · Naproxen PRN  · Nystatin cream  · Quetiapine   · Hydrochlorothiazide     Medication doses / instructions adjusted:  · Gabapentin changed from 300mg TID to 600mg TID  · Losartan changed from 50mg to 100mg  · Hydroxyzine changed from QD to BID    Thanks!   Safia Rios PharmD  Pharmacy Resident   Please call with questions [phone]  2/23/2022 1:19 PM

## 2022-02-24 LAB
ANION GAP SERPL CALCULATED.3IONS-SCNC: 11 MMOL/L (ref 3–16)
BUN BLDV-MCNC: 24 MG/DL (ref 7–20)
CALCIUM SERPL-MCNC: 9.1 MG/DL (ref 8.3–10.6)
CHLORIDE BLD-SCNC: 104 MMOL/L (ref 99–110)
CHOLESTEROL, TOTAL: 172 MG/DL (ref 0–199)
CO2: 23 MMOL/L (ref 21–32)
CREAT SERPL-MCNC: 1.1 MG/DL (ref 0.6–1.2)
GFR AFRICAN AMERICAN: >60
GFR NON-AFRICAN AMERICAN: 51
GLUCOSE BLD-MCNC: 161 MG/DL (ref 70–99)
GLUCOSE BLD-MCNC: 204 MG/DL (ref 70–99)
GLUCOSE BLD-MCNC: 234 MG/DL (ref 70–99)
GLUCOSE BLD-MCNC: 249 MG/DL (ref 70–99)
GLUCOSE BLD-MCNC: 258 MG/DL (ref 70–99)
GLUCOSE BLD-MCNC: 262 MG/DL (ref 70–99)
HCT VFR BLD CALC: 33.3 % (ref 36–48)
HDLC SERPL-MCNC: 26 MG/DL (ref 40–60)
HEMOGLOBIN: 11 G/DL (ref 12–16)
LDL CHOLESTEROL CALCULATED: ABNORMAL MG/DL
LDL CHOLESTEROL DIRECT: 83 MG/DL
MCH RBC QN AUTO: 27 PG (ref 26–34)
MCHC RBC AUTO-ENTMCNC: 33.1 G/DL (ref 31–36)
MCV RBC AUTO: 81.5 FL (ref 80–100)
PDW BLD-RTO: 18 % (ref 12.4–15.4)
PERFORMED ON: ABNORMAL
PLATELET # BLD: 269 K/UL (ref 135–450)
PMV BLD AUTO: 6.5 FL (ref 5–10.5)
POTASSIUM REFLEX MAGNESIUM: 5 MMOL/L (ref 3.5–5.1)
RBC # BLD: 4.08 M/UL (ref 4–5.2)
SODIUM BLD-SCNC: 138 MMOL/L (ref 136–145)
TRIGL SERPL-MCNC: 411 MG/DL (ref 0–150)
VLDLC SERPL CALC-MCNC: ABNORMAL MG/DL
WBC # BLD: 6.1 K/UL (ref 4–11)

## 2022-02-24 PROCEDURE — 80061 LIPID PANEL: CPT

## 2022-02-24 PROCEDURE — G0378 HOSPITAL OBSERVATION PER HR: HCPCS

## 2022-02-24 PROCEDURE — 80048 BASIC METABOLIC PNL TOTAL CA: CPT

## 2022-02-24 PROCEDURE — 83721 ASSAY OF BLOOD LIPOPROTEIN: CPT

## 2022-02-24 PROCEDURE — 93005 ELECTROCARDIOGRAM TRACING: CPT | Performed by: INTERNAL MEDICINE

## 2022-02-24 PROCEDURE — 6370000000 HC RX 637 (ALT 250 FOR IP): Performed by: INTERNAL MEDICINE

## 2022-02-24 PROCEDURE — 6360000002 HC RX W HCPCS: Performed by: INTERNAL MEDICINE

## 2022-02-24 PROCEDURE — 85027 COMPLETE CBC AUTOMATED: CPT

## 2022-02-24 PROCEDURE — A9502 TC99M TETROFOSMIN: HCPCS | Performed by: INTERNAL MEDICINE

## 2022-02-24 PROCEDURE — 1200000000 HC SEMI PRIVATE

## 2022-02-24 PROCEDURE — 2580000003 HC RX 258: Performed by: INTERNAL MEDICINE

## 2022-02-24 PROCEDURE — 96372 THER/PROPH/DIAG INJ SC/IM: CPT

## 2022-02-24 PROCEDURE — 99244 OFF/OP CNSLTJ NEW/EST MOD 40: CPT | Performed by: INTERNAL MEDICINE

## 2022-02-24 PROCEDURE — 3430000000 HC RX DIAGNOSTIC RADIOPHARMACEUTICAL: Performed by: INTERNAL MEDICINE

## 2022-02-24 PROCEDURE — 36415 COLL VENOUS BLD VENIPUNCTURE: CPT

## 2022-02-24 RX ORDER — METOPROLOL SUCCINATE 25 MG/1
25 TABLET, EXTENDED RELEASE ORAL 2 TIMES DAILY
Status: DISCONTINUED | OUTPATIENT
Start: 2022-02-24 | End: 2022-02-25 | Stop reason: HOSPADM

## 2022-02-24 RX ADMIN — HYDROXYZINE HYDROCHLORIDE 100 MG: 25 TABLET ORAL at 21:29

## 2022-02-24 RX ADMIN — INSULIN LISPRO 6 UNITS: 100 INJECTION, SOLUTION INTRAVENOUS; SUBCUTANEOUS at 12:30

## 2022-02-24 RX ADMIN — METOPROLOL SUCCINATE 25 MG: 25 TABLET, EXTENDED RELEASE ORAL at 16:55

## 2022-02-24 RX ADMIN — METOPROLOL SUCCINATE 25 MG: 25 TABLET, EXTENDED RELEASE ORAL at 10:14

## 2022-02-24 RX ADMIN — QUETIAPINE FUMARATE 300 MG: 300 TABLET ORAL at 21:30

## 2022-02-24 RX ADMIN — INSULIN LISPRO 2 UNITS: 100 INJECTION, SOLUTION INTRAVENOUS; SUBCUTANEOUS at 10:17

## 2022-02-24 RX ADMIN — ENOXAPARIN SODIUM 30 MG: 100 INJECTION SUBCUTANEOUS at 21:36

## 2022-02-24 RX ADMIN — BUPROPION HYDROCHLORIDE 200 MG: 100 TABLET, EXTENDED RELEASE ORAL at 21:40

## 2022-02-24 RX ADMIN — INSULIN LISPRO 2 UNITS: 100 INJECTION, SOLUTION INTRAVENOUS; SUBCUTANEOUS at 12:31

## 2022-02-24 RX ADMIN — BUSPIRONE HYDROCHLORIDE 15 MG: 10 TABLET ORAL at 10:14

## 2022-02-24 RX ADMIN — INSULIN LISPRO 6 UNITS: 100 INJECTION, SOLUTION INTRAVENOUS; SUBCUTANEOUS at 17:36

## 2022-02-24 RX ADMIN — ACETAMINOPHEN 650 MG: 325 TABLET ORAL at 21:36

## 2022-02-24 RX ADMIN — TETROFOSMIN 30 MILLICURIE: 1.38 INJECTION, POWDER, LYOPHILIZED, FOR SOLUTION INTRAVENOUS at 08:50

## 2022-02-24 RX ADMIN — GABAPENTIN 600 MG: 300 CAPSULE ORAL at 21:29

## 2022-02-24 RX ADMIN — ACETAMINOPHEN 650 MG: 325 TABLET ORAL at 10:15

## 2022-02-24 RX ADMIN — HYDROXYZINE HYDROCHLORIDE 100 MG: 25 TABLET ORAL at 10:14

## 2022-02-24 RX ADMIN — GABAPENTIN 600 MG: 300 CAPSULE ORAL at 10:10

## 2022-02-24 RX ADMIN — PANTOPRAZOLE SODIUM 40 MG: 40 TABLET, DELAYED RELEASE ORAL at 10:14

## 2022-02-24 RX ADMIN — Medication 10 ML: at 08:50

## 2022-02-24 RX ADMIN — INSULIN LISPRO 6 UNITS: 100 INJECTION, SOLUTION INTRAVENOUS; SUBCUTANEOUS at 10:17

## 2022-02-24 RX ADMIN — ROSUVASTATIN CALCIUM 20 MG: 20 TABLET, COATED ORAL at 21:30

## 2022-02-24 RX ADMIN — LOSARTAN POTASSIUM 100 MG: 50 TABLET, FILM COATED ORAL at 10:15

## 2022-02-24 RX ADMIN — INSULIN LISPRO 1 UNITS: 100 INJECTION, SOLUTION INTRAVENOUS; SUBCUTANEOUS at 21:38

## 2022-02-24 RX ADMIN — BUSPIRONE HYDROCHLORIDE 15 MG: 10 TABLET ORAL at 21:30

## 2022-02-24 RX ADMIN — BUSPIRONE HYDROCHLORIDE 15 MG: 10 TABLET ORAL at 14:41

## 2022-02-24 RX ADMIN — FERROUS SULFATE TAB 325 MG (65 MG ELEMENTAL FE) 325 MG: 325 (65 FE) TAB at 10:32

## 2022-02-24 RX ADMIN — GABAPENTIN 600 MG: 300 CAPSULE ORAL at 14:41

## 2022-02-24 RX ADMIN — HYDROCHLOROTHIAZIDE 25 MG: 25 TABLET ORAL at 10:10

## 2022-02-24 RX ADMIN — SODIUM CHLORIDE, PRESERVATIVE FREE 10 ML: 5 INJECTION INTRAVENOUS at 21:36

## 2022-02-24 RX ADMIN — ASPIRIN 81 MG: 81 TABLET, CHEWABLE ORAL at 10:15

## 2022-02-24 RX ADMIN — SERTRALINE HYDROCHLORIDE 150 MG: 100 TABLET ORAL at 10:14

## 2022-02-24 RX ADMIN — NITROGLYCERIN 0.4 MG: 0.4 TABLET SUBLINGUAL at 12:23

## 2022-02-24 RX ADMIN — ENOXAPARIN SODIUM 30 MG: 100 INJECTION SUBCUTANEOUS at 10:15

## 2022-02-24 RX ADMIN — INSULIN LISPRO 1 UNITS: 100 INJECTION, SOLUTION INTRAVENOUS; SUBCUTANEOUS at 17:36

## 2022-02-24 RX ADMIN — INSULIN GLARGINE 10 UNITS: 100 INJECTION, SOLUTION SUBCUTANEOUS at 21:37

## 2022-02-24 RX ADMIN — SODIUM CHLORIDE, PRESERVATIVE FREE 10 ML: 5 INJECTION INTRAVENOUS at 10:15

## 2022-02-24 RX ADMIN — BUPROPION HYDROCHLORIDE 200 MG: 100 TABLET, EXTENDED RELEASE ORAL at 10:33

## 2022-02-24 ASSESSMENT — PAIN SCALES - GENERAL
PAINLEVEL_OUTOF10: 9
PAINLEVEL_OUTOF10: 7
PAINLEVEL_OUTOF10: 8
PAINLEVEL_OUTOF10: 0
PAINLEVEL_OUTOF10: 5
PAINLEVEL_OUTOF10: 0
PAINLEVEL_OUTOF10: 9
PAINLEVEL_OUTOF10: 9

## 2022-02-24 ASSESSMENT — PAIN DESCRIPTION - PAIN TYPE
TYPE: CHRONIC PAIN
TYPE: CHRONIC PAIN
TYPE: ACUTE PAIN
TYPE: CHRONIC PAIN

## 2022-02-24 ASSESSMENT — PAIN DESCRIPTION - LOCATION
LOCATION: GENERALIZED
LOCATION: GENERALIZED
LOCATION: CHEST
LOCATION: GENERALIZED

## 2022-02-24 ASSESSMENT — PAIN DESCRIPTION - DESCRIPTORS
DESCRIPTORS: SHARP
DESCRIPTORS: ACHING

## 2022-02-24 ASSESSMENT — PAIN DESCRIPTION - PROGRESSION
CLINICAL_PROGRESSION: NOT CHANGED

## 2022-02-24 ASSESSMENT — PAIN DESCRIPTION - FREQUENCY
FREQUENCY: CONTINUOUS

## 2022-02-24 ASSESSMENT — PAIN DESCRIPTION - ONSET
ONSET: ON-GOING

## 2022-02-24 NOTE — PROGRESS NOTES
Hospitalist Progress Note      PCP: Richy Ibanez, APRN - CNP    Date of Admission: 2/23/2022    Chief Complaint:   Chest pain    Hospital Course:   Admitted for chest pain left-sided radiating to the shoulder concern for ACS    Subjective:   Better today, still having some 3 out of 10 chest pain      Medications:  Reviewed    Infusion Medications    sodium chloride      dextrose       Scheduled Medications    buPROPion  200 mg Oral BID    busPIRone  15 mg Oral TID    ferrous sulfate  325 mg Oral Daily with breakfast    gabapentin  600 mg Oral TID    hydroCHLOROthiazide  25 mg Oral Daily    hydrOXYzine  100 mg Oral BID    losartan  100 mg Oral Daily    pantoprazole  40 mg Oral Daily    QUEtiapine  300 mg Oral Nightly    sertraline  150 mg Oral Daily    sodium chloride flush  5-40 mL IntraVENous 2 times per day    aspirin  81 mg Oral Daily    rosuvastatin  20 mg Oral Nightly    insulin glargine  10 Units SubCUTAneous Nightly    insulin lispro  0.05 Units/kg SubCUTAneous TID WC    insulin lispro  0-6 Units SubCUTAneous TID WC    insulin lispro  0-3 Units SubCUTAneous Nightly    metoprolol succinate  25 mg Oral Daily    enoxaparin  30 mg SubCUTAneous BID     PRN Meds: sodium chloride flush, sodium chloride, ondansetron **OR** ondansetron, acetaminophen **OR** acetaminophen, polyethylene glycol, nitroGLYCERIN, glucose, glucagon (rDNA), dextrose, dextrose bolus (hypoglycemia), sodium chloride flush      Intake/Output Summary (Last 24 hours) at 2/24/2022 0932  Last data filed at 2/23/2022 2339  Gross per 24 hour   Intake 600 ml   Output    Net 600 ml       Physical Exam Performed:    /71   Pulse 87   Temp 97.8 °F (36.6 °C) (Oral)   Resp 18   Ht 5' 4\" (1.626 m)   Wt 259 lb 11.2 oz (117.8 kg)   SpO2 98%   BMI 44.58 kg/m²     General appearance:  No apparent distress, appears stated age and cooperative. HEENT:  Normal cephalic, atraumatic without obvious deformity.  Pupils equal, round, and reactive to light. Extra ocular muscles intact. Conjunctivae/corneas clear. Neck: Supple, with full range of motion. No jugular venous distention. Trachea midline. Respiratory:  Normal respiratory effort. Clear to auscultation, bilaterally without Rales/Wheezes/Rhonchi. Cardiovascular:  Regular rate and rhythm with normal S1/S2 without murmurs, rubs or gallops. Abdomen: Soft, non-tender, non-distended with normal bowel sounds. Musculoskeletal:  No clubbing, cyanosis or edema bilaterally. Full range of motion without deformity. Skin: Skin color, texture, turgor normal.  No rashes or lesions. Neurologic:  Neurovascularly intact without any focal sensory/motor deficits. Cranial nerves: II-XII intact, grossly non-focal.  Psychiatric:  Alert and oriented, thought content appropriate, normal insight  Capillary Refill: Brisk,< 3 seconds   Peripheral Pulses: +2 palpable, equal bilaterally       Labs:   Recent Labs     02/23/22  1123 02/24/22  0646   WBC 5.2 6.1   HGB 10.4* 11.0*   HCT 31.0* 33.3*    269     Recent Labs     02/23/22  1123 02/23/22  1348 02/24/22  0646     --  138   K 5.3* 4.7 5.0     --  104   CO2 24  --  23   BUN 28*  --  24*   CREATININE 0.9  --  1.1   CALCIUM 9.1  --  9.1     No results for input(s): AST, ALT, BILIDIR, BILITOT, ALKPHOS in the last 72 hours. No results for input(s): INR in the last 72 hours.   Recent Labs     02/23/22  1123 02/23/22  1348   TROPONINI <0.01 <0.01       Urinalysis:      Lab Results   Component Value Date    NITRU POSITIVE 12/23/2021    WBCUA 10-20 12/23/2021    BACTERIA 3+ 12/23/2021    RBCUA 0-2 12/23/2021    BLOODU SMALL 12/23/2021    SPECGRAV 1.025 12/23/2021    GLUCOSEU >=1000 12/23/2021       Radiology:  XR CHEST (2 VW)   Final Result      No acute cardiopulmonary disease      NM MYOCARDIAL SPECT REST EXERCISE OR RX    (Results Pending)           Assessment/Plan:    Active Hospital Problems    Diagnosis Date Noted    Chest pain [R07.9] 02/23/2022     Chest pain concern for ACS, multiple risk factors including hypertension, uncontrolled diabetes, morbid obesity, hyperlipidemia  Initial troponin x2, Initial EKG with T wave inversions in lead I but otherwise no acute ST changes appreciated  Stress test completed, she did have pain during Lexiscan, her pain improved with nitroglycerin  Although nuclear imaging was negative, I am concerned with underlying liver factors, will ask cardiology for evaluation  Continue aspirin 81 mg, high intensity statin Crestor 20 mg nightly  Follow-up lipid profile  Monitor on telemetry     Hypertensive urgency, she is on multiple medications at home, including losartan hydrochlorothiazide, metoprolol  -Continue home medications    Hyperlipidemia, High intensity statin rosuvastatin 20 mg once daily  -Follow-up lipid profile  Anxiety/PTSD -Restart home medications including hydroxyzine, BuSpar, bupropion, Seroquel     Type 2 diabetes, uncontrolled, last A1c 11.2 12/2021, I do not see insulin prior to admission medications, she is on liraglutide  Says she was discharged to [facility] in 12/2021, and when dc'd from rehab to home she was no on insulin  A few days back PCP checked her A1c and it was 8.6  Blood glucose on admission 316  While in the hospital hold liraglutide  Start Lantus, prandial, sliding scale insulin  Hypotension protocol  Point-of-care glucose checks     Morbid obesity due to excess calories Body mass index is 44.58 kg/m². - Complicating assessment and treatment. Placing patient at risk for multiple co-morbidities as well as early death and contributing to the patient's presentation.  on weight loss when appropriate.       DVT Prophylaxis: Lovenox  Diet: ADULT DIET; Regular; 4 carb choices (60 gm/meal); Low Sodium (2 gm);  No Caffeine  Code Status: Full Code    PT/OT Eval Status: Evaluate daily order if needed    Dispo -continue monitoring in the hospital, although stress test negative I have asked cardiology to see the patient as she has multiple risk factors.     Ruperto Marquez MD

## 2022-02-24 NOTE — PLAN OF CARE
Problem: Falls - Risk of:  Goal: Will remain free from falls  Description: Will remain free from falls  Outcome: Ongoing  Note: Discussed with pt importance to keep bed low and locked with alarm activated, nonskid socks on when out of bed, call light and belongings within reach- will monitor. Problem: Falls - Risk of:  Goal: Absence of physical injury  Description: Absence of physical injury  Outcome: Ongoing  Note: No physical injury noted. Problem: Skin Integrity:  Goal: Will show no infection signs and symptoms  Description: Will show no infection signs and symptoms  Outcome: Ongoing  Note: Pt has red bumps on left arm and has reddened groin folds and butt crack.

## 2022-02-24 NOTE — CONSULTS
Reason for Consultation/Chief Complaint: Chest pain. History of Present Illness:  Sofia Ibrahim is a 62 y.o. patient whom we were asked to see for chest pain. Also hx DM, HTN, MRSA. Admitted with onset of chest pain. Pain left sided. She describes to me sharp shooting pain but admit notes show pressure. Sent to ER. Noted BP 262I systolic. Trop negative. EKG normal.  BP has improved with meds given. Sx have improved. Only mild now. Feels much better. She was uncertain how she was taking her meds and as noted bp was markedly elevated. Sx have improved with improved BP. No previous cardiac hx otherwise. Past Medical History:   has a past medical history of Anxiety, Diabetes mellitus ([address] Hernia, abdominal, Hyperlipidemia, Hypertension, and PTSD (post-traumatic stress disorder). Surgical History:   has a past surgical history that includes Hysterectomy; Cholecystectomy; colectomy; and Revision Colostomy. Social History:   reports that she has never smoked. She has never used smokeless tobacco. She reports that she does not drink alcohol and does not use drugs. Family History:  No evidence for sudden cardiac death or premature CAD    Home Medications:  Were reviewed and are listed in nursing record. and/or listed below  Prior to Admission medications    Medication Sig Start Date End Date Taking? Authorizing Provider   Liraglutide (VICTOZA) 18 MG/3ML SOPN SC injection Inject 3 mg into the skin daily   Yes Historical Provider, MD   lovastatin (MEVACOR) 20 MG tablet Take 20 mg by mouth nightly   Yes Historical Provider, MD   naproxen (NAPROSYN) 500 MG tablet Take 500 mg by mouth 2 times daily as needed for Pain   Yes Historical Provider, MD   nystatin (MYCOSTATIN) 452645 UNIT/GM cream Apply topically 2 times daily Apply topically 2 times daily.    Yes Historical Provider, MD   hydroCHLOROthiazide (HYDRODIURIL) 25 MG tablet Take 25 mg by mouth daily   Yes Historical Provider, MD metoprolol succinate (TOPROL XL) 25 MG extended release tablet Take 1 tablet by mouth daily 12/28/21  Yes Patti Corrigan MD   ferrous sulfate (IRON 325) 325 (65 Fe) MG tablet Take 1 tablet by mouth daily (with breakfast) 12/28/21  Yes Patti Corrigan MD   QUEtiapine (SEROQUEL) 300 MG tablet Take 300 mg by mouth nightly   Yes Historical Provider, MD   acetaminophen (TYLENOL) 500 MG tablet Take 1,000 mg by mouth every 6 hours as needed for Pain   Yes Historical Provider, MD   gabapentin (NEURONTIN) 300 MG capsule Take 600 mg by mouth 3 times daily. Yes Historical Provider, MD   hydrOXYzine (ATARAX) 50 MG tablet Take 100 mg by mouth in the morning and at bedtime    Yes Historical Provider, MD   losartan (COZAAR) 100 MG tablet Take 100 mg by mouth daily    Yes Historical Provider, MD   pantoprazole (PROTONIX) 40 MG tablet Take 40 mg by mouth daily   Yes Historical Provider, MD   sertraline (ZOLOFT) 100 MG tablet Take 150 mg by mouth daily    Yes Historical Provider, MD   busPIRone (BUSPAR) 15 MG tablet Take 15 mg by mouth 3 times daily  9/8/21  Yes Historical Provider, MD   buPROPion (WELLBUTRIN SR) 200 MG extended release tablet Take 200 mg by mouth 2 times daily  10/4/21  Yes Historical Provider, MD        Allergies:  Azithromycin, Metronidazole, Clindamycin, Penicillin g, Penicillin v, Tetanus immune globulin, and Codeine     Review of Systems:       · Constitutional: there has been no unanticipated weight loss. There's been no change in energy level, sleep pattern, or activity level. · Eyes: No visual changes or diplopia. No scleral icterus. · ENT: No Headaches, hearing loss or vertigo. No mouth sores or sore throat. · Cardiovascular: No loss of consciousness. No hemoptysis, pleuritic pain, or phlebitis. · Respiratory: No cough or wheezing, no sputum production. No hematemesis. · Gastrointestinal: No abdominal pain, appetite loss, blood in stools. No change in bowel or bladder habits.   · Genitourinary: No dysuria, trouble voiding, or hematuria. · Musculoskeletal:  No gait disturbance, weakness or joint complaints. · Integumentary: No rash or pruritis.   · All other systems reviewed negative as done     Physical Examination:    Vitals:    02/24/22 1551   BP: (!) 154/75   Pulse: 89   Resp: 16   Temp: 98.7 °F (37.1 °C)   SpO2: 98%    Weight: 259 lb 11.2 oz (117.8 kg)         General Appearance:  Alert, cooperative, no distress, appears stated age   Head:  Normocephalic, without obvious abnormality, atraumatic   Eyes:  PERRL, conjunctiva/corneas clear       Nose: Nares normal, no drainage or sinus tenderness   Throat: Lips, mucosa, and tongue normal   Neck: Supple, symmetrical, trachea midline       Lungs:   Clear to auscultation bilaterally, respirations unlabored   Chest Wall:  No tenderness or deformity   Heart:  Regular rate and rhythm, S1, S2 normal, no murmur, rub or gallop   Abdomen:   Soft, non-tender, bowel sounds active all four quadrants,             Extremities: Extremities normal, atraumatic, no cyanosis or edema   Pulses: 2+ and symmetric   Skin: Skin color, texture, turgor normal, no rashes or lesions   Pysch: Normal mood and affect   Neurologic: Normal gross motor and sensory exam.         Labs  CBC:   Lab Results   Component Value Date    WBC 6.1 02/24/2022    RBC 4.08 02/24/2022    HGB 11.0 02/24/2022    HCT 33.3 02/24/2022    MCV 81.5 02/24/2022    RDW 18.0 02/24/2022     02/24/2022     CMP:    Lab Results   Component Value Date     02/24/2022    K 5.0 02/24/2022     02/24/2022    CO2 23 02/24/2022    BUN 24 02/24/2022    CREATININE 1.1 02/24/2022    GFRAA >60 02/24/2022    AGRATIO 1.3 06/20/2021    LABGLOM 51 02/24/2022    GLUCOSE 258 02/24/2022    PROT 5.5 12/24/2021    CALCIUM 9.1 02/24/2022    BILITOT 0.3 12/24/2021    ALKPHOS 79 12/24/2021    AST 12 12/24/2021    ALT 15 12/24/2021     PT/INR:  No results found for: PTINR  Lab Results   Component Value Date    TROPONINI <0.01 02/23/2022       EKG:  I have reviewed EKG with the following interpretation:  Impression:  NSR, WNL     Assessment  Patient Active Problem List   Diagnosis    Syncope and collapse    Refractory migraine with aura    Psychogenic headache    Neck pain    Intentional drug overdose ([address]    Hypertension    Hyperplastic polyp of intestine    Head problem    Depressive disorder    Severe sepsis (HCC)    Type 2 diabetes mellitus (HCC)    Morbid (severe) obesity due to excess calories ([address]    Chest pain         Plan:    Chest pain somewhat atypical.  Marked hypertension on admission. Trop negative. EKG normal despite prolonged persistent pain.  myoview shows normal LV function and normal perfusion. BP better on meds. Sx have trended with improvement in BP. Likely related to bp out of control. Titrate antihypertensives. bp much better since here. Increase toproll.   Watch BP.

## 2022-02-24 NOTE — PLAN OF CARE
Problem: Falls - Risk of:  Goal: Will remain free from falls  Description: Will remain free from falls  Outcome: Ongoing  Note: Discussed with pt importance to keep bed low and locked with alarm activated, nonskid socks on when out of bed, call light and belongings within reach- will monitor. Problem: Falls - Risk of:  Goal: Absence of physical injury  Description: Absence of physical injury  Outcome: Ongoing  Note: No physical injury noted. Problem: Pain:  Goal: Pain level will decrease  Description: Pain level will decrease  Outcome: Ongoing  Note: Pt denies chest pain at this time.

## 2022-02-25 VITALS
HEIGHT: 64 IN | BODY MASS INDEX: 43.33 KG/M2 | TEMPERATURE: 98.2 F | HEART RATE: 85 BPM | WEIGHT: 253.8 LBS | RESPIRATION RATE: 15 BRPM | OXYGEN SATURATION: 98 % | DIASTOLIC BLOOD PRESSURE: 84 MMHG | SYSTOLIC BLOOD PRESSURE: 143 MMHG

## 2022-02-25 LAB
EKG ATRIAL RATE: 84 BPM
EKG DIAGNOSIS: NORMAL
EKG P AXIS: 78 DEGREES
EKG P-R INTERVAL: 176 MS
EKG Q-T INTERVAL: 374 MS
EKG QRS DURATION: 88 MS
EKG QTC CALCULATION (BAZETT): 441 MS
EKG R AXIS: 63 DEGREES
EKG T AXIS: 63 DEGREES
EKG VENTRICULAR RATE: 84 BPM
GLUCOSE BLD-MCNC: 256 MG/DL (ref 70–99)
GLUCOSE BLD-MCNC: 270 MG/DL (ref 70–99)
PERFORMED ON: ABNORMAL
PERFORMED ON: ABNORMAL

## 2022-02-25 PROCEDURE — 2580000003 HC RX 258: Performed by: INTERNAL MEDICINE

## 2022-02-25 PROCEDURE — 6360000002 HC RX W HCPCS: Performed by: INTERNAL MEDICINE

## 2022-02-25 PROCEDURE — 93010 ELECTROCARDIOGRAM REPORT: CPT | Performed by: INTERNAL MEDICINE

## 2022-02-25 PROCEDURE — G0378 HOSPITAL OBSERVATION PER HR: HCPCS

## 2022-02-25 PROCEDURE — 6370000000 HC RX 637 (ALT 250 FOR IP): Performed by: INTERNAL MEDICINE

## 2022-02-25 PROCEDURE — 96372 THER/PROPH/DIAG INJ SC/IM: CPT

## 2022-02-25 RX ORDER — ROSUVASTATIN CALCIUM 20 MG/1
20 TABLET, COATED ORAL NIGHTLY
Qty: 30 TABLET | Refills: 3 | Status: SHIPPED | OUTPATIENT
Start: 2022-02-25 | End: 2022-05-31 | Stop reason: CLARIF

## 2022-02-25 RX ORDER — ASPIRIN 81 MG/1
81 TABLET, CHEWABLE ORAL DAILY
Qty: 30 TABLET | Refills: 3 | Status: ON HOLD | OUTPATIENT
Start: 2022-02-26 | End: 2022-04-29 | Stop reason: HOSPADM

## 2022-02-25 RX ORDER — METOPROLOL SUCCINATE 25 MG/1
25 TABLET, EXTENDED RELEASE ORAL 2 TIMES DAILY
Qty: 30 TABLET | Refills: 3 | Status: SHIPPED | OUTPATIENT
Start: 2022-02-25

## 2022-02-25 RX ADMIN — ENOXAPARIN SODIUM 30 MG: 100 INJECTION SUBCUTANEOUS at 08:44

## 2022-02-25 RX ADMIN — ASPIRIN 81 MG: 81 TABLET, CHEWABLE ORAL at 08:51

## 2022-02-25 RX ADMIN — GABAPENTIN 600 MG: 300 CAPSULE ORAL at 13:02

## 2022-02-25 RX ADMIN — BUPROPION HYDROCHLORIDE 200 MG: 100 TABLET, EXTENDED RELEASE ORAL at 09:02

## 2022-02-25 RX ADMIN — GABAPENTIN 600 MG: 300 CAPSULE ORAL at 08:46

## 2022-02-25 RX ADMIN — LOSARTAN POTASSIUM 100 MG: 50 TABLET, FILM COATED ORAL at 08:51

## 2022-02-25 RX ADMIN — BUSPIRONE HYDROCHLORIDE 15 MG: 10 TABLET ORAL at 08:51

## 2022-02-25 RX ADMIN — PANTOPRAZOLE SODIUM 40 MG: 40 TABLET, DELAYED RELEASE ORAL at 08:51

## 2022-02-25 RX ADMIN — INSULIN LISPRO 6 UNITS: 100 INJECTION, SOLUTION INTRAVENOUS; SUBCUTANEOUS at 12:39

## 2022-02-25 RX ADMIN — INSULIN LISPRO 3 UNITS: 100 INJECTION, SOLUTION INTRAVENOUS; SUBCUTANEOUS at 12:40

## 2022-02-25 RX ADMIN — BUSPIRONE HYDROCHLORIDE 15 MG: 10 TABLET ORAL at 13:02

## 2022-02-25 RX ADMIN — ACETAMINOPHEN 650 MG: 325 TABLET ORAL at 13:02

## 2022-02-25 RX ADMIN — SODIUM CHLORIDE, PRESERVATIVE FREE 10 ML: 5 INJECTION INTRAVENOUS at 08:54

## 2022-02-25 RX ADMIN — HYDROXYZINE HYDROCHLORIDE 100 MG: 25 TABLET ORAL at 08:45

## 2022-02-25 RX ADMIN — INSULIN LISPRO 6 UNITS: 100 INJECTION, SOLUTION INTRAVENOUS; SUBCUTANEOUS at 08:54

## 2022-02-25 RX ADMIN — METOPROLOL SUCCINATE 25 MG: 25 TABLET, EXTENDED RELEASE ORAL at 09:57

## 2022-02-25 RX ADMIN — SERTRALINE HYDROCHLORIDE 150 MG: 100 TABLET ORAL at 08:51

## 2022-02-25 RX ADMIN — INSULIN LISPRO 2 UNITS: 100 INJECTION, SOLUTION INTRAVENOUS; SUBCUTANEOUS at 08:54

## 2022-02-25 RX ADMIN — FERROUS SULFATE TAB 325 MG (65 MG ELEMENTAL FE) 325 MG: 325 (65 FE) TAB at 08:51

## 2022-02-25 RX ADMIN — HYDROCHLOROTHIAZIDE 25 MG: 25 TABLET ORAL at 08:53

## 2022-02-25 ASSESSMENT — PAIN SCALES - GENERAL
PAINLEVEL_OUTOF10: 8
PAINLEVEL_OUTOF10: 6

## 2022-02-25 NOTE — CARE COORDINATION
Case Management Assessment            Discharge Note                    Date / Time of Note: 2/25/2022 12:29 PM                  Discharge Note Completed by: KEYONA Taylor, NASIRW    Patient Name: Zeus Snyder   YOB: 1960  Diagnosis: Chest pain [R07.9]  Chest pain, unspecified type [R07.9]   Date / Time: 2/23/2022 10:33 AM    Current PCP: [place] [facility] patient: No    Hospitalization in the last 30 days: No    Advance Directives:  Code Status: Full Code  [place] DNR form completed and on chart: No    Financial:  Payor: Christopher Molina / Plan: Ck Quinteros / Product Type: *No Product type* /      Pharmacy:    [address] [place] [id number]  Phone: [phone] Fax: [phone]      Assistance purchasing medications?: Potential Assistance Purchasing Medications: No  Assistance provided by Case Management: None at this time    Does patient want to participate in local refill/ meds to beds program?:      Meds To Beds General Rules:  1. Can ONLY be done Monday- Friday between 8:30am-5pm  2. Prescription(s) must be in pharmacy by 3pm to be filled same day  3. Copy of patient's insurance/ prescription drug card and patient face sheet must be sent along with the prescription(s)  4. Cost of Rx cannot be added to hospital bill. If financial assistance is needed, please contact unit  or ;  or  CANNOT provide pharmacy voucher for patients co-pays  5.  Patients can then  the prescription on their way out of the hospital at discharge, or pharmacy can deliver to the bedside if staff is available. (payment due at time of pick-up or delivery - cash, check, or card accepted)     Able to afford home medications/ co-pay costs: Yes    ADLS:  Current PT AM-PAC Score:   /24  Current OT AM-PAC Score: /24      DISCHARGE Disposition: Home- No Services Needed    LOC at discharge: Not Applicable  EMIL Completed: Yes    Notification completed in HENS/PAS?:  Not Applicable    IMM Completed:   Not Indicated    Transportation:  Transportation PLAN for discharge: lyft1   Mode of Transport: Private Car  Time of Transport: 1;30pm    Transport form completed: No    Home Care:  [address] ordered at discharge: No    Durable Medical Equipment:  DME Provider: none  Equipment obtained during hospitalization: nnoe    Home Oxygen and Respiratory Equipment:  Oxygen needed at discharge?: No    Dialysis:  Dialysis patient: No    Referrals made at [facility] for outpatient continued care:  Not Applicable    Additional CM Notes:   Pt from a group home and will DC to group home w/no services needs. Pt does need a Lyft home. SW scheduled Lyft for 1:30pm. Pt has no other needs. The Plan for Transition of Care is related to the following treatment goals of Chest pain [R07.9]  Chest pain, unspecified type [R07.9]    The Patient and/or patient representative Asmita Villagomez and her family were provided with a choice of provider and agrees with the discharge plan Yes    Freedom of choice list was provided with basic dialogue that supports the patient's individualized plan of care/goals and shares the quality data associated with the providers.  Yes    Care Transitions patient: No    KEYONA Colon, [facility]  Case Management Department  Ph: [phone]  Fax: [phone]

## 2022-02-25 NOTE — DISCHARGE SUMMARY
Hospital Medicine Discharge Summary    Patient ID: Jaz Martin      Patient's PCP: Immanuel Moser, APRN - CNP    Admit Date: 2/23/2022     Discharge Date: 2/25/2022     Admitting Physician: Beth Torrez MD     Discharge Physician: Beth Torrez MD     Discharge Diagnoses:  Chest pain rule out ACS  Hypertensive urgency     The patient was seen and examined on day of discharge and this discharge summary is in conjunction with any daily progress note from day of discharge. Hospital Course:   11year-old female admitted for chest pain concern for ACS, low back including hypertension, uncontrolled diabetes, obesity, hyperlipidemia, initial troponin x2 -, EKG with T wave inversion without acute ST changes. Stress test low risk. Seen by cardiology, suspected due to uncontrolled hypertension    Hypertensive urgency, continue home medication, increased metoprolol XL to 25 mg twice daily  Counseled on medication compliance    Hyperlipidemia, stop lovastatin, start Crestor 20 mg once daily    Rest of the problems chronic  Anxiety/PTSD -Restart home medications including hydroxyzine, BuSpar, bupropion, Seroquel  Type 2 diabetes, uncontrolled, continue home liraglutide, counseled on carb controlled diet  Morbid obesity excess calories BMI 57.93, complicating assessment and treatment    Physical Exam Performed:     BP (!) 143/84   Pulse 85   Temp 98.2 °F (36.8 °C) (Oral)   Resp 15   Ht 5' 4\" (1.626 m)   Wt 253 lb 12.8 oz (115.1 kg)   SpO2 98%   BMI 43.56 kg/m²       General appearance:  No apparent distress, appears stated age and cooperative. HEENT:  Normal cephalic, atraumatic without obvious deformity. Pupils equal, round, and reactive to light. Extra ocular muscles intact. Conjunctivae/corneas clear. Neck: Supple, with full range of motion. No jugular venous distention. Trachea midline. Respiratory:  Normal respiratory effort.  Clear to auscultation, bilaterally without Rales/Wheezes/Rhonchi. Cardiovascular:  Regular rate and rhythm with normal S1/S2 without murmurs, rubs or gallops. Abdomen: Soft, non-tender, non-distended with normal bowel sounds. Musculoskeletal:  No clubbing, cyanosis or edema bilaterally. Full range of motion without deformity. Skin: Skin color, texture, turgor normal.  No rashes or lesions. Neurologic:  Neurovascularly intact without any focal sensory/motor deficits. Cranial nerves: II-XII intact, grossly non-focal.  Psychiatric:  Alert and oriented, thought content appropriate, normal insight  Capillary Refill: Brisk,< 3 seconds   Peripheral Pulses: +2 palpable, equal bilaterally       Labs:  For convenience and continuity at follow-up the following most recent labs are provided:      CBC:    Lab Results   Component Value Date    WBC 6.1 02/24/2022    HGB 11.0 02/24/2022    HCT 33.3 02/24/2022     02/24/2022       Renal:    Lab Results   Component Value Date     02/24/2022    K 5.0 02/24/2022     02/24/2022    CO2 23 02/24/2022    BUN 24 02/24/2022    CREATININE 1.1 02/24/2022    CALCIUM 9.1 02/24/2022    PHOS 3.1 12/28/2021         Significant Diagnostic Studies    Radiology:   NM MYOCARDIAL SPECT REST EXERCISE OR RX   Final Result      XR CHEST (2 VW)   Final Result      No acute cardiopulmonary disease             Consults:     IP CONSULT TO HOSPITALIST  IP CONSULT TO CARDIOLOGY    Disposition: Home/group home    Condition at Discharge: Stable    Discharge Instructions/Follow-up:    Medication compliance, carb controlled diet, low-salt diet    Code Status:  Full Code    Activity: activity as tolerated    Diet: Low sodium, carb controlled diet      Discharge Medications:     Discharge Medication List as of 2/25/2022 12:55 PM           Details   aspirin 81 MG chewable tablet Take 1 tablet by mouth daily, Disp-30 tablet, R-3Normal      rosuvastatin (CRESTOR) 20 MG tablet Take 1 tablet by mouth nightly, Disp-30 tablet, R-3Normal Details   metoprolol succinate (TOPROL XL) 25 MG extended release tablet Take 1 tablet by mouth in the morning and at bedtime, Disp-30 tablet, R-3Normal              Details   Liraglutide (VICTOZA) 18 MG/3ML SOPN SC injection Inject 3 mg into the skin dailyHistorical Med      naproxen (NAPROSYN) 500 MG tablet Take 500 mg by mouth 2 times daily as needed for PainHistorical Med      nystatin (MYCOSTATIN) 381577 UNIT/GM cream Apply topically 2 times daily Apply topically 2 times daily. , Topical, 2 TIMES DAILY, Historical Med      hydroCHLOROthiazide (HYDRODIURIL) 25 MG tablet Take 25 mg by mouth dailyHistorical Med      ferrous sulfate (IRON 325) 325 (65 Fe) MG tablet Take 1 tablet by mouth daily (with breakfast), Disp-30 tablet, R-3NO PRINT      QUEtiapine (SEROQUEL) 300 MG tablet Take 300 mg by mouth nightlyHistorical Med      acetaminophen (TYLENOL) 500 MG tablet Take 1,000 mg by mouth every 6 hours as needed for PainHistorical Med      gabapentin (NEURONTIN) 300 MG capsule Take 600 mg by mouth 3 times daily. Historical Med      hydrOXYzine (ATARAX) 50 MG tablet Take 100 mg by mouth in the morning and at bedtime Historical Med      losartan (COZAAR) 100 MG tablet Take 100 mg by mouth daily Historical Med      pantoprazole (PROTONIX) 40 MG tablet Take 40 mg by mouth dailyHistorical Med      sertraline (ZOLOFT) 100 MG tablet Take 150 mg by mouth daily Historical Med      busPIRone (BUSPAR) 15 MG tablet Take 15 mg by mouth 3 times daily Historical Med      buPROPion (WELLBUTRIN SR) 200 MG extended release tablet Take 200 mg by mouth 2 times daily Historical Med             Time Spent on discharge is more than 30 minutes in the examination, evaluation, counseling and review of medications and discharge plan. Signed:    Kori Ruffin MD   2/25/2022      Thank you EUGENIE Alvarez - ELISHA for the opportunity to be involved in this patient's care.  If you have any questions or concerns please feel free to contact me at [phone].

## 2022-02-25 NOTE — PLAN OF CARE
Patient discharged. AVS reviewed with patient. Patient verbalized understanding. IV removed. Patient taking all belongings with her.  Patient safety maintained

## 2022-02-25 NOTE — PLAN OF CARE
Problem: Falls - Risk of:  Goal: Will remain free from falls  Description: Will remain free from falls  2/25/2022 1308 by Dorys Mills RN  Outcome: Completed  2/25/2022 0221 by Corey Abraham RN  Outcome: Ongoing  Goal: Absence of physical injury  Description: Absence of physical injury  2/25/2022 1308 by Dorys Mills RN  Outcome: Completed  2/25/2022 0221 by Corey Abraham RN  Outcome: Ongoing

## 2022-02-25 NOTE — DISCHARGE INSTR - COC
Continuity of Care Form    Patient Name: Jennifer Alston   :  1960  MRN:  [id number]    Admit date:  2022  Discharge date:  ***    Code Status Order: Full Code   Advance Directives:      Admitting Physician:  Boris Douglas MD  PCP: Selena Guzman, APRN - CNP    Discharging Nurse: [facility] Unit/Room#: 7009/8542-37  Discharging Unit Phone Number: ***    Emergency Contact:   Extended Emergency Contact Information  Primary Emergency Contact: Cristal Doherty, [address] Phone: [phone]  Relation: Child  Preferred language: English   needed?  No    Past Surgical History:  Past Surgical History:   Procedure Laterality Date    CHOLECYSTECTOMY      COLECTOMY      HYSTERECTOMY      REVISION COLOSTOMY         Immunization History:   Immunization History   Administered Date(s) Administered    COVID-19, J&J, PF, 0.5 mL 2021    COVID-19, Pfizer Purple top, DILUTE for use, 12+ yrs, 30mcg/0.3mL dose 10/28/2021    Influenza, MDCK Quadv, IM, PF (Flucelvax 2 yrs and older) 10/28/2021       Active Problems:  Patient Active Problem List   Diagnosis Code    Syncope and collapse R55    Refractory migraine with aura G43.919    Psychogenic headache F45.41    Neck pain M54.2    Intentional drug overdose ([address] T50.902A    Hypertension I10    Hyperplastic polyp of intestine K63.5    Head problem R68.89    Depressive disorder F32.9    Severe sepsis ([address] A41.9, R65.20    Type 2 diabetes mellitus ([address] E11.9    Morbid (severe) obesity due to excess calories ([address] E66.01    Chest pain R07.9       Isolation/Infection:   Isolation            Contact          Patient Infection Status       Infection Onset Added Last Indicated Last Indicated By Review Planned Expiration Resolved Resolved By    MDRO (multi-drug resistant organism) 21 Culture, Urine        Resolved    COVID-19 (Rule Out) 21 COVID-19 (Ordered)   21 Rule-Out Test Resulted            Nurse Assessment:  Last Vital Signs: BP (!) 143/84   Pulse 85   Temp 98.2 °F (36.8 °C) (Oral)   Resp 15   Ht 5' 4\" (1.626 m)   Wt 253 lb 12.8 oz (115.1 kg)   SpO2 98%   BMI 43.56 kg/m²     Last documented pain score (0-10 scale): Pain Level: 6  Last Weight:   Wt Readings from Last 1 Encounters:   02/25/22 253 lb 12.8 oz (115.1 kg)     Mental Status:  {IP PT MENTAL STATUS:20030}    IV Access:  { EMIL IV ACCESS:862086772}    Nursing Mobility/ADLs:  Walking   {CHP DME PEQE:700414910}  Transfer  {CHP DME LMFJ:370971575}  Bathing  {CHP DME YLNJ:690775187}  Dressing  {CHP DME ZGBI:584595385}  Toileting  {CHP DME FRFQ:231230738}  Feeding  {CHP DME ENLQ:393841533}  Med Admin  {CHP DME YJMP:233591120}  Med Delivery   { EMIL MED Delivery:149231867}    Wound Care Documentation and Therapy:        Elimination:  Continence: Bowel: {YES / UH:47525}  Bladder: {YES / EM:98602}  Urinary Catheter: {Urinary Catheter:387885415}   Colostomy/Ileostomy/Ileal Conduit: {YES / RF:22389}       Date of Last BM: ***    Intake/Output Summary (Last 24 hours) at 2/25/2022 1232  Last data filed at 2/24/2022 1853  Gross per 24 hour   Intake 240 ml   Output --   Net 240 ml     I/O last 3 completed shifts:   In: 5 [P.O.:840]  Out: -     Safety Concerns:     [address] Safety Concerns:646608468}    Impairments/Disabilities:      [address] Impairments/Disabilities:394647721}    Nutrition Therapy:  Current Nutrition Therapy:   [address] Diet List:919880177}    Routes of Feeding: {CHP DME Other Feedings:718488375}  Liquids: {Slp liquid thickness:06875}  Daily Fluid Restriction: {CHP DME Yes amt example:266368731}  Last Modified Barium Swallow with Video (Video Swallowing Test): {Done Not Done NAOI:701641128}    Treatments at the Time of Hospital Discharge:   Respiratory Treatments: ***  Oxygen Therapy:  {Therapy; copd oxygen:25645}  Ventilator:    {MH CC Vent WXCS:282050280}    Rehab Therapies: {THERAPEUTIC INTERVENTION:5503230244}  Weight Bearing Status/Restrictions: [address] CC Weight Bearin}  Other Medical Equipment (for information only, NOT a DME order):  {EQUIPMENT:448446667}  Other Treatments: ***    Patient's personal belongings (please select all that are sent with patient):  {CHP DME Belongings:892928955}    RN SIGNATURE:  {Esignature:051893697}    CASE MANAGEMENT/SOCIAL WORK SECTION    Inpatient Status Date: obs 22    Readmission Risk Assessment Score:  Readmission Risk              Risk of Unplanned Readmission:  0           Discharging to Facility/ Agency   Home no services    Dialysis Facility (if applicable)   N/a    / signature: Electronically signed by KEYONA Morgan LSW on 22 at 12:32 PM EST    PHYSICIAN SECTION    Prognosis: {Prognosis:5964325052}    Condition at Discharge: [address] Patient Condition:625671852}    Rehab Potential (if transferring to Rehab): {Prognosis:1934802861}    Recommended Labs or Other Treatments After Discharge: ***    Physician Certification: I certify the above information and transfer of Caio Woods  is necessary for the continuing treatment of the diagnosis listed and that she requires {Admit to Appropriate Level of Care:56223} for {GREATER/LESS:749487906} 30 days.      Update Admission H&P: {CHP DME Changes in TLWEZ:088979086}    PHYSICIAN SIGNATURE:  {Esignature:588169007}

## 2022-02-25 NOTE — PLAN OF CARE
Problem: Falls - Risk of:  Goal: Will remain free from falls  Description: Will remain free from falls  2/25/2022 0221 by Safia Carr RN  Outcome: Ongoing  2/24/2022 1620 by Chris Nash RN  Outcome: Ongoing  Note: Discussed with pt importance to keep bed low and locked with alarm activated, nonskid socks on when out of bed, call light and belongings within reach- will monitor. Fall precautions in place with low bed, bed alarm on, non-skid socks, call light and bedside table within reach. Pt educated on indication for bed alarm and agreeable. Pt moved closer to the nurses station for safety.       Problem: Pain:  Goal: Pain level will decrease  Description: Pain level will decrease  2/25/2022 0221 by Safia Carr RN  Outcome: Ongoing        Problem: Skin Integrity:  Goal: Will show no infection signs and symptoms  Description: Will show no infection signs and symptoms  Outcome: Ongoing

## 2022-03-02 LAB
EKG ATRIAL RATE: 94 BPM
EKG DIAGNOSIS: NORMAL
EKG P AXIS: 144 DEGREES
EKG P-R INTERVAL: 178 MS
EKG Q-T INTERVAL: 332 MS
EKG QRS DURATION: 94 MS
EKG QTC CALCULATION (BAZETT): 415 MS
EKG R AXIS: -17 DEGREES
EKG T AXIS: 128 DEGREES
EKG VENTRICULAR RATE: 94 BPM

## 2022-03-20 ENCOUNTER — APPOINTMENT (OUTPATIENT)
Dept: GENERAL RADIOLOGY | Age: 62
End: 2022-03-20
Payer: MEDICAID

## 2022-03-20 ENCOUNTER — HOSPITAL ENCOUNTER (EMERGENCY)
Age: 62
Discharge: HOME | End: 2022-03-20
Attending: EMERGENCY MEDICINE
Payer: MEDICAID

## 2022-03-20 VITALS
BODY MASS INDEX: 43.32 KG/M2 | DIASTOLIC BLOOD PRESSURE: 63 MMHG | OXYGEN SATURATION: 96 % | TEMPERATURE: 98.9 F | HEIGHT: 65 IN | RESPIRATION RATE: 14 BRPM | HEART RATE: 97 BPM | SYSTOLIC BLOOD PRESSURE: 112 MMHG | WEIGHT: 260 LBS

## 2022-03-20 DIAGNOSIS — N39.0 ACUTE LOWER UTI (URINARY TRACT INFECTION): Primary | ICD-10-CM

## 2022-03-20 LAB
ALBUMIN SERPL-MCNC: 4 G/DL (ref 3.4–5)
ALP BLD-CCNC: 84 U/L (ref 40–129)
ALT SERPL-CCNC: 14 U/L (ref 10–40)
ANION GAP SERPL CALCULATED.3IONS-SCNC: 13 MMOL/L (ref 3–16)
AST SERPL-CCNC: 13 U/L (ref 15–37)
BACTERIA: ABNORMAL /HPF
BASOPHILS ABSOLUTE: 0 K/UL (ref 0–0.2)
BASOPHILS RELATIVE PERCENT: 0.1 %
BILIRUB SERPL-MCNC: 0.4 MG/DL (ref 0–1)
BILIRUBIN DIRECT: <0.2 MG/DL (ref 0–0.3)
BILIRUBIN URINE: NEGATIVE
BILIRUBIN, INDIRECT: ABNORMAL MG/DL (ref 0–1)
BLOOD, URINE: ABNORMAL
BUN BLDV-MCNC: 26 MG/DL (ref 7–20)
CALCIUM SERPL-MCNC: 9 MG/DL (ref 8.3–10.6)
CHLORIDE BLD-SCNC: 100 MMOL/L (ref 99–110)
CLARITY: CLEAR
CO2: 24 MMOL/L (ref 21–32)
COLOR: YELLOW
CREAT SERPL-MCNC: 1.2 MG/DL (ref 0.6–1.2)
EOSINOPHILS ABSOLUTE: 0.2 K/UL (ref 0–0.6)
EOSINOPHILS RELATIVE PERCENT: 5.6 %
GFR AFRICAN AMERICAN: 55
GFR NON-AFRICAN AMERICAN: 46
GLUCOSE BLD-MCNC: 339 MG/DL (ref 70–99)
GLUCOSE URINE: >=1000 MG/DL
HCT VFR BLD CALC: 32.1 % (ref 36–48)
HEMOGLOBIN: 10.4 G/DL (ref 12–16)
INFLUENZA A: NOT DETECTED
INFLUENZA B: NOT DETECTED
KETONES, URINE: NEGATIVE MG/DL
LACTIC ACID: 2.5 MMOL/L (ref 0.4–2)
LACTIC ACID: 3.4 MMOL/L (ref 0.4–2)
LEUKOCYTE ESTERASE, URINE: ABNORMAL
LIPASE: 22 U/L (ref 13–60)
LYMPHOCYTES ABSOLUTE: 0.2 K/UL (ref 1–5.1)
LYMPHOCYTES RELATIVE PERCENT: 4.8 %
MCH RBC QN AUTO: 27.3 PG (ref 26–34)
MCHC RBC AUTO-ENTMCNC: 32.4 G/DL (ref 31–36)
MCV RBC AUTO: 84.3 FL (ref 80–100)
MICROSCOPIC EXAMINATION: YES
MONOCYTES ABSOLUTE: 0 K/UL (ref 0–1.3)
MONOCYTES RELATIVE PERCENT: 1.2 %
NEUTROPHILS ABSOLUTE: 3.8 K/UL (ref 1.7–7.7)
NEUTROPHILS RELATIVE PERCENT: 88.3 %
NITRITE, URINE: POSITIVE
PDW BLD-RTO: 15.8 % (ref 12.4–15.4)
PH UA: 6 (ref 5–8)
PLATELET # BLD: 174 K/UL (ref 135–450)
PMV BLD AUTO: 7 FL (ref 5–10.5)
POTASSIUM SERPL-SCNC: 4.5 MMOL/L (ref 3.5–5.1)
PROCALCITONIN: 0.48 NG/ML (ref 0–0.15)
PROTEIN UA: 30 MG/DL
RBC # BLD: 3.8 M/UL (ref 4–5.2)
RBC UA: ABNORMAL /HPF (ref 0–4)
SARS-COV-2 RNA, RT PCR: NOT DETECTED
SODIUM BLD-SCNC: 137 MMOL/L (ref 136–145)
SPECIFIC GRAVITY UA: 1.02 (ref 1–1.03)
TOTAL PROTEIN: 7 G/DL (ref 6.4–8.2)
URINE TYPE: ABNORMAL
UROBILINOGEN, URINE: 0.2 E.U./DL
WBC # BLD: 4.3 K/UL (ref 4–11)
WBC UA: ABNORMAL /HPF (ref 0–5)

## 2022-03-20 PROCEDURE — 80048 BASIC METABOLIC PNL TOTAL CA: CPT

## 2022-03-20 PROCEDURE — 99285 EMERGENCY DEPT VISIT HI MDM: CPT

## 2022-03-20 PROCEDURE — 87077 CULTURE AEROBIC IDENTIFY: CPT

## 2022-03-20 PROCEDURE — 87636 SARSCOV2 & INF A&B AMP PRB: CPT

## 2022-03-20 PROCEDURE — 87086 URINE CULTURE/COLONY COUNT: CPT

## 2022-03-20 PROCEDURE — 84145 PROCALCITONIN (PCT): CPT

## 2022-03-20 PROCEDURE — 83605 ASSAY OF LACTIC ACID: CPT

## 2022-03-20 PROCEDURE — 83690 ASSAY OF LIPASE: CPT

## 2022-03-20 PROCEDURE — 6370000000 HC RX 637 (ALT 250 FOR IP): Performed by: EMERGENCY MEDICINE

## 2022-03-20 PROCEDURE — 85025 COMPLETE CBC W/AUTO DIFF WBC: CPT

## 2022-03-20 PROCEDURE — 71045 X-RAY EXAM CHEST 1 VIEW: CPT

## 2022-03-20 PROCEDURE — 87040 BLOOD CULTURE FOR BACTERIA: CPT

## 2022-03-20 PROCEDURE — 81001 URINALYSIS AUTO W/SCOPE: CPT

## 2022-03-20 PROCEDURE — 6360000002 HC RX W HCPCS: Performed by: EMERGENCY MEDICINE

## 2022-03-20 PROCEDURE — 2580000003 HC RX 258: Performed by: EMERGENCY MEDICINE

## 2022-03-20 PROCEDURE — 80076 HEPATIC FUNCTION PANEL: CPT

## 2022-03-20 PROCEDURE — 87186 SC STD MICRODIL/AGAR DIL: CPT

## 2022-03-20 PROCEDURE — 96365 THER/PROPH/DIAG IV INF INIT: CPT

## 2022-03-20 RX ORDER — PANTOPRAZOLE SODIUM 40 MG/1
40 TABLET, DELAYED RELEASE ORAL ONCE
Status: COMPLETED | OUTPATIENT
Start: 2022-03-20 | End: 2022-03-20

## 2022-03-20 RX ORDER — METOPROLOL SUCCINATE 25 MG/1
25 TABLET, EXTENDED RELEASE ORAL ONCE
Status: COMPLETED | OUTPATIENT
Start: 2022-03-20 | End: 2022-03-20

## 2022-03-20 RX ORDER — CEFDINIR 300 MG/1
300 CAPSULE ORAL 2 TIMES DAILY
Qty: 20 CAPSULE | Refills: 0 | Status: SHIPPED | OUTPATIENT
Start: 2022-03-20 | End: 2022-03-30

## 2022-03-20 RX ORDER — ACETAMINOPHEN 500 MG
1000 TABLET ORAL ONCE
Status: COMPLETED | OUTPATIENT
Start: 2022-03-20 | End: 2022-03-20

## 2022-03-20 RX ORDER — QUETIAPINE FUMARATE 300 MG/1
300 TABLET, FILM COATED ORAL ONCE
Status: COMPLETED | OUTPATIENT
Start: 2022-03-20 | End: 2022-03-20

## 2022-03-20 RX ORDER — SERTRALINE HYDROCHLORIDE 100 MG/1
100 TABLET, FILM COATED ORAL ONCE
Status: COMPLETED | OUTPATIENT
Start: 2022-03-20 | End: 2022-03-20

## 2022-03-20 RX ORDER — HYDROCHLOROTHIAZIDE 25 MG/1
25 TABLET ORAL ONCE
Status: COMPLETED | OUTPATIENT
Start: 2022-03-20 | End: 2022-03-20

## 2022-03-20 RX ORDER — SODIUM CHLORIDE, SODIUM LACTATE, POTASSIUM CHLORIDE, AND CALCIUM CHLORIDE .6; .31; .03; .02 G/100ML; G/100ML; G/100ML; G/100ML
1000 INJECTION, SOLUTION INTRAVENOUS ONCE
Status: COMPLETED | OUTPATIENT
Start: 2022-03-20 | End: 2022-03-20

## 2022-03-20 RX ORDER — GABAPENTIN 300 MG/1
300 CAPSULE ORAL ONCE
Status: COMPLETED | OUTPATIENT
Start: 2022-03-20 | End: 2022-03-20

## 2022-03-20 RX ORDER — LOSARTAN POTASSIUM 100 MG/1
100 TABLET ORAL ONCE
Status: COMPLETED | OUTPATIENT
Start: 2022-03-20 | End: 2022-03-20

## 2022-03-20 RX ORDER — BUPROPION HYDROCHLORIDE 100 MG/1
200 TABLET, EXTENDED RELEASE ORAL ONCE
Status: COMPLETED | OUTPATIENT
Start: 2022-03-20 | End: 2022-03-20

## 2022-03-20 RX ADMIN — PANTOPRAZOLE SODIUM 40 MG: 40 TABLET, DELAYED RELEASE ORAL at 10:38

## 2022-03-20 RX ADMIN — ACETAMINOPHEN 1000 MG: 500 TABLET ORAL at 07:05

## 2022-03-20 RX ADMIN — GABAPENTIN 300 MG: 300 CAPSULE ORAL at 10:38

## 2022-03-20 RX ADMIN — QUETIAPINE FUMARATE 300 MG: 300 TABLET ORAL at 10:31

## 2022-03-20 RX ADMIN — SERTRALINE HYDROCHLORIDE 100 MG: 100 TABLET ORAL at 10:31

## 2022-03-20 RX ADMIN — BUSPIRONE HYDROCHLORIDE 15 MG: 5 TABLET ORAL at 10:31

## 2022-03-20 RX ADMIN — HYDROCHLOROTHIAZIDE 25 MG: 25 TABLET ORAL at 10:38

## 2022-03-20 RX ADMIN — LOSARTAN POTASSIUM 100 MG: 100 TABLET, FILM COATED ORAL at 10:31

## 2022-03-20 RX ADMIN — METOPROLOL SUCCINATE 25 MG: 25 TABLET, FILM COATED, EXTENDED RELEASE ORAL at 10:31

## 2022-03-20 RX ADMIN — CEFTRIAXONE SODIUM 2000 MG: 2 INJECTION, POWDER, FOR SOLUTION INTRAMUSCULAR; INTRAVENOUS at 08:40

## 2022-03-20 RX ADMIN — BUPROPION HYDROCHLORIDE 200 MG: 100 TABLET, EXTENDED RELEASE ORAL at 10:31

## 2022-03-20 RX ADMIN — SODIUM CHLORIDE, POTASSIUM CHLORIDE, SODIUM LACTATE AND CALCIUM CHLORIDE 1000 ML: 600; 310; 30; 20 INJECTION, SOLUTION INTRAVENOUS at 06:06

## 2022-03-20 RX ADMIN — SODIUM CHLORIDE, SODIUM LACTATE, POTASSIUM CHLORIDE, AND CALCIUM CHLORIDE 1000 ML: .6; .31; .03; .02 INJECTION, SOLUTION INTRAVENOUS at 08:03

## 2022-03-20 ASSESSMENT — PAIN DESCRIPTION - DESCRIPTORS: DESCRIPTORS: SHARP

## 2022-03-20 ASSESSMENT — ENCOUNTER SYMPTOMS
RESPIRATORY NEGATIVE: 1
GASTROINTESTINAL NEGATIVE: 1
EYES NEGATIVE: 1

## 2022-03-20 ASSESSMENT — PAIN SCALES - GENERAL
PAINLEVEL_OUTOF10: 10
PAINLEVEL_OUTOF10: 10
PAINLEVEL_OUTOF10: 9
PAINLEVEL_OUTOF10: 10

## 2022-03-20 ASSESSMENT — PAIN DESCRIPTION - FREQUENCY: FREQUENCY: CONTINUOUS

## 2022-03-20 ASSESSMENT — PAIN DESCRIPTION - PAIN TYPE: TYPE: ACUTE PAIN

## 2022-03-20 ASSESSMENT — PAIN - FUNCTIONAL ASSESSMENT: PAIN_FUNCTIONAL_ASSESSMENT: 0-10

## 2022-03-20 ASSESSMENT — PAIN DESCRIPTION - LOCATION: LOCATION: BACK

## 2022-03-20 NOTE — ED PROVIDER NOTES
[address] [facility] [address] ENCOUNTER          ATTENDING PHYSICIAN NOTE       Date of evaluation: 3/20/2022    ADDENDUM:      Care of this patient was assumed from Dr. Aby Damon. The patient was seen for Back Pain  . The patient's initial evaluation and plan have been discussed with the prior provider who initially evaluated the patient. Nursing Notes, Past Medical Hx, Past Surgical Hx, Social Hx, Allergies, and Family Hx were all reviewed. Diagnostic Results       RADIOLOGY:  XR CHEST PORTABLE   Final Result     Normal chest x-ray.               LABS:   Results for orders placed or performed during the hospital encounter of 03/20/22   COVID-19 & Influenza Combo    Specimen: Nasopharyngeal Swab   Result Value Ref Range    SARS-CoV-2 RNA, RT PCR NOT DETECTED NOT DETECTED    INFLUENZA A NOT DETECTED NOT DETECTED    INFLUENZA B NOT DETECTED NOT DETECTED   CBC with Auto Differential   Result Value Ref Range    WBC 4.3 4.0 - 11.0 K/uL    RBC 3.80 (L) 4.00 - 5.20 M/uL    Hemoglobin 10.4 (L) 12.0 - 16.0 g/dL    Hematocrit 32.1 (L) 36.0 - 48.0 %    MCV 84.3 80.0 - 100.0 fL    MCH 27.3 26.0 - 34.0 pg    MCHC 32.4 31.0 - 36.0 g/dL    RDW 15.8 (H) 12.4 - 15.4 %    Platelets 645 961 - 551 K/uL    MPV 7.0 5.0 - 10.5 fL    Neutrophils % 88.3 %    Lymphocytes % 4.8 %    Monocytes % 1.2 %    Eosinophils % 5.6 %    Basophils % 0.1 %    Neutrophils Absolute 3.8 1.7 - 7.7 K/uL    Lymphocytes Absolute 0.2 (L) 1.0 - 5.1 K/uL    Monocytes Absolute 0.0 0.0 - 1.3 K/uL    Eosinophils Absolute 0.2 0.0 - 0.6 K/uL    Basophils Absolute 0.0 0.0 - 0.2 K/uL   Basic Metabolic Panel   Result Value Ref Range    Sodium 137 136 - 145 mmol/L    Potassium 4.5 3.5 - 5.1 mmol/L    Chloride 100 99 - 110 mmol/L    CO2 24 21 - 32 mmol/L    Anion Gap 13 3 - 16    Glucose 339 (H) 70 - 99 mg/dL    BUN 26 (H) 7 - 20 mg/dL    CREATININE 1.2 0.6 - 1.2 mg/dL    GFR Non- 46 (A) >60    GFR  55 (A) >60    Calcium 9.0 8.3 - 10.6 mg/dL   Hepatic Function Panel   Result Value Ref Range    Total Protein 7.0 6.4 - 8.2 g/dL    Albumin 4.0 3.4 - 5.0 g/dL    Alkaline Phosphatase 84 40 - 129 U/L    ALT 14 10 - 40 U/L    AST 13 (L) 15 - 37 U/L    Total Bilirubin 0.4 0.0 - 1.0 mg/dL    Bilirubin, Direct <0.2 0.0 - 0.3 mg/dL    Bilirubin, Indirect see below 0.0 - 1.0 mg/dL   Lipase   Result Value Ref Range    Lipase 22.0 13.0 - 60.0 U/L   Urinalysis   Result Value Ref Range    Color, UA Yellow Straw/Yellow    Clarity, UA Clear Clear    Glucose, Ur >=1000 (A) Negative mg/dL    Bilirubin Urine Negative Negative    Ketones, Urine Negative Negative mg/dL    Specific Gravity, UA 1.025 1.005 - 1.030    Blood, Urine MODERATE (A) Negative    pH, UA 6.0 5.0 - 8.0    Protein, UA 30 (A) Negative mg/dL    Urobilinogen, Urine 0.2 <2.0 E.U./dL    Nitrite, Urine POSITIVE (A) Negative    Leukocyte Esterase, Urine SMALL (A) Negative    Microscopic Examination YES     Urine Type NotGiven    Lactic Acid   Result Value Ref Range    Lactic Acid 3.4 (H) 0.4 - 2.0 mmol/L   Procalcitonin   Result Value Ref Range    Procalcitonin 0.48 (H) 0.00 - 0.15 ng/mL   Microscopic Urinalysis   Result Value Ref Range    WBC, UA  (A) 0 - 5 /HPF    RBC, UA  (A) 0 - 4 /HPF    Bacteria, UA 4+ (A) None Seen /HPF   Lactic Acid   Result Value Ref Range    Lactic Acid 2.5 (H) 0.4 - 2.0 mmol/L       RECENT VITALS:  BP: (!) 103/45, Temp: 98.9 °F (37.2 °C), Pulse: 99, Resp: 16, SpO2: 96 %     Procedures         ED Course     The patient was given the following medications:  Orders Placed This Encounter   Medications    lactated ringers bolus    acetaminophen (TYLENOL) tablet 1,000 mg    lactated ringers bolus    cefTRIAXone (ROCEPHIN) 2000 mg IVPB in D5W 50ml minibag     Order Specific Question:   Antimicrobial Indications     Answer:   Urinary Tract Infection    busPIRone (BUSPAR) tablet 15 mg    buPROPion [facility]) extended release tablet 200 mg    gabapentin (NEURONTIN) capsule 300 mg    hydroCHLOROthiazide (HYDRODIURIL) tablet 25 mg    losartan (COZAAR) tablet 100 mg    metoprolol succinate (TOPROL XL) extended release tablet 25 mg    pantoprazole (PROTONIX) tablet 40 mg    QUEtiapine (SEROQUEL) tablet 300 mg    sertraline (ZOLOFT) tablet 100 mg    cefdinir (OMNICEF) 300 MG capsule     Sig: Take 1 capsule by mouth 2 times daily for 10 days     Dispense:  20 capsule     Refill:  0       CONSULTS:  None    MEDICAL DECISION MAKING / ASSESSMENT / Dwayne Richard is a 62 y.o. female presenting with back pain. On signout, the patient was awaiting labs. Lactate 3.4. CBC with white count of 4.3 and hemoglobin of 10.4. Renal panel with normal electrolytes, BUN mildly elevated at 26 but creatinine baseline at 1.2. Glucose elevated at 339. LFTs normal lipase normal.  Chest x-ray normal.  Repeat lactate 2.5. Covid and flu negative. Urinalysis concerning for bacterial infection. The patient had a repeat temperature that was normal after Tylenol. She was given a second liter of LR for hydration. She was given ceftriaxone 2 g IV for UTI versus pyelonephritis as her last urine culture demonstrated E. coli sensitive to ceftriaxone. She was also given the morning medicines that she had missed including BuSpar, bupropion, gabapentin, hydrochlorothiazide, losartan, metoprolol, pantoprazole, Seroquel, Zoloft. Tachycardia resolved after home medications. I spoke to pharmacy regarding the patient's urinary tract infection and past cultures and they recommended sending the patient home on cefdinir 300 twice daily. I am going to prescribe her a 2-week course to cover for pyelonephritis although my sense is that her back pain is actually chronic and not necessarily related to her UTI. But will cover for pyelonephritis just in case. The patient has appropriate follow-up with neurology to continue work-up of her back pain and lower extremity weakness. Return precautions for UTI given. Clinical Impression     1.  Acute lower UTI (urinary tract infection)        Disposition     PATIENT REFERRED TO:  EUGENIE Rivas CNP  [address] [facility]  [phone]    Call in 1 day  for follow up    [facility] Emergency Department  [address] [facility]  [phone]    If symptoms worsen      DISCHARGE MEDICATIONS:  New Prescriptions    CEFDINIR (OMNICEF) 300 MG CAPSULE    Take 1 capsule by mouth 2 times daily for 10 days       DISPOSITION Decision To Discharge 03/20/2022 11:51:33 AM         Donn Alpers, MD  03/20/22 7411

## 2022-03-20 NOTE — ED PROVIDER NOTES
[address] [facility]          ATTENDING PHYSICIAN NOTE       Date of evaluation: 3/20/2022    Chief Complaint     Back Pain      History of Present Illness     Kisha Hewitt is a 62 y.o. female who presents to the emergency department complaining of pain to the bilateral legs and back. Patient states that she has been having this pain for approximately 6 months. She has been seen by her primary care provider and has been referred to a neurologist for this. She states over the last day the pain has become more severe. She tried taking Tylenol at home without improvement of her symptoms. Patient was found on arrival to the emerge from her to have a fever of 101.1. She did not realize she had a fever. She does note a cough is nonproductive of sputum. She denies any headache or neck pain. She denies any nausea, vomiting, or diarrhea. She denies any burning or pain with urination. Review of Systems     Review of Systems   Constitutional: Positive for fatigue and fever. HENT: Negative. Eyes: Negative. Respiratory: Negative. Cardiovascular: Negative. Gastrointestinal: Negative. Genitourinary: Negative. Musculoskeletal:        See HPI   Neurological: Negative. All other systems reviewed and are negative. Past Medical, Surgical, Family, and Social History     She has a past medical history of Anxiety, Diabetes mellitus ([address] Hernia, abdominal, Hyperlipidemia, Hypertension, and PTSD (post-traumatic stress disorder). She has a past surgical history that includes Hysterectomy; Cholecystectomy; colectomy; and Revision Colostomy. Her family history is not on file. She reports that she has never smoked. She has never used smokeless tobacco. She reports that she does not drink alcohol and does not use drugs.     Medications     Previous Medications    ACETAMINOPHEN (TYLENOL) 500 MG TABLET    Take 1,000 mg by mouth every 6 hours as needed for Pain ASPIRIN 81 MG CHEWABLE TABLET    Take 1 tablet by mouth daily    BUPROPION (WELLBUTRIN SR) 200 MG EXTENDED RELEASE TABLET    Take 200 mg by mouth 2 times daily     BUSPIRONE (BUSPAR) 15 MG TABLET    Take 15 mg by mouth 3 times daily     FERROUS SULFATE (IRON 325) 325 (65 FE) MG TABLET    Take 1 tablet by mouth daily (with breakfast)    GABAPENTIN (NEURONTIN) 300 MG CAPSULE    Take 600 mg by mouth 3 times daily. HYDROCHLOROTHIAZIDE (HYDRODIURIL) 25 MG TABLET    Take 25 mg by mouth daily    HYDROXYZINE (ATARAX) 50 MG TABLET    Take 100 mg by mouth in the morning and at bedtime     LIRAGLUTIDE (VICTOZA) 18 MG/3ML SOPN SC INJECTION    Inject 3 mg into the skin daily    LOSARTAN (COZAAR) 100 MG TABLET    Take 100 mg by mouth daily     METOPROLOL SUCCINATE (TOPROL XL) 25 MG EXTENDED RELEASE TABLET    Take 1 tablet by mouth in the morning and at bedtime    NAPROXEN (NAPROSYN) 500 MG TABLET    Take 500 mg by mouth 2 times daily as needed for Pain    NYSTATIN (MYCOSTATIN) 120920 UNIT/GM CREAM    Apply topically 2 times daily Apply topically 2 times daily. PANTOPRAZOLE (PROTONIX) 40 MG TABLET    Take 40 mg by mouth daily    QUETIAPINE (SEROQUEL) 300 MG TABLET    Take 300 mg by mouth nightly    ROSUVASTATIN (CRESTOR) 20 MG TABLET    Take 1 tablet by mouth nightly    SERTRALINE (ZOLOFT) 100 MG TABLET    Take 150 mg by mouth daily        Allergies     She is allergic to azithromycin, metronidazole, clindamycin, penicillin g, penicillin v, tetanus immune globulin, and codeine. Physical Exam     INITIAL VITALS: BP: (!) 124/90, Temp: 101.1 °F (38.4 °C), Pulse: 118, Resp: 20, SpO2: 97 %   Physical Exam  Vitals reviewed. Constitutional:       General: She is not in acute distress. Appearance: She is obese. HENT:      Head: Normocephalic and atraumatic. Mouth/Throat:      Mouth: Mucous membranes are moist.      Pharynx: No oropharyngeal exudate. Eyes:      General: No scleral icterus.      Extraocular Movements: Extraocular movements intact. Conjunctiva/sclera: Conjunctivae normal.      Pupils: Pupils are equal, round, and reactive to light. Cardiovascular:      Rate and Rhythm: Regular rhythm. Tachycardia present. Pulses: Normal pulses. Heart sounds: Normal heart sounds. Pulmonary:      Effort: Pulmonary effort is normal.      Breath sounds: Normal breath sounds. No wheezing, rhonchi or rales. Abdominal:      General: Bowel sounds are normal.      Palpations: Abdomen is soft. Tenderness: There is no abdominal tenderness. There is no guarding or rebound. Musculoskeletal:         General: Swelling present. No deformity or signs of injury. Cervical back: Normal range of motion and neck supple. Skin:     General: Skin is warm and dry. Neurological:      General: No focal deficit present. Mental Status: She is alert and oriented to person, place, and time. Cranial Nerves: No cranial nerve deficit. Motor: No weakness. Coordination: Coordination normal.         Diagnostic Results     RADIOLOGY:  XR CHEST PORTABLE   Final Result     Normal chest x-ray. LABS:   Results for orders placed or performed during the hospital encounter of 03/20/22   Lactic Acid   Result Value Ref Range    Lactic Acid 3.4 (H) 0.4 - 2.0 mmol/L     RECENT VITALS:  BP: (!) 124/90,Temp: 101.1 °F (38.4 °C), Pulse: 118, Resp: 20, SpO2: 97 %     Procedures     N/A    ED Course     Nursing Notes, Past Medical Hx, Past Surgical Hx, Social Hx,Allergies, and Family Hx were reviewed. patient was given the following medications:  Orders Placed This Encounter   Medications    lactated ringers bolus    acetaminophen (TYLENOL) tablet 1,000 mg       CONSULTS:  None    MEDICAL DECISIONMAKING / ASSESSMENT / Dylan Jimenez is a 62 y.o. female presents complaining of bilateral lower extremity pain.   Patient states she has been having pain to the bilateral lower extremities for several months. She is currently in the process of being evaluated for this by her primary care provider and neurology without an etiology determined. She states the pain was worse this morning which prompted her to come to the hospital.  On arrival, patient was noted to be febrile to 101.1. She denies any recent cough, shortness of breath, or urinary symptoms. She has clear breath sounds normal heart sounds. She has a soft abdomen. She does have palpable pulses in the bilateral lower extremities. Laboratory studies are pending. At this time, care of the patient be turned over to the oncoming provider who complete the patient's work-up and disposition pending results of testing. Clinical Impression     No diagnosis found. Disposition     PATIENT REFERRED TO:  No follow-up provider specified.     DISCHARGE MEDICATIONS:  New Prescriptions    No medications on file       [id number] Brianna Cristobal MD  03/20/22 8823

## 2022-03-22 LAB
ORGANISM: ABNORMAL
URINE CULTURE, ROUTINE: ABNORMAL

## 2022-03-24 LAB
BLOOD CULTURE, ROUTINE: NORMAL
CULTURE, BLOOD 2: NORMAL

## 2022-04-22 ENCOUNTER — APPOINTMENT (OUTPATIENT)
Dept: GENERAL RADIOLOGY | Age: 62
DRG: 720 | End: 2022-04-22
Payer: MEDICAID

## 2022-04-22 ENCOUNTER — APPOINTMENT (OUTPATIENT)
Dept: CT IMAGING | Age: 62
DRG: 720 | End: 2022-04-22
Payer: MEDICAID

## 2022-04-22 ENCOUNTER — HOSPITAL ENCOUNTER (INPATIENT)
Age: 62
LOS: 7 days | Discharge: NURSING, REHAB OR LONG-TERM CARE | DRG: 720 | End: 2022-04-29
Attending: EMERGENCY MEDICINE | Admitting: INTERNAL MEDICINE
Payer: MEDICAID

## 2022-04-22 DIAGNOSIS — D72.829 LEUKOCYTOSIS, UNSPECIFIED TYPE: ICD-10-CM

## 2022-04-22 DIAGNOSIS — R77.8 ELEVATED TROPONIN: ICD-10-CM

## 2022-04-22 DIAGNOSIS — N10 ACUTE PYELONEPHRITIS: Primary | ICD-10-CM

## 2022-04-22 DIAGNOSIS — N17.9 AKI (ACUTE KIDNEY INJURY) (HCC): ICD-10-CM

## 2022-04-22 DIAGNOSIS — E86.1 HYPOTENSION DUE TO HYPOVOLEMIA: ICD-10-CM

## 2022-04-22 DIAGNOSIS — I95.89 HYPOTENSION DUE TO HYPOVOLEMIA: ICD-10-CM

## 2022-04-22 LAB
ANION GAP SERPL CALCULATED.3IONS-SCNC: 16 MMOL/L (ref 3–16)
BACTERIA: ABNORMAL /HPF
BASE EXCESS VENOUS: -3.4 MMOL/L (ref -2–3)
BASOPHILS ABSOLUTE: 0 K/UL (ref 0–0.2)
BASOPHILS RELATIVE PERCENT: 0.2 %
BILIRUBIN URINE: NEGATIVE
BLOOD, URINE: NEGATIVE
BUN BLDV-MCNC: 46 MG/DL (ref 7–20)
CALCIUM SERPL-MCNC: 8.9 MG/DL (ref 8.3–10.6)
CARBOXYHEMOGLOBIN: 1.8 % (ref 0–1.5)
CHLORIDE BLD-SCNC: 100 MMOL/L (ref 99–110)
CLARITY: CLEAR
CO2: 21 MMOL/L (ref 21–32)
COLOR: YELLOW
CREAT SERPL-MCNC: 2.1 MG/DL (ref 0.6–1.2)
CREATININE URINE: 163.9 MG/DL (ref 28–259)
EKG ATRIAL RATE: 91 BPM
EKG DIAGNOSIS: NORMAL
EKG P AXIS: 66 DEGREES
EKG P-R INTERVAL: 188 MS
EKG Q-T INTERVAL: 370 MS
EKG QRS DURATION: 90 MS
EKG QTC CALCULATION (BAZETT): 455 MS
EKG R AXIS: 23 DEGREES
EKG T AXIS: 48 DEGREES
EKG VENTRICULAR RATE: 91 BPM
EOSINOPHILS ABSOLUTE: 0.2 K/UL (ref 0–0.6)
EOSINOPHILS RELATIVE PERCENT: 1.1 %
EPITHELIAL CELLS, UA: ABNORMAL /HPF (ref 0–5)
GFR AFRICAN AMERICAN: 29
GFR NON-AFRICAN AMERICAN: 24
GLUCOSE BLD-MCNC: 309 MG/DL (ref 70–99)
GLUCOSE BLD-MCNC: 327 MG/DL (ref 70–99)
GLUCOSE BLD-MCNC: 338 MG/DL (ref 70–99)
GLUCOSE URINE: NEGATIVE MG/DL
HCO3 VENOUS: 23.2 MMOL/L (ref 24–28)
HCT VFR BLD CALC: 31.3 % (ref 36–48)
HEMOGLOBIN, VEN, REDUCED: 46.2 %
HEMOGLOBIN: 10.1 G/DL (ref 12–16)
KETONES, URINE: NEGATIVE MG/DL
LACTIC ACID, SEPSIS: 1.9 MMOL/L (ref 0.4–1.9)
LEUKOCYTE ESTERASE, URINE: ABNORMAL
LYMPHOCYTES ABSOLUTE: 0.6 K/UL (ref 1–5.1)
LYMPHOCYTES RELATIVE PERCENT: 4.2 %
MCH RBC QN AUTO: 27.5 PG (ref 26–34)
MCHC RBC AUTO-ENTMCNC: 32.1 G/DL (ref 31–36)
MCV RBC AUTO: 85.5 FL (ref 80–100)
METHEMOGLOBIN VENOUS: 0.6 % (ref 0–1.5)
MICROSCOPIC EXAMINATION: YES
MONOCYTES ABSOLUTE: 1.1 K/UL (ref 0–1.3)
MONOCYTES RELATIVE PERCENT: 8.1 %
NEUTROPHILS ABSOLUTE: 12.2 K/UL (ref 1.7–7.7)
NEUTROPHILS RELATIVE PERCENT: 86.4 %
NITRITE, URINE: POSITIVE
O2 SAT, VEN: 53 %
PCO2, VEN: 47.5 MMHG (ref 41–51)
PDW BLD-RTO: 14.7 % (ref 12.4–15.4)
PERFORMED ON: ABNORMAL
PERFORMED ON: ABNORMAL
PH UA: 5.5 (ref 5–8)
PH VENOUS: 7.3 (ref 7.35–7.45)
PLATELET # BLD: 136 K/UL (ref 135–450)
PMV BLD AUTO: 7.9 FL (ref 5–10.5)
PO2, VEN: 30.6 MMHG (ref 25–40)
POTASSIUM REFLEX MAGNESIUM: 5.1 MMOL/L (ref 3.5–5.1)
PROTEIN UA: 30 MG/DL
RBC # BLD: 3.66 M/UL (ref 4–5.2)
RBC UA: ABNORMAL /HPF (ref 0–4)
SODIUM BLD-SCNC: 137 MMOL/L (ref 136–145)
SODIUM URINE: 45 MMOL/L
SPECIFIC GRAVITY UA: 1.02 (ref 1–1.03)
TCO2 CALC VENOUS: 25 MMOL/L
TROPONIN: 0.04 NG/ML
TROPONIN: 0.04 NG/ML
URINE REFLEX TO CULTURE: YES
URINE TYPE: ABNORMAL
UROBILINOGEN, URINE: 0.2 E.U./DL
WBC # BLD: 14.1 K/UL (ref 4–11)
WBC UA: ABNORMAL /HPF (ref 0–5)

## 2022-04-22 PROCEDURE — 84484 ASSAY OF TROPONIN QUANT: CPT

## 2022-04-22 PROCEDURE — 2580000003 HC RX 258: Performed by: EMERGENCY MEDICINE

## 2022-04-22 PROCEDURE — 96375 TX/PRO/DX INJ NEW DRUG ADDON: CPT

## 2022-04-22 PROCEDURE — 87040 BLOOD CULTURE FOR BACTERIA: CPT

## 2022-04-22 PROCEDURE — 2580000003 HC RX 258: Performed by: INTERNAL MEDICINE

## 2022-04-22 PROCEDURE — 6360000002 HC RX W HCPCS: Performed by: EMERGENCY MEDICINE

## 2022-04-22 PROCEDURE — 85025 COMPLETE CBC W/AUTO DIFF WBC: CPT

## 2022-04-22 PROCEDURE — 83605 ASSAY OF LACTIC ACID: CPT

## 2022-04-22 PROCEDURE — 70450 CT HEAD/BRAIN W/O DYE: CPT

## 2022-04-22 PROCEDURE — 73030 X-RAY EXAM OF SHOULDER: CPT

## 2022-04-22 PROCEDURE — 96366 THER/PROPH/DIAG IV INF ADDON: CPT

## 2022-04-22 PROCEDURE — 6370000000 HC RX 637 (ALT 250 FOR IP): Performed by: EMERGENCY MEDICINE

## 2022-04-22 PROCEDURE — 99285 EMERGENCY DEPT VISIT HI MDM: CPT

## 2022-04-22 PROCEDURE — 96365 THER/PROPH/DIAG IV INF INIT: CPT

## 2022-04-22 PROCEDURE — 87186 SC STD MICRODIL/AGAR DIL: CPT

## 2022-04-22 PROCEDURE — 71045 X-RAY EXAM CHEST 1 VIEW: CPT

## 2022-04-22 PROCEDURE — 1200000000 HC SEMI PRIVATE

## 2022-04-22 PROCEDURE — 6370000000 HC RX 637 (ALT 250 FOR IP): Performed by: INTERNAL MEDICINE

## 2022-04-22 PROCEDURE — 96361 HYDRATE IV INFUSION ADD-ON: CPT

## 2022-04-22 PROCEDURE — 93005 ELECTROCARDIOGRAM TRACING: CPT | Performed by: EMERGENCY MEDICINE

## 2022-04-22 PROCEDURE — 82570 ASSAY OF URINE CREATININE: CPT

## 2022-04-22 PROCEDURE — 84300 ASSAY OF URINE SODIUM: CPT

## 2022-04-22 PROCEDURE — 81001 URINALYSIS AUTO W/SCOPE: CPT

## 2022-04-22 PROCEDURE — 82803 BLOOD GASES ANY COMBINATION: CPT

## 2022-04-22 PROCEDURE — 87150 DNA/RNA AMPLIFIED PROBE: CPT

## 2022-04-22 PROCEDURE — 80048 BASIC METABOLIC PNL TOTAL CA: CPT

## 2022-04-22 PROCEDURE — 36415 COLL VENOUS BLD VENIPUNCTURE: CPT

## 2022-04-22 PROCEDURE — 83036 HEMOGLOBIN GLYCOSYLATED A1C: CPT

## 2022-04-22 RX ORDER — INSULIN LISPRO 100 [IU]/ML
0-12 INJECTION, SOLUTION INTRAVENOUS; SUBCUTANEOUS
Status: DISCONTINUED | OUTPATIENT
Start: 2022-04-22 | End: 2022-04-29 | Stop reason: HOSPADM

## 2022-04-22 RX ORDER — SODIUM CHLORIDE 0.9 % (FLUSH) 0.9 %
5-40 SYRINGE (ML) INJECTION PRN
Status: DISCONTINUED | OUTPATIENT
Start: 2022-04-22 | End: 2022-04-29 | Stop reason: HOSPADM

## 2022-04-22 RX ORDER — ONDANSETRON 2 MG/ML
4 INJECTION INTRAMUSCULAR; INTRAVENOUS EVERY 6 HOURS PRN
Status: DISCONTINUED | OUTPATIENT
Start: 2022-04-22 | End: 2022-04-29 | Stop reason: HOSPADM

## 2022-04-22 RX ORDER — BUPROPION HYDROCHLORIDE 100 MG/1
200 TABLET, EXTENDED RELEASE ORAL 2 TIMES DAILY
Status: DISCONTINUED | OUTPATIENT
Start: 2022-04-22 | End: 2022-04-29 | Stop reason: HOSPADM

## 2022-04-22 RX ORDER — FERROUS SULFATE 325(65) MG
325 TABLET ORAL
Status: DISCONTINUED | OUTPATIENT
Start: 2022-04-23 | End: 2022-04-29 | Stop reason: HOSPADM

## 2022-04-22 RX ORDER — ASPIRIN 81 MG/1
81 TABLET, CHEWABLE ORAL DAILY
Status: DISCONTINUED | OUTPATIENT
Start: 2022-04-23 | End: 2022-04-29 | Stop reason: HOSPADM

## 2022-04-22 RX ORDER — DEXTROSE MONOHYDRATE 25 G/50ML
12.5 INJECTION, SOLUTION INTRAVENOUS PRN
Status: DISCONTINUED | OUTPATIENT
Start: 2022-04-22 | End: 2022-04-22

## 2022-04-22 RX ORDER — HYDROCHLOROTHIAZIDE 25 MG/1
25 TABLET ORAL DAILY
Status: DISCONTINUED | OUTPATIENT
Start: 2022-04-23 | End: 2022-04-29 | Stop reason: HOSPADM

## 2022-04-22 RX ORDER — KETOROLAC TROMETHAMINE 30 MG/ML
15 INJECTION, SOLUTION INTRAMUSCULAR; INTRAVENOUS ONCE
Status: COMPLETED | OUTPATIENT
Start: 2022-04-22 | End: 2022-04-22

## 2022-04-22 RX ORDER — GABAPENTIN 300 MG/1
300 CAPSULE ORAL 3 TIMES DAILY
Status: DISCONTINUED | OUTPATIENT
Start: 2022-04-22 | End: 2022-04-24

## 2022-04-22 RX ORDER — 0.9 % SODIUM CHLORIDE 0.9 %
1000 INTRAVENOUS SOLUTION INTRAVENOUS ONCE
Status: COMPLETED | OUTPATIENT
Start: 2022-04-22 | End: 2022-04-22

## 2022-04-22 RX ORDER — ROSUVASTATIN CALCIUM 20 MG/1
20 TABLET, COATED ORAL NIGHTLY
Status: DISCONTINUED | OUTPATIENT
Start: 2022-04-22 | End: 2022-04-29 | Stop reason: HOSPADM

## 2022-04-22 RX ORDER — QUETIAPINE FUMARATE 300 MG/1
300 TABLET, FILM COATED ORAL NIGHTLY
Status: DISCONTINUED | OUTPATIENT
Start: 2022-04-22 | End: 2022-04-29 | Stop reason: HOSPADM

## 2022-04-22 RX ORDER — HYDROXYZINE HYDROCHLORIDE 25 MG/1
100 TABLET, FILM COATED ORAL 2 TIMES DAILY
Status: DISCONTINUED | OUTPATIENT
Start: 2022-04-22 | End: 2022-04-29 | Stop reason: HOSPADM

## 2022-04-22 RX ORDER — DEXTROSE MONOHYDRATE 50 MG/ML
100 INJECTION, SOLUTION INTRAVENOUS PRN
Status: DISCONTINUED | OUTPATIENT
Start: 2022-04-22 | End: 2022-04-29 | Stop reason: HOSPADM

## 2022-04-22 RX ORDER — ACETAMINOPHEN 325 MG/1
650 TABLET ORAL ONCE
Status: COMPLETED | OUTPATIENT
Start: 2022-04-22 | End: 2022-04-22

## 2022-04-22 RX ORDER — SODIUM CHLORIDE 9 MG/ML
1000 INJECTION, SOLUTION INTRAVENOUS CONTINUOUS
Status: DISCONTINUED | OUTPATIENT
Start: 2022-04-22 | End: 2022-04-22 | Stop reason: SDUPTHER

## 2022-04-22 RX ORDER — ACETAMINOPHEN 650 MG/1
650 SUPPOSITORY RECTAL EVERY 6 HOURS PRN
Status: DISCONTINUED | OUTPATIENT
Start: 2022-04-22 | End: 2022-04-29 | Stop reason: HOSPADM

## 2022-04-22 RX ORDER — SODIUM CHLORIDE 9 MG/ML
INJECTION, SOLUTION INTRAVENOUS CONTINUOUS
Status: DISCONTINUED | OUTPATIENT
Start: 2022-04-22 | End: 2022-04-26

## 2022-04-22 RX ORDER — PANTOPRAZOLE SODIUM 40 MG/1
40 TABLET, DELAYED RELEASE ORAL DAILY
Status: DISCONTINUED | OUTPATIENT
Start: 2022-04-23 | End: 2022-04-28

## 2022-04-22 RX ORDER — NICOTINE POLACRILEX 4 MG
15 LOZENGE BUCCAL PRN
Status: DISCONTINUED | OUTPATIENT
Start: 2022-04-22 | End: 2022-04-22

## 2022-04-22 RX ORDER — METOPROLOL SUCCINATE 25 MG/1
25 TABLET, EXTENDED RELEASE ORAL 2 TIMES DAILY
Status: CANCELLED | OUTPATIENT
Start: 2022-04-22

## 2022-04-22 RX ORDER — INSULIN LISPRO 100 [IU]/ML
0-6 INJECTION, SOLUTION INTRAVENOUS; SUBCUTANEOUS NIGHTLY
Status: DISCONTINUED | OUTPATIENT
Start: 2022-04-22 | End: 2022-04-29 | Stop reason: HOSPADM

## 2022-04-22 RX ORDER — LOSARTAN POTASSIUM 50 MG/1
100 TABLET ORAL DAILY
Status: DISCONTINUED | OUTPATIENT
Start: 2022-04-23 | End: 2022-04-29 | Stop reason: HOSPADM

## 2022-04-22 RX ORDER — ENOXAPARIN SODIUM 100 MG/ML
40 INJECTION SUBCUTANEOUS DAILY
Status: DISCONTINUED | OUTPATIENT
Start: 2022-04-23 | End: 2022-04-29 | Stop reason: HOSPADM

## 2022-04-22 RX ORDER — SODIUM CHLORIDE 9 MG/ML
INJECTION, SOLUTION INTRAVENOUS PRN
Status: DISCONTINUED | OUTPATIENT
Start: 2022-04-22 | End: 2022-04-29 | Stop reason: HOSPADM

## 2022-04-22 RX ORDER — ONDANSETRON 4 MG/1
4 TABLET, ORALLY DISINTEGRATING ORAL EVERY 8 HOURS PRN
Status: DISCONTINUED | OUTPATIENT
Start: 2022-04-22 | End: 2022-04-29 | Stop reason: HOSPADM

## 2022-04-22 RX ORDER — POLYETHYLENE GLYCOL 3350 17 G/17G
17 POWDER, FOR SOLUTION ORAL DAILY PRN
Status: DISCONTINUED | OUTPATIENT
Start: 2022-04-22 | End: 2022-04-29 | Stop reason: HOSPADM

## 2022-04-22 RX ORDER — SODIUM CHLORIDE 0.9 % (FLUSH) 0.9 %
5-40 SYRINGE (ML) INJECTION EVERY 12 HOURS SCHEDULED
Status: DISCONTINUED | OUTPATIENT
Start: 2022-04-22 | End: 2022-04-29 | Stop reason: HOSPADM

## 2022-04-22 RX ORDER — ACETAMINOPHEN 325 MG/1
650 TABLET ORAL EVERY 6 HOURS PRN
Status: DISCONTINUED | OUTPATIENT
Start: 2022-04-22 | End: 2022-04-29 | Stop reason: HOSPADM

## 2022-04-22 RX ADMIN — BUPROPION HYDROCHLORIDE 200 MG: 100 TABLET, EXTENDED RELEASE ORAL at 23:33

## 2022-04-22 RX ADMIN — SODIUM CHLORIDE, PRESERVATIVE FREE 10 ML: 5 INJECTION INTRAVENOUS at 23:30

## 2022-04-22 RX ADMIN — BUSPIRONE HYDROCHLORIDE 15 MG: 10 TABLET ORAL at 23:28

## 2022-04-22 RX ADMIN — INSULIN LISPRO 4 UNITS: 100 INJECTION, SOLUTION INTRAVENOUS; SUBCUTANEOUS at 23:30

## 2022-04-22 RX ADMIN — SODIUM CHLORIDE 1000 ML: 9 INJECTION, SOLUTION INTRAVENOUS at 17:14

## 2022-04-22 RX ADMIN — SODIUM CHLORIDE 1000 ML: 9 INJECTION, SOLUTION INTRAVENOUS at 10:21

## 2022-04-22 RX ADMIN — ROSUVASTATIN CALCIUM 20 MG: 20 TABLET, FILM COATED ORAL at 23:27

## 2022-04-22 RX ADMIN — CEFTRIAXONE 1000 MG: 1 INJECTION, POWDER, FOR SOLUTION INTRAMUSCULAR; INTRAVENOUS at 17:14

## 2022-04-22 RX ADMIN — KETOROLAC TROMETHAMINE 15 MG: 30 INJECTION, SOLUTION INTRAMUSCULAR at 10:21

## 2022-04-22 RX ADMIN — GABAPENTIN 300 MG: 300 CAPSULE ORAL at 23:28

## 2022-04-22 RX ADMIN — HYDROXYZINE HYDROCHLORIDE 100 MG: 25 TABLET, FILM COATED ORAL at 23:27

## 2022-04-22 RX ADMIN — QUETIAPINE FUMARATE 300 MG: 300 TABLET ORAL at 23:28

## 2022-04-22 RX ADMIN — SODIUM CHLORIDE: 9 INJECTION, SOLUTION INTRAVENOUS at 23:26

## 2022-04-22 RX ADMIN — ACETAMINOPHEN 650 MG: 325 TABLET ORAL at 23:27

## 2022-04-22 RX ADMIN — ACETAMINOPHEN 650 MG: 325 TABLET ORAL at 16:47

## 2022-04-22 RX ADMIN — INSULIN GLARGINE 10 UNITS: 100 INJECTION, SOLUTION SUBCUTANEOUS at 23:29

## 2022-04-22 ASSESSMENT — PAIN DESCRIPTION - ORIENTATION
ORIENTATION: LEFT
ORIENTATION: LEFT

## 2022-04-22 ASSESSMENT — PAIN SCALES - GENERAL
PAINLEVEL_OUTOF10: 7
PAINLEVEL_OUTOF10: 4
PAINLEVEL_OUTOF10: 7

## 2022-04-22 ASSESSMENT — PAIN DESCRIPTION - LOCATION
LOCATION: NECK
LOCATION: BACK;ARM;NECK
LOCATION: ABDOMEN;NECK
LOCATION: NECK

## 2022-04-22 ASSESSMENT — PAIN DESCRIPTION - PAIN TYPE
TYPE: ACUTE PAIN
TYPE: ACUTE PAIN;CHRONIC PAIN

## 2022-04-22 ASSESSMENT — PAIN DESCRIPTION - FREQUENCY
FREQUENCY: INTERMITTENT
FREQUENCY: CONTINUOUS

## 2022-04-22 ASSESSMENT — ENCOUNTER SYMPTOMS
SORE THROAT: 0
DIARRHEA: 0

## 2022-04-22 ASSESSMENT — PAIN DESCRIPTION - DESCRIPTORS
DESCRIPTORS: ACHING
DESCRIPTORS: ACHING

## 2022-04-22 ASSESSMENT — PAIN - FUNCTIONAL ASSESSMENT
PAIN_FUNCTIONAL_ASSESSMENT: PREVENTS OR INTERFERES SOME ACTIVE ACTIVITIES AND ADLS
PAIN_FUNCTIONAL_ASSESSMENT: PREVENTS OR INTERFERES SOME ACTIVE ACTIVITIES AND ADLS
PAIN_FUNCTIONAL_ASSESSMENT: 0-10

## 2022-04-22 ASSESSMENT — PAIN DESCRIPTION - ONSET: ONSET: ON-GOING

## 2022-04-22 NOTE — ED PROVIDER NOTES
[address] ENCOUNTER          ATTENDING PHYSICIAN NOTE       Date of evaluation: 4/22/2022    ADDENDUM:      Care of this patient was assumed from Dr. Moreno Becerra. The patient was seen for Fall (Pt has fallen a couple times this morning without LOC. Pt reports neck pain, back pain, and arm pain- all on left side), Neck Pain, Back Pain, and Arm Pain  . The patient's initial evaluation and plan have been discussed with the prior provider who initially evaluated the patient. Please see the original HPI and MDM for full details. Nursing Notes, Past Medical Hx, Past Surgical Hx, Social Hx, Allergies, and Family Hx were all reviewed. Diagnostic Results       RADIOLOGY:  XR SHOULDER LEFT (MIN 2 VIEWS)   Final Result      CHEST: Mild right basilar atelectasis. Lungs are otherwise clear. No pneumothorax. Normal cardiomediastinal silhouette. 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 No fracture or dislocation. XR CHEST PORTABLE   Final Result      CHEST: Mild right basilar atelectasis. Lungs are otherwise clear. No pneumothorax. Normal cardiomediastinal silhouette. 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 No fracture or dislocation.       CT Head WO Contrast    (Results Pending)       LABS:   Results for orders placed or performed during the hospital encounter of 04/22/22   CBC with Auto Differential   Result Value Ref Range    WBC 14.1 (H) 4.0 - 11.0 K/uL    RBC 3.66 (L) 4.00 - 5.20 M/uL    Hemoglobin 10.1 (L) 12.0 - 16.0 g/dL    Hematocrit 31.3 (L) 36.0 - 48.0 %    MCV 85.5 80.0 - 100.0 fL    MCH 27.5 26.0 - 34.0 pg    MCHC 32.1 31.0 - 36.0 g/dL    RDW 14.7 12.4 - 15.4 %    Platelets 530 122 - 965 K/uL    MPV 7.9 5.0 - 10.5 fL    Neutrophils % 86.4 %    Lymphocytes % 4.2 %    Monocytes % 8.1 %    Eosinophils % 1.1 %    Basophils % 0.2 %    Neutrophils Absolute 12. 2 (H) 1.7 - 7.7 K/uL    Lymphocytes Absolute 0.6 (L) 1.0 - 5.1 K/uL    Monocytes Absolute 1.1 0.0 - 1.3 K/uL    Eosinophils Absolute 0.2 0.0 - 0.6 K/uL    Basophils Absolute 0.0 0.0 - 0.2 K/uL   Basic Metabolic Panel w/ Reflex to MG   Result Value Ref Range    Sodium 137 136 - 145 mmol/L    Potassium reflex Magnesium 5.1 3.5 - 5.1 mmol/L    Chloride 100 99 - 110 mmol/L    CO2 21 21 - 32 mmol/L    Anion Gap 16 3 - 16    Glucose 327 (H) 70 - 99 mg/dL    BUN 46 (H) 7 - 20 mg/dL    CREATININE 2.1 (H) 0.6 - 1.2 mg/dL    GFR Non-African American 24 (A) >60    GFR  29 (A) >60    Calcium 8.9 8.3 - 10.6 mg/dL   Urinalysis with Reflex to Culture    Specimen: Urine   Result Value Ref Range    Color, UA Yellow Straw/Yellow    Clarity, UA Clear Clear    Glucose, Ur Negative Negative mg/dL    Bilirubin Urine Negative Negative    Ketones, Urine Negative Negative mg/dL    Specific Gravity, UA 1.025 1.005 - 1.030    Blood, Urine Negative Negative    pH, UA 5.5 5.0 - 8.0    Protein, UA 30 (A) Negative mg/dL    Urobilinogen, Urine 0.2 <2.0 E.U./dL    Nitrite, Urine POSITIVE (A) Negative    Leukocyte Esterase, Urine MODERATE (A) Negative    Microscopic Examination YES     Urine Type NotGiven     Urine Reflex to Culture Yes    Troponin   Result Value Ref Range    Troponin 0.04 (H) <0.01 ng/mL   Blood Gas, Venous   Result Value Ref Range    pH, Ru 7.297 (L) 7.350 - 7.450    pCO2, Ru 47.5 41.0 - 51.0 mmHg    pO2, Ru 30.6 25.0 - 40.0 mmHg    HCO3, Venous 23.2 (L) 24.0 - 28.0 mmol/L    Base Excess, Ru -3.4 (L) -2.0 - 3.0 mmol/L    O2 Sat, Ru 53 Not established %    Carboxyhemoglobin 1.8 (H) 0.0 - 1.5 %    MetHgb, Ru 0.6 0.0 - 1.5 %    TC02 (Calc), Ru 25 mmol/L    Hemoglobin, Ru, Reduced 46.20 %   Troponin   Result Value Ref Range    Troponin 0.04 (H) <0.01 ng/mL   Sodium, urine, random   Result Value Ref Range    Sodium, Ur 45 Not Established mmol/L   Lactate, Sepsis   Result Value Ref Range    Lactic Acid, Sepsis 1.9 0.4 - 1.9 mmol/L   Microscopic Urinalysis   Result Value Ref Range    WBC, UA  (A) 0 - 5 /HPF    RBC, UA 0-2 0 - 4 /HPF    Epithelial Cells, UA 2-5 0 - 5 /HPF    Bacteria, UA 3+ (A) None Seen /HPF   POCT Glucose   Result Value Ref Range    POC Glucose 338 (H) 70 - 99 mg/dl    Performed on ACCU-CHEK    EKG 12 Lead   Result Value Ref Range    Ventricular Rate 91 BPM    Atrial Rate 91 BPM    P-R Interval 188 ms    QRS Duration 90 ms    Q-T Interval 370 ms    QTc Calculation (Bazett) 455 ms    P Axis 66 degrees    R Axis 23 degrees    T Axis 48 degrees    Diagnosis       EKG performed in ER and to be interpreted by ER physician. Confirmed by MD, ER ([id number]),  Mar Thakkar ([id number]) on 4/22/2022 1:18:27 PM       RECENT VITALS:  BP: (!) 91/57, Temp: 98 °F (36.7 °C), Pulse: 92, Resp: 19, SpO2: 96 %     Procedures   Procedures    ED Course     The patient was given the following medications:  Orders Placed This Encounter   Medications    ketorolac (TORADOL) injection 15 mg    0.9 % sodium chloride bolus    0.9 % sodium chloride infusion    acetaminophen (TYLENOL) tablet 650 mg    cefTRIAXone (ROCEPHIN) 1000 mg IVPB in 50 mL D5W minibag     Order Specific Question:   Antimicrobial Indications     Answer:   Urinary Tract Infection     Order Specific Question:   Antimicrobial Indications     Answer: Other     Order Specific Question:   Other Abx Indication     Answer:   Sepsis       CONSULTS:  Ovi Sood / CECILIA / Clraice David is a 62 y.o. female who initially presented for falls, hypotension. Please see the original HPI and MDM for full details. At time of signout, patient was pending reevaluation after additional IV fluids and urinalysis. She did have improvement in her blood pressures after second liter of IV fluids, remained alert and well-appearing throughout. She does have signs of significant UTI.   She did also began to complain of some headache and given her falls and possible head injury, we did obtain head CT that was without acute findings. Patient given ceftriaxone based on prior urine micros. Will discuss with the hospitalist for further care and management. Clinical Impression     1. SHAYLA (acute kidney injury) ([facility] [address]    2. Hypotension due to hypovolemia    3. Leukocytosis, unspecified type    4. Elevated troponin        Disposition     PATIENT REFERRED TO:  No follow-up provider specified.     DISCHARGE MEDICATIONS:  New Prescriptions    No medications on file       DISPOSITION Decision To Admit 04/22/2022 02:20:32 PM         Bipin Arcos MD  04/22/22 4393

## 2022-04-22 NOTE — ED PROVIDER NOTES
[address] [facility]          ATTENDING PHYSICIAN NOTE       Date of evaluation: 4/22/2022    Chief Complaint     Fall (Pt has fallen a couple times this morning without LOC. Pt reports neck pain, back pain, and arm pain- all on left side), Neck Pain, Back Pain, and Arm Pain      History of Present Illness     Yanna Fischer is a 62 y.o. female who presents to the emergency department via EMS from her group home for evaluation of her second fall of the day. EMS states is not uncommon for them to be periodically called for a lift assist with no apparent trauma. Roughly an hour prior to arrival, they thought this was the case, she denied any injuries, and did not want transport to the hospital.  They were called back an hour later for a similar episode, and they convinced her to come to the hospital for evaluation. They noted her blood sugar to be elevated in the 300s, and she states that she had eaten and had just taken her insulin. She complains primarily of left upper extremity pain for me, most localized in the left shoulder. Moderate constant throbbing discomfort that is worse with movement. She also has some soreness in the elbow and wrist.  On my evaluation, she has no neck or back pain. She denies chest pain and shortness of breath. She denies fevers and chills. She denies vomiting and diarrhea. Review of Systems     Review of Systems   Constitutional: Negative for chills. HENT: Negative for sore throat. Cardiovascular: Negative for palpitations. Gastrointestinal: Negative for diarrhea. Genitourinary: Negative for dysuria and flank pain. Neurological: Negative for dizziness and headaches. All other systems reviewed and are negative.       Past Medical, Surgical, Family, and Social History     She has a past medical history of Anxiety, Diabetes mellitus ([address] Diabetic polyneuropathy ([facility] [address] Hernia, abdominal, Hyperlipidemia, Hypertension, and PTSD (post-traumatic stress disorder). She has a past surgical history that includes Hysterectomy; Cholecystectomy; colectomy; and Revision Colostomy. Her family history is not on file. She reports that she has never smoked. She has never used smokeless tobacco. She reports that she does not drink alcohol and does not use drugs. Medications     Previous Medications    ACETAMINOPHEN (TYLENOL) 500 MG TABLET    Take 1,000 mg by mouth every 6 hours as needed for Pain    ASPIRIN 81 MG CHEWABLE TABLET    Take 1 tablet by mouth daily    BUPROPION (WELLBUTRIN SR) 200 MG EXTENDED RELEASE TABLET    Take 200 mg by mouth 2 times daily     BUSPIRONE (BUSPAR) 15 MG TABLET    Take 15 mg by mouth 3 times daily     FERROUS SULFATE (IRON 325) 325 (65 FE) MG TABLET    Take 1 tablet by mouth daily (with breakfast)    GABAPENTIN (NEURONTIN) 300 MG CAPSULE    Take 600 mg by mouth 3 times daily. HYDROCHLOROTHIAZIDE (HYDRODIURIL) 25 MG TABLET    Take 25 mg by mouth daily    HYDROXYZINE (ATARAX) 50 MG TABLET    Take 100 mg by mouth in the morning and at bedtime     LIRAGLUTIDE (VICTOZA) 18 MG/3ML SOPN SC INJECTION    Inject 3 mg into the skin daily    LOSARTAN (COZAAR) 100 MG TABLET    Take 100 mg by mouth daily     METOPROLOL SUCCINATE (TOPROL XL) 25 MG EXTENDED RELEASE TABLET    Take 1 tablet by mouth in the morning and at bedtime    NAPROXEN (NAPROSYN) 500 MG TABLET    Take 500 mg by mouth 2 times daily as needed for Pain    NYSTATIN (MYCOSTATIN) 670462 UNIT/GM CREAM    Apply topically 2 times daily Apply topically 2 times daily.     PANTOPRAZOLE (PROTONIX) 40 MG TABLET    Take 40 mg by mouth daily    QUETIAPINE (SEROQUEL) 300 MG TABLET    Take 300 mg by mouth nightly    ROSUVASTATIN (CRESTOR) 20 MG TABLET    Take 1 tablet by mouth nightly    SERTRALINE (ZOLOFT) 100 MG TABLET    Take 150 mg by mouth daily        Allergies     She is allergic to azithromycin, metronidazole, clindamycin, penicillin g, penicillin v, tetanus immune globulin, and codeine. Physical Exam     INITIAL VITALS: BP (!) 80/48   Pulse 91   Temp 98 °F (36.7 °C) (Oral)   Resp 20   SpO2 98%    General: Well developed, well nourished female in no acute distress. HEENT: Sclera white, conjunctiva pink, mucous membranes moist and oropharynx clear  Neck: Supple, no meningismus or JVD  Respirations: Unlabored with symmetric chest rise and fall, lungs clear  CV: Regular rate and rhythm with strong distal pulses  Abd: Soft without rebound guarding distention or focal tenderness  Musculoskeletal: Other than left upper extremity, she moves all extremities with no signs of orthopedic trauma or edema. Left upper extremity exam is noted for mild pain with range of motion of the left shoulder. Otherwise, no pain with range of motion of the other joints, and no tenderness to palpation to the bones or joints. Skin: Warm and dry with no rashes or lesions. Neuro: Awake and alert with no focal neurologic deficits. Normal speech and facial symmetry. Psych: Mood and affect are normal.    Diagnostic Results     EKG   Indication: Hypotension. Interpreted by me. Normal sinus rhythm, normal axis and intervals, rate is 91. No ST segment or T wave changes. Interpretation: Normal sinus rhythm no signs of acute ischemia or infarction    RADIOLOGY:  XR SHOULDER LEFT (MIN 2 VIEWS)   Final Result      CHEST: Mild right basilar atelectasis. Lungs are otherwise clear. No pneumothorax. Normal cardiomediastinal silhouette. 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 No fracture or dislocation. XR CHEST PORTABLE   Final Result      CHEST: Mild right basilar atelectasis. Lungs are otherwise clear. No pneumothorax. Normal cardiomediastinal silhouette. 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 No fracture or dislocation.           LABS:   Results for orders placed or performed during the hospital encounter of 04/22/22   CBC with Auto Differential   Result Value Ref Range    WBC 14.1 (H) 4.0 - 11.0 K/uL    RBC 3.66 (L) 4.00 - 5.20 M/uL    Hemoglobin 10.1 (L) 12.0 - 16.0 g/dL    Hematocrit 31.3 (L) 36.0 - 48.0 %    MCV 85.5 80.0 - 100.0 fL    MCH 27.5 26.0 - 34.0 pg    MCHC 32.1 31.0 - 36.0 g/dL    RDW 14.7 12.4 - 15.4 %    Platelets 175 938 - 131 K/uL    MPV 7.9 5.0 - 10.5 fL    Neutrophils % 86.4 %    Lymphocytes % 4.2 %    Monocytes % 8.1 %    Eosinophils % 1.1 %    Basophils % 0.2 %    Neutrophils Absolute 12.2 (H) 1.7 - 7.7 K/uL    Lymphocytes Absolute 0.6 (L) 1.0 - 5.1 K/uL    Monocytes Absolute 1.1 0.0 - 1.3 K/uL    Eosinophils Absolute 0.2 0.0 - 0.6 K/uL    Basophils Absolute 0.0 0.0 - 0.2 K/uL   Basic Metabolic Panel w/ Reflex to MG   Result Value Ref Range    Sodium 137 136 - 145 mmol/L    Potassium reflex Magnesium 5.1 3.5 - 5.1 mmol/L    Chloride 100 99 - 110 mmol/L    CO2 21 21 - 32 mmol/L    Anion Gap 16 3 - 16    Glucose 327 (H) 70 - 99 mg/dL    BUN 46 (H) 7 - 20 mg/dL    CREATININE 2.1 (H) 0.6 - 1.2 mg/dL    GFR Non-African American 24 (A) >60    GFR  29 (A) >60    Calcium 8.9 8.3 - 10.6 mg/dL   Troponin   Result Value Ref Range    Troponin 0.04 (H) <0.01 ng/mL   Blood Gas, Venous   Result Value Ref Range    pH, Ru 7.297 (L) 7.350 - 7.450    pCO2, Ru 47.5 41.0 - 51.0 mmHg    pO2, Ru 30.6 25.0 - 40.0 mmHg    HCO3, Venous 23.2 (L) 24.0 - 28.0 mmol/L    Base Excess, Ru -3.4 (L) -2.0 - 3.0 mmol/L    O2 Sat, Ru 53 Not established %    Carboxyhemoglobin 1.8 (H) 0.0 - 1.5 %    MetHgb, Ru 0.6 0.0 - 1.5 %    TC02 (Calc), Ru 25 mmol/L    Hemoglobin, Ru, Reduced 46.20 %   Troponin   Result Value Ref Range    Troponin 0.04 (H) <0.01 ng/mL   POCT Glucose   Result Value Ref Range    POC Glucose 338 (H) 70 - 99 mg/dl    Performed on ACCU-CHEK    EKG 12 Lead   Result Value Ref Range    Ventricular Rate 91 BPM    Atrial Rate 91 BPM    P-R Interval 188 ms QRS Duration 90 ms    Q-T Interval 370 ms    QTc Calculation (Bazett) 455 ms    P Axis 66 degrees    R Axis 23 degrees    T Axis 48 degrees    Diagnosis       EKG performed in ER and to be interpreted by ER physician. Confirmed by MD, ER ([id number]),  Jorden Yanes ([id number]) on 4/22/2022 1:18:27 PM     RECENT VITALS:  BP: (!) 96/49, Temp: 98 °F (36.7 °C), Pulse: 88, Resp: 20, SpO2: 98 %       ED Course     Nursing Notes, Past Medical Hx, Past Surgical Hx, Social Hx, Allergies, and Family Hx were reviewed. The patient was given the following medications:  Orders Placed This Encounter   Medications    ketorolac (TORADOL) injection 15 mg    0.9 % sodium chloride bolus     She was seen and examined on arrival to the treatment area and frequently reassessed. She is remained stable throughout her emergency department course. She received Toradol for her left shoulder discomfort with some improvement, and this was done before her labs returned. She has received IV fluids, and blood pressure has been fluid responsive. Hospitalist has been paged for admission. CONSULTS:  Ovi Sood / CECILIA / Imelda Hua is a 62 y.o. female presents to the emergency department after EMS was called for the second time today for a lift assist.    Only trauma related complaint is left arm pain, which appears isolated to the left shoulder. X-rays are reassuring, and she has had some improvement with standard therapy. She was found to have hypotension and acute kidney injury. She is not tachycardic. Blood pressure has improved with IV fluids, and I suspect a prerenal etiology. She was noted to have an elevated white blood cell count without obvious source of infection. Chest x-ray shows no pneumonia, and she has no cough. She has no GI symptoms, and her abdominal exam is benign. She denies urinary symptoms, but has not yet provided a urine sample.   She has history of MDRO E. coli in the past, and I am hesitant to empirically start antibiotics until an infection is confirmed. A pure wick has been placed, and she is also taking oral fluids. I believe the hypotension is more related to hypovolemia then it is to severe sepsis. Her lactate is reassuring as well. She was unexpectedly found to have a detectable troponin with a nonischemic EKG and no signs or symptoms of acute coronary syndrome. Repeat troponin is unchanged. Her blood sugar is elevated, and she does have a mild acidosis on VBG. However, anion gap and bicarb on renal panel are reassuring. She is receiving IV fluids, and this can continue to be monitored. She will require admission for further fluid resuscitation, monitoring of improvement in her laboratory studies, and improvement in her blood pressure. I do feel that she is stable for the floor currently, although level of care will be discussed with the hospitalist.    Clinical Impression     1. SHAYLA (acute kidney injury) ([facility] [address]    2. Hypotension due to hypovolemia    3. Leukocytosis, unspecified type    4.  Elevated troponin        Disposition     DISPOSITION Decision To Admit 04/22/2022 02:20:32 PM         Ced Edwards MD  04/22/22 5708

## 2022-04-23 LAB
ANION GAP SERPL CALCULATED.3IONS-SCNC: 12 MMOL/L (ref 3–16)
ANISOCYTOSIS: ABNORMAL
BANDED NEUTROPHILS RELATIVE PERCENT: 6 % (ref 0–7)
BASOPHILS ABSOLUTE: 0 K/UL (ref 0–0.2)
BASOPHILS RELATIVE PERCENT: 0 %
BUN BLDV-MCNC: 56 MG/DL (ref 7–20)
CALCIUM SERPL-MCNC: 8.3 MG/DL (ref 8.3–10.6)
CHLORIDE BLD-SCNC: 107 MMOL/L (ref 99–110)
CO2: 20 MMOL/L (ref 21–32)
CREAT SERPL-MCNC: 2.1 MG/DL (ref 0.6–1.2)
DOHLE BODIES: PRESENT
EOSINOPHILS ABSOLUTE: 0 K/UL (ref 0–0.6)
EOSINOPHILS RELATIVE PERCENT: 0 %
ESTIMATED AVERAGE GLUCOSE: 205.9 MG/DL
GFR AFRICAN AMERICAN: 29
GFR NON-AFRICAN AMERICAN: 24
GLUCOSE BLD-MCNC: 219 MG/DL (ref 70–99)
GLUCOSE BLD-MCNC: 249 MG/DL (ref 70–99)
GLUCOSE BLD-MCNC: 276 MG/DL (ref 70–99)
GLUCOSE BLD-MCNC: 293 MG/DL (ref 70–99)
GLUCOSE BLD-MCNC: 342 MG/DL (ref 70–99)
HBA1C MFR BLD: 8.8 %
HCT VFR BLD CALC: 26.9 % (ref 36–48)
HEMOGLOBIN: 8.8 G/DL (ref 12–16)
HYPOCHROMIA: ABNORMAL
LYMPHOCYTES ABSOLUTE: 0.4 K/UL (ref 1–5.1)
LYMPHOCYTES RELATIVE PERCENT: 5 %
MCH RBC QN AUTO: 27.9 PG (ref 26–34)
MCHC RBC AUTO-ENTMCNC: 32.7 G/DL (ref 31–36)
MCV RBC AUTO: 85.3 FL (ref 80–100)
METAMYELOCYTES RELATIVE PERCENT: 1 %
MONOCYTES ABSOLUTE: 0.6 K/UL (ref 0–1.3)
MONOCYTES RELATIVE PERCENT: 7 %
NEUTROPHILS ABSOLUTE: 7.4 K/UL (ref 1.7–7.7)
NEUTROPHILS RELATIVE PERCENT: 81 %
PDW BLD-RTO: 15.2 % (ref 12.4–15.4)
PERFORMED ON: ABNORMAL
PLATELET # BLD: 96 K/UL (ref 135–450)
PLATELET SLIDE REVIEW: ABNORMAL
PMV BLD AUTO: 7.7 FL (ref 5–10.5)
POTASSIUM REFLEX MAGNESIUM: 4.3 MMOL/L (ref 3.5–5.1)
RBC # BLD: 3.15 M/UL (ref 4–5.2)
REPORT: NORMAL
SLIDE REVIEW: ABNORMAL
SODIUM BLD-SCNC: 139 MMOL/L (ref 136–145)
TEAR DROP CELLS: ABNORMAL
TOXIC GRANULATION: PRESENT
VACUOLATED NEUTROPHILS: PRESENT
WBC # BLD: 8.4 K/UL (ref 4–11)

## 2022-04-23 PROCEDURE — 85025 COMPLETE CBC W/AUTO DIFF WBC: CPT

## 2022-04-23 PROCEDURE — 6370000000 HC RX 637 (ALT 250 FOR IP): Performed by: INTERNAL MEDICINE

## 2022-04-23 PROCEDURE — 36415 COLL VENOUS BLD VENIPUNCTURE: CPT

## 2022-04-23 PROCEDURE — 51798 US URINE CAPACITY MEASURE: CPT

## 2022-04-23 PROCEDURE — 51701 INSERT BLADDER CATHETER: CPT

## 2022-04-23 PROCEDURE — 1200000000 HC SEMI PRIVATE

## 2022-04-23 PROCEDURE — 80048 BASIC METABOLIC PNL TOTAL CA: CPT

## 2022-04-23 PROCEDURE — 6360000002 HC RX W HCPCS: Performed by: INTERNAL MEDICINE

## 2022-04-23 PROCEDURE — 2580000003 HC RX 258: Performed by: INTERNAL MEDICINE

## 2022-04-23 RX ADMIN — ROSUVASTATIN CALCIUM 20 MG: 20 TABLET, FILM COATED ORAL at 21:05

## 2022-04-23 RX ADMIN — GABAPENTIN 300 MG: 300 CAPSULE ORAL at 09:52

## 2022-04-23 RX ADMIN — ENOXAPARIN SODIUM 40 MG: 100 INJECTION SUBCUTANEOUS at 10:02

## 2022-04-23 RX ADMIN — BUPROPION HYDROCHLORIDE 200 MG: 100 TABLET, EXTENDED RELEASE ORAL at 10:00

## 2022-04-23 RX ADMIN — GABAPENTIN 300 MG: 300 CAPSULE ORAL at 13:06

## 2022-04-23 RX ADMIN — BUSPIRONE HYDROCHLORIDE 15 MG: 10 TABLET ORAL at 21:05

## 2022-04-23 RX ADMIN — CEFTRIAXONE SODIUM 2000 MG: 2 INJECTION, POWDER, FOR SOLUTION INTRAMUSCULAR; INTRAVENOUS at 13:09

## 2022-04-23 RX ADMIN — INSULIN LISPRO 8 UNITS: 100 INJECTION, SOLUTION INTRAVENOUS; SUBCUTANEOUS at 16:39

## 2022-04-23 RX ADMIN — SODIUM CHLORIDE, PRESERVATIVE FREE 10 ML: 5 INJECTION INTRAVENOUS at 21:06

## 2022-04-23 RX ADMIN — HYDROXYZINE HYDROCHLORIDE 100 MG: 25 TABLET, FILM COATED ORAL at 09:52

## 2022-04-23 RX ADMIN — INSULIN LISPRO 4 UNITS: 100 INJECTION, SOLUTION INTRAVENOUS; SUBCUTANEOUS at 10:03

## 2022-04-23 RX ADMIN — QUETIAPINE FUMARATE 300 MG: 300 TABLET ORAL at 21:05

## 2022-04-23 RX ADMIN — BUSPIRONE HYDROCHLORIDE 15 MG: 10 TABLET ORAL at 13:06

## 2022-04-23 RX ADMIN — ASPIRIN 81 MG 81 MG: 81 TABLET ORAL at 09:52

## 2022-04-23 RX ADMIN — HYDROXYZINE HYDROCHLORIDE 100 MG: 25 TABLET, FILM COATED ORAL at 21:05

## 2022-04-23 RX ADMIN — SODIUM CHLORIDE: 9 INJECTION, SOLUTION INTRAVENOUS at 10:28

## 2022-04-23 RX ADMIN — FERROUS SULFATE TAB 325 MG (65 MG ELEMENTAL FE) 325 MG: 325 (65 FE) TAB at 09:59

## 2022-04-23 RX ADMIN — INSULIN LISPRO 3 UNITS: 100 INJECTION, SOLUTION INTRAVENOUS; SUBCUTANEOUS at 21:03

## 2022-04-23 RX ADMIN — ACETAMINOPHEN 650 MG: 325 TABLET ORAL at 18:55

## 2022-04-23 RX ADMIN — GABAPENTIN 300 MG: 300 CAPSULE ORAL at 21:06

## 2022-04-23 RX ADMIN — ONDANSETRON 4 MG: 2 INJECTION INTRAMUSCULAR; INTRAVENOUS at 10:25

## 2022-04-23 RX ADMIN — BUSPIRONE HYDROCHLORIDE 15 MG: 10 TABLET ORAL at 09:52

## 2022-04-23 RX ADMIN — SODIUM CHLORIDE: 9 INJECTION, SOLUTION INTRAVENOUS at 21:35

## 2022-04-23 RX ADMIN — SERTRALINE HYDROCHLORIDE 150 MG: 100 TABLET ORAL at 09:52

## 2022-04-23 RX ADMIN — PANTOPRAZOLE SODIUM 40 MG: 40 TABLET, DELAYED RELEASE ORAL at 09:52

## 2022-04-23 RX ADMIN — BUPROPION HYDROCHLORIDE 200 MG: 100 TABLET, EXTENDED RELEASE ORAL at 21:19

## 2022-04-23 RX ADMIN — INSULIN LISPRO 4 UNITS: 100 INJECTION, SOLUTION INTRAVENOUS; SUBCUTANEOUS at 12:00

## 2022-04-23 RX ADMIN — INSULIN GLARGINE 10 UNITS: 100 INJECTION, SOLUTION SUBCUTANEOUS at 21:04

## 2022-04-23 RX ADMIN — ACETAMINOPHEN 650 MG: 325 TABLET ORAL at 09:59

## 2022-04-23 ASSESSMENT — PAIN SCALES - GENERAL
PAINLEVEL_OUTOF10: 3
PAINLEVEL_OUTOF10: 0
PAINLEVEL_OUTOF10: 8
PAINLEVEL_OUTOF10: 1

## 2022-04-23 ASSESSMENT — PAIN DESCRIPTION - ORIENTATION: ORIENTATION: UPPER

## 2022-04-23 ASSESSMENT — PAIN DESCRIPTION - DESCRIPTORS: DESCRIPTORS: ACHING

## 2022-04-23 ASSESSMENT — PAIN DESCRIPTION - LOCATION: LOCATION: BACK

## 2022-04-23 NOTE — PLAN OF CARE
Problem: Skin/Tissue Integrity  Goal: Absence of new skin breakdown  Description: 1. Monitor for areas of redness and/or skin breakdown  2. Assess vascular access sites hourly  3. Every 4-6 hours minimum:  Change oxygen saturation probe site  4. Every 4-6 hours:  If on nasal continuous positive airway pressure, respiratory therapy assess nares and determine need for appliance change or resting period. Outcome: Progressing  Note: Pt has remained free from skin breakdown during shift. Problem: Safety - Adult  Goal: Free from fall injury  Outcome: Progressing  Note: Pt has remained free from falls during shift. Pt's bed is locked in lowest position with alarm on, call light, bedside table, and personal belongings within reach.

## 2022-04-23 NOTE — PROGRESS NOTES
Comprehensive Nutrition Assessment    RECOMMENDATIONS:  1. PO Diet: Continue 3CC diet   2. ONS: Start Glucerna QD  3. Nutrition Education: RD to offer DM diet edu throughout adm. NUTRITION ASSESSMENT:   Nutritional summary & status: Nutrition screening triggered for poor appetite and wt loss. No recorded po intakes yet. Reviewed wt hx; -9 lbs within past 4 mths but not significant. Last A1C 11.2% - noted pending updated A1C. Wt fluctuations expected throughout adm as pt has edema. RD adding glucerna ONS with lunch to promote adequate intakes. WIll continue to monitor nutrition adequacy throughout adm closely.  Admission/PMH: SHAYLA; UTI; PMH: HTN, DM    MALNUTRITION ASSESSMENT  Context of Malnutrition: Acute Illness   Malnutrition Status: No malnutrition    NUTRITION DIAGNOSIS   Altered nutrition-related lab values related to endocrine dysfuntion as evidenced by  (hx of DM, elevated BG, elevated A1C)    NUTRITION INTERVENTION  Food and/or Nutrient Delivery:  Continue Current Diet,Start Oral Nutrition Supplement  Nutrition Education/Counseling:  No recommendation at this time   Goals:  Pt will consume and tolerate >50% of all meals and ONS offered throughout adm. Nutrition Monitoring and Evaluation:   Food/Nutrient Intake Outcomes:  Food and Nutrient Intake,Supplement Intake  Physical Signs/Symptoms Outcomes:  Biochemical Data,GI Status,Weight     OBJECTIVE DATA: Significant to nutrition assessment  · Nutrition-Focused Physical Findings: non-pitting generalized edema, no bm noted yet  · Labs: Reviewed; AVGJ 029, A1C 11.2 (12/24/21) - updated A1C pending  · Meds: Reviewed; insulin, protonix, ferrous sulfate, prn glycolax, prn zofran   · Wounds: None       CURRENT NUTRITION THERAPIES  ADULT DIET;  Regular; 3 carb choices (45 gm/meal)     PO Intake: Unable to assess   PO Supplement Intake:None Ordered  Additional Sources of Calories/IVF:NS @ 100 mL/hr      ANTHROPOMETRICS  Current Weight: 259 lb 14.8 oz (117.9 kg)    Admission weight: 259 lb 14.8 oz (117.9 kg)  Ideal Body Weight (IBW): 125 lbs   Usual Bodyweight  (~250-255 lbs per EMR)   Weight Changes: No significant wt loss noted; -9 lbs within past ~4 mths (3%)       BMI: 43.25    Wt Readings from Last 50 Encounters:   04/22/22 259 lb 14.8 oz (117.9 kg)   03/20/22 260 lb (117.9 kg)   02/25/22 253 lb 12.8 oz (115.1 kg)   12/28/21 268 lb 15.4 oz (122 kg)   11/04/21 255 lb (115.7 kg)   10/14/21 255 lb (115.7 kg)   10/11/21 254 lb (115.2 kg)   08/02/21 255 lb 1.2 oz (115.7 kg)   07/05/21 240 lb (108.9 kg)   06/20/21 242 lb (109.8 kg)     COMPARATIVE STANDARDS  Energy (kcal):  8901-1489 (8-11)     Protein (g):  68-80  (1.2-1.4)       Fluid (ml/day):  min 1500mL/day    The patient will still be monitored per nutrition standards of care. Consult dietitian if nutrition interventions essential to patient care is needed.      Miguel Olmedo [address], [address] [place] [address]:  [phone]  Office:  [phone]

## 2022-04-23 NOTE — PLAN OF CARE
Problem: Skin/Tissue Integrity  Goal: Absence of new skin breakdown  Description: 1. Monitor for areas of redness and/or skin breakdown  2. Assess vascular access sites hourly  3. Every 4-6 hours minimum:  Change oxygen saturation probe site  4. Every 4-6 hours:  If on nasal continuous positive airway pressure, respiratory therapy assess nares and determine need for appliance change or resting period. 4/23/2022 1245 by Yvette Aguila RN  Outcome: Progressing  4/23/2022 0313 by Zaria Thompson RN  Outcome: Progressing  Note: Pt has remained free from skin breakdown during shift. Problem: Safety - Adult  Goal: Free from fall injury  4/23/2022 1245 by Yvette Aguila RN  Outcome: Progressing  4/23/2022 0313 by Zaria Thompson RN  Outcome: Progressing  Note: Pt has remained free from falls during shift. Pt's bed is locked in lowest position with alarm on, call light, bedside table, and personal belongings within reach.

## 2022-04-23 NOTE — PROGRESS NOTES
4 Eyes Admission Assessment     I agree as the admission nurse that 2 RN's have performed a thorough Head to Toe Skin Assessment on the patient. ALL assessment sites listed below have been assessed on admission. Areas assessed by both nurses: Frost Zavaleta   [x]   Head, Face, and Ears   [x]   Shoulders, Back, and Chest  [x]   Arms, Elbows, and Hands   [x]   Coccyx, Sacrum, and Ischum  [x]   Legs, Feet, and Heels        Does the Patient have Skin Breakdown?   Yes a wound was noted on the Admission Assessment and an LDA was Initiated documentation include the Radha-wound, Wound Assessment, Measurements, Dressing Treatment, Drainage, and Color\",         Regan Prevention initiated:  Yes   Wound Care Orders initiated:  No      WOC nurse consulted for Pressure Injury (Stage 3,4, Unstageable, DTI, NWPT, and Complex wounds):  No      Nurse 1 eSignature: Electronically signed by Rick Conner RN on 4/22/22 at 10:31 PM EDT    **SHARE this note so that the co-signing nurse is able to place an eSignature**    Nurse 2 eSignature: Electronically signed by Barbaraann Bumpers, RN on 4/22/22 at 10:32 PM EDT

## 2022-04-23 NOTE — PROGRESS NOTES
Hospitalist Progress Note      PCP: Franklyn Arevalo, APRN - CNP    Date of Admission: 4/22/2022    Chief Complaint: [facility] Course:    72-year-old female with past medical history of hypertension, diabetes mellitus presented from the group home due to fall. Subjective: No acute events reported overnight. Patient states that she is starting to feel slightly better but still feels pretty weak and tired. Denies any other new complaints including fevers, chills.       Medications:  Reviewed    Infusion Medications    sodium chloride      dextrose      sodium chloride 100 mL/hr at 04/23/22 1028     Scheduled Medications    cefTRIAXone (ROCEPHIN) IV  2,000 mg IntraVENous Q12H    pantoprazole  40 mg Oral Daily    sertraline  150 mg Oral Daily    busPIRone  15 mg Oral TID    buPROPion  200 mg Oral BID    QUEtiapine  300 mg Oral Nightly    gabapentin  300 mg Oral TID    hydrOXYzine  100 mg Oral BID    ferrous sulfate  325 mg Oral Daily with breakfast    aspirin  81 mg Oral Daily    rosuvastatin  20 mg Oral Nightly    [Held by provider] hydroCHLOROthiazide  25 mg Oral Daily    [Held by provider] losartan  100 mg Oral Daily    sodium chloride flush  5-40 mL IntraVENous 2 times per day    enoxaparin  40 mg SubCUTAneous Daily    insulin glargine  10 Units SubCUTAneous Nightly    insulin lispro  0-12 Units SubCUTAneous TID WC    insulin lispro  0-6 Units SubCUTAneous Nightly     PRN Meds: sodium chloride flush, sodium chloride, ondansetron **OR** ondansetron, polyethylene glycol, acetaminophen **OR** acetaminophen, glucagon (rDNA), dextrose, dextrose bolus (hypoglycemia) **OR** dextrose bolus (hypoglycemia), glucose      Intake/Output Summary (Last 24 hours) at 4/23/2022 1430  Last data filed at 4/23/2022 1241  Gross per 24 hour   Intake 640 ml   Output 0 ml   Net 640 ml       Physical Exam Performed:    BP (!) 105/54   Pulse 85   Temp 98 °F (36.7 °C) (Oral)   Resp 16   Wt 259 lb 14.8 oz (117.9 kg)   SpO2 95%   BMI 43.25 kg/m²     General appearance: No apparent distress, appears stated age and cooperative. HEENT: Pupils equal, round, and reactive to light. Conjunctivae/corneas clear. Neck: Supple, with full range of motion. No jugular venous distention. Trachea midline. Respiratory:  Normal respiratory effort. Clear to auscultation, bilaterally without Rales/Wheezes/Rhonchi. Cardiovascular: Regular rate and rhythm with normal S1/S2 without murmurs, rubs or gallops. Abdomen: Soft, non-tender, non-distended with normal bowel sounds. Musculoskeletal: No clubbing, cyanosis or edema bilaterally. Full range of motion without deformity. Skin: Skin color, texture, turgor normal.  No rashes or lesions. Neurologic:  Neurovascularly intact without any focal sensory/motor deficits. Cranial nerves: II-XII intact, grossly non-focal.  Psychiatric: Alert and oriented, thought content appropriate, normal insight  Capillary Refill: Brisk,3 seconds, normal   Peripheral Pulses: +2 palpable, equal bilaterally       Labs:   Recent Labs     04/22/22  1025 04/23/22  0905   WBC 14.1* 8.4   HGB 10.1* 8.8*   HCT 31.3* 26.9*    96*     Recent Labs     04/22/22  1025 04/23/22  0905    139   K 5.1 4.3    107   CO2 21 20*   BUN 46* 56*   CREATININE 2.1* 2.1*   CALCIUM 8.9 8.3     No results for input(s): AST, ALT, BILIDIR, BILITOT, ALKPHOS in the last 72 hours. No results for input(s): INR in the last 72 hours. Recent Labs     04/22/22  1025 04/22/22  1327   TROPONINI 0.04* 0.04*       Urinalysis:      Lab Results   Component Value Date    NITRU POSITIVE 04/22/2022    WBCUA  04/22/2022    BACTERIA 3+ 04/22/2022    RBCUA 0-2 04/22/2022    BLOODU Negative 04/22/2022    SPECGRAV 1.025 04/22/2022    GLUCOSEU Negative 04/22/2022       Radiology:  CT Head WO Contrast   Final Result   1. No acute intracranial abnormality.       XR SHOULDER LEFT (MIN 2 VIEWS)   Final Result      CHEST: Mild right basilar atelectasis. Lungs are otherwise clear. No pneumothorax. Normal cardiomediastinal silhouette. 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 No fracture or dislocation. XR CHEST PORTABLE   Final Result      CHEST: Mild right basilar atelectasis. Lungs are otherwise clear. No pneumothorax. Normal cardiomediastinal silhouette. 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 No fracture or dislocation. Assessment/Plan:    Active Hospital Problems    Diagnosis     SHAYLA (acute kidney injury) ([facility] [address] [N17.9]      Priority: Medium     Urinary tract infection  E. coli bacteremia  - Continue IV Rocephin 2 g twice daily  -  Blood cultures positive for E. coli     Acute kidney injury  -Continue IV fluids  - Recheck creatinine  - Hold nephrotoxic medications. Diabetes mellitus  - Continue Lantus, insulin sliding scale     Generalized weakness  Mechanical falls  - PT/OT     Hypertension  - Will hold losartan, hydrochlorothiazide, metoprolol for now     GERD  - continue protonix    DVT Prophylaxis: Lovenox  Diet: ADULT DIET;  Regular; 3 carb choices (45 gm/meal)  ADULT ORAL NUTRITION SUPPLEMENT; Lunch; Diabetic Oral Supplement  Code Status: Full Code    PT/OT Eval Status: Pending    Dispo -likely 1 to 2 days    Quique Funes MD

## 2022-04-23 NOTE — PLAN OF CARE
Problem: Nutrition Deficit:  Goal: Optimize nutritional status  Nutrition Problem: Altered nutrition-related lab values  Intervention: Continue Current Diet,Start Oral Nutrition Supplement  Nutrition Goals: Pt will consume and tolerate >50% of all meals and ONS offered throughout adm.

## 2022-04-23 NOTE — H&P
Hospital Medicine History & Physical      PCP: EUGENIE Coleman CNP    Date of Admission: 4/22/2022    Date of Service: Pt seen/examined on 4/22/2022    Chief Complaint:      Chief Complaint   Patient presents with    Fall     Pt has fallen a couple times this morning without LOC. Pt reports neck pain, back pain, and arm pain- all on left side    Neck Pain    Back Pain    Arm Pain       History Of Present Illness:     71-year-old female with past medical history of hypertension, diabetes mellitus presented from the group home due to fall. Patient is here that she had couple of falls today. She said that she has been feeding really weak and tired which is very unusual for her. Due to the fall she was brought to the hospital.  I when she was noted to be hypotensive. Lab work showed elevated creatinine of 2.1. Blood glucose was also noted to be in the 200s. Lab work was concerning for urinary tract infection. Patient was admitted to the hospital for further work-up and management. Past Medical History:        Diagnosis Date    Anxiety     Diabetes mellitus ([address]     Diabetic polyneuropathy ([address]     Hernia, abdominal     Hyperlipidemia     Hypertension     PTSD (post-traumatic stress disorder)        Past Surgical History:        Procedure Laterality Date    CHOLECYSTECTOMY      COLECTOMY      HYSTERECTOMY      REVISION COLOSTOMY         Medications Prior to Admission:    Prior to Admission medications    Medication Sig Start Date End Date Taking?  Authorizing Provider   metoprolol succinate (TOPROL XL) 25 MG extended release tablet Take 1 tablet by mouth in the morning and at bedtime 2/25/22   Abel Garza MD   aspirin 81 MG chewable tablet Take 1 tablet by mouth daily 2/26/22   Abel Garza MD   rosuvastatin (CRESTOR) 20 MG tablet Take 1 tablet by mouth nightly 2/25/22   Abel Garza MD   Liraglutide (VICTOZA) 18 MG/3ML SOPN SC injection Inject 3 mg into the skin daily Historical Provider, MD   naproxen (NAPROSYN) 500 MG tablet Take 500 mg by mouth 2 times daily as needed for Pain    Historical Provider, MD   nystatin (MYCOSTATIN) 012747 UNIT/GM cream Apply topically 2 times daily Apply topically 2 times daily. Historical Provider, MD   hydroCHLOROthiazide (HYDRODIURIL) 25 MG tablet Take 25 mg by mouth daily    Historical Provider, MD   ferrous sulfate (IRON 325) 325 (65 Fe) MG tablet Take 1 tablet by mouth daily (with breakfast) 12/28/21   Laura Gill MD   QUEtiapine (SEROQUEL) 300 MG tablet Take 300 mg by mouth nightly    Historical Provider, MD   acetaminophen (TYLENOL) 500 MG tablet Take 1,000 mg by mouth every 6 hours as needed for Pain    Historical Provider, MD   gabapentin (NEURONTIN) 300 MG capsule Take 600 mg by mouth 3 times daily. Historical Provider, MD   hydrOXYzine (ATARAX) 50 MG tablet Take 100 mg by mouth in the morning and at bedtime     Historical Provider, MD   losartan (COZAAR) 100 MG tablet Take 100 mg by mouth daily     Historical Provider, MD   pantoprazole (PROTONIX) 40 MG tablet Take 40 mg by mouth daily    Historical Provider, MD   sertraline (ZOLOFT) 100 MG tablet Take 150 mg by mouth daily     Historical Provider, MD   busPIRone (BUSPAR) 15 MG tablet Take 15 mg by mouth 3 times daily  9/8/21   Historical Provider, MD   buPROPion [facility]) 200 MG extended release tablet Take 200 mg by mouth 2 times daily  10/4/21   Historical Provider, MD       Allergies:  Azithromycin, Metronidazole, Clindamycin, Penicillin g, Penicillin v, Tetanus immune globulin, and Codeine    Social History:       reports that she has never smoked. She has never used smokeless tobacco. She reports that she does not drink alcohol and does not use drugs. Family History:  Reviewed in detail and negative for DM, Early CAD, Cancer, CVA. History reviewed. No pertinent family history. REVIEW OF SYSTEMS:   Positive review  noted in the HPI.  All other systems reviewed and negative. PHYSICAL EXAM:    BP (!) 91/57   Pulse 92   Temp 98 °F (36.7 °C) (Oral)   Resp 19   SpO2 96%   General Appearance: alert and oriented to person, place and time, well developed and well- nourished, in no acute distress  Skin: warm and dry, no rash or erythema  Head: normocephalic and atraumatic  Eyes: pupils equal, round, and reactive to light, extraocular eye movements intact, conjunctivae normal  ENT: tympanic membrane, external ear and ear canal normal bilaterally, nose without deformity, nasal mucosa and turbinates normal without polyps  Neck: supple and non-tender without mass, no thyromegaly or thyroid nodules, no cervical lymphadenopathy  Pulmonary/Chest: clear to auscultation bilaterally- no wheezes, rales or rhonchi, normal air movement, no respiratory distress  Cardiovascular: normal rate, regular rhythm, normal S1 and S2, no murmurs, rubs, clicks, or gallops, Peripheral pulses good, Cap refill <3 sec, no carotid bruits  Abdomen: soft, non-tender, non-distended, normal bowel sounds, no masses or organomegaly  Extremities: no cyanosis, clubbing or edema  Musculoskeletal: normal range of motion, no joint swelling, deformity or tenderness  Neurologic: reflexes normal and symmetric, no cranial nerve deficit, gait, coordination and speech normal      LABS:        CBC   Recent Labs     04/22/22  1025   WBC 14.1*   HGB 10.1*   HCT 31.3*         RENAL  Recent Labs     04/22/22  1025      K 5.1      CO2 21   BUN 46*   CREATININE 2.1*     LFT'S  No results for input(s): AST, ALT, ALB, BILIDIR, BILITOT, ALKPHOS in the last 72 hours. COAG  No results for input(s): INR in the last 72 hours.   CARDIAC ENZYMES  Recent Labs     04/22/22  1025 04/22/22  1327   TROPONINI 0.04* 0.04*       U/A:    Lab Results   Component Value Date    COLORU Yellow 04/22/2022    WBCUA  04/22/2022    RBCUA 0-2 04/22/2022    BACTERIA 3+ 04/22/2022    CLARITYU Clear 04/22/2022    SPECGRAV 1.025 04/22/2022    LEUKOCYTESUR MODERATE 04/22/2022    BLOODU Negative 04/22/2022    GLUCOSEU Negative 04/22/2022       ABG  No results found for: RUV9EUD, BEART, P8QFRLGG, PHART, THGBART, KLY3NUY, PO2ART, XXB5TII    UA:  Recent Labs     04/22/22  1725   COLORU Yellow   PHUR 5.5   WBCUA *   RBCUA 0-2   BACTERIA 3+*   CLARITYU Clear   SPECGRAV 1.025   LEUKOCYTESUR MODERATE*   UROBILINOGEN 0.2   BILIRUBINUR Negative   BLOODU Negative   GLUCOSEU Negative   KETUA Negative       Microbiology:  No results for input(s): LABGRAM, LABANAE, ORG, CXSURG in the last 72 hours. Nasal Culture: No results for input(s): ORG, MRSAPCR in the last 72 hours. Blood Culture: No results for input(s): BC, BLOODCULT2, ORG in the last 72 hours. Fungal Culture:   No results for input(s): FUNGSM in the last 72 hours. No results for input(s): FUNCXBLD in the last 72 hours. CSF Culture:  No results for input(s): COLORCSF, APPEARCSF, CFTUBE, CLOTCSF, WBCCSF, RBCCSF, NEUTCSF, NUMCELLSCSF, LYMPHSCSF, MONOCSF, GLUCCSF, VOLCSF in the last 72 hours. Respiratory Culture:  No results for input(s): Chirstina Shields in the last 72 hours. AFB:No results for input(s): AFBSMEAR in the last 72 hours. Urine Culture  No results for input(s): LABURIN in the last 72 hours. RADIOLOGY:    CT Head WO Contrast   Final Result   1. No acute intracranial abnormality. XR SHOULDER LEFT (MIN 2 VIEWS)   Final Result      CHEST: Mild right basilar atelectasis. Lungs are otherwise clear. No pneumothorax. Normal cardiomediastinal silhouette. 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 No fracture or dislocation. XR CHEST PORTABLE   Final Result      CHEST: Mild right basilar atelectasis. Lungs are otherwise clear. No pneumothorax. Normal cardiomediastinal silhouette. 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  No fracture or dislocation. Previous medical records personally reviewed and analyzed         PHYSICIAN CERTIFICATION    I certify that Frank Stephenson is expected to be hospitalized for >2 midnights based on the following assessment and plan:    ASSESSMENT/PLAN:  Active Hospital Problems    Diagnosis Date Noted    SHAYLA (acute kidney injury) ([facility] [N17.9] 04/22/2022     Priority: Medium     Urinary tract infection  - Continue IV Rocephin  - Urine cultures have been ordered    Acute kidney injury  -Continue IV fluids  - Recheck creatinine  - Hold nephrotoxic medications    Generalized weakness  Mechanical falls  - PT/OT    Hypertension  - Will hold losartan, hydrochlorothiazide, metoprolol for now    GERD  - continue protonix    DVT Prophylaxis: lovenox  Diet: ADULT DIET; Regular  Code Status: full  PT/OT Eval Status: pending    Dispo - pending clinical course       Whitney Mcardle, MD  The note was completed using EMR. Every effort was made to ensure accuracy; however, inadvertent computerized transcription errors may be present. Thank you [facility] EUGENIE Ayers - ELISHA for the opportunity to be involved in this patient's care. If you have any questions or concerns please feel free to contact me at [phone].

## 2022-04-23 NOTE — PROGRESS NOTES
Gabapentin 600 mg TID ordered from home med list.  This medication is renally eliminated. Patient with SHAYLA. Will change to 300 mg TID per renal dose adjustment policy. Estimated Creatinine Clearance: 37 mL/min (A) (based on SCr of 2.1 mg/dL (H)). Pharmacy will continue to monitor renal function and adjust dose as necessary. Please call with any questions.     Thanks  Jenny Peter, PharmD 4/22/2022, 10:45 PM

## 2022-04-24 ENCOUNTER — APPOINTMENT (OUTPATIENT)
Dept: CT IMAGING | Age: 62
DRG: 720 | End: 2022-04-24
Payer: MEDICAID

## 2022-04-24 LAB
ANION GAP SERPL CALCULATED.3IONS-SCNC: 12 MMOL/L (ref 3–16)
BUN BLDV-MCNC: 34 MG/DL (ref 7–20)
CALCIUM SERPL-MCNC: 9 MG/DL (ref 8.3–10.6)
CHLORIDE BLD-SCNC: 107 MMOL/L (ref 99–110)
CO2: 21 MMOL/L (ref 21–32)
CREAT SERPL-MCNC: 1.1 MG/DL (ref 0.6–1.2)
GFR AFRICAN AMERICAN: >60
GFR NON-AFRICAN AMERICAN: 51
GLUCOSE BLD-MCNC: 227 MG/DL (ref 70–99)
GLUCOSE BLD-MCNC: 230 MG/DL (ref 70–99)
GLUCOSE BLD-MCNC: 231 MG/DL (ref 70–99)
GLUCOSE BLD-MCNC: 248 MG/DL (ref 70–99)
GLUCOSE BLD-MCNC: 274 MG/DL (ref 70–99)
LACTIC ACID: 0.9 MMOL/L (ref 0.4–2)
PERFORMED ON: ABNORMAL
POTASSIUM REFLEX MAGNESIUM: 4.2 MMOL/L (ref 3.5–5.1)
SODIUM BLD-SCNC: 140 MMOL/L (ref 136–145)

## 2022-04-24 PROCEDURE — 87040 BLOOD CULTURE FOR BACTERIA: CPT

## 2022-04-24 PROCEDURE — 74177 CT ABD & PELVIS W/CONTRAST: CPT

## 2022-04-24 PROCEDURE — 83605 ASSAY OF LACTIC ACID: CPT

## 2022-04-24 PROCEDURE — 36415 COLL VENOUS BLD VENIPUNCTURE: CPT

## 2022-04-24 PROCEDURE — 6370000000 HC RX 637 (ALT 250 FOR IP): Performed by: INTERNAL MEDICINE

## 2022-04-24 PROCEDURE — 2580000003 HC RX 258: Performed by: INTERNAL MEDICINE

## 2022-04-24 PROCEDURE — 6360000004 HC RX CONTRAST MEDICATION: Performed by: INTERNAL MEDICINE

## 2022-04-24 PROCEDURE — 6360000002 HC RX W HCPCS: Performed by: INTERNAL MEDICINE

## 2022-04-24 PROCEDURE — 80048 BASIC METABOLIC PNL TOTAL CA: CPT

## 2022-04-24 PROCEDURE — 1200000000 HC SEMI PRIVATE

## 2022-04-24 PROCEDURE — 84443 ASSAY THYROID STIM HORMONE: CPT

## 2022-04-24 RX ORDER — DIPHENHYDRAMINE HCL 25 MG
25 TABLET ORAL EVERY 6 HOURS PRN
Status: DISCONTINUED | OUTPATIENT
Start: 2022-04-24 | End: 2022-04-29 | Stop reason: HOSPADM

## 2022-04-24 RX ORDER — GABAPENTIN 300 MG/1
600 CAPSULE ORAL 3 TIMES DAILY
Status: DISCONTINUED | OUTPATIENT
Start: 2022-04-24 | End: 2022-04-29 | Stop reason: HOSPADM

## 2022-04-24 RX ADMIN — INSULIN LISPRO 6 UNITS: 100 INJECTION, SOLUTION INTRAVENOUS; SUBCUTANEOUS at 18:13

## 2022-04-24 RX ADMIN — GABAPENTIN 300 MG: 300 CAPSULE ORAL at 13:15

## 2022-04-24 RX ADMIN — INSULIN LISPRO 4 UNITS: 100 INJECTION, SOLUTION INTRAVENOUS; SUBCUTANEOUS at 09:23

## 2022-04-24 RX ADMIN — BUSPIRONE HYDROCHLORIDE 15 MG: 10 TABLET ORAL at 13:15

## 2022-04-24 RX ADMIN — BUSPIRONE HYDROCHLORIDE 15 MG: 10 TABLET ORAL at 09:19

## 2022-04-24 RX ADMIN — PANTOPRAZOLE SODIUM 40 MG: 40 TABLET, DELAYED RELEASE ORAL at 09:16

## 2022-04-24 RX ADMIN — INSULIN LISPRO 4 UNITS: 100 INJECTION, SOLUTION INTRAVENOUS; SUBCUTANEOUS at 13:15

## 2022-04-24 RX ADMIN — SODIUM CHLORIDE, PRESERVATIVE FREE 10 ML: 5 INJECTION INTRAVENOUS at 20:17

## 2022-04-24 RX ADMIN — INSULIN LISPRO 2 UNITS: 100 INJECTION, SOLUTION INTRAVENOUS; SUBCUTANEOUS at 20:32

## 2022-04-24 RX ADMIN — ENOXAPARIN SODIUM 40 MG: 100 INJECTION SUBCUTANEOUS at 09:16

## 2022-04-24 RX ADMIN — BUPROPION HYDROCHLORIDE 200 MG: 100 TABLET, EXTENDED RELEASE ORAL at 22:57

## 2022-04-24 RX ADMIN — FERROUS SULFATE TAB 325 MG (65 MG ELEMENTAL FE) 325 MG: 325 (65 FE) TAB at 09:23

## 2022-04-24 RX ADMIN — BUSPIRONE HYDROCHLORIDE 15 MG: 10 TABLET ORAL at 20:17

## 2022-04-24 RX ADMIN — QUETIAPINE FUMARATE 300 MG: 300 TABLET ORAL at 20:17

## 2022-04-24 RX ADMIN — CEFTRIAXONE SODIUM 2000 MG: 2 INJECTION, POWDER, FOR SOLUTION INTRAMUSCULAR; INTRAVENOUS at 00:33

## 2022-04-24 RX ADMIN — GABAPENTIN 300 MG: 300 CAPSULE ORAL at 09:17

## 2022-04-24 RX ADMIN — BUPROPION HYDROCHLORIDE 200 MG: 100 TABLET, EXTENDED RELEASE ORAL at 09:22

## 2022-04-24 RX ADMIN — GABAPENTIN 600 MG: 300 CAPSULE ORAL at 20:17

## 2022-04-24 RX ADMIN — IOPAMIDOL 80 ML: 755 INJECTION, SOLUTION INTRAVENOUS at 22:53

## 2022-04-24 RX ADMIN — VANCOMYCIN HYDROCHLORIDE 1750 MG: 10 INJECTION, POWDER, LYOPHILIZED, FOR SOLUTION INTRAVENOUS at 22:43

## 2022-04-24 RX ADMIN — ASPIRIN 81 MG 81 MG: 81 TABLET ORAL at 09:18

## 2022-04-24 RX ADMIN — ROSUVASTATIN CALCIUM 20 MG: 20 TABLET, FILM COATED ORAL at 20:17

## 2022-04-24 RX ADMIN — CEFTRIAXONE SODIUM 2000 MG: 2 INJECTION, POWDER, FOR SOLUTION INTRAMUSCULAR; INTRAVENOUS at 13:15

## 2022-04-24 RX ADMIN — HYDROXYZINE HYDROCHLORIDE 100 MG: 25 TABLET, FILM COATED ORAL at 09:16

## 2022-04-24 RX ADMIN — SERTRALINE HYDROCHLORIDE 150 MG: 100 TABLET ORAL at 09:17

## 2022-04-24 RX ADMIN — HYDROXYZINE HYDROCHLORIDE 100 MG: 25 TABLET, FILM COATED ORAL at 20:17

## 2022-04-24 ASSESSMENT — PAIN SCALES - GENERAL
PAINLEVEL_OUTOF10: 0

## 2022-04-24 NOTE — PROGRESS NOTES
Hospitalist Progress Note      PCP: Ny Vazquez, APRN - CNP    Date of Admission: 4/22/2022    Chief Complaint: [facility] Course:    23-year-old female with past medical history of hypertension, diabetes mellitus presented from the group home due to fall. Subjective: No acute events reported overnight. Patient still feels very tired and lethargic. Denies any fevers overnight.       Medications:  Reviewed    Infusion Medications    sodium chloride      dextrose      sodium chloride 100 mL/hr at 04/23/22 2135     Scheduled Medications    cefTRIAXone (ROCEPHIN) IV  2,000 mg IntraVENous Q12H    pantoprazole  40 mg Oral Daily    sertraline  150 mg Oral Daily    busPIRone  15 mg Oral TID    buPROPion  200 mg Oral BID    QUEtiapine  300 mg Oral Nightly    gabapentin  300 mg Oral TID    hydrOXYzine  100 mg Oral BID    ferrous sulfate  325 mg Oral Daily with breakfast    aspirin  81 mg Oral Daily    rosuvastatin  20 mg Oral Nightly    [Held by provider] hydroCHLOROthiazide  25 mg Oral Daily    [Held by provider] losartan  100 mg Oral Daily    sodium chloride flush  5-40 mL IntraVENous 2 times per day    enoxaparin  40 mg SubCUTAneous Daily    insulin glargine  10 Units SubCUTAneous Nightly    insulin lispro  0-12 Units SubCUTAneous TID WC    insulin lispro  0-6 Units SubCUTAneous Nightly     PRN Meds: sodium chloride flush, sodium chloride, ondansetron **OR** ondansetron, polyethylene glycol, acetaminophen **OR** acetaminophen, glucagon (rDNA), dextrose, dextrose bolus (hypoglycemia) **OR** dextrose bolus (hypoglycemia), glucose      Intake/Output Summary (Last 24 hours) at 4/24/2022 1236  Last data filed at 4/24/2022 1100  Gross per 24 hour   Intake 650 ml   Output 1675 ml   Net -1025 ml       Physical Exam Performed:    BP (!) 129/91   Pulse 109   Temp 99.2 °F (37.3 °C) (Oral)   Resp 18   Wt 259 lb 14.8 oz (117.9 kg)   SpO2 92%   BMI 43.25 kg/m²     General appearance: No apparent distress, appears stated age and cooperative. HEENT: Pupils equal, round, and reactive to light. Conjunctivae/corneas clear. Neck: Supple, with full range of motion. No jugular venous distention. Trachea midline. Respiratory:  Normal respiratory effort. Clear to auscultation, bilaterally without Rales/Wheezes/Rhonchi. Cardiovascular: Regular rate and rhythm with normal S1/S2 without murmurs, rubs or gallops. Abdomen: Soft, non-tender, non-distended with normal bowel sounds. Musculoskeletal: No clubbing, cyanosis or edema bilaterally. Full range of motion without deformity. Skin: Skin color, texture, turgor normal.  No rashes or lesions. Neurologic:  Neurovascularly intact without any focal sensory/motor deficits. Cranial nerves: II-XII intact, grossly non-focal.  Psychiatric: Alert and oriented, thought content appropriate, normal insight  Capillary Refill: Brisk,3 seconds, normal   Peripheral Pulses: +2 palpable, equal bilaterally       Labs:   Recent Labs     04/22/22  1025 04/23/22  0905   WBC 14.1* 8.4   HGB 10.1* 8.8*   HCT 31.3* 26.9*    96*     Recent Labs     04/22/22  1025 04/23/22  0905    139   K 5.1 4.3    107   CO2 21 20*   BUN 46* 56*   CREATININE 2.1* 2.1*   CALCIUM 8.9 8.3     No results for input(s): AST, ALT, BILIDIR, BILITOT, ALKPHOS in the last 72 hours. No results for input(s): INR in the last 72 hours. Recent Labs     04/22/22  1025 04/22/22  1327   TROPONINI 0.04* 0.04*       Urinalysis:      Lab Results   Component Value Date    NITRU POSITIVE 04/22/2022    WBCUA  04/22/2022    BACTERIA 3+ 04/22/2022    RBCUA 0-2 04/22/2022    BLOODU Negative 04/22/2022    SPECGRAV 1.025 04/22/2022    GLUCOSEU Negative 04/22/2022       Radiology:  CT Head WO Contrast   Final Result   1. No acute intracranial abnormality. XR SHOULDER LEFT (MIN 2 VIEWS)   Final Result      CHEST: Mild right basilar atelectasis. Lungs are otherwise clear. No pneumothorax.  Normal cardiomediastinal silhouette. 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 No fracture or dislocation. XR CHEST PORTABLE   Final Result      CHEST: Mild right basilar atelectasis. Lungs are otherwise clear. No pneumothorax. Normal cardiomediastinal silhouette. 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 No fracture or dislocation. Assessment/Plan:    Active Hospital Problems    Diagnosis     SHAYLA (acute kidney injury) ([facility] [address] [N17.9]      Priority: Medium     Urinary tract infection  E. coli bacteremia  - Continue IV Rocephin 2 g twice daily  -  Blood cultures positive for E. coli     Acute kidney injury  - Continue IV fluids  - Recheck creatinine  - Hold nephrotoxic medications. Diabetes mellitus  - Continue Lantus, insulin sliding scale     Generalized weakness  Mechanical falls  - PT/OT     Hypertension  - Will hold losartan, hydrochlorothiazide, metoprolol for now     GERD  - continue protonix    DVT Prophylaxis: Lovenox  Diet: ADULT DIET;  Regular; 3 carb choices (45 gm/meal)  ADULT ORAL NUTRITION SUPPLEMENT; Lunch; Diabetic Oral Supplement  Code Status: Full Code    PT/OT Eval Status: Pending    Dispo -likely 1 to 2 days    [facility], MD

## 2022-04-24 NOTE — PROGRESS NOTES
Pt is on Gabapentin 300mg po TID - dose reduced from home regimen on admission due to SHAYLA. SHAYLA now resolving. Will increase back to home dose of Gabapentin 600mg po TID per renal dose adjustment policy. Estimated Creatinine Clearance: 70 mL/min (based on SCr of 1.1 mg/dL). Pharmacy will continue to monitor renal function and adjust dose as necessary.     Please call with questions--  Thanks--  Jude Stauffer, PharmD, BCPS, BCGP  [id number] ([facility])   4/24/2022 1:50 PM

## 2022-04-24 NOTE — PLAN OF CARE
Problem: Discharge Planning  Goal: Discharge to home or other facility with appropriate resources  Outcome: Progressing  Flowsheets (Taken 4/24/2022 0327)  Discharge to home or other facility with appropriate resources: Identify barriers to discharge with patient and caregiver  Note: Pt still yet to be evaluated by PT/OT. She is requiring mod-max assist just for repositioning in bed. Problem: Pain  Goal: Verbalizes/displays adequate comfort level or baseline comfort level  Outcome: Progressing  Note: Pt denies pain. Problem: Skin/Tissue Integrity  Goal: Absence of new skin breakdown  Description: 1. Monitor for areas of redness and/or skin breakdown  2. Assess vascular access sites hourly  3. Every 4-6 hours minimum:  Change oxygen saturation probe site  4. Every 4-6 hours:  If on nasal continuous positive airway pressure, respiratory therapy assess nares and determine need for appliance change or resting period. Outcome: Progressing  Note: No new skin breakdown noted. Problem: Safety - Adult  Goal: Free from fall injury  Outcome: Progressing  Note: No falls or injuries this night shift. Problem: Nutrition Deficit:  Goal: Optimize nutritional status  Outcome: Progressing  Note: Pt eating 50-75% of meals over past 24 hours.

## 2022-04-24 NOTE — PROGRESS NOTES
Perfect serve communication with Dr. Mateo Vásquez regarding patient flagging for sepsis, changes in mentation, patient increasingly confused and hallucinating periodically, able to redirect with ease, as well as changes in vitals. Inquired about obtaining labs and placing tele on patient. Per MD no labs at this time, continue to monitor and ok to place on tele.

## 2022-04-25 LAB
ANION GAP SERPL CALCULATED.3IONS-SCNC: 10 MMOL/L (ref 3–16)
BLOOD CULTURE, ROUTINE: ABNORMAL
BLOOD CULTURE, ROUTINE: ABNORMAL
BUN BLDV-MCNC: 21 MG/DL (ref 7–20)
CALCIUM SERPL-MCNC: 8.9 MG/DL (ref 8.3–10.6)
CHLORIDE BLD-SCNC: 109 MMOL/L (ref 99–110)
CO2: 20 MMOL/L (ref 21–32)
CREAT SERPL-MCNC: 1 MG/DL (ref 0.6–1.2)
CULTURE, BLOOD 2: ABNORMAL
GFR AFRICAN AMERICAN: >60
GFR NON-AFRICAN AMERICAN: 56
GLUCOSE BLD-MCNC: 188 MG/DL (ref 70–99)
GLUCOSE BLD-MCNC: 223 MG/DL (ref 70–99)
GLUCOSE BLD-MCNC: 226 MG/DL (ref 70–99)
GLUCOSE BLD-MCNC: 237 MG/DL (ref 70–99)
GLUCOSE BLD-MCNC: 249 MG/DL (ref 70–99)
ORGANISM: ABNORMAL
PERFORMED ON: ABNORMAL
POTASSIUM REFLEX MAGNESIUM: 4.1 MMOL/L (ref 3.5–5.1)
SODIUM BLD-SCNC: 139 MMOL/L (ref 136–145)
TSH REFLEX: 1.02 UIU/ML (ref 0.27–4.2)

## 2022-04-25 PROCEDURE — 97530 THERAPEUTIC ACTIVITIES: CPT

## 2022-04-25 PROCEDURE — 97116 GAIT TRAINING THERAPY: CPT

## 2022-04-25 PROCEDURE — 80048 BASIC METABOLIC PNL TOTAL CA: CPT

## 2022-04-25 PROCEDURE — 6360000002 HC RX W HCPCS: Performed by: INTERNAL MEDICINE

## 2022-04-25 PROCEDURE — 36415 COLL VENOUS BLD VENIPUNCTURE: CPT

## 2022-04-25 PROCEDURE — 97535 SELF CARE MNGMENT TRAINING: CPT

## 2022-04-25 PROCEDURE — 2580000003 HC RX 258: Performed by: INTERNAL MEDICINE

## 2022-04-25 PROCEDURE — 97162 PT EVAL MOD COMPLEX 30 MIN: CPT

## 2022-04-25 PROCEDURE — 6370000000 HC RX 637 (ALT 250 FOR IP): Performed by: INTERNAL MEDICINE

## 2022-04-25 PROCEDURE — 2500000003 HC RX 250 WO HCPCS: Performed by: INTERNAL MEDICINE

## 2022-04-25 PROCEDURE — 99255 IP/OBS CONSLTJ NEW/EST HI 80: CPT | Performed by: INTERNAL MEDICINE

## 2022-04-25 PROCEDURE — 1200000000 HC SEMI PRIVATE

## 2022-04-25 PROCEDURE — 97166 OT EVAL MOD COMPLEX 45 MIN: CPT

## 2022-04-25 RX ORDER — INSULIN LISPRO 100 [IU]/ML
6 INJECTION, SOLUTION INTRAVENOUS; SUBCUTANEOUS
Status: DISCONTINUED | OUTPATIENT
Start: 2022-04-25 | End: 2022-04-29 | Stop reason: HOSPADM

## 2022-04-25 RX ORDER — CASTOR OIL AND BALSAM, PERU 788; 87 MG/G; MG/G
OINTMENT TOPICAL 2 TIMES DAILY
Status: DISCONTINUED | OUTPATIENT
Start: 2022-04-25 | End: 2022-04-29 | Stop reason: HOSPADM

## 2022-04-25 RX ORDER — DIMETHICONE, CAMPHOR (SYNTHETIC), MENTHOL, AND PHENOL 1.1; .5; .625; .5 G/100G; G/100G; G/100G; G/100G
OINTMENT TOPICAL PRN
Status: DISCONTINUED | OUTPATIENT
Start: 2022-04-25 | End: 2022-04-29 | Stop reason: HOSPADM

## 2022-04-25 RX ORDER — LEVOFLOXACIN 5 MG/ML
750 INJECTION, SOLUTION INTRAVENOUS EVERY 24 HOURS
Status: DISCONTINUED | OUTPATIENT
Start: 2022-04-25 | End: 2022-04-29 | Stop reason: HOSPADM

## 2022-04-25 RX ADMIN — SERTRALINE HYDROCHLORIDE 150 MG: 100 TABLET ORAL at 08:17

## 2022-04-25 RX ADMIN — Medication 7.5 G: at 23:54

## 2022-04-25 RX ADMIN — QUETIAPINE FUMARATE 300 MG: 300 TABLET ORAL at 22:57

## 2022-04-25 RX ADMIN — MICONAZOLE NITRATE: 20 POWDER TOPICAL at 23:48

## 2022-04-25 RX ADMIN — ENOXAPARIN SODIUM 40 MG: 100 INJECTION SUBCUTANEOUS at 08:32

## 2022-04-25 RX ADMIN — BUPROPION HYDROCHLORIDE 200 MG: 100 TABLET, EXTENDED RELEASE ORAL at 22:59

## 2022-04-25 RX ADMIN — ASPIRIN 81 MG 81 MG: 81 TABLET ORAL at 08:17

## 2022-04-25 RX ADMIN — Medication: at 23:48

## 2022-04-25 RX ADMIN — VANCOMYCIN HYDROCHLORIDE 750 MG: 10 INJECTION, POWDER, LYOPHILIZED, FOR SOLUTION INTRAVENOUS at 11:30

## 2022-04-25 RX ADMIN — BUSPIRONE HYDROCHLORIDE 15 MG: 10 TABLET ORAL at 08:17

## 2022-04-25 RX ADMIN — LEVOFLOXACIN 750 MG: 5 INJECTION, SOLUTION INTRAVENOUS at 19:03

## 2022-04-25 RX ADMIN — BUSPIRONE HYDROCHLORIDE 15 MG: 10 TABLET ORAL at 13:03

## 2022-04-25 RX ADMIN — DIPHENHYDRAMINE HYDROCHLORIDE 25 MG: 25 TABLET ORAL at 08:31

## 2022-04-25 RX ADMIN — ACETAMINOPHEN 650 MG: 325 TABLET ORAL at 13:03

## 2022-04-25 RX ADMIN — PANTOPRAZOLE SODIUM 40 MG: 40 TABLET, DELAYED RELEASE ORAL at 08:17

## 2022-04-25 RX ADMIN — INSULIN LISPRO 4 UNITS: 100 INJECTION, SOLUTION INTRAVENOUS; SUBCUTANEOUS at 13:07

## 2022-04-25 RX ADMIN — FERROUS SULFATE TAB 325 MG (65 MG ELEMENTAL FE) 325 MG: 325 (65 FE) TAB at 08:17

## 2022-04-25 RX ADMIN — HYDROXYZINE HYDROCHLORIDE 100 MG: 25 TABLET, FILM COATED ORAL at 08:18

## 2022-04-25 RX ADMIN — INSULIN LISPRO 6 UNITS: 100 INJECTION, SOLUTION INTRAVENOUS; SUBCUTANEOUS at 13:06

## 2022-04-25 RX ADMIN — INSULIN LISPRO 6 UNITS: 100 INJECTION, SOLUTION INTRAVENOUS; SUBCUTANEOUS at 17:02

## 2022-04-25 RX ADMIN — ACETAMINOPHEN 650 MG: 325 TABLET ORAL at 19:03

## 2022-04-25 RX ADMIN — SODIUM CHLORIDE: 9 INJECTION, SOLUTION INTRAVENOUS at 22:27

## 2022-04-25 RX ADMIN — BUSPIRONE HYDROCHLORIDE 15 MG: 10 TABLET ORAL at 22:57

## 2022-04-25 RX ADMIN — INSULIN LISPRO 1 UNITS: 100 INJECTION, SOLUTION INTRAVENOUS; SUBCUTANEOUS at 22:30

## 2022-04-25 RX ADMIN — GABAPENTIN 600 MG: 300 CAPSULE ORAL at 08:17

## 2022-04-25 RX ADMIN — DIPHENHYDRAMINE HYDROCHLORIDE 25 MG: 25 TABLET ORAL at 00:39

## 2022-04-25 RX ADMIN — MEROPENEM 1000 MG: 1 INJECTION, POWDER, FOR SOLUTION INTRAVENOUS at 00:40

## 2022-04-25 RX ADMIN — GABAPENTIN 600 MG: 300 CAPSULE ORAL at 13:03

## 2022-04-25 RX ADMIN — BUPROPION HYDROCHLORIDE 200 MG: 100 TABLET, EXTENDED RELEASE ORAL at 08:17

## 2022-04-25 RX ADMIN — MEROPENEM 1000 MG: 1 INJECTION, POWDER, FOR SOLUTION INTRAVENOUS at 06:47

## 2022-04-25 RX ADMIN — INSULIN LISPRO 4 UNITS: 100 INJECTION, SOLUTION INTRAVENOUS; SUBCUTANEOUS at 08:33

## 2022-04-25 RX ADMIN — ROSUVASTATIN CALCIUM 20 MG: 20 TABLET, FILM COATED ORAL at 23:04

## 2022-04-25 RX ADMIN — INSULIN LISPRO 4 UNITS: 100 INJECTION, SOLUTION INTRAVENOUS; SUBCUTANEOUS at 17:02

## 2022-04-25 RX ADMIN — SODIUM CHLORIDE: 9 INJECTION, SOLUTION INTRAVENOUS at 06:52

## 2022-04-25 RX ADMIN — HYDROXYZINE HYDROCHLORIDE 100 MG: 25 TABLET, FILM COATED ORAL at 22:57

## 2022-04-25 RX ADMIN — GABAPENTIN 600 MG: 300 CAPSULE ORAL at 22:57

## 2022-04-25 ASSESSMENT — PAIN SCALES - GENERAL
PAINLEVEL_OUTOF10: 0
PAINLEVEL_OUTOF10: 4
PAINLEVEL_OUTOF10: 4
PAINLEVEL_OUTOF10: 0
PAINLEVEL_OUTOF10: 4

## 2022-04-25 ASSESSMENT — PAIN DESCRIPTION - LOCATION
LOCATION: GENERALIZED
LOCATION: GENERALIZED

## 2022-04-25 NOTE — PROGRESS NOTES
Occupational Therapy  Facility/Department: [address]  Occupational Therapy Initial Assessment and Treatment Note     Name: Leanor Bence  : 1960  MRN: [id number]  Date of Service: 2022    Discharge Recommendations:Cheyenne Gaines scored a 16/24 on the AM-PAC ADL Inpatient form. Current research shows that an AM-PAC score of 17 or less is typically not associated with a discharge to the patient's home setting. Based on the patient's AM-PAC score and their current ADL deficits, it is recommended that the patient have 3-5 sessions per week of Occupational Therapy at d/c to increase the patient's independence. Please see assessment section for further patient specific details. If patient discharges prior to next session this note will serve as a discharge summary. Please see below for the latest assessment towards goals. Subacute/Skilled Nursing Facility  OT Equipment Recommendations  Equipment Needed: No  Other: defer to next care facility       Treatment Diagnosis: impaired ADLs / functional transfers/ decreased endurance. Assessment   Performance deficits / Impairments: Decreased functional mobility ; Decreased ADL status; Decreased endurance;Decreased safe awareness  Assessment: Pt from supported living receives assist with medication/ meals. Pt reports was struggling to perform ADLs and reports freq falls. Pt demo some self limiting behaviors and could benefit from ongoing inpt OT at d/c to maximize functional level. If pt were to go home would require 24hr assist /help with all transfers. Will follow as inpt. Treatment Diagnosis: impaired ADLs / functional transfers/ decreased endurance. Prognosis: Fair  Decision Making: Medium Complexity  REQUIRES OT FOLLOW-UP: Yes  Activity Tolerance  Activity Tolerance: Patient limited by fatigue  Activity Tolerance: Pt with self limiting behaviors. Pt needing encouragement to maximize functional level.         Plan   Plan  Times per Week: 2-5x  Times per Day: Daily  Current Treatment Recommendations: Self-Care / ADL,Balance training,Endurance training,Safety education & training,Equipment evaluation, education, & procurement,Patient/Caregiver education & training     Restrictions  Position Activity Restriction  Other position/activity restrictions: Up with assist    Subjective   General  Chart Reviewed: Yes  Additional Pertinent Hx: Admit 4/22 with frequenty falls/ Elevated BS     Cxray -Mild right basilar atelectasis. , R shoulder xray- neg, CT head - neg, CT Abd-                                              PMHX: hypertension, diabetes mellitus, falls, PTSD  Diagnosis: Falls/ Elevated BS  Vital Signs  Temp: 98.6 °F (37 °C)  Temp Source: Oral  Pulse: 108  Heart Rate Source: Monitor  Resp: 18  BP: (!) 171/61  MAP (Calculated): 97.67  Patient Position: Semi fowlers  Level of Consciousness: Alert (0)  MEWS Score: 2    Social/Functional History  Social/Functional History  Lives With: Other (comment) (roommate)  Type of Home: Apartment (group home)  Home Layout: One level  Home Access: Stairs to enter with rails (4)  Bathroom Shower/Tub: Tub/Shower unit  Bathroom Toilet: Standard  Home Equipment: Walker, rolling  Has the patient had two or more falls in the past year or any fall with injury in the past year?: Yes  ADL Assistance: Independent  Homemaking Assistance: Needs assistance (group home staff do meals)  Ambulation Assistance: Independent (with rolling walker)  Transfer Assistance: Independent  Active : No (takes a cab to [address] [place] [address])  Additional Comments: Pt with multiple recent falls. Pt reports group home staff arrive in the morning and assist with meds/meals. Staff are around all day and leave after dinner. Pt uses an electric cart at [address] [place] [address]. Objective  Treatment included functional transfer training, ADL's and pt. education. Safety Devices  Type of Devices: Chair alarm in place; Left in chair;Nurse notified;Call light within reach  Balance  Sitting Balance: Contact guard assistance (improved to Supervision at E[facility].)  Standing Balance: Contact guard assistance (with walker)  Standing Balance  Time: 2 mins x 1 and 1 minsx 1  Activity: functional transfers/ stance with walker / attempted mobility in room  Functional Mobility  Functional - Mobility Device: Rolling Walker  Activity: Other  Assist Level: Minimal assistance  Functional Mobility Comments: pt attempting to sit w/o warning  Toilet Transfers  Toilet - Technique: Stand step  Equipment Used: Extra wide bedside commode  Toilet Transfer: Contact guard assistance;Minimal assistance  Toilet Transfers Comments: pt rushing and sits w/o warning -- pt needing mod v.cues for safety / tech  AROM: Generally decreased, functional  Strength: Generally decreased, functional  Coordination: Within functional limits  Tone: Normal  Sensation: Intact  ADL  Feeding: Setup; Increased time to complete;Verbal cueing  Grooming: Minimal assistance;Verbal cueing;Setup  LE Dressing: Maximum assistance  Toileting: Maximum assistance;Dependent/Total (using purewick / incont per RN staff)        Bed mobility  Scooting: Supervision  Transfers  Sit to stand: Contact guard assistance  Stand to sit: Contact guard assistance  Transfer Comments: v.cues for hand placement  Vision - Basic Assessment  Prior Vision: No visual deficits  Cognition  Overall Cognitive Status: Exceptions  Arousal/Alertness: Delayed responses to stimuli  Following Commands: Follows one step commands with repetition  Attention Span: Difficulty attending to directions  Memory: Decreased short term memory;Decreased recall of recent events  Safety Judgement: Decreased awareness of need for assistance  Insights: Decreased awareness of deficits  Initiation: Requires cues for some  Sequencing: Requires cues for some  Cognition Comment: slow mentation noted        Exercise Treatment: pt edu on performing AROM all planes. x 7-10 reps.   Education Given To: Patient  Education Provided: Role of Therapy;Plan of Care;ADL Adaptive Strategies;IADL Safety  Education Method: Verbal  Barriers to Learning: Cognition  Education Outcome: Continued education needed     AM-PAC Score  AM-PAC Inpatient Daily Activity Raw Score: 16 (04/25/22 0941)  AM-PAC Inpatient ADL T-Scale Score : 35.96 (04/25/22 0941)  ADL Inpatient CMS 0-100% Score: 53.32 (04/25/22 0941)  ADL Inpatient CMS G-Code Modifier : CK (04/25/22 0941)    Goals  Short Term Goals  Time Frame for Short term goals: at d/c  Short Term Goal 1: Stance x 5 mins with Supervision for ADLs /IADLs  Short Term Goal 2: Commode transfers with Supervision with RTS prn  Short Term Goal 3: LE Dressing with Supervision and AE prn  Short Term Goal 4: Chair pushups x 5 reps with supervision  Patient Goals   Patient goals : not able to state     Therapy Time   Individual Concurrent Group Co-treatment   Time In 0851         Time Out 0930         Minutes 39              Timed Code Treatment Minutes:   23 mins     Total Treatment Minutes:  39 mins       Bernie Meadows, OT

## 2022-04-25 NOTE — PROGRESS NOTES
Clinical Pharmacy Progress Note    Vancomycin - Management by Pharmacy    Consult Date(s): 4/24/22  Consulting Provider(s): Dr Victorina Pedersen / Plan:  1)  UTI / bacteremia / sepsis with new fevers  - Vancomycin   Concurrent Antimicrobials: Meropenem   Day of Vanc Therapy: 2   Current Dosing Method: Bayesian-Guided AUC Dosing  o Therapeutic Goal: AUC = 400-600 mg/L*hr  o Received loading dose of 1750mg IV x1 last evening. SHAYLA resolving. Will continue with 750mg IV q12h - regimen predicts an AUC = 512 and trough = 16.3.    o Random level is ordered for 4/26 AM to further evaluate above regimen.  Will continue to monitor clinical condition and make adjustments to regimen as appropriate. Please call with questions--  Thanks--  Ritchie Carnes, PharmD, BCPS, BCGP  [id number] ([facility])   4/25/2022 10:55 AM        Interval update:   Now on broad spectrum antibiotics for fevers and worsening AMS. ID consulted. Subjective/Objective:  Marybeth Naylor is a 62 y.o. female with a PMHx significant for HTN, DM who presented 4/22 from group home with several falls; admitted with UTI and SHAYLA. Of note, recent UTI (3/22) -urine grew E. Coli (MDRO) tretated with cefdinir x 10 days. On 4/23, blood cultures x 2 grew E. Coli - ceftriaxone dose increased. On 4/24, pt spiked fever (tmax 103) and RN noted worsening mentation along with worsening tachypnea tachycardia - antibiotics were broadened to Meropenem + Vancomycin. Pharmacy is consulted to dose vancomycin.      Ht Readings from Last 1 Encounters:   03/20/22 5' 5\" (1.651 m)     Wt Readings from Last 1 Encounters:   04/22/22 259 lb 14.8 oz (117.9 kg)       Current & Prior Antimicrobial Regimen(s):   (4/22-4/24)   Meropenem 1000mg EI q8h (4/25-current)   Vancomcyin - Pharmacy to dose  o 1750 mg IV x1 (4/24 2200)  o 750 mg IV q12h (4/25-current)    Vancomycin Level(s) / Doses:    Date Time Dose Level / Type of Level Interpretation   4/26 06:00 750mg q12h Random = ordered           Note: Serum levels collected for AUC-based dosing may be high if collected in close proximity to the dose administered. This is not necessarily indicative of toxicity. Cultures & Sensitivities:    Date Site Micro Susceptibility / Result   4/22 Blood x2 E.coli Pan-sensitive   4/22 Urine Ordered, not collected    4/24 Blood x2 sent            Labs / Ancillary Data:    Estimated Creatinine Clearance: 77 mL/min (based on SCr of 1 mg/dL).     Recent Labs     04/23/22  0905 04/24/22  1233 04/25/22  0942   CREATININE 2.1* 1.1 1.0   BUN 56* 34* 21*   WBC 8.4  --   --

## 2022-04-25 NOTE — CARE COORDINATION
SW met with pt at bedside to discuss discharge plan as pt has recs for SNF. SW left list of SNF in network with pt's insurance for review. Precert is needed for placement. SW to follow. KEYONA Sierra            Case Management Assessment           Initial Evaluation                Date / Time of Evaluation: 4/25/2022 2:10 PM                 Assessment Completed by: KEYONA Sierra    Patient Name: Marybeth Naylor     YOB: 1960  Diagnosis: Acute pyelonephritis [N10]  Elevated troponin [R77.8]  SHAYLA (acute kidney injury) ([address] [N17.9]  Leukocytosis, unspecified type [D72.829]  Hypotension due to hypovolemia [I95.89, E86.1]     Date / Time: 4/22/2022  9:51 AM    Patient Admission Status: Inpatient    If patient is discharged prior to next notation, then this note serves as note for discharge by case management. Current PCP: Orin Barnes, [address] [place] [id number] Patient: No    Chart Reviewed: Yes  Patient/ Family Interviewed: Yes    Initial assessment completed at bedside with: Patient    Hospitalization in the last 30 days: No    Emergency Contacts:  Extended Emergency Contact Information  Primary Emergency Contact: Princess Patel, [address] Phone: [phone]  Relation: Child  Preferred language: English   needed? No    Advance Directives:   Code Status: Full Code    Healthcare Power of : No  Agent: n/a  Contact Number: n/a      Financial  Payor: [facility] MEDICAID / Plan: Filemon Joyce / Product Type: *No Product type* /     Pre-cert required for SNF: Yes    [address] [facility]  [phone]  [facility] [address]  Phone: [phone] Fax: [phone]      Potential assistance Purchasing Medications:    Does Patient want to participate in local refill/ meds to beds program?:      Meds To Beds General Rules:  1. Can ONLY be done Monday- Friday between 8:30am-5pm  2. Prescription(s) must be in pharmacy by 3pm to be filled same day  3. Copy of patient's insurance/ prescription drug card and patient face sheet must be sent along with the prescription(s)  4. Cost of Rx cannot be added to hospital bill. If financial assistance is needed, please contact unit  or ;  or  CANNOT provide pharmacy voucher for patients co-pays  5. Patients can then  the prescription on their way out of the hospital at discharge, or pharmacy can deliver to the bedside if staff is available. (payment due at time of pick-up or delivery - cash, check, or card accepted)     Able to afford home medications/ co-pay costs: Yes    ADLS  Support Systems:      PT AM-PAC: 15 /24  OT AM-PAC: 16 /24    [place]: Group home  Steps: 3    Plans to RETURN to current housing: Yes  Barriers to RETURNING to current housing: medical stability    Home Care Information  Currently ACTIVE with [employer] [address]: No  Home Care Agency: Not Applicable    Currently ACTIVE with [place] on Aging: No  Passport/ Waiver: No  Passport/ Waiver Services: Not Applicable    : n/a Phone: [address]  [place] Provider: n/a  Equipment: cane and walker    Home Oxygen and [address] [place] prior to admission: No    Dialysis  Active with HD/PD prior to admission: No    DISCHARGE PLAN:  Disposition: SNF placement    Transportation PLAN for discharge: TBD     Additional Case Management Notes: SW met with pt at bedside, completed initial assessment. Pt reports she resides at [address] [place] [address],  is Ronna Jackson. Pt has recs for SNF, pt is reviewing list, was open for placement. SW to follow.     The Plan for Transition of Care is related to the following treatment goals of Acute pyelonephritis [N10]  Elevated troponin [R77.8]  SHAYLA (acute kidney injury) ([facility] [N17.9]  Leukocytosis, unspecified type [D72.829]  Hypotension due to hypovolemia [I95.89, E86.1]    The Patient and/or patient representative Gerson Valentine and her family were provided with a choice of provider and agrees with the discharge plan Yes    Freedom of choice list was provided with basic dialogue that supports the patient's individualized plan of care/goals and shares the quality data associated with the providers.  Yes    Care Transition patient: No    KEYONA Rockwell  [facility]  Case Management Department  Ph: [phone]   Fax: [phone]

## 2022-04-25 NOTE — PLAN OF CARE
Problem: Confusion  Goal: Confusion, delirium, dementia, or psychosis is improved or at baseline  Description: INTERVENTIONS:  1. Assess for possible contributors to thought disturbance, including medications, impaired vision or hearing, underlying metabolic abnormalities, dehydration, psychiatric diagnoses, and notify attending LIP  2. [place] high risk fall precautions, as indicated  3. Provide frequent short contacts to provide reality reorientation, refocusing and direction  4. Decrease environmental stimuli, including noise as appropriate  5. Monitor and intervene to maintain adequate nutrition, hydration, elimination, sleep and activity  6. If unable to ensure safety without constant attention obtain sitter and review sitter guidelines with assigned personnel  7. Initiate Psychosocial CNS and Spiritual Care consult, as indicated  Outcome: Progressing  Note: Change in status this night shift. Pt became much more confused tonight than the night prior. She was disoriented to place, time, and situation and was hallucinating objects in the room and asking me bizarre questions. This noted alongside change in vital signs (heart rate in 130's) and increase in body temperature (102.7). MD made aware, and he ordered new/different antibiotics for pt as well as a CT of her abdomen. Lactic acid also ordered and drawn and was normal.  Temperature now down to a low-grade and pt is resting comfortably with heart rate of 101. Pt is now on telemetry, which is reading sinus tachycardia.

## 2022-04-25 NOTE — CONSULTS
Clinical Pharmacy Progress Note    Vancomycin- Management by Pharmacy    Consult Date(s): 4/24/22  Consulting Provider(s): Dr Garza Shape / Plan    UTI/ sepsis  - Vancomycin   Concurrent Antimicrobials: Ceftiaxone day #3   Day of Vanc Therapy: 1   Current Dosing Method: Bayesian-Guided AUC Dosing   Therapeutic Goal: 400-600 mg/L*hr   Current Dose / Frequency:new start   Plan / Rationale:  o Will order loading dose of 1750 mg IV x1, then start 1000 mg IV q18h. - Bayesian-guided dosing predicts AUC ~ 496 mg/L*hr and steady-state trough ~ 14.2 mcg/mL on this regimen  - Plan to order level in next 1-2 days to confirm kinetic estimates  - Will approach dosing carefully given SHAYLA on admission which is improving.  Will continue to monitor clinical condition and make adjustments to regimen as appropriate. Thank you for consulting Pharmacy! Please call with questions:    Genoveva Boeck  Pharm. D. BCPS    4/24/2022 9:00 PM   [phone] ([facility])       Interval update:   Renal function improving  (SCr 2.1 on 4/22 and 4/23, down to 1.1 today) - baseline SCr ~ 0.7. This evening, RN noted worsening mentation, increasing HR and RR- also spiked temp 102.7. Subjective/Objective: Ms. Duong Fletcher is a 62 y.o. female with a PMHx significant for HTN, DM; Presented 4/22 from group home with several falls; admitted with UTI and SHAYLA and started on Rocephin. Of note, recent UTI (3/22) -urine grew E. Coli (MDRO) tretated with cefdinir x 10 days. On 4/23, blood cultures x 2 grew E.Coli- Rocephin dose increased. On 4/24, pt spiked fever (tmax 102.7) and RN noted worsening mentation along with worsening tachypnea tachycardia. Pharmacy has been consulted to dose vancomycin.      Height:   Ht Readings from Last 1 Encounters:   03/20/22 5' 5\" (1.651 m)     Weight:   Wt Readings from Last 1 Encounters:   04/22/22 259 lb 14.8 oz (117.9 kg)       Current & Prior Antimicrobial Regimen(s):   Ceftriaxone  o 1g IV x1 (4/22)  o 2g IV q12h (4/23--current)   Vancomcyin  o 1750 mg IV x1 (4/24)  o 1000 mg IV q18h (4/25--)    Level(s) / Doses:    Date Time Dose Level / Type of Level Interpretation                 Note: Serum levels collected for AUC-based dosing may be high if collected in close proximity to the dose administered. This is not necessarily indicative of toxicity. Cultures & Sensitivities:    Date Site Micro Susceptibility / Result   4/24 Blood x2 pending    4/22 Blood 1&2 E. Coli  pending   4/22 Urine pending    3/20 Urine E. Coli  (MDRO) Sensitive: Augmentin, cefepime, Rocephin, Ertapenem, gentamicin, meropenem, nitrofurantoin, Zosyn  Resistant:  Ampicillin, Unasyn, cefazolin, Cipro, Bactrim   Intermediate: Cefuroxime     Labs / Ancillary Data:    Estimated Creatinine Clearance: 70 mL/min (based on SCr of 1.1 mg/dL). Recent Labs     04/22/22  1025 04/23/22  0905 04/24/22  1233   CREATININE 2.1* 2.1* 1.1   BUN 46* 56* 34*   WBC 14.1* 8.4  --        Additional Lab Values / Findings of Note:    Procalcitonin: No results for input(s): PROCAL in the last 72 hours.

## 2022-04-25 NOTE — CONSULTS
Infectious Diseases Inpatient Consult Note    Reason for Consult:   E coli UTI / bacteremia  Requesting Physician:   Dr Eleonora Beal  Primary Care Physician:  Cari Doan, EUGENIE - CNP  History Obtained From:   Pt, EPIC    Admit Date: 4/22/2022  Hospital Day: 4    CHIEF COMPLAINT:       Chief Complaint   Patient presents with    Fall     Pt has fallen a couple times this morning without LOC. Pt reports neck pain, back pain, and arm pain- all on left side    Neck Pain    Back Pain    Arm Pain       HISTORY OF PRESENT ILLNESS:      61 yo woman with hx obesity (BMI 43), HTN, MELLY, depression  Multiple allergies listed including PCN, Metronidazole, Clindamycin, Azithromycin    Presents with falls, fatigue over 1 week  Pt was weak at home. Developed 'shaking chills'  No urinary sx - denies dysuria.  + L back pain (when asked)    In ED 4/22, afeb, low BP, Cr 2.1  UA mod LE, WBC   CT with perinephric stranding, heterogenous enhancement  Admit, started on Vanco / Meropenem    4/24 Tm 103    Today 4/25, afeb  Feels better yet diminished appetite.     Less L back / flank pain    Past Medical History:    Past Medical History:   Diagnosis Date    Anxiety     Diabetes mellitus ([address]     Diabetic polyneuropathy (HCC)     Hernia, abdominal     Hyperlipidemia     Hypertension     PTSD (post-traumatic stress disorder)        Past Surgical History:    Past Surgical History:   Procedure Laterality Date    CHOLECYSTECTOMY      COLECTOMY      HYSTERECTOMY      REVISION COLOSTOMY         Current Medications:     vancomycin  750 mg IntraVENous Q12H    insulin glargine  26 Units SubCUTAneous Nightly    insulin lispro  6 Units SubCUTAneous TID WC    gabapentin  600 mg Oral TID    meropenem  1,000 mg IntraVENous Q8H    pantoprazole  40 mg Oral Daily    sertraline  150 mg Oral Daily    busPIRone  15 mg Oral TID    buPROPion  200 mg Oral BID    QUEtiapine  300 mg Oral Nightly    hydrOXYzine  100 mg Oral BID    ferrous sulfate  325 mg Oral Daily with breakfast    aspirin  81 mg Oral Daily    rosuvastatin  20 mg Oral Nightly    [Held by provider] hydroCHLOROthiazide  25 mg Oral Daily    [Held by provider] losartan  100 mg Oral Daily    sodium chloride flush  5-40 mL IntraVENous 2 times per day    enoxaparin  40 mg SubCUTAneous Daily    insulin lispro  0-12 Units SubCUTAneous TID WC    insulin lispro  0-6 Units SubCUTAneous Nightly       Allergies:  Azithromycin, Metronidazole, Clindamycin, Tetanus immune globulin, Codeine, Penicillin g, and Penicillin v    Social History:    TOBACCO:    None   ETOH:    None   DRUGS:   None   MARITAL STATUS:      OCCUPATION:   Disabled       Family History:   No immunodeficiency    REVIEW OF SYSTEMS:    No fever / chills / sweats. No weight loss. No visual change, eye pain, eye discharge. No oral lesion, sore throat, dysphagia. Denies cough / sputum. Denies chest pain, palpitations. Denies n / v / abd pain. No diarrhea. Denies dysuria or change in urinary function. Denies joint swelling or pain. No myalgia, arthralgia. Denies skin changes, itching  Denies focal weakness, sensory change or other neurologic symptom    Denies new / worse depression, psychiatric symptoms    PHYSICAL EXAM:      Vitals:    BP (!) 145/70   Pulse 102   Temp 98.7 °F (37.1 °C)   Resp 18   Wt 259 lb 14.8 oz (117.9 kg)   SpO2 91%   BMI 43.25 kg/m²     GENERAL: No apparent distress.     HEENT: Membranes moist, no oral lesion, PERRL  NECK:  Supple, no lymphadenopathy  LUNGS: Clear b/l, no rales, no dullness  CARDIAC: RRR, no murmur appreciated  ABD:  + BS, soft / NT  EXT:  No rash, no edema, no lesions  NEURO: No focal neurologic findings  PSYCH: Orientation, sensorium, mood normal  LINES:  Peripheral iv    DATA:    Lab Results   Component Value Date    WBC 8.4 04/23/2022    HGB 8.8 (L) 04/23/2022    HCT 26.9 (L) 04/23/2022    MCV 85.3 04/23/2022    PLT 96 (L) 04/23/2022     Lab Results Component Value Date    CREATININE 1.0 2022    BUN 21 (H) 2022     2022    K 4.1 2022     2022    CO2 20 (L) 2022       Hepatic Function Panel:   Lab Results   Component Value Date    ALKPHOS 84 2022    ALT 14 2022    AST 13 2022    PROT 7.0 2022    BILITOT 0.4 2022    BILIDIR <0.2 2022    IBILI see below 2022    LABALBU 4.0 2022       Micro:   BC x 2 E coli  Antibiotic Interpretation Microscan    ampicillin Sensitive <=2 mcg/mL   ampicillin-sulbactam Sensitive <=2 mcg/mL   ceFAZolin Sensitive <=4 mcg/mL   cefepime Sensitive <=0.12 mcg/mL   cefTRIAXone Sensitive <=0.25 mcg/mL   ciprofloxacin Sensitive <=0.25 mcg/mL   ertapenem Sensitive <=0.12 mcg/mL   gentamicin Sensitive <=1 mcg/mL   levofloxacin Sensitive <=0.12 mcg/mL   piperacillin-tazobactam Sensitive <=4 mcg/mL   trimethoprim-sulfamethoxazole Sensitive <=20 mcg/mL     Imagin/22 Abd / pelvic CT  1. Limited CT evaluation of kidneys due to motion artifact. However, there is heterogeneous enhancement of the left kidney raising possibility for pyelonephritis. There is urothelial thickening considered inflammatory, but no evidence for urinary    calculus. No perinephric fluid collection. Increased retroperitoneal stranding at the level of Gerota's fascia and extending inferiorly into the pelvis as well. 2. Mild splenomegaly   3.  Chronic right spigelian hernia containing loops of distal ileum and proximal colon, and 2 additional ventral hernias containing bowel loops without evidence for bowel obstruction or strangulation and a small supraumbilical hernia containing fat         IMPRESSION:      Patient Active Problem List   Diagnosis    Syncope and collapse    Refractory migraine with aura    Psychogenic headache    Neck pain    Intentional drug overdose ([address]    Hypertension    Hyperplastic polyp of intestine    Head problem    Depressive disorder  Severe sepsis (HCC)    Type 2 diabetes mellitus (HCC)    Morbid (severe) obesity due to excess calories (HCC)    Chest pain    SHAYLA (acute [facility] injury) ([facility]       Hx obesity (BMI 43), HTN, MELLY, depression  Multiple allergies listed including PCN, Metronidazole, Clindamycin, Azithromycin    Falls, fatigue   L pyelonephritis  E coli bacteremia    RECOMMENDATIONS:    Change to Levofloxacin  D/c Vancomycin, Meropenem    Medical Decision Making: The following items were considered in medical decision making:  Discussion of patient care with other providers  Reviewed clinical lab tests  Reviewed radiology tests  Reviewed other diagnostic tests/interventions  Microbiology cultures and other micro tests reviewed      Risk of Complications/Morbidity: High   Illness(es)/ Infection present that pose threat to bodily function. There is potential for severe exacerbation of infection/side effects of treatment.   Therapy requires intensive monitoring for antimicrobial agent toxicity    Discussed with pt  Lavonne Vu MD

## 2022-04-25 NOTE — PROGRESS NOTES
Physician Progress Note      Sourav Keith  CSN #:                  [id number]  :                       1960  ADMIT DATE:       2022 9:51 AM  DISCH DATE:  RESPONDING  PROVIDER #:        Corey Sims MD          QUERY TEXT:    Pt admitted with fall, weakness. Pt noted to have increased WBC count and HR. If possible, please document in the progress notes and discharge summary if   you are evaluating and /or treating any of the following: The medical record reflects the following:  Risk Factors: 63 yo w/ UTI, falls, hypotension  Clinical Indicators: Per PN : Urinary tract infection, E. coli bacteremia. WBC 14.1, HR 91 - 102. Treatment: Lactic acid, blood and urine cultures, IV Rocephin, IV Merrem, IV   Vanc  Options provided:  -- Sepsis due to UTI present on admission  -- UTI without Sepsis  -- Other - I will add my own diagnosis  -- Disagree - Not applicable / Not valid  -- Disagree - Clinically unable to determine / Unknown  -- Refer to Clinical Documentation Reviewer    PROVIDER RESPONSE TEXT:    This patient has sepsis due to UTI which was present on admission.     Query created by: Jerman Mccarthy on 2022 12:55 PM      Electronically signed by:  Corey Sims MD 2022 1:49 PM

## 2022-04-25 NOTE — PROGRESS NOTES
Hospitalist Progress Note      PCP: Shade Little, APRN - CNP    Date of Admission: 4/22/2022    Chief Complaint: [facility] Course:    18-year-old female with past medical history of hypertension, diabetes mellitus presented from the group home due to fall. Subjective: had a rough night with high fevers. Feels worn out today. Denies any abdominal pain. Tolerating PO.      Medications:  Reviewed    Infusion Medications    sodium chloride      dextrose      sodium chloride 100 mL/hr at 04/25/22 7812     Scheduled Medications    vancomycin  750 mg IntraVENous Q12H    insulin glargine  20 Units SubCUTAneous Nightly    gabapentin  600 mg Oral TID    meropenem  1,000 mg IntraVENous Q8H    pantoprazole  40 mg Oral Daily    sertraline  150 mg Oral Daily    busPIRone  15 mg Oral TID    buPROPion  200 mg Oral BID    QUEtiapine  300 mg Oral Nightly    hydrOXYzine  100 mg Oral BID    ferrous sulfate  325 mg Oral Daily with breakfast    aspirin  81 mg Oral Daily    rosuvastatin  20 mg Oral Nightly    [Held by provider] hydroCHLOROthiazide  25 mg Oral Daily    [Held by provider] losartan  100 mg Oral Daily    sodium chloride flush  5-40 mL IntraVENous 2 times per day    enoxaparin  40 mg SubCUTAneous Daily    insulin lispro  0-12 Units SubCUTAneous TID WC    insulin lispro  0-6 Units SubCUTAneous Nightly     PRN Meds: diphenhydrAMINE, sodium chloride flush, sodium chloride, ondansetron **OR** ondansetron, polyethylene glycol, acetaminophen **OR** acetaminophen, glucagon (rDNA), dextrose, dextrose bolus (hypoglycemia) **OR** dextrose bolus (hypoglycemia), glucose      Intake/Output Summary (Last 24 hours) at 4/25/2022 1158  Last data filed at 4/25/2022 0339  Gross per 24 hour   Intake 900 ml   Output 1875 ml   Net -975 ml       Physical Exam Performed:    BP (!) 171/61   Pulse 108   Temp 98.6 °F (37 °C) (Oral)   Resp 18   Wt 259 lb 14.8 oz (117.9 kg)   SpO2 91%   BMI 43.25 kg/m²     General appearance: No apparent distress, appears stated age and cooperative. Respiratory:  Normal respiratory effort. Clear to auscultation, bilaterally without Rales/Wheezes/Rhonchi. Cardiovascular: Regular rate and rhythm with normal S1/S2 without murmurs, rubs or gallops. Abdomen: Soft, non-tender, non-distended with normal bowel sounds. Musculoskeletal: No clubbing, cyanosis or edema bilaterally. Full range of motion without deformity. Skin: Skin color, texture, turgor normal.  No rashes or lesions. Neurologic:  Neurovascularly intact without any focal sensory/motor deficits. Cranial nerves: II-XII intact, grossly non-focal.  Psychiatric: Alert and oriented, thought content appropriate, normal insight  Capillary Refill: Brisk,3 seconds, normal   Peripheral Pulses: +2 palpable, equal bilaterally       Labs:   Recent Labs     04/23/22  0905   WBC 8.4   HGB 8.8*   HCT 26.9*   PLT 96*     Recent Labs     04/23/22  0905 04/24/22  1233 04/25/22  0942    140 139   K 4.3 4.2 4.1    107 109   CO2 20* 21 20*   BUN 56* 34* 21*   CREATININE 2.1* 1.1 1.0   CALCIUM 8.3 9.0 8.9     No results for input(s): AST, ALT, BILIDIR, BILITOT, ALKPHOS in the last 72 hours. No results for input(s): INR in the last 72 hours. Recent Labs     04/22/22  1327   TROPONINI 0.04*       Urinalysis:      Lab Results   Component Value Date    NITRU POSITIVE 04/22/2022    WBCUA  04/22/2022    BACTERIA 3+ 04/22/2022    RBCUA 0-2 04/22/2022    BLOODU Negative 04/22/2022    SPECGRAV 1.025 04/22/2022    GLUCOSEU Negative 04/22/2022       Radiology:  CT ABDOMEN PELVIS W IV CONTRAST Additional Contrast? None   Final Result         1. Limited CT evaluation of kidneys due to motion artifact. However, there is heterogeneous enhancement of the left kidney raising possibility for pyelonephritis. There is urothelial thickening considered inflammatory, but no evidence for urinary    calculus. No perinephric fluid collection.  Increased retroperitoneal stranding at the level of Gerota's fascia and extending inferiorly into the pelvis as well. 2. Mild splenomegaly   3. Chronic right spigelian hernia containing loops of distal ileum and proximal colon, and 2 additional ventral hernias containing bowel loops without evidence for bowel obstruction or strangulation and a small supraumbilical hernia containing fat      CT Head WO Contrast   Final Result   1. No acute intracranial abnormality. XR SHOULDER LEFT (MIN 2 VIEWS)   Final Result      CHEST: Mild right basilar atelectasis. Lungs are otherwise clear. No pneumothorax. Normal cardiomediastinal silhouette. 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 No fracture or dislocation. XR CHEST PORTABLE   Final Result      CHEST: Mild right basilar atelectasis. Lungs are otherwise clear. No pneumothorax. Normal cardiomediastinal silhouette. 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 No fracture or dislocation. Assessment/Plan:    Active Hospital Problems    Diagnosis     SHAYLA (acute kidney injury) ([facility] [address] [N17.9]      Priority: Medium     Urinary tract infection  E. coli bacteremia  -  Blood cultures positive for E. Coli  - patient with ongoing fevers  - consult ID  - cont meropenem. vanco for now     Acute kidney injury  - Continue IV fluids  - Recheck creatinine  - Hold nephrotoxic medications. Diabetes mellitus  - Continue Lantus, insulin sliding scale     Generalized weakness  Mechanical falls  - PT/OT     Hypertension  - Will hold losartan, hydrochlorothiazide, metoprolol for now     GERD  - continue protonix    DVT Prophylaxis: Lovenox  Diet: ADULT DIET;  Regular; 3 carb choices (45 gm/meal)  ADULT ORAL NUTRITION SUPPLEMENT; Lunch; Diabetic Oral Supplement  Code Status: Full Code    PT/OT Eval Status: Pending    Dispo -likely 1 to 2 days    Jarad Moore MD

## 2022-04-25 NOTE — PROGRESS NOTES
Physical Therapy  Facility/Department: [address]  Physical Therapy Initial Assessment and Treatment    Name: Jordi Israel  : 1960  MRN: [id number]  Date of Service: 2022    Discharge Recommendations:  Jordi Israel scored a 15/24 on the AM-PAC short mobility form. Current research shows that an AM-PAC score of 17 or less is typically not associated with a discharge to the patient's home setting. Based on the patient's AM-PAC score and their current functional mobility deficits, it is recommended that the patient have 3-5 sessions per week of Physical Therapy at d/c to increase the patient's independence. Please see assessment section for further patient specific details. PT Equipment Recommendations  Equipment Needed: No        Assessment   Assessment: Pt from group home with mulitple falls. Pt demonstrates decreased transfers and gait from stated baseline. Pt is impulsive with poor safety awareness. Gait is unsteady and pt needing assist for all mobility. Pt is at risk for continued falls. Safety concerns for pt returning home without capable assist.  Pt would benefit from SNF. Will continue to follow. Treatment Diagnosis: Decreased gait associated with falls. Decision Making: Medium Complexity  Requires PT Follow-Up: Yes     Plan   Plan:  (2-5)  Safety Devices  Type of Devices: Chair alarm in place,Left in chair,Nurse notified,Call light within reach     Restrictions  Position Activity Restriction  Other position/activity restrictions: Up with assist     Subjective   Chart Reviewed: Yes  Additional Pertinent Hx: Pt to ED  with multiple falls. Imaging (-) for head, L shoulder and chest.  PMH:  Anxiety, DM, polyneuropathy, Hernia, HTN, and PTSD  Diagnosis: Acute Pyelonephritis    Subjective  Subjective: Pt found supine in bed. \"I don't think I can do that today. \"  Pt denies pain.          Social/Functional History  Lives With: Other (comment) (roommate)  Type of Home: Apartment (group home)  Home Layout: One level  Home Access: Stairs to enter with rails (4)  Bathroom Shower/Tub: Tub/Shower unit  Bathroom Toilet: Standard  Home Equipment: Walker, rolling  Has the patient had two or more falls in the past year or any fall with injury in the past year?: Yes  ADL Assistance: [address]: Needs assistance (group home staff do meals)  Ambulation Assistance: Independent (with rolling walker)  Transfer Assistance: Independent  Active : No (takes a cab to [address] [place] [address])  Additional Comments: Pt with multiple recent falls. Pt reports group home staff arrive in the morning and assist with meds/meals. Staff are around all day and leave after dinner. Pt uses an electric cart at [address] [place] [address]. Vision/Hearing  Hearing: Within functional limits    Vision:  Within functional limits    Cognition   Within Functional Limits     Objective   Gross Assessment  AROM: Within functional limits  Strength: Generally decreased, functional      Bed mobility  Supine to Sit: Stand by assistance (HOB raised, verbal cues, increased time and effort to scoot out to EOB)     Transfers  Sit to Stand: Minimal Assistance (pulls from walker, impulsive)  Stand to sit: Minimal Assistance (sits without warning, does not reach back, decreased control)  Bed to Chair: Minimal assistance (stand step pivot with rolling walker)     Ambulation  Device: Rolling Walker  Assistance: Minimal assistance  Quality of Gait: pushes walker out too far, poor safety awareness, assist for walker management, impulsive  Gait Deviations: Increased EVERARDO; Decreased step length;Decreased step height  Distance: 5ft to chair     Balance  Sitting - Static: Good  Sitting - Dynamic: Fair  Standing - Static: Fair  Standing - Dynamic: Poor (with rolling walker)      AM-PAC Score  AM-PAC Inpatient Mobility Raw Score : 15 (04/25/22 0932)  AM-PAC Inpatient T-Scale Score : 39.45 (04/25/22 0932)  Mobility Inpatient CMS 0-100% Score: 57.7 (04/25/22 0932)  Mobility Inpatient CMS G-Code Modifier : CK (04/25/22 0932)    Goals  Short Term Goals  Time Frame for Short term goals: Discharge  Short term goal 1: supine <> sit supervision  Short term goal 2: sit <> stand SBA  Short term goal 3: bed <> chair SBA  Short term goal 4: ambulate 50ft with rolling walker SBA  Patient Goals   Patient goals : Not stated       Therapy Time   Individual Concurrent Group Co-treatment   Time In 0850         Time Out 0929         Minutes 39         Timed Code Treatment Minutes:  25  Total Treatment Minutes:  44       Bernadine Sauceda, PT

## 2022-04-25 NOTE — PROGRESS NOTES
Pharmacy Note - Extended Infusion Beta-Lactam Adjustment    Meropenem ordered for treatment of bacteremia. Per [facility] Extended Infusion Beta-Lactam Policy, dosing will be changed to Meropenem 1g IV x1 over 30 minutes, followed by meropenem 1g IV q8 hours extended infusion. Estimated Creatinine Clearance: Estimated Creatinine Clearance: 70 mL/min (based on SCr of 1.1 mg/dL). Dialysis Status, SHAYLA, CKD: na  BMI: Body mass index is 43.25 kg/m². Rationale for Adjustment: Agent demonstrates time-dependent effect on bacterial eradication. Extended-infusion dosing strategy aims to enhance microbiologic and clinical efficacy. Pharmacy will continue to monitor cultures and sensitivities (where available) and adjust dose as necessary. Please call with any questions.     Brenda Cobos, PharmD 4/24/2022, 11:08 PM

## 2022-04-26 LAB
ANION GAP SERPL CALCULATED.3IONS-SCNC: 10 MMOL/L (ref 3–16)
BASOPHILS ABSOLUTE: 0 K/UL (ref 0–0.2)
BASOPHILS RELATIVE PERCENT: 0.1 %
BUN BLDV-MCNC: 15 MG/DL (ref 7–20)
CALCIUM SERPL-MCNC: 8.9 MG/DL (ref 8.3–10.6)
CHLORIDE BLD-SCNC: 105 MMOL/L (ref 99–110)
CO2: 24 MMOL/L (ref 21–32)
CREAT SERPL-MCNC: 0.9 MG/DL (ref 0.6–1.2)
EOSINOPHILS ABSOLUTE: 0.3 K/UL (ref 0–0.6)
EOSINOPHILS RELATIVE PERCENT: 5.1 %
GFR AFRICAN AMERICAN: >60
GFR NON-AFRICAN AMERICAN: >60
GLUCOSE BLD-MCNC: 143 MG/DL (ref 70–99)
GLUCOSE BLD-MCNC: 161 MG/DL (ref 70–99)
GLUCOSE BLD-MCNC: 163 MG/DL (ref 70–99)
GLUCOSE BLD-MCNC: 165 MG/DL (ref 70–99)
GLUCOSE BLD-MCNC: 185 MG/DL (ref 70–99)
GLUCOSE BLD-MCNC: 187 MG/DL (ref 70–99)
HCT VFR BLD CALC: 24.1 % (ref 36–48)
HEMOGLOBIN: 7.9 G/DL (ref 12–16)
LYMPHOCYTES ABSOLUTE: 0.7 K/UL (ref 1–5.1)
LYMPHOCYTES RELATIVE PERCENT: 12.8 %
MCH RBC QN AUTO: 27.2 PG (ref 26–34)
MCHC RBC AUTO-ENTMCNC: 32.7 G/DL (ref 31–36)
MCV RBC AUTO: 83.2 FL (ref 80–100)
MONOCYTES ABSOLUTE: 0.7 K/UL (ref 0–1.3)
MONOCYTES RELATIVE PERCENT: 11.7 %
NEUTROPHILS ABSOLUTE: 4.1 K/UL (ref 1.7–7.7)
NEUTROPHILS RELATIVE PERCENT: 70.3 %
PDW BLD-RTO: 15.1 % (ref 12.4–15.4)
PERFORMED ON: ABNORMAL
PLATELET # BLD: 157 K/UL (ref 135–450)
PMV BLD AUTO: 7.1 FL (ref 5–10.5)
POTASSIUM REFLEX MAGNESIUM: 4.2 MMOL/L (ref 3.5–5.1)
RBC # BLD: 2.89 M/UL (ref 4–5.2)
SODIUM BLD-SCNC: 139 MMOL/L (ref 136–145)
WBC # BLD: 5.8 K/UL (ref 4–11)

## 2022-04-26 PROCEDURE — 36415 COLL VENOUS BLD VENIPUNCTURE: CPT

## 2022-04-26 PROCEDURE — 82746 ASSAY OF FOLIC ACID SERUM: CPT

## 2022-04-26 PROCEDURE — 83550 IRON BINDING TEST: CPT

## 2022-04-26 PROCEDURE — 6370000000 HC RX 637 (ALT 250 FOR IP): Performed by: INTERNAL MEDICINE

## 2022-04-26 PROCEDURE — 80048 BASIC METABOLIC PNL TOTAL CA: CPT

## 2022-04-26 PROCEDURE — 82607 VITAMIN B-12: CPT

## 2022-04-26 PROCEDURE — 6360000002 HC RX W HCPCS: Performed by: INTERNAL MEDICINE

## 2022-04-26 PROCEDURE — 99232 SBSQ HOSP IP/OBS MODERATE 35: CPT | Performed by: INTERNAL MEDICINE

## 2022-04-26 PROCEDURE — 2580000003 HC RX 258: Performed by: INTERNAL MEDICINE

## 2022-04-26 PROCEDURE — 85025 COMPLETE CBC W/AUTO DIFF WBC: CPT

## 2022-04-26 PROCEDURE — 1200000000 HC SEMI PRIVATE

## 2022-04-26 PROCEDURE — 83540 ASSAY OF IRON: CPT

## 2022-04-26 RX ORDER — METOPROLOL SUCCINATE 25 MG/1
25 TABLET, EXTENDED RELEASE ORAL 2 TIMES DAILY
Status: DISCONTINUED | OUTPATIENT
Start: 2022-04-26 | End: 2022-04-29 | Stop reason: HOSPADM

## 2022-04-26 RX ADMIN — INSULIN LISPRO 6 UNITS: 100 INJECTION, SOLUTION INTRAVENOUS; SUBCUTANEOUS at 13:09

## 2022-04-26 RX ADMIN — METOPROLOL SUCCINATE 25 MG: 25 TABLET, FILM COATED, EXTENDED RELEASE ORAL at 13:07

## 2022-04-26 RX ADMIN — MICONAZOLE NITRATE: 20 POWDER TOPICAL at 08:19

## 2022-04-26 RX ADMIN — LOSARTAN POTASSIUM 100 MG: 50 TABLET, FILM COATED ORAL at 13:07

## 2022-04-26 RX ADMIN — Medication: at 19:35

## 2022-04-26 RX ADMIN — ASPIRIN 81 MG 81 MG: 81 TABLET ORAL at 08:02

## 2022-04-26 RX ADMIN — LEVOFLOXACIN 750 MG: 5 INJECTION, SOLUTION INTRAVENOUS at 18:06

## 2022-04-26 RX ADMIN — BUPROPION HYDROCHLORIDE 200 MG: 100 TABLET, EXTENDED RELEASE ORAL at 08:01

## 2022-04-26 RX ADMIN — BUSPIRONE HYDROCHLORIDE 15 MG: 10 TABLET ORAL at 08:02

## 2022-04-26 RX ADMIN — ENOXAPARIN SODIUM 40 MG: 100 INJECTION SUBCUTANEOUS at 08:02

## 2022-04-26 RX ADMIN — ACETAMINOPHEN 650 MG: 325 TABLET ORAL at 08:08

## 2022-04-26 RX ADMIN — PANTOPRAZOLE SODIUM 40 MG: 40 TABLET, DELAYED RELEASE ORAL at 08:02

## 2022-04-26 RX ADMIN — INSULIN LISPRO 1 UNITS: 100 INJECTION, SOLUTION INTRAVENOUS; SUBCUTANEOUS at 20:32

## 2022-04-26 RX ADMIN — SERTRALINE HYDROCHLORIDE 150 MG: 100 TABLET ORAL at 08:01

## 2022-04-26 RX ADMIN — INSULIN LISPRO 2 UNITS: 100 INJECTION, SOLUTION INTRAVENOUS; SUBCUTANEOUS at 13:09

## 2022-04-26 RX ADMIN — HYDROXYZINE HYDROCHLORIDE 100 MG: 25 TABLET, FILM COATED ORAL at 08:01

## 2022-04-26 RX ADMIN — GABAPENTIN 600 MG: 300 CAPSULE ORAL at 08:02

## 2022-04-26 RX ADMIN — ACETAMINOPHEN 650 MG: 325 TABLET ORAL at 16:35

## 2022-04-26 RX ADMIN — SODIUM CHLORIDE, PRESERVATIVE FREE 10 ML: 5 INJECTION INTRAVENOUS at 19:35

## 2022-04-26 RX ADMIN — BUSPIRONE HYDROCHLORIDE 15 MG: 10 TABLET ORAL at 13:07

## 2022-04-26 RX ADMIN — INSULIN LISPRO 6 UNITS: 100 INJECTION, SOLUTION INTRAVENOUS; SUBCUTANEOUS at 08:20

## 2022-04-26 RX ADMIN — FERROUS SULFATE TAB 325 MG (65 MG ELEMENTAL FE) 325 MG: 325 (65 FE) TAB at 08:01

## 2022-04-26 RX ADMIN — INSULIN LISPRO 6 UNITS: 100 INJECTION, SOLUTION INTRAVENOUS; SUBCUTANEOUS at 18:06

## 2022-04-26 RX ADMIN — INSULIN LISPRO 2 UNITS: 100 INJECTION, SOLUTION INTRAVENOUS; SUBCUTANEOUS at 08:20

## 2022-04-26 RX ADMIN — QUETIAPINE FUMARATE 300 MG: 300 TABLET ORAL at 20:49

## 2022-04-26 RX ADMIN — MICONAZOLE NITRATE: 20 POWDER TOPICAL at 19:36

## 2022-04-26 RX ADMIN — Medication: at 08:19

## 2022-04-26 RX ADMIN — ROSUVASTATIN CALCIUM 20 MG: 20 TABLET, FILM COATED ORAL at 20:49

## 2022-04-26 RX ADMIN — GABAPENTIN 600 MG: 300 CAPSULE ORAL at 20:48

## 2022-04-26 RX ADMIN — BUSPIRONE HYDROCHLORIDE 15 MG: 10 TABLET ORAL at 20:48

## 2022-04-26 RX ADMIN — INSULIN LISPRO 2 UNITS: 100 INJECTION, SOLUTION INTRAVENOUS; SUBCUTANEOUS at 18:07

## 2022-04-26 RX ADMIN — METOPROLOL SUCCINATE 25 MG: 25 TABLET, FILM COATED, EXTENDED RELEASE ORAL at 20:48

## 2022-04-26 RX ADMIN — HYDROXYZINE HYDROCHLORIDE 100 MG: 25 TABLET, FILM COATED ORAL at 20:48

## 2022-04-26 RX ADMIN — SODIUM CHLORIDE 25 ML: 9 INJECTION, SOLUTION INTRAVENOUS at 16:36

## 2022-04-26 RX ADMIN — GABAPENTIN 600 MG: 300 CAPSULE ORAL at 13:07

## 2022-04-26 RX ADMIN — SODIUM CHLORIDE, PRESERVATIVE FREE 10 ML: 5 INJECTION INTRAVENOUS at 08:20

## 2022-04-26 RX ADMIN — BUPROPION HYDROCHLORIDE 200 MG: 100 TABLET, EXTENDED RELEASE ORAL at 20:48

## 2022-04-26 ASSESSMENT — PAIN DESCRIPTION - ONSET
ONSET: AWAKENED FROM SLEEP
ONSET: GRADUAL
ONSET: GRADUAL

## 2022-04-26 ASSESSMENT — PAIN SCALES - GENERAL
PAINLEVEL_OUTOF10: 0
PAINLEVEL_OUTOF10: 4
PAINLEVEL_OUTOF10: 4
PAINLEVEL_OUTOF10: 0
PAINLEVEL_OUTOF10: 2
PAINLEVEL_OUTOF10: 0

## 2022-04-26 ASSESSMENT — PAIN DESCRIPTION - LOCATION
LOCATION: GENERALIZED
LOCATION: ABDOMEN

## 2022-04-26 ASSESSMENT — PAIN - FUNCTIONAL ASSESSMENT
PAIN_FUNCTIONAL_ASSESSMENT: ACTIVITIES ARE NOT PREVENTED
PAIN_FUNCTIONAL_ASSESSMENT: ACTIVITIES ARE NOT PREVENTED

## 2022-04-26 ASSESSMENT — PAIN DESCRIPTION - FREQUENCY
FREQUENCY: INTERMITTENT
FREQUENCY: INTERMITTENT

## 2022-04-26 ASSESSMENT — PAIN DESCRIPTION - PAIN TYPE
TYPE: CHRONIC PAIN
TYPE: CHRONIC PAIN

## 2022-04-26 ASSESSMENT — PAIN DESCRIPTION - DESCRIPTORS
DESCRIPTORS: ACHING
DESCRIPTORS: ACHING

## 2022-04-26 ASSESSMENT — PAIN DESCRIPTION - ORIENTATION
ORIENTATION: MID
ORIENTATION: MID

## 2022-04-26 NOTE — PROGRESS NOTES
Hospitalist Progress Note      PCP: Juana Philippe, EUGENIE - CNP    Date of Admission: 4/22/2022    Chief Complaint: [facility] Course:    52-year-old female with past medical history of hypertension, diabetes mellitus presented from the group home due to fall. Subjective: had more restful night. Still with some lower back pain. Tolerating PO.  Overall feels improved but not ready for dc     Medications:  Reviewed    Infusion Medications    sodium chloride 25 mL (04/26/22 1636)    dextrose       Scheduled Medications    metoprolol succinate  25 mg Oral BID    insulin glargine  26 Units SubCUTAneous Nightly    insulin lispro  6 Units SubCUTAneous TID WC    levofloxacin  750 mg IntraVENous Q24H    miconazole   Topical BID    Venelex   Topical BID    gabapentin  600 mg Oral TID    pantoprazole  40 mg Oral Daily    sertraline  150 mg Oral Daily    busPIRone  15 mg Oral TID    buPROPion  200 mg Oral BID    QUEtiapine  300 mg Oral Nightly    hydrOXYzine  100 mg Oral BID    ferrous sulfate  325 mg Oral Daily with breakfast    aspirin  81 mg Oral Daily    rosuvastatin  20 mg Oral Nightly    [Held by provider] hydroCHLOROthiazide  25 mg Oral Daily    losartan  100 mg Oral Daily    sodium chloride flush  5-40 mL IntraVENous 2 times per day    enoxaparin  40 mg SubCUTAneous Daily    insulin lispro  0-12 Units SubCUTAneous TID WC    insulin lispro  0-6 Units SubCUTAneous Nightly     PRN Meds: medicated lip ointment, diphenhydrAMINE, sodium chloride flush, sodium chloride, ondansetron **OR** ondansetron, polyethylene glycol, acetaminophen **OR** acetaminophen, glucagon (rDNA), dextrose, dextrose bolus (hypoglycemia) **OR** dextrose bolus (hypoglycemia), glucose      Intake/Output Summary (Last 24 hours) at 4/26/2022 1816  Last data filed at 4/26/2022 1448  Gross per 24 hour   Intake 9967 ml   Output 2875 ml   Net 7092 ml       Physical Exam Performed:    BP (!) 156/83   Pulse 87   Temp 98.5 °F (36.9 °C) (Oral)   Resp 18   Wt 265 lb 6.9 oz (120.4 kg)   SpO2 95%   BMI 44.17 kg/m²     General appearance: No apparent distress, appears stated age and cooperative. Respiratory:  Normal respiratory effort. Clear to auscultation, bilaterally without Rales/Wheezes/Rhonchi. Cardiovascular: Regular rate and rhythm with normal S1/S2 without murmurs, rubs or gallops. Abdomen: Soft, non-tender, non-distended with normal bowel sounds. Musculoskeletal: No clubbing, cyanosis or edema bilaterally. Full range of motion without deformity. Skin: Skin color, texture, turgor normal.  No rashes or lesions. Neurologic:  Neurovascularly intact without any focal sensory/motor deficits. Cranial nerves: II-XII intact, grossly non-focal.  Psychiatric: Alert and oriented, thought content appropriate, normal insight  Capillary Refill: Brisk,3 seconds, normal   Peripheral Pulses: +2 palpable, equal bilaterally       Labs:   Recent Labs     04/26/22  0812   WBC 5.8   HGB 7.9*   HCT 24.1*        Recent Labs     04/24/22  1233 04/25/22  0942 04/26/22  0812    139 139   K 4.2 4.1 4.2    109 105   CO2 21 20* 24   BUN 34* 21* 15   CREATININE 1.1 1.0 0.9   CALCIUM 9.0 8.9 8.9     No results for input(s): AST, ALT, BILIDIR, BILITOT, ALKPHOS in the last 72 hours. No results for input(s): INR in the last 72 hours. No results for input(s): Matthias Manan in the last 72 hours. Urinalysis:      Lab Results   Component Value Date    NITRU POSITIVE 04/22/2022    WBCUA  04/22/2022    BACTERIA 3+ 04/22/2022    RBCUA 0-2 04/22/2022    BLOODU Negative 04/22/2022    SPECGRAV 1.025 04/22/2022    GLUCOSEU Negative 04/22/2022       Radiology:  CT ABDOMEN PELVIS W IV CONTRAST Additional Contrast? None   Final Result         1. Limited CT evaluation of kidneys due to motion artifact. However, there is heterogeneous enhancement of the left kidney raising possibility for pyelonephritis.  There is urothelial thickening considered inflammatory, but no evidence for urinary    calculus. No perinephric fluid collection. Increased retroperitoneal stranding at the level of Gerota's fascia and extending inferiorly into the pelvis as well. 2. Mild splenomegaly   3. Chronic right spigelian hernia containing loops of distal ileum and proximal colon, and 2 additional ventral hernias containing bowel loops without evidence for bowel obstruction or strangulation and a small supraumbilical hernia containing fat      CT Head WO Contrast   Final Result   1. No acute intracranial abnormality. XR SHOULDER LEFT (MIN 2 VIEWS)   Final Result      CHEST: Mild right basilar atelectasis. Lungs are otherwise clear. No pneumothorax. Normal cardiomediastinal silhouette. 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 No fracture or dislocation. XR CHEST PORTABLE   Final Result      CHEST: Mild right basilar atelectasis. Lungs are otherwise clear. No pneumothorax. Normal cardiomediastinal silhouette. 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 No fracture or dislocation. Assessment/Plan:    Active Hospital Problems    Diagnosis     SHAYLA (acute kidney injury) ([facility] [address] [N17.9]      Priority: Medium     Urinary tract infection  E. coli bacteremia  -  Blood cultures positive for E. Coli  - repeat blood cx negative so far  - on levaquin  - ID following     Acute kidney injury  - will stop IVF  - oral hydration    Diabetes mellitus  - Continue Lantus, insulin sliding scale     Generalized weakness  Mechanical falls  - PT/OT     Hypertension  - uncontrolled  - stop IVF  - resume metoprolol, losartan, HCTZ     GERD  - continue protonix    DVT Prophylaxis: Lovenox  Diet: ADULT DIET;  Regular; 3 carb choices (45 gm/meal)  ADULT ORAL NUTRITION SUPPLEMENT; Lunch; Diabetic Oral Supplement  Code Status: Full Code    PT/OT Eval Status: Pending    Dispo -likely 1 to 2 days    Yordan Bender MD

## 2022-04-26 NOTE — PLAN OF CARE
D: stressed importance of good skin care to prevent cellulitis and skin break down. Noted redness/rash with excoriation under bi breast, abd folds, and gluteal folds. Encouraged to use HCG on bath wipes at home if unable to take daily shower. Encouraged to do daily shower and take extra care of cleaning under breast, abd fold, & tracy area. Venolex ordered and applied to PU on abd pannus and excoriated skin to gluteal fold along with zinc paste. Stated appetite is better and drinking supplement drinks. B/p elevated and home b/p meds on hold. MD was notified and IV flds d/c.   A: Cont to monitor during hourly rounds    Problem: Pain  Goal: Verbalizes/displays adequate comfort level or baseline comfort level  4/26/2022 0144 by Tom Moody RN  Outcome: Progressing  4/26/2022 0125 by Tom Moody RN  Outcome: Progressing     Problem: Skin/Tissue Integrity  Goal: Absence of new skin breakdown  Description: 1. Monitor for areas of redness and/or skin breakdown  2. Assess vascular access sites hourly  3. Every 4-6 hours minimum:  Change oxygen saturation probe site  4. Every 4-6 hours:  If on nasal continuous positive airway pressure, respiratory therapy assess nares and determine need for appliance change or resting period.   Outcome: Progressing     Problem: Nutrition Deficit:  Goal: Optimize nutritional status  4/26/2022 0144 by Tom Moody RN  Outcome: Progressing  4/26/2022 0125 by Tom Moody RN  Outcome: Progressing

## 2022-04-26 NOTE — PLAN OF CARE
Problem: Discharge Planning  Goal: Discharge to home or other facility with appropriate resources  4/26/2022 1514 by Jerry Hodge RN  Outcome: Progressing  4/26/2022 1137 by Jerry Hodge RN  Outcome: Progressing     Problem: Pain  Goal: Verbalizes/displays adequate comfort level or baseline comfort level  4/26/2022 1514 by Jerry Hodge RN  Outcome: Progressing  4/26/2022 1137 by Jerry Hodge RN  Outcome: Progressing  4/26/2022 0144 by Aliza Saez RN  Outcome: Progressing  4/26/2022 0125 by Aliza Saez RN  Outcome: Progressing     Problem: Skin/Tissue Integrity  Goal: Absence of new skin breakdown  Description: 1. Monitor for areas of redness and/or skin breakdown  2. Assess vascular access sites hourly  3. Every 4-6 hours minimum:  Change oxygen saturation probe site  4. Every 4-6 hours:  If on nasal continuous positive airway pressure, respiratory therapy assess nares and determine need for appliance change or resting period. 4/26/2022 1514 by Jerry Hodge RN  Outcome: Progressing  4/26/2022 1137 by Jerry Hodge RN  Outcome: Progressing  4/26/2022 0125 by Aliza Saez RN  Outcome: Progressing     Problem: Safety - Adult  Goal: Free from fall injury  4/26/2022 1514 by Jerry Hodge RN  Outcome: Progressing  4/26/2022 1137 by Jerry Hodge RN  Outcome: Progressing     Problem: Nutrition Deficit:  Goal: Optimize nutritional status  4/26/2022 1514 by Jerry Hodge RN  Outcome: Progressing  4/26/2022 1137 by Jerry Hodge RN  Outcome: Progressing  4/26/2022 0144 by Aliza Saez RN  Outcome: Progressing  4/26/2022 0125 by Aliza Saez RN  Outcome: Progressing     Problem: Confusion  Goal: Confusion, delirium, dementia, or psychosis is improved or at baseline  Description: INTERVENTIONS:  1. Assess for possible contributors to thought disturbance, including medications, impaired vision or hearing, underlying metabolic abnormalities, dehydration, psychiatric diagnoses, and notify attending LIP  2. Brilliant high risk fall precautions, as indicated  3. Provide frequent short contacts to provide reality reorientation, refocusing and direction  4. Decrease environmental stimuli, including noise as appropriate  5. Monitor and intervene to maintain adequate nutrition, hydration, elimination, sleep and activity  6. If unable to ensure safety without constant attention obtain sitter and review sitter guidelines with assigned personnel  7.  Initiate Psychosocial CNS and Spiritual Care consult, as indicated  4/26/2022 1514 by Sudhakar Mensah RN  Outcome: Progressing  4/26/2022 1137 by Sudhakar Mensah RN  Outcome: Progressing     Problem: Chronic Conditions and Co-morbidities  Goal: Patient's chronic conditions and co-morbidity symptoms are monitored and maintained or improved  4/26/2022 1514 by Sudhakar Mensha RN  Outcome: Progressing  4/26/2022 1137 by Sudhakar Mensah RN  Outcome: Progressing     Problem: ABCDS Injury Assessment  Goal: Absence of physical injury  4/26/2022 1514 by Sudhakar Mensah RN  Outcome: Progressing  4/26/2022 1137 by Sudhakar Mensah RN  Outcome: Progressing

## 2022-04-26 NOTE — PROGRESS NOTES
ID Follow-up NOTE    CC:   E coli UTI / bacteremia  Antibiotics: Levofloxacin    Admit Date: 4/22/2022  Hospital Day: 5    Subjective:     Patient feels 'better', still with L flank pain      Objective:     Patient Vitals for the past 8 hrs:   BP Temp Pulse Resp SpO2   04/26/22 1305 (!) 158/60 98.4 °F (36.9 °C) 106 18 95 %     I/O last 3 completed shifts: In: 88676 [P.O.:1820; I.V.:8347]  Out: 3025 [LCIZS:8377]  I/O this shift:  In: 660 [P.O.:660]  Out: 1475 [Urine:1475]    EXAM:  GENERAL: No apparent distress.     HEENT: Membranes moist, no oral lesion  NECK:  Supple, no lymphadenopathy  LUNGS: Clear b/l, no rales, no dullness  CARDIAC: RRR, no murmur appreciated  ABD:  + BS, soft / NT - + L CVAT  EXT:  No rash, no edema, no lesions  NEURO: No focal neurologic findings  PSYCH: Orientation, sensorium, mood normal  LINES:  Peripheral iv       Data Review:  Lab Results   Component Value Date    WBC 5.8 04/26/2022    HGB 7.9 (L) 04/26/2022    HCT 24.1 (L) 04/26/2022    MCV 83.2 04/26/2022     04/26/2022     Lab Results   Component Value Date    CREATININE 0.9 04/26/2022    BUN 15 04/26/2022     04/26/2022    K 4.2 04/26/2022     04/26/2022    CO2 24 04/26/2022       Hepatic Function Panel:   Lab Results   Component Value Date    ALKPHOS 84 03/20/2022    ALT 14 03/20/2022    AST 13 03/20/2022    PROT 7.0 03/20/2022    BILITOT 0.4 03/20/2022    BILIDIR <0.2 03/20/2022    IBILI see below 03/20/2022    LABALBU 4.0 03/20/2022       Micro:  4/22 BC x 2 E coli  Antibiotic Interpretation Microscan     ampicillin Sensitive <=2 mcg/mL   ampicillin-sulbactam Sensitive <=2 mcg/mL   ceFAZolin Sensitive <=4 mcg/mL   cefepime Sensitive <=0.12 mcg/mL   cefTRIAXone Sensitive <=0.25 mcg/mL   ciprofloxacin Sensitive <=0.25 mcg/mL   ertapenem Sensitive <=0.12 mcg/mL   gentamicin Sensitive <=1 mcg/mL   levofloxacin Sensitive <=0.12 mcg/mL   piperacillin-tazobactam Sensitive <=4 mcg/mL   trimethoprim-sulfamethoxazole Sensitive <=20 mcg/mL      Imagin/22 Abd / pelvic CT  1. Limited CT evaluation of kidneys due to motion artifact. However, there is heterogeneous enhancement of the left kidney raising possibility for pyelonephritis. There is urothelial thickening considered inflammatory, but no evidence for urinary    calculus. No perinephric fluid collection. Increased retroperitoneal stranding at the level of Gerota's fascia and extending inferiorly into the pelvis as well. 2. Mild splenomegaly   3.  Chronic right spigelian hernia containing loops of distal ileum and proximal colon, and 2 additional ventral hernias containing bowel loops without evidence for bowel obstruction or strangulation and a small supraumbilical hernia containing fat       Scheduled Meds:   metoprolol succinate  25 mg Oral BID    insulin glargine  26 Units SubCUTAneous Nightly    insulin lispro  6 Units SubCUTAneous TID WC    levofloxacin  750 mg IntraVENous Q24H    miconazole   Topical BID    Venelex   Topical BID    gabapentin  600 mg Oral TID    pantoprazole  40 mg Oral Daily    sertraline  150 mg Oral Daily    busPIRone  15 mg Oral TID    buPROPion  200 mg Oral BID    QUEtiapine  300 mg Oral Nightly    hydrOXYzine  100 mg Oral BID    ferrous sulfate  325 mg Oral Daily with breakfast    aspirin  81 mg Oral Daily    rosuvastatin  20 mg Oral Nightly    [Held by provider] hydroCHLOROthiazide  25 mg Oral Daily    losartan  100 mg Oral Daily    sodium chloride flush  5-40 mL IntraVENous 2 times per day    enoxaparin  40 mg SubCUTAneous Daily    insulin lispro  0-12 Units SubCUTAneous TID     insulin lispro  0-6 Units SubCUTAneous Nightly       Continuous Infusions:   sodium chloride      dextrose         PRN Meds:  medicated lip ointment, diphenhydrAMINE, sodium chloride flush, sodium chloride, ondansetron **OR** ondansetron, polyethylene glycol, acetaminophen **OR** acetaminophen, glucagon (rDNA), dextrose, dextrose bolus (hypoglycemia) **OR** dextrose bolus (hypoglycemia), glucose      Assessment:     Hx obesity (BMI 43), HTN, MELLY, depression  Multiple allergies listed including PCN, Metronidazole, Clindamycin, Azithromycin     Falls, fatigue   L pyelonephritis  E coli bacteremia    Plan:     Cont Levofloxacin   Treat for 10 days further     Medical Decision Making:   The following items were considered in medical decision making:  Discussion of patient care with other providers  Reviewed clinical lab tests  Reviewed radiology tests  Reviewed other diagnostic tests/interventions  Microbiology cultures and other micro tests reviewed      Discussed with pt  Teresa Nina MD

## 2022-04-26 NOTE — PLAN OF CARE
Problem: Discharge Planning  Goal: Discharge to home or other facility with appropriate resources  Outcome: Progressing     Problem: Pain  Goal: Verbalizes/displays adequate comfort level or baseline comfort level  4/26/2022 1137 by Neisha Santos RN  Outcome: Progressing  4/26/2022 0144 by Cari Roberts RN  Outcome: Progressing  4/26/2022 0125 by Cari Roberts RN  Outcome: Progressing     Problem: Skin/Tissue Integrity  Goal: Absence of new skin breakdown  Description: 1. Monitor for areas of redness and/or skin breakdown  2. Assess vascular access sites hourly  3. Every 4-6 hours minimum:  Change oxygen saturation probe site  4. Every 4-6 hours:  If on nasal continuous positive airway pressure, respiratory therapy assess nares and determine need for appliance change or resting period. 4/26/2022 1137 by Neisha Santos RN  Outcome: Progressing  4/26/2022 0125 by Cari Roberts RN  Outcome: Progressing     Problem: Safety - Adult  Goal: Free from fall injury  Outcome: Progressing     Problem: Nutrition Deficit:  Goal: Optimize nutritional status  4/26/2022 1137 by Neisha Santos RN  Outcome: Progressing  4/26/2022 0144 by Cari Roberts RN  Outcome: Progressing  4/26/2022 0125 by Cari Roberts RN  Outcome: Progressing     Problem: Confusion  Goal: Confusion, delirium, dementia, or psychosis is improved or at baseline  Description: INTERVENTIONS:  1. Assess for possible contributors to thought disturbance, including medications, impaired vision or hearing, underlying metabolic abnormalities, dehydration, psychiatric diagnoses, and notify attending LIP  2. Peever high risk fall precautions, as indicated  3. Provide frequent short contacts to provide reality reorientation, refocusing and direction  4. Decrease environmental stimuli, including noise as appropriate  5. Monitor and intervene to maintain adequate nutrition, hydration, elimination, sleep and activity  6.  If unable to ensure safety without constant attention obtain sitter and review sitter guidelines with assigned personnel  7.  Initiate Psychosocial CNS and Spiritual Care consult, as indicated  Outcome: Progressing     Problem: Chronic Conditions and Co-morbidities  Goal: Patient's chronic conditions and co-morbidity symptoms are monitored and maintained or improved  Outcome: Progressing     Problem: ABCDS Injury Assessment  Goal: Absence of physical injury  Outcome: Progressing

## 2022-04-27 LAB
ANION GAP SERPL CALCULATED.3IONS-SCNC: 9 MMOL/L (ref 3–16)
BUN BLDV-MCNC: 14 MG/DL (ref 7–20)
CALCIUM SERPL-MCNC: 9.2 MG/DL (ref 8.3–10.6)
CHLORIDE BLD-SCNC: 98 MMOL/L (ref 99–110)
CO2: 26 MMOL/L (ref 21–32)
CREAT SERPL-MCNC: 0.9 MG/DL (ref 0.6–1.2)
FOLATE: 4.51 NG/ML (ref 4.78–24.2)
GFR AFRICAN AMERICAN: >60
GFR NON-AFRICAN AMERICAN: >60
GLUCOSE BLD-MCNC: 142 MG/DL (ref 70–99)
GLUCOSE BLD-MCNC: 162 MG/DL (ref 70–99)
GLUCOSE BLD-MCNC: 164 MG/DL (ref 70–99)
GLUCOSE BLD-MCNC: 165 MG/DL (ref 70–99)
GLUCOSE BLD-MCNC: 167 MG/DL (ref 70–99)
HCT VFR BLD CALC: 25.5 % (ref 36–48)
HEMOGLOBIN: 8.4 G/DL (ref 12–16)
IRON SATURATION: 10 % (ref 15–50)
IRON: 22 UG/DL (ref 37–145)
PERFORMED ON: ABNORMAL
POTASSIUM REFLEX MAGNESIUM: 4.3 MMOL/L (ref 3.5–5.1)
SODIUM BLD-SCNC: 133 MMOL/L (ref 136–145)
TOTAL IRON BINDING CAPACITY: 228 UG/DL (ref 260–445)
VITAMIN B-12: 1973 PG/ML (ref 211–911)

## 2022-04-27 PROCEDURE — 99232 SBSQ HOSP IP/OBS MODERATE 35: CPT | Performed by: INTERNAL MEDICINE

## 2022-04-27 PROCEDURE — 6370000000 HC RX 637 (ALT 250 FOR IP): Performed by: INTERNAL MEDICINE

## 2022-04-27 PROCEDURE — 6360000002 HC RX W HCPCS: Performed by: INTERNAL MEDICINE

## 2022-04-27 PROCEDURE — 97110 THERAPEUTIC EXERCISES: CPT

## 2022-04-27 PROCEDURE — 1200000000 HC SEMI PRIVATE

## 2022-04-27 PROCEDURE — 97530 THERAPEUTIC ACTIVITIES: CPT

## 2022-04-27 PROCEDURE — 85018 HEMOGLOBIN: CPT

## 2022-04-27 PROCEDURE — 85014 HEMATOCRIT: CPT

## 2022-04-27 PROCEDURE — 80048 BASIC METABOLIC PNL TOTAL CA: CPT

## 2022-04-27 PROCEDURE — 36415 COLL VENOUS BLD VENIPUNCTURE: CPT

## 2022-04-27 PROCEDURE — 2580000003 HC RX 258: Performed by: INTERNAL MEDICINE

## 2022-04-27 PROCEDURE — 97535 SELF CARE MNGMENT TRAINING: CPT

## 2022-04-27 RX ORDER — FOLIC ACID 1 MG/1
1 TABLET ORAL DAILY
Status: DISCONTINUED | OUTPATIENT
Start: 2022-04-27 | End: 2022-04-29 | Stop reason: HOSPADM

## 2022-04-27 RX ORDER — SENNA AND DOCUSATE SODIUM 50; 8.6 MG/1; MG/1
2 TABLET, FILM COATED ORAL 2 TIMES DAILY
Status: DISCONTINUED | OUTPATIENT
Start: 2022-04-27 | End: 2022-04-28

## 2022-04-27 RX ORDER — POLYETHYLENE GLYCOL 3350 17 G/17G
17 POWDER, FOR SOLUTION ORAL DAILY
Status: DISCONTINUED | OUTPATIENT
Start: 2022-04-27 | End: 2022-04-29 | Stop reason: HOSPADM

## 2022-04-27 RX ADMIN — ENOXAPARIN SODIUM 40 MG: 100 INJECTION SUBCUTANEOUS at 09:00

## 2022-04-27 RX ADMIN — LOSARTAN POTASSIUM 100 MG: 50 TABLET, FILM COATED ORAL at 09:00

## 2022-04-27 RX ADMIN — GABAPENTIN 600 MG: 300 CAPSULE ORAL at 21:04

## 2022-04-27 RX ADMIN — ROSUVASTATIN CALCIUM 20 MG: 20 TABLET, FILM COATED ORAL at 21:05

## 2022-04-27 RX ADMIN — BUSPIRONE HYDROCHLORIDE 15 MG: 10 TABLET ORAL at 21:04

## 2022-04-27 RX ADMIN — HYDROXYZINE HYDROCHLORIDE 100 MG: 25 TABLET, FILM COATED ORAL at 21:05

## 2022-04-27 RX ADMIN — INSULIN LISPRO 2 UNITS: 100 INJECTION, SOLUTION INTRAVENOUS; SUBCUTANEOUS at 09:19

## 2022-04-27 RX ADMIN — ASPIRIN 81 MG 81 MG: 81 TABLET ORAL at 09:00

## 2022-04-27 RX ADMIN — BUSPIRONE HYDROCHLORIDE 15 MG: 10 TABLET ORAL at 08:59

## 2022-04-27 RX ADMIN — SODIUM CHLORIDE, PRESERVATIVE FREE 10 ML: 5 INJECTION INTRAVENOUS at 09:01

## 2022-04-27 RX ADMIN — BUSPIRONE HYDROCHLORIDE 15 MG: 10 TABLET ORAL at 14:29

## 2022-04-27 RX ADMIN — MICONAZOLE NITRATE: 20 POWDER TOPICAL at 09:04

## 2022-04-27 RX ADMIN — SENNOSIDES AND DOCUSATE SODIUM 2 TABLET: 50; 8.6 TABLET ORAL at 21:05

## 2022-04-27 RX ADMIN — INSULIN LISPRO 2 UNITS: 100 INJECTION, SOLUTION INTRAVENOUS; SUBCUTANEOUS at 12:10

## 2022-04-27 RX ADMIN — HYDROXYZINE HYDROCHLORIDE 100 MG: 25 TABLET, FILM COATED ORAL at 09:00

## 2022-04-27 RX ADMIN — BUPROPION HYDROCHLORIDE 200 MG: 100 TABLET, EXTENDED RELEASE ORAL at 09:01

## 2022-04-27 RX ADMIN — Medication: at 21:08

## 2022-04-27 RX ADMIN — GABAPENTIN 600 MG: 300 CAPSULE ORAL at 09:00

## 2022-04-27 RX ADMIN — METOPROLOL SUCCINATE 25 MG: 25 TABLET, FILM COATED, EXTENDED RELEASE ORAL at 21:05

## 2022-04-27 RX ADMIN — QUETIAPINE FUMARATE 300 MG: 300 TABLET ORAL at 21:05

## 2022-04-27 RX ADMIN — SODIUM CHLORIDE: 9 INJECTION, SOLUTION INTRAVENOUS at 18:26

## 2022-04-27 RX ADMIN — INSULIN LISPRO 6 UNITS: 100 INJECTION, SOLUTION INTRAVENOUS; SUBCUTANEOUS at 18:28

## 2022-04-27 RX ADMIN — ACETAMINOPHEN 650 MG: 650 SUPPOSITORY RECTAL at 06:15

## 2022-04-27 RX ADMIN — INSULIN LISPRO 1 UNITS: 100 INJECTION, SOLUTION INTRAVENOUS; SUBCUTANEOUS at 21:10

## 2022-04-27 RX ADMIN — PANTOPRAZOLE SODIUM 40 MG: 40 TABLET, DELAYED RELEASE ORAL at 09:00

## 2022-04-27 RX ADMIN — INSULIN LISPRO 2 UNITS: 100 INJECTION, SOLUTION INTRAVENOUS; SUBCUTANEOUS at 18:28

## 2022-04-27 RX ADMIN — LEVOFLOXACIN 750 MG: 5 INJECTION, SOLUTION INTRAVENOUS at 18:27

## 2022-04-27 RX ADMIN — ACETAMINOPHEN 650 MG: 325 TABLET ORAL at 11:02

## 2022-04-27 RX ADMIN — POLYETHYLENE GLYCOL 3350 17 G: 17 POWDER, FOR SOLUTION ORAL at 14:29

## 2022-04-27 RX ADMIN — SENNOSIDES AND DOCUSATE SODIUM 2 TABLET: 50; 8.6 TABLET ORAL at 14:30

## 2022-04-27 RX ADMIN — MICONAZOLE NITRATE: 20 POWDER TOPICAL at 21:09

## 2022-04-27 RX ADMIN — METOPROLOL SUCCINATE 25 MG: 25 TABLET, FILM COATED, EXTENDED RELEASE ORAL at 09:00

## 2022-04-27 RX ADMIN — Medication: at 09:05

## 2022-04-27 RX ADMIN — SERTRALINE HYDROCHLORIDE 150 MG: 100 TABLET ORAL at 08:59

## 2022-04-27 RX ADMIN — INSULIN LISPRO 6 UNITS: 100 INJECTION, SOLUTION INTRAVENOUS; SUBCUTANEOUS at 09:19

## 2022-04-27 RX ADMIN — INSULIN LISPRO 6 UNITS: 100 INJECTION, SOLUTION INTRAVENOUS; SUBCUTANEOUS at 12:10

## 2022-04-27 RX ADMIN — HYDROCHLOROTHIAZIDE 25 MG: 25 TABLET ORAL at 09:18

## 2022-04-27 RX ADMIN — SODIUM CHLORIDE, PRESERVATIVE FREE 10 ML: 5 INJECTION INTRAVENOUS at 21:14

## 2022-04-27 RX ADMIN — FOLIC ACID 1 MG: 1 TABLET ORAL at 14:35

## 2022-04-27 RX ADMIN — GABAPENTIN 600 MG: 300 CAPSULE ORAL at 14:29

## 2022-04-27 RX ADMIN — BUPROPION HYDROCHLORIDE 200 MG: 100 TABLET, EXTENDED RELEASE ORAL at 21:04

## 2022-04-27 RX ADMIN — FERROUS SULFATE TAB 325 MG (65 MG ELEMENTAL FE) 325 MG: 325 (65 FE) TAB at 09:00

## 2022-04-27 ASSESSMENT — PAIN DESCRIPTION - ONSET
ONSET: ON-GOING

## 2022-04-27 ASSESSMENT — PAIN DESCRIPTION - DESCRIPTORS
DESCRIPTORS: STABBING
DESCRIPTORS: ACHING
DESCRIPTORS: ACHING

## 2022-04-27 ASSESSMENT — PAIN DESCRIPTION - FREQUENCY
FREQUENCY: INTERMITTENT

## 2022-04-27 ASSESSMENT — PAIN DESCRIPTION - LOCATION
LOCATION: BACK

## 2022-04-27 ASSESSMENT — PAIN SCALES - GENERAL
PAINLEVEL_OUTOF10: 0
PAINLEVEL_OUTOF10: 6
PAINLEVEL_OUTOF10: 6
PAINLEVEL_OUTOF10: 3
PAINLEVEL_OUTOF10: 6
PAINLEVEL_OUTOF10: 0

## 2022-04-27 ASSESSMENT — PAIN DESCRIPTION - PAIN TYPE
TYPE: ACUTE PAIN

## 2022-04-27 ASSESSMENT — PAIN DESCRIPTION - ORIENTATION
ORIENTATION: LEFT
ORIENTATION: LEFT;LOWER
ORIENTATION: LEFT;LOWER

## 2022-04-27 ASSESSMENT — PAIN - FUNCTIONAL ASSESSMENT: PAIN_FUNCTIONAL_ASSESSMENT: ACTIVITIES ARE NOT PREVENTED

## 2022-04-27 NOTE — PROGRESS NOTES
Occupational Therapy  Facility/Department: [address]  Daily Treatment    Name: Amado Hanks  : 1960  MRN: [id number]  Date of Service: 2022    Discharge Recommendations:  Amado Hanks scored a 14/24 on the AM-PAC ADL Inpatient form. Current research shows that an AM-PAC score of 17 or less is typically not associated with a discharge to the patient's home setting. Based on the patient's AM-PAC score and their current ADL deficits, it is recommended that the patient have 3-5 sessions per week of Occupational Therapy at d/c to increase the patient's independence. Please see assessment section for further patient specific details. If patient discharges prior to next session this note will serve as a discharge summary. Please see below for the latest assessment towards goals. Subacute/Skilled Nursing Facility  OT Equipment Recommendations  Equipment Needed: No  Other: defer to next care facility     Patient Diagnosis(es): The primary encounter diagnosis was Acute pyelonephritis. Diagnoses of SHAYLA (acute kidney injury) ([address] Hypotension due to hypovolemia, Leukocytosis, unspecified type, and Elevated troponin were also pertinent to this visit. Past Medical History:  has a past medical history of Anxiety, Diabetes mellitus ([address] Diabetic polyneuropathy ([address] Hernia, abdominal, Hyperlipidemia, Hypertension, and PTSD (post-traumatic stress disorder). Past Surgical History:  has a past surgical history that includes Hysterectomy; Cholecystectomy; colectomy; and Revision Colostomy. Treatment Diagnosis: impaired ADLs / functional transfers/ decreased endurance    Assessment   Performance deficits / Impairments: Decreased functional mobility ; Decreased ADL status; Decreased endurance;Decreased safe awareness;Decreased strength;Decreased balance;Decreased posture  Assessment: Pt from supported living receives assist with medication/ meals.   Pt reports was struggling to perform ADLs and reports freq falls. Pt limited by chronic back pain this session, unable to tolerate sitting up in chair. Pt could benefit from ongoing inpt OT at d/c to maximize functional level. If pt were to go home would require 24hr assist /help with all transfers. Will follow as inpt. Treatment Diagnosis: impaired ADLs / functional transfers/ decreased endurance  Prognosis: Fair  REQUIRES OT FOLLOW-UP: Yes  Activity Tolerance  Activity Tolerance: Patient limited by pain  Activity Tolerance: RN aware of pt's request for pain medication. Plan   Plan  Times per Week: 2-5x  Times per Day: Daily  Current Treatment Recommendations: Self-Care / ADL,Balance training,Endurance training,Safety education & training,Equipment evaluation, education, & procurement,Patient/Caregiver education & training,Strengthening,Functional mobility training     Restrictions  Position Activity Restriction  Other position/activity restrictions: Up with assist    Subjective   General  Chart Reviewed: Yes  Patient assessed for rehabilitation services?: Yes  Additional Pertinent Hx: Admit 4/22 with frequenty falls/ Elevated BS     Cxray -Mild right basilar atelectasis. , R shoulder xray- neg, CT head - neg, CT Abd-                                              PMHX: hypertension, diabetes mellitus, falls, PTSD  Family / Caregiver Present: No  Referring Practitioner: Cecil Taylor MD  Diagnosis: Falls/ Elevated BS  Subjective  Subjective: RN cleared pt for tx. Pt supine at session start. \"My back is hurting way too much to stay in this chair. \"     Pt c/o severe chronic back pain, limiting activity. RN aware.        Social/Functional History  Social/Functional History  Lives With: Other (comment) (roommate)  Type of Home: Apartment (group home)  Home Layout: One level  Home Access: Stairs to enter with rails (4)  Bathroom Shower/Tub: Tub/Shower unit  Bathroom Toilet: Standard  Home Equipment: Walker, rolling  Has the patient had two or more falls in the past year or any fall with injury in the past year?: Yes  ADL Assistance: [facility]: Needs assistance (group home staff do meals)  Ambulation Assistance: Independent (with rolling walker)  Transfer Assistance: Independent  Active : No (takes a cab to [facility])  Additional Comments: Pt with multiple recent falls. Pt reports group home staff arrive in the morning and assist with meds/meals. Staff are around all day and leave after dinner. Pt uses an electric cart at [facility]. Objective              Safety Devices  Type of Devices: Nurse notified;Call light within reach;Gait belt;Bed alarm in place; Left in bed     Balance  Sitting: Impaired  Sitting - Static: Fair (occasional); Unsupported  Standing: Impaired  Standing - Static: Fair   Standing - Dynamic: Poor             ADL  Grooming: Dependent/Total (to comb hair in supine, increased assist needed 2/2 extensive knots)  UE Bathing: Setup (to use shower cap in supine, ventral bath wipes in unsupported sitting, limited by pain, VCs for thoroughness)  LE Bathing: Maximum assistance (seated BLEs hip to knee with set-up; TA below knee, and TA for tracy-area in supine 2/2 pain)  Toileting: Dependent/Total (hygiene and pure-wick)           Bed mobility  Rolling to Left: Moderate assistance  Rolling to Right: Moderate assistance  Supine to Sit: Stand by assistance  Sit to Supine: Moderate assistance (BLEs)  Scooting: Stand by assistance  Comment: Pt educated on back precautions 2/2 chronic back pain. Did not complete log roll technique despite education and cues. Transfers  Sit to stand: Minimal assistance  Stand to sit: Minimal assistance  Transfer Comments: Pt needing mod VCs for safe t/f technique and safe use of RW. Stood 3 times from EOB, 1x from chair. Sits prematurely. Cognition  Overall Cognitive Status: Exceptions  Arousal/Alertness: Delayed responses to stimuli  Following Commands:  Follows one step commands with repetition  Attention Span: Difficulty attending to directions  Memory: Decreased short term memory;Decreased recall of recent events  Safety Judgement: Decreased awareness of need for assistance  Problem Solving: Assistance required to identify errors made;Decreased awareness of errors  Insights: Decreased awareness of deficits  Initiation: Requires cues for some  Sequencing: Requires cues for some  Cognition Comment: slow mentation noted                                      Education: Role of OT, safe t/f training, safe use of DME, awareness of deficits, discharge planning, ADL as therapeutic exercise, importance of OOB       AM-PAC Score        AM-[facility] Inpatient Daily Activity Raw Score: 14 (04/27/22 1426)  AM-PAC Inpatient ADL T-Scale Score : 33.39 (04/27/22 1426)  ADL Inpatient CMS 0-100% Score: 59.67 (04/27/22 1426)  ADL Inpatient CMS G-Code Modifier : CK (04/27/22 1426)    Goals  Short Term Goals  Time Frame for Short term goals: at d/c - cont all goals 4/27  Short Term Goal 1: Stance x 5 mins with Supervision for ADLs /IADLs  Short Term Goal 2: Commode transfers with Supervision with RTS prn  Short Term Goal 3: LE Dressing with Supervision and AE prn  Short Term Goal 4: Chair pushups x 5 reps with supervision  Patient Goals   Patient goals : \"I want to get better. I'll do what I can. \"       Therapy Time   Individual Concurrent Group Co-treatment   Time In 1013         Time Out 1055         Minutes 42         Timed Code Treatment Minutes: 43 Minutes       If patient is discharged prior to next treatment session, this note will serve as the discharge summary.   Carmelo Ellison, OTR/L #[id number]

## 2022-04-27 NOTE — DISCHARGE INSTR - COC
Continuity of Care Form    Patient Name: Dagoberto Bajwa   :  1960  MRN:  [id number]    Admit date:  2022  Discharge date:  22    Code Status Order: Full Code   Advance Directives:      Admitting Physician:  Juanjo Baig MD  PCP: EUGENIE Patel CNP    Discharging Nurse: [facility] Unit/Room#: 8251/2733-14  Discharging Unit Phone Number: [phone]    Emergency Contact:   Extended Emergency Contact Information  Primary Emergency Contact: Tawanda Vanegas, [facility] Phone: [phone]  Relation: Child  Preferred language: English   needed?  No  Secondary Emergency Contact: Zakiya Holland Manager  Mobile Phone: [phone]  Relation: Other    Past Surgical History:  Past Surgical History:   Procedure Laterality Date    CHOLECYSTECTOMY      COLECTOMY      HYSTERECTOMY      REVISION COLOSTOMY         Immunization History:   Immunization History   Administered Date(s) Administered    COVID-19, J&J, PF, 0.5 mL 2021    COVID-19, Pfizer Purple top, DILUTE for use, 12+ yrs, 30mcg/0.3mL dose 10/28/2021    Influenza, MDCK Quadv, IM, PF (Flucelvax 2 yrs and older) 10/28/2021       Active Problems:  Patient Active Problem List   Diagnosis Code    Syncope and collapse R55    Refractory migraine with aura G43.919    Psychogenic headache F45.41    Neck pain M54.2    Intentional drug overdose ([address] T50.902A    Hypertension I10    Hyperplastic polyp of intestine K63.5    Head problem R68.89    Depressive disorder F32.9    Severe sepsis ([address] A41.9, R65.20    Type 2 diabetes mellitus ([address] E11.9    Morbid (severe) obesity due to excess calories (HCC) E66.01    Chest pain R07.9    SHAYLA (acute kidney injury) ([address] N17.9       Isolation/Infection:   Isolation            Contact          Patient Infection Status       Infection Onset Added Last Indicated Last Indicated By Review Planned Expiration Resolved Resolved By    MDRO (multi-drug resistant organism) 21 03/20/22 Culture, Urine        Resolved    COVID-19 (Rule Out) 03/20/22 03/20/22 03/20/22 COVID-19 & Influenza Combo (Ordered)   03/20/22 Rule-Out Test Resulted    COVID-19 (Rule Out) 12/23/21 12/23/21 12/23/21 COVID-19 (Ordered)   12/24/21 Rule-Out Test Resulted            Nurse Assessment:  Last Vital Signs: BP (!) 118/59   Pulse 78   Temp 98.3 °F (36.8 °C) (Oral)   Resp 16   Wt 265 lb 6.9 oz (120.4 kg)   SpO2 93%   BMI 44.17 kg/m²     Last documented pain score (0-10 scale): Pain Level: 6  Last Weight:   Wt Readings from Last 1 Encounters:   04/26/22 265 lb 6.9 oz (120.4 kg)     Mental Status:  oriented, alert, coherent, logical, thought processes intact, and able to concentrate and follow conversation    IV Access:  - None    Nursing Mobility/ADLs:  Walking   Assisted  Transfer  Assisted  Bathing  Assisted  Dressing  Assisted  Toileting  Assisted  Feeding  Independent  Med Admin  Assisted  Med Delivery   whole    Wound Care Documentation and Therapy:        Elimination:  Continence: Bowel: sometimes  Bladder: sometimes  Urinary Catheter: None   Colostomy/Ileostomy/Ileal Conduit: No       Date of Last BM: 04/28/2022    Intake/Output Summary (Last 24 hours) at 4/27/2022 1240  Last data filed at 4/27/2022 0626  Gross per 24 hour   Intake 440 ml   Output 3325 ml   Net -2885 ml     I/O last 3 completed shifts: In: 6374 [P.O.:1620; I.V.:8347]  Out: 5625 [Urine:5625]    Safety Concerns: At Risk for Falls    Impairments/Disabilities:      Ble weakness    Nutrition Therapy:  Current Nutrition Therapy:   - Oral Diet:  Carb Control 3 carbs/meal (1500kcals/day)    Routes of Feeding: Oral  Liquids: Thin Liquids  Daily Fluid Restriction: no  Last Modified Barium Swallow with Video (Video Swallowing Test): not done    Treatments at the Time of Hospital Discharge:   Respiratory Treatments: none  Oxygen Therapy:  is not on home oxygen therapy.   Ventilator:    - No ventilator support    Rehab Therapies: Physical Therapy and Occupational Therapy  Weight Bearing Status/Restrictions: No weight bearing restrictions  Other Medical Equipment (for information only, NOT a DME order):  wheelchair and walker  Other Treatments:     Patient's personal belongings (please select all that are sent with patient):  Cell phone, one set of clothes, one pair shoes, change purse with some credit/debit cards, keys    RN SIGNATURE:  Electronically signed by Alessio Ryan RN on 4/29/22 at 3:26 PM EDT    CASE MANAGEMENT/SOCIAL WORK SECTION    Inpatient Status Date: 4/22/22    Readmission Risk Assessment Score:  Readmission Risk              Risk of Unplanned Readmission:  21           Discharging to Facility/ [address] (SNF): [address] [facility] Phone: [phone] Fax: [phone]    Dialysis Facility (if applicable)   NA    / signature: Electronically signed by KEYONA Hannon, NASIRW on 4/28/22 at 11:40 AM EDT    PHYSICIAN SECTION    Prognosis: Good    Condition at Discharge: Stable    Rehab Potential (if transferring to Rehab): Good    Recommended Labs or Other Treatments After Discharge:     PT/OT daily    Labs: weekly CBC, BMP. Resume aspirin when cleared by GI. Physician Certification: I certify the above information and transfer of Marilu Romero  is necessary for the continuing treatment of the diagnosis listed and that she requires [place] for less 30 days.      Update Admission H&P: No change in H&P    PHYSICIAN SIGNATURE:  Electronically signed by Rebecca Teran MD on 4/27/22 at 12:41 PM EDT

## 2022-04-27 NOTE — PROGRESS NOTES
ID Follow-up NOTE    CC:   E coli UTI / bacteremia  Antibiotics: Levofloxacin    Admit Date: 4/22/2022  Hospital Day: 6    Subjective:     Patient c/o L flank pain, less than from admission      Objective:     Patient Vitals for the past 8 hrs:   BP Temp Temp src Pulse Resp SpO2   04/27/22 1522 (!) 143/69 98.3 °F (36.8 °C) Oral 81 18 95 %   04/27/22 1105 (!) 118/59 98.3 °F (36.8 °C) Oral 78 16 93 %     I/O last 3 completed shifts: In: 8216 [P.O.:1620; I.V.:8347]  Out: 5625 [Urine:5625]  I/O this shift:  In: -   Out: 500 [Urine:500]    EXAM:  GENERAL: No apparent distress.     HEENT: Membranes moist, no oral lesion  NECK:  Supple, no lymphadenopathy  LUNGS: Clear b/l, no rales, no dullness  CARDIAC: RRR, no murmur appreciated  ABD:  + BS, soft / NT - + L CVAT  EXT:  No rash, no edema, no lesions  NEURO: No focal neurologic findings  PSYCH: Orientation, sensorium, mood normal  LINES:  Peripheral iv       Data Review:  Lab Results   Component Value Date    WBC 5.8 04/26/2022    HGB 8.4 (L) 04/27/2022    HCT 25.5 (L) 04/27/2022    MCV 83.2 04/26/2022     04/26/2022     Lab Results   Component Value Date    CREATININE 0.9 04/27/2022    BUN 14 04/27/2022     (L) 04/27/2022    K 4.3 04/27/2022    CL 98 (L) 04/27/2022    CO2 26 04/27/2022       Hepatic Function Panel:   Lab Results   Component Value Date    ALKPHOS 84 03/20/2022    ALT 14 03/20/2022    AST 13 03/20/2022    PROT 7.0 03/20/2022    BILITOT 0.4 03/20/2022    BILIDIR <0.2 03/20/2022    IBILI see below 03/20/2022    LABALBU 4.0 03/20/2022       Micro:  4/22 BC x 2 E coli  Antibiotic Interpretation Microscan     ampicillin Sensitive <=2 mcg/mL   ampicillin-sulbactam Sensitive <=2 mcg/mL   ceFAZolin Sensitive <=4 mcg/mL   cefepime Sensitive <=0.12 mcg/mL   cefTRIAXone Sensitive <=0.25 mcg/mL   ciprofloxacin Sensitive <=0.25 mcg/mL   ertapenem Sensitive <=0.12 mcg/mL   gentamicin Sensitive <=1 mcg/mL   levofloxacin Sensitive <=0.12 mcg/mL piperacillin-tazobactam Sensitive <=4 mcg/mL   trimethoprim-sulfamethoxazole Sensitive <=20 mcg/mL      Imagin/22 Abd / pelvic CT  1. Limited CT evaluation of kidneys due to motion artifact. However, there is heterogeneous enhancement of the left kidney raising possibility for pyelonephritis. There is urothelial thickening considered inflammatory, but no evidence for urinary    calculus. No perinephric fluid collection. Increased retroperitoneal stranding at the level of Gerota's fascia and extending inferiorly into the pelvis as well. 2. Mild splenomegaly   3.  Chronic right spigelian hernia containing loops of distal ileum and proximal colon, and 2 additional ventral hernias containing bowel loops without evidence for bowel obstruction or strangulation and a small supraumbilical hernia containing fat       Scheduled Meds:   sennosides-docusate sodium  2 tablet Oral BID    polyethylene glycol  17 g Oral Daily    folic acid  1 mg Oral Daily    metoprolol succinate  25 mg Oral BID    insulin glargine  26 Units SubCUTAneous Nightly    insulin lispro  6 Units SubCUTAneous TID WC    levofloxacin  750 mg IntraVENous Q24H    miconazole   Topical BID    Venelex   Topical BID    gabapentin  600 mg Oral TID    pantoprazole  40 mg Oral Daily    sertraline  150 mg Oral Daily    busPIRone  15 mg Oral TID    buPROPion  200 mg Oral BID    QUEtiapine  300 mg Oral Nightly    hydrOXYzine  100 mg Oral BID    ferrous sulfate  325 mg Oral Daily with breakfast    aspirin  81 mg Oral Daily    rosuvastatin  20 mg Oral Nightly    hydroCHLOROthiazide  25 mg Oral Daily    losartan  100 mg Oral Daily    sodium chloride flush  5-40 mL IntraVENous 2 times per day    enoxaparin  40 mg SubCUTAneous Daily    insulin lispro  0-12 Units SubCUTAneous TID WC    insulin lispro  0-6 Units SubCUTAneous Nightly       Continuous Infusions:   sodium chloride 25 mL (22 1636)    dextrose         PRN Meds:  medicated lip ointment, diphenhydrAMINE, sodium chloride flush, sodium chloride, ondansetron **OR** ondansetron, polyethylene glycol, acetaminophen **OR** acetaminophen, glucagon (rDNA), dextrose, dextrose bolus (hypoglycemia) **OR** dextrose bolus (hypoglycemia), glucose      Assessment:     Hx obesity (BMI 43), HTN, MELLY, depression  Multiple allergies listed including PCN, Metronidazole, Clindamycin, Azithromycin     Falls, fatigue   L pyelonephritis  E coli bacteremia    Plan:     Cont Levofloxacin   Treat for 10 days further     Medical Decision Making:   The following items were considered in medical decision making:  Discussion of patient care with other providers  Reviewed clinical lab tests  Reviewed radiology tests  Reviewed other diagnostic tests/interventions  Microbiology cultures and other micro tests reviewed      Discussed with pt  Samir Uribe MD

## 2022-04-27 NOTE — CARE COORDINATION
Precert is still pending. CM canceled transport and notified pt and pt's RN.         Electronically signed by KEYONA Whatley, LORETTA on 4/27/2022 at 4:33 PM

## 2022-04-27 NOTE — PLAN OF CARE
Problem: Nutrition Deficit:  Goal: Optimize nutritional status  4/27/2022 1550 by April Vasquez, MS, RD, LD  Outcome: Progressing  4/27/2022 0813 by Elvin Atkins RN  Outcome: Progressing  Note: Pt with good appetite. Nutrition Problem: Altered nutrition-related lab values  Intervention: Continue Current Diet,Continue Oral Nutrition Supplement  Nutrition Goals: Pt will consume and tolerate >50% of all meals and ONS offered throughout adm.

## 2022-04-27 NOTE — PROGRESS NOTES
Physical Therapy  Facility/Department: [address]  Daily Treatment Note  NAME: Nura Valentino  : 1960  MRN: [id number]    Date of Service: 2022    Discharge Recommendations: Nura Valentino scored a 13/24 on the AM-PAC short mobility form. Current research shows that an AM-PAC score of 17 or less is typically not associated with a discharge to the patient's home setting. Based on the patient's AM-PAC score and their current functional mobility deficits, it is recommended that the patient have 3-5 sessions per week of Physical Therapy at d/c to increase the patient's independence. Please see assessment section for further patient specific details. If patient discharges prior to next session this note will serve as a discharge summary. Please see below for the latest assessment towards goals. PT Equipment Recommendations  Equipment Needed: No  Other: defer to next level of care    Patient Diagnosis(es): The primary encounter diagnosis was Acute pyelonephritis. Diagnoses of SHAYLA (acute kidney injury) ([facility] [address] Hypotension due to hypovolemia, Leukocytosis, unspecified type, and Elevated troponin were also pertinent to this visit. Assessment   Assessment: Pt remains below her functional baseline. Requires assist for transfers. Pt able to take 1-2 small steps laterally at EOB but limited overall due to weakness, pain, and decreased balance. Rec continued IP PT. Per chart, plan is for SNF. Equipment Needed: No  Other: defer to next level of care     Plan    Plan  Current Treatment Recommendations: Strengthening; Functional mobility training;Gait training;Transfer training;Balance training;Patient/Caregiver education & training  Plan Comment: 2-5     Subjective    Subjective  Subjective: Pt found supine in bed. Agrees to PT. Rates LBP and B LE pain at 6/10. RN aware.   Orientation  Overall Orientation Status:  (alert, appropriate, and cooperative)  Cognition  Overall Cognitive Status: Exceptions  Arousal/Alertness: Delayed responses to stimuli  Following Commands: Follows one step commands with repetition  Attention Span: Difficulty attending to directions  Memory: Decreased short term memory;Decreased recall of recent events  Safety Judgement: Decreased awareness of need for assistance  Problem Solving: Assistance required to identify errors made;Decreased awareness of errors  Insights: Decreased awareness of deficits  Initiation: Requires cues for some  Sequencing: Requires cues for some  Cognition Comment: slow mentation noted     Objective   Vitals   Bed mobility  Supine to sit: CGA - HOB elevated, slow and effortful, use of rail  Sit to supine: SBA  Rolling to each side: SBA with verbal cues, use of rail  Balance  Sitting: Impaired  Sitting - Static: Fair (occasional); Unsupported  Standing: Impaired  Standing - Static: Poor (stood with RW x 10-20 sec, 3 trials total, min to mod A 2* L sided lean)  Standing - Dynamic: Poor  Pt attempted 1-2 small steps laterally at EOB towards HOB with RW, min A. Limited by pain, weakness, decreased balance. Transfer Training  Transfer Training: Yes  Interventions: Verbal cues; Tactile cues  Sit to Stand: Minimum assistance (impulsive, max cues for hand placement)  Stand to Sit: Minimum assistance     PT Exercises  A/AROM Exercises: x 10 B APs, QS, hip ABD, heel slides, SAQ; x 5 B SLR; max verbal and tactile cues needed for correct technique with all ex        Patient Education  Education Given To: Patient  Education Provided: Role of Therapy  Education Method: Verbal  Education Outcome: Continued education needed    Goals  Short Term Goals  Time Frame for Short term goals: Discharge - all goals ongoing 4/27  Short term goal 1: supine <> sit supervision  Short term goal 2: sit <> stand SBA  Short term goal 3: bed <> chair SBA  Short term goal 4: ambulate 50ft with rolling walker SBA  Patient Goals   Patient goals : Not stated      Therapy Time Individual Concurrent Group Co-treatment   Time In 1350         Time Out 1428         Minutes 38                 Timed Code Treatment Minutes:  38    Total Treatment Minutes:  45    If patient is discharged prior to next treatment, this note will serve as the discharge summary.   Lata Oneill, PT, DPT  [id number]

## 2022-04-27 NOTE — PROGRESS NOTES
Comprehensive Nutrition Assessment    RECOMMENDATIONS:  1. PO Diet: Continue 3CC diet   2. ONS: Continue Glucerna QD    NUTRITION ASSESSMENT:   Nutritional summary & status: Follow up. Pt w/ other staff during attempted encounter. Pt w/ improved po intake since last RD visit however intakes remain <50%. Pt drinking ordered ONS. RD to increase frequency to promote intake. Updated A1C received showing notable improvement since previous reading (11.1 down to 8.8). D/c orders in place for SNF. Will continue to monitor per [facility].  Admission/PMH: SHAYLA; UTI; PMH: HTN, DM    MALNUTRITION ASSESSMENT  Context of Malnutrition: Acute Illness   Malnutrition Status: No malnutrition    NUTRITION DIAGNOSIS   Altered nutrition-related lab values related to endocrine dysfuntion as evidenced by  (hx of DM, elevated BG, elevated A1C)    NUTRITION INTERVENTION  Food and/or Nutrient Delivery:  Continue Current Diet,Continue Oral Nutrition Supplement  Nutrition Education/Counseling:  No recommendation at this time   Goals:  Pt will consume and tolerate >50% of all meals and ONS offered throughout adm. Nutrition Monitoring and Evaluation:   Food/Nutrient Intake Outcomes:  Food and Nutrient Intake,Supplement Intake  Physical Signs/Symptoms Outcomes:  Biochemical Data,GI Status,Weight     OBJECTIVE DATA: Significant to nutrition assessment  · Nutrition-Focused Physical Findings: non-pitting generalized edema, no bm noted yet  · Labs: Reviewed; A1C 8.8 (4/22/22) - updated A1C pending  · Meds: Reviewed; insulin, protonix, ferrous sulfate, prn glycolax, prn zofran   · Wounds: None       CURRENT NUTRITION THERAPIES  ADULT DIET;  Regular; 3 carb choices (45 gm/meal)  ADULT ORAL NUTRITION SUPPLEMENT; Lunch; Diabetic Oral Supplement     PO Intake: Unable to assess   PO Supplement Intake:None Ordered  Additional Sources of Calories/IVF:NS @ 100 mL/hr      ANTHROPOMETRICS  Current Weight: 265 lb 6.9 oz (120.4 kg)    Admission weight: 259 lb 14.8 oz (117.9 kg)  Ideal Body Weight (IBW): 125 lbs   Usual Bodyweight  (~250-255 lbs per EMR)   Weight Changes: No significant wt loss noted; -9 lbs within past ~4 mths (3%)       BMI: 43.25    Wt Readings from Last 50 Encounters:   04/22/22 259 lb 14.8 oz (117.9 kg)   03/20/22 260 lb (117.9 kg)   02/25/22 253 lb 12.8 oz (115.1 kg)   12/28/21 268 lb 15.4 oz (122 kg)   11/04/21 255 lb (115.7 kg)   10/14/21 255 lb (115.7 kg)   10/11/21 254 lb (115.2 kg)   08/02/21 255 lb 1.2 oz (115.7 kg)   07/05/21 240 lb (108.9 kg)   06/20/21 242 lb (109.8 kg)     COMPARATIVE STANDARDS  Energy (kcal):  7876-6967 (8-11)     Protein (g):  68-80  (1.2-1.4)       Fluid (ml/day):  min 1500mL/day    The patient will still be monitored per nutrition standards of care. Consult dietitian if nutrition interventions essential to patient care is needed.      Miguel Bryant Raymon [address], [address]:  [phone]  Office:  [phone]

## 2022-04-27 NOTE — PROGRESS NOTES
Hospitalist Progress Note      PCP: José Manuel Fam APRN - CNP    Date of Admission: 4/22/2022    Chief Complaint: [facility] Course:    61-year-old female with past medical history of hypertension, diabetes mellitus presented from the group home due to fall. Subjective: no acute events overnight. No recent BM. Tolerating PO well. Worried about paying rent. Specifically asked me not to call her daughter with medical updates.      Medications:  Reviewed    Infusion Medications    sodium chloride 25 mL (04/26/22 1636)    dextrose       Scheduled Medications    sennosides-docusate sodium  2 tablet Oral BID    polyethylene glycol  17 g Oral Daily    folic acid  1 mg Oral Daily    metoprolol succinate  25 mg Oral BID    insulin glargine  26 Units SubCUTAneous Nightly    insulin lispro  6 Units SubCUTAneous TID WC    levofloxacin  750 mg IntraVENous Q24H    miconazole   Topical BID    Venelex   Topical BID    gabapentin  600 mg Oral TID    pantoprazole  40 mg Oral Daily    sertraline  150 mg Oral Daily    busPIRone  15 mg Oral TID    buPROPion  200 mg Oral BID    QUEtiapine  300 mg Oral Nightly    hydrOXYzine  100 mg Oral BID    ferrous sulfate  325 mg Oral Daily with breakfast    aspirin  81 mg Oral Daily    rosuvastatin  20 mg Oral Nightly    hydroCHLOROthiazide  25 mg Oral Daily    losartan  100 mg Oral Daily    sodium chloride flush  5-40 mL IntraVENous 2 times per day    enoxaparin  40 mg SubCUTAneous Daily    insulin lispro  0-12 Units SubCUTAneous TID WC    insulin lispro  0-6 Units SubCUTAneous Nightly     PRN Meds: medicated lip ointment, diphenhydrAMINE, sodium chloride flush, sodium chloride, ondansetron **OR** ondansetron, polyethylene glycol, acetaminophen **OR** acetaminophen, glucagon (rDNA), dextrose, dextrose bolus (hypoglycemia) **OR** dextrose bolus (hypoglycemia), glucose      Intake/Output Summary (Last 24 hours) at 4/27/2022 1241  Last data filed at 4/27/2022 9478  Gross per 24 hour   Intake 440 ml   Output 3325 ml   Net -2885 ml       Physical Exam Performed:    BP (!) 118/59   Pulse 78   Temp 98.3 °F (36.8 °C) (Oral)   Resp 16   Wt 265 lb 6.9 oz (120.4 kg)   SpO2 93%   BMI 44.17 kg/m²     General appearance: No apparent distress, appears stated age and cooperative. Respiratory:  Normal respiratory effort. Clear to auscultation, bilaterally without Rales/Wheezes/Rhonchi. Cardiovascular: Regular rate and rhythm with normal S1/S2 without murmurs, rubs or gallops. Abdomen: Soft, non-tender, non-distended with normal bowel sounds. Musculoskeletal: No clubbing, cyanosis or edema bilaterally. Full range of motion without deformity. Skin: Skin color, texture, turgor normal.  No rashes or lesions. Neurologic:  Neurovascularly intact without any focal sensory/motor deficits. Cranial nerves: II-XII intact, grossly non-focal.  Psychiatric: Alert and oriented, thought content appropriate, normal insight  Capillary Refill: Brisk,3 seconds, normal   Peripheral Pulses: +2 palpable, equal bilaterally       Labs:   Recent Labs     04/26/22  0812 04/27/22  0852   WBC 5.8  --    HGB 7.9* 8.4*   HCT 24.1* 25.5*     --      Recent Labs     04/25/22  0942 04/26/22  0812 04/27/22  0852    139 133*   K 4.1 4.2 4.3    105 98*   CO2 20* 24 26   BUN 21* 15 14   CREATININE 1.0 0.9 0.9   CALCIUM 8.9 8.9 9.2     No results for input(s): AST, ALT, BILIDIR, BILITOT, ALKPHOS in the last 72 hours. No results for input(s): INR in the last 72 hours. No results for input(s): Reyne Greek in the last 72 hours. Urinalysis:      Lab Results   Component Value Date    NITRU POSITIVE 04/22/2022    WBCUA  04/22/2022    BACTERIA 3+ 04/22/2022    RBCUA 0-2 04/22/2022    BLOODU Negative 04/22/2022    SPECGRAV 1.025 04/22/2022    GLUCOSEU Negative 04/22/2022       Radiology:  CT ABDOMEN PELVIS W IV CONTRAST Additional Contrast? None   Final Result         1.  Limited CT evaluation of kidneys due to motion artifact. However, there is heterogeneous enhancement of the left kidney raising possibility for pyelonephritis. There is urothelial thickening considered inflammatory, but no evidence for urinary    calculus. No perinephric fluid collection. Increased retroperitoneal stranding at the level of Gerota's fascia and extending inferiorly into the pelvis as well. 2. Mild splenomegaly   3. Chronic right spigelian hernia containing loops of distal ileum and proximal colon, and 2 additional ventral hernias containing bowel loops without evidence for bowel obstruction or strangulation and a small supraumbilical hernia containing fat      CT Head WO Contrast   Final Result   1. No acute intracranial abnormality. XR SHOULDER LEFT (MIN 2 VIEWS)   Final Result      CHEST: Mild right basilar atelectasis. Lungs are otherwise clear. No pneumothorax. Normal cardiomediastinal silhouette. 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 No fracture or dislocation. XR CHEST PORTABLE   Final Result      CHEST: Mild right basilar atelectasis. Lungs are otherwise clear. No pneumothorax. Normal cardiomediastinal silhouette. 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 No fracture or dislocation. Assessment/Plan:    Active Hospital Problems    Diagnosis     SHAYLA (acute kidney injury) ([facility] [address] [N17.9]      Priority: Medium     Urinary tract infection  E. coli bacteremia  - Blood cultures positive for E.  Coli  - repeat blood cx negative   - on levaquin plan for 10 more days on dc  - ID following     Acute kidney injury  - resolved with IVF  - continue with oral hydration    Diabetes mellitus  - Continue Lantus, insulin sliding scale     Generalized weakness  Mechanical falls  - PT/OT  - needs SNF on dc     Hypertension  - uncontrolled  - stop IVF  - resume metoprolol, losartan, HCTZ resume today     GERD  - continue protonix    Anemia:  without gross bleeding. Hgb stable. Has iron and folate deficiency- supplementing. Will need to follow up blood counts on dc. Last record of colonoscopy in 2006. Consider colon as outpatient once recovered from sepsis. DVT Prophylaxis: Lovenox  Diet: ADULT DIET;  Regular; 3 carb choices (45 gm/meal)  ADULT ORAL NUTRITION SUPPLEMENT; Lunch; Diabetic Oral Supplement  Code Status: Full Code    PT/OT Eval Status: Pending    Dispo -stable to dc today to SNF    Sherrill Costa MD

## 2022-04-27 NOTE — PLAN OF CARE
Problem: Pain  Goal: Verbalizes/displays adequate comfort level or baseline comfort level  4/27/2022 0813 by Herlinda Thakur RN  Outcome: Progressing  Note: Patient complains of pain at level 6/10. Patient describes pain as ache. Patient requests pain medication. Patient medicated with tylenol . Will continue to monitor. Problem: Skin/Tissue Integrity  Goal: Absence of new skin breakdown  Description: 1. Monitor for areas of redness and/or skin breakdown  2. Assess vascular access sites hourly  3. Every 4-6 hours minimum:  Change oxygen saturation probe site  4. Every 4-6 hours:  If on nasal continuous positive airway pressure, respiratory therapy assess nares and determine need for appliance change or resting period. Outcome: Progressing  Note: No new skin breakdown noted. Problem: Safety - Adult  Goal: Free from fall injury  Outcome: Progressing  Note: Discussed with pt importance to keep bed low and locked with alarm activated, nonskid socks on when out of bed, call light and belongings within reach- will monitor. Problem: Nutrition Deficit:  Goal: Optimize nutritional status  Outcome: Progressing  Note: Pt with good appetite. Problem: Confusion  Goal: Confusion, delirium, dementia, or psychosis is improved or at baseline  Description: INTERVENTIONS:  1. Assess for possible contributors to thought disturbance, including medications, impaired vision or hearing, underlying metabolic abnormalities, dehydration, psychiatric diagnoses, and notify attending LIP  2. Lester high risk fall precautions, as indicated  3. Provide frequent short contacts to provide reality reorientation, refocusing and direction  4. Decrease environmental stimuli, including noise as appropriate  5. Monitor and intervene to maintain adequate nutrition, hydration, elimination, sleep and activity  6.  If unable to ensure safety without constant attention obtain sitter and review sitter guidelines with assigned personnel  7. Initiate Psychosocial CNS and Spiritual Care consult, as indicated  Outcome: Progressing  Note: No confusion noted.

## 2022-04-27 NOTE — CARE COORDINATION
[facility] can accept pt and will start precert. CM called group home again, no answer or vm. CM spoke with daughter, Keisha Player [phone] to provide update. She provided the group home/[facility] 's info - [facility] [phone]. CM spoke with Jet Mallory to give update, she reported that pt as a roommate in apartment styled home with shared kitchen, on first floor, 4-5 stairs outside the home. Pt has cane and walker. Jet Mallory provided 's contact - Rakesh Hinson [phone]. CM spoke with José Simmons to give update, and asked RN to give José Simmons update as well. All are in agreement with pt going to [facility]. CM will update them at [place]. CM added them to pt's contact list.        Electronically signed by KEYONA Ahmadi, LORETTA on 4/27/2022 at 11:53 AM  ____________________________________________________________________    CM following. Pt is from home, SNF is recommended. Pt discussed possibly going home instead of SNF. CM reviewed rationale for recommendation, risks with going home and benefits of going to SNF. Pt in agreement with SNF. CM sent the following referrals: [facility]  [facility]  Danial PETIT also tried to call pt's group home [phone] to touch base. No answer, no vm available. CM will continue trying.           Electronically signed by KEYONA Ahmadi, LORETTA on 4/27/2022 at 10:02 AM

## 2022-04-28 LAB
ANION GAP SERPL CALCULATED.3IONS-SCNC: 10 MMOL/L (ref 3–16)
BLOOD CULTURE, ROUTINE: NORMAL
BUN BLDV-MCNC: 18 MG/DL (ref 7–20)
CALCIUM SERPL-MCNC: 9.2 MG/DL (ref 8.3–10.6)
CHLORIDE BLD-SCNC: 99 MMOL/L (ref 99–110)
CO2: 30 MMOL/L (ref 21–32)
CREAT SERPL-MCNC: 1 MG/DL (ref 0.6–1.2)
GFR AFRICAN AMERICAN: >60
GFR NON-AFRICAN AMERICAN: 56
GLUCOSE BLD-MCNC: 114 MG/DL (ref 70–99)
GLUCOSE BLD-MCNC: 122 MG/DL (ref 70–99)
GLUCOSE BLD-MCNC: 126 MG/DL (ref 70–99)
GLUCOSE BLD-MCNC: 131 MG/DL (ref 70–99)
GLUCOSE BLD-MCNC: 148 MG/DL (ref 70–99)
HCT VFR BLD CALC: 25.8 % (ref 36–48)
HEMOGLOBIN: 8.5 G/DL (ref 12–16)
OCCULT BLOOD DIAGNOSTIC: ABNORMAL
PERFORMED ON: ABNORMAL
POTASSIUM REFLEX MAGNESIUM: 4 MMOL/L (ref 3.5–5.1)
SODIUM BLD-SCNC: 139 MMOL/L (ref 136–145)

## 2022-04-28 PROCEDURE — 1200000000 HC SEMI PRIVATE

## 2022-04-28 PROCEDURE — 85018 HEMOGLOBIN: CPT

## 2022-04-28 PROCEDURE — 6360000002 HC RX W HCPCS: Performed by: INTERNAL MEDICINE

## 2022-04-28 PROCEDURE — 85014 HEMATOCRIT: CPT

## 2022-04-28 PROCEDURE — 36415 COLL VENOUS BLD VENIPUNCTURE: CPT

## 2022-04-28 PROCEDURE — 6370000000 HC RX 637 (ALT 250 FOR IP): Performed by: INTERNAL MEDICINE

## 2022-04-28 PROCEDURE — 6360000002 HC RX W HCPCS: Performed by: STUDENT IN AN ORGANIZED HEALTH CARE EDUCATION/TRAINING PROGRAM

## 2022-04-28 PROCEDURE — 80048 BASIC METABOLIC PNL TOTAL CA: CPT

## 2022-04-28 PROCEDURE — C9113 INJ PANTOPRAZOLE SODIUM, VIA: HCPCS | Performed by: STUDENT IN AN ORGANIZED HEALTH CARE EDUCATION/TRAINING PROGRAM

## 2022-04-28 PROCEDURE — G0328 FECAL BLOOD SCRN IMMUNOASSAY: HCPCS

## 2022-04-28 PROCEDURE — 2580000003 HC RX 258: Performed by: INTERNAL MEDICINE

## 2022-04-28 RX ORDER — SENNA AND DOCUSATE SODIUM 50; 8.6 MG/1; MG/1
2 TABLET, FILM COATED ORAL 2 TIMES DAILY PRN
Status: DISCONTINUED | OUTPATIENT
Start: 2022-04-28 | End: 2022-04-29 | Stop reason: HOSPADM

## 2022-04-28 RX ORDER — BENZOCAINE/MENTHOL 6 MG-10 MG
LOZENGE MUCOUS MEMBRANE 2 TIMES DAILY
Status: DISCONTINUED | OUTPATIENT
Start: 2022-04-28 | End: 2022-04-29 | Stop reason: HOSPADM

## 2022-04-28 RX ORDER — PANTOPRAZOLE SODIUM 40 MG/10ML
40 INJECTION, POWDER, LYOPHILIZED, FOR SOLUTION INTRAVENOUS 2 TIMES DAILY
Status: DISCONTINUED | OUTPATIENT
Start: 2022-04-28 | End: 2022-04-29

## 2022-04-28 RX ADMIN — Medication: at 22:09

## 2022-04-28 RX ADMIN — LEVOFLOXACIN 750 MG: 5 INJECTION, SOLUTION INTRAVENOUS at 18:19

## 2022-04-28 RX ADMIN — ASPIRIN 81 MG 81 MG: 81 TABLET ORAL at 09:00

## 2022-04-28 RX ADMIN — METOPROLOL SUCCINATE 25 MG: 25 TABLET, FILM COATED, EXTENDED RELEASE ORAL at 09:00

## 2022-04-28 RX ADMIN — MICONAZOLE NITRATE: 20 POWDER TOPICAL at 22:26

## 2022-04-28 RX ADMIN — INSULIN LISPRO 6 UNITS: 100 INJECTION, SOLUTION INTRAVENOUS; SUBCUTANEOUS at 09:07

## 2022-04-28 RX ADMIN — GABAPENTIN 600 MG: 300 CAPSULE ORAL at 14:01

## 2022-04-28 RX ADMIN — GABAPENTIN 600 MG: 300 CAPSULE ORAL at 22:26

## 2022-04-28 RX ADMIN — FERROUS SULFATE TAB 325 MG (65 MG ELEMENTAL FE) 325 MG: 325 (65 FE) TAB at 09:00

## 2022-04-28 RX ADMIN — BUSPIRONE HYDROCHLORIDE 15 MG: 10 TABLET ORAL at 14:01

## 2022-04-28 RX ADMIN — SODIUM CHLORIDE: 9 INJECTION, SOLUTION INTRAVENOUS at 18:17

## 2022-04-28 RX ADMIN — HYDROXYZINE HYDROCHLORIDE 100 MG: 25 TABLET, FILM COATED ORAL at 22:26

## 2022-04-28 RX ADMIN — BUSPIRONE HYDROCHLORIDE 15 MG: 10 TABLET ORAL at 22:25

## 2022-04-28 RX ADMIN — PANTOPRAZOLE SODIUM 40 MG: 40 TABLET, DELAYED RELEASE ORAL at 09:00

## 2022-04-28 RX ADMIN — METOPROLOL SUCCINATE 25 MG: 25 TABLET, FILM COATED, EXTENDED RELEASE ORAL at 22:26

## 2022-04-28 RX ADMIN — INSULIN LISPRO 6 UNITS: 100 INJECTION, SOLUTION INTRAVENOUS; SUBCUTANEOUS at 18:20

## 2022-04-28 RX ADMIN — LOSARTAN POTASSIUM 100 MG: 50 TABLET, FILM COATED ORAL at 09:00

## 2022-04-28 RX ADMIN — INSULIN LISPRO 6 UNITS: 100 INJECTION, SOLUTION INTRAVENOUS; SUBCUTANEOUS at 14:06

## 2022-04-28 RX ADMIN — BUPROPION HYDROCHLORIDE 200 MG: 100 TABLET, EXTENDED RELEASE ORAL at 09:02

## 2022-04-28 RX ADMIN — HYDROXYZINE HYDROCHLORIDE 100 MG: 25 TABLET, FILM COATED ORAL at 09:01

## 2022-04-28 RX ADMIN — FOLIC ACID 1 MG: 1 TABLET ORAL at 09:00

## 2022-04-28 RX ADMIN — SODIUM CHLORIDE, PRESERVATIVE FREE 10 ML: 5 INJECTION INTRAVENOUS at 09:01

## 2022-04-28 RX ADMIN — SERTRALINE HYDROCHLORIDE 150 MG: 100 TABLET ORAL at 08:58

## 2022-04-28 RX ADMIN — QUETIAPINE FUMARATE 300 MG: 300 TABLET ORAL at 22:26

## 2022-04-28 RX ADMIN — GABAPENTIN 600 MG: 300 CAPSULE ORAL at 08:59

## 2022-04-28 RX ADMIN — MICONAZOLE NITRATE: 20 POWDER TOPICAL at 09:02

## 2022-04-28 RX ADMIN — HYDROCORTISONE: 0.01 CREAM TOPICAL at 14:01

## 2022-04-28 RX ADMIN — INSULIN LISPRO 2 UNITS: 100 INJECTION, SOLUTION INTRAVENOUS; SUBCUTANEOUS at 14:05

## 2022-04-28 RX ADMIN — Medication: at 09:06

## 2022-04-28 RX ADMIN — HYDROCHLOROTHIAZIDE 25 MG: 25 TABLET ORAL at 09:01

## 2022-04-28 RX ADMIN — BUSPIRONE HYDROCHLORIDE 15 MG: 10 TABLET ORAL at 09:00

## 2022-04-28 RX ADMIN — ENOXAPARIN SODIUM 40 MG: 100 INJECTION SUBCUTANEOUS at 09:01

## 2022-04-28 RX ADMIN — HYDROCORTISONE: 0.01 CREAM TOPICAL at 21:08

## 2022-04-28 RX ADMIN — ROSUVASTATIN CALCIUM 20 MG: 20 TABLET, FILM COATED ORAL at 22:26

## 2022-04-28 ASSESSMENT — PAIN DESCRIPTION - PAIN TYPE
TYPE: ACUTE PAIN

## 2022-04-28 ASSESSMENT — PAIN - FUNCTIONAL ASSESSMENT
PAIN_FUNCTIONAL_ASSESSMENT: ACTIVITIES ARE NOT PREVENTED

## 2022-04-28 ASSESSMENT — PAIN DESCRIPTION - ORIENTATION
ORIENTATION: LOWER
ORIENTATION: LEFT
ORIENTATION: LOWER
ORIENTATION: LOWER

## 2022-04-28 ASSESSMENT — PAIN DESCRIPTION - FREQUENCY
FREQUENCY: CONTINUOUS

## 2022-04-28 ASSESSMENT — PAIN DESCRIPTION - LOCATION
LOCATION: BACK

## 2022-04-28 ASSESSMENT — PAIN DESCRIPTION - ONSET
ONSET: ON-GOING

## 2022-04-28 ASSESSMENT — PAIN SCALES - GENERAL
PAINLEVEL_OUTOF10: 3
PAINLEVEL_OUTOF10: 4
PAINLEVEL_OUTOF10: 0
PAINLEVEL_OUTOF10: 0
PAINLEVEL_OUTOF10: 3
PAINLEVEL_OUTOF10: 2

## 2022-04-28 ASSESSMENT — PAIN DESCRIPTION - DESCRIPTORS
DESCRIPTORS: ACHING
DESCRIPTORS: STABBING
DESCRIPTORS: ACHING
DESCRIPTORS: ACHING

## 2022-04-28 NOTE — CARE COORDINATION
Pt's Attending just confirmed that pt will not DC today so she can get an EGD tomorrow. CM canceled transport notified Alean Goodpasture in admissions [phone] and pt's RN.         Electronically signed by KEYONA Grayson, LSW on 4/28/2022 at 4:42 PM

## 2022-04-28 NOTE — CONSULTS
[address] [place] [address]  GI Consultation                                                                 Patient: Светлана Marvin  : 1960       Date:  2022    Subjective:     History of Present Illness  Patient is a 62 y. 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 coli bacteremia. Hemoglobin on presentation was 10.1 (baseline hemoglobin of 10-11), on hospital day 2 her hemoglobin dropped to 8.8 down to 7.9 the next day. Patient's MCV is within normal limits however RDW is increased. Iron study with decreased serum iron, iron saturation of 10 and decreased TIBC. Folate was mildly decreased. Patient has had multiple bowel movements since admission and none of them has had grover bleeding or melena. Per nursing her bowel movement today was darker than usual.  Patient has been on iron sulfate 325 daily since admission. Patient denies any abdominal pain, nausea, vomiting, fevers, chills, epigastric tenderness, burning sensation in her chest.  She takes about 3 Advils a week, non-smoker, nondrinker. Already had lunch    Last  EGD was on 2021, at that time she had an irregular Z-line at the GE junction, erythematous mucosa in the stomach however no ulcers were observed. Biopsy of the stomach showed chronic inactive gastritis. Biopsy of the esophagus showed intestinal metaplasia concerns for Dickerson esophagus. Pt is on QD PPI. Pathology  FINAL DIAGNOSIS:  A.  Stomach, biopsies:  - Mild chronic inactive gastritis with features of mild reactive  gastropathy.  - Negative for intestinal metaplasia, dysplasia, and malignancy. - Routine stains and immunostain are negative for Helicobacter pylori.     Naga Patel, biopsies:  - Squamocolumnar mucosa with intestinal metaplasia (see comment). - Negative for dysplasia and malignancy. Comment:  Depending upon the exact location of the Part B biopsies, the  intestinal metaplasia may signify Dickerson's esophagus.  Correlation with  the endoscopic impression is recommended. Last cscope  Per record last one was in 2016. Was told to repeat in 2025. Pt unsure of exact dates    FHx: Son has Crohn's, Mom had UC    Past Medical History:   Diagnosis Date    Anxiety     [facility]     Diabetic polyneuropathy ([facility] [facility]     Hernia, abdominal     Hyperlipidemia     Hypertension     PTSD (post-traumatic stress disorder)       Past Surgical History:   Procedure Laterality Date    CHOLECYSTECTOMY      COLECTOMY      HYSTERECTOMY      REVISION COLOSTOMY        Past Endoscopic History   EGD in 01/22/21  Z-line irregular, at the gastroesophageal junction. Biopsied.  - Erythematous mucosa in the stomach. - Normal examined duodenum. - path as above    Admission Meds  No current facility-administered medications on file prior to encounter. Current Outpatient Medications on File Prior to Encounter   Medication Sig Dispense Refill    metoprolol succinate (TOPROL XL) 25 MG extended release tablet Take 1 tablet by mouth in the morning and at bedtime 30 tablet 3    aspirin 81 MG chewable tablet Take 1 tablet by mouth daily (Patient not taking: Reported on 4/22/2022) 30 tablet 3    rosuvastatin (CRESTOR) 20 MG tablet Take 1 tablet by mouth nightly 30 tablet 3    Liraglutide (VICTOZA) 18 MG/3ML SOPN SC injection Inject 3 mg into the skin daily      naproxen (NAPROSYN) 500 MG tablet Take 500 mg by mouth 2 times daily as needed for Pain (Patient not taking: Reported on 4/22/2022)      nystatin (MYCOSTATIN) 670600 UNIT/GM cream Apply topically 2 times daily Apply topically 2 times daily.       hydroCHLOROthiazide (HYDRODIURIL) 25 MG tablet Take 25 mg by mouth daily      ferrous sulfate (IRON 325) 325 (65 Fe) MG tablet Take 1 tablet by mouth daily (with breakfast) 30 tablet 3    QUEtiapine (SEROQUEL) 300 MG tablet Take 300 mg by mouth nightly      acetaminophen (TYLENOL) 500 MG tablet Take 1,000 mg by mouth every 6 hours as needed for Pain      gabapentin (NEURONTIN) 300 MG capsule Take 600 mg by mouth 3 times daily.  hydrOXYzine (ATARAX) 50 MG tablet Take 100 mg by mouth in the morning and at bedtime       losartan (COZAAR) 100 MG tablet Take 100 mg by mouth daily       pantoprazole (PROTONIX) 40 MG tablet Take 40 mg by mouth daily      sertraline (ZOLOFT) 100 MG tablet Take 150 mg by mouth daily       busPIRone (BUSPAR) 15 MG tablet Take 15 mg by mouth 3 times daily       buPROPion (WELLBUTRIN SR) 200 MG extended release tablet Take 200 mg by mouth 2 times daily         Allergies  Allergies   Allergen Reactions    Azithromycin      Other reaction(s): Itching, Swelling    Metronidazole Hives and Itching    Clindamycin     Tetanus Immune Globulin     Codeine Rash    Penicillin G Rash     Other reaction(s): Unknown    Penicillin V Rash      Social   Social History     Tobacco Use    Smoking status: Never Smoker    Smokeless tobacco: Never Used   Substance Use Topics    Alcohol use: Never        History reviewed. No pertinent family history. No family history of colon cancer, Crohn's disease, or ulcerative colitis. Review of Systems  A comprehensive review of systems was negative.        Physical Exam    /67   Pulse 77   Temp 98.6 °F (37 °C) (Oral)   Resp 16   Wt 265 lb 6.9 oz (120.4 kg)   SpO2 93%   BMI 44.17 kg/m²   General appearance: alert, cooperative, no distress, appears stated age  Anicteric, No Jaundice  Head: Normocephalic, without obvious abnormality  Lungs: clear to auscultation bilaterally, Normal Effort  Heart: regular rate and rhythm, normal S1 and S2, no murmurs or rubs  Abdomen: soft, non-tender; bowel sounds normal; no masses,  no organomegaly  Extremities: atraumatic, no cyanosis or edema  Skin: warm and dry  Neuro: intact  AAOX3    Data Review:    Recent Labs     04/26/22  0812 04/27/22  0852 04/28/22  0725   WBC 5.8  --   --    HGB 7.9* 8.4* 8.5*   HCT 24.1* 25.5* 25.8*   MCV 83.2  --   --      --   --      Recent Labs     04/26/22  0812 04/27/22  0852 04/28/22  0725    133* 139   K 4.2 4.3 4.0    98* 99   CO2 24 26 30   BUN 15 14 18   CREATININE 0.9 0.9 1.0     No results for input(s): AST, ALT, ALB, BILIDIR, BILITOT, ALKPHOS in the last 72 hours. No results for input(s): LIPASE, AMYLASE in the last 72 hours. No results for input(s): PROTIME, INR in the last 72 hours. No results for input(s): PTT in the last 72 hours. No results for input(s): OCCULTBLD in the last 72 hours. Imaging Studies:                                CT-scan of abdomen and pelvis:  1. Limited CT evaluation of kidneys due to motion artifact. However, there is heterogeneous enhancement of the left kidney raising possibility for pyelonephritis. There is urothelial thickening considered inflammatory, but no evidence for urinary    calculus. No perinephric fluid collection. Increased retroperitoneal stranding at the level of Gerota's fascia and extending inferiorly into the pelvis as well. 2. Mild splenomegaly   3. Chronic right spigelian hernia containing loops of distal ileum and proximal colon, and 2 additional ventral hernias containing bowel loops without evidence for bowel obstruction or strangulation and a small supraumbilical hernia containing fat         Assessment:     Principal Problem:    SHAYLA (acute kidney injury) ([address]  Resolved Problems:    * No resolved hospital problems. *    Fecal occult blood +ve  Anemia, likely blood loss and iron def  Concern for Barrette's on last EGD    Recommendations: Will benefit from EGD.  Will discuss with attending if should pursue while IP or as OP  PPI BID    Thank you for the opportunity to participate in [address] care.    [place] MD   PGY2

## 2022-04-28 NOTE — CARE COORDINATION
DAMASO spoke with admissions at [place] [address], precert still pending.         Electronically signed by KEYONA Handy, LORETTA on 4/28/2022 at 10:12 AM

## 2022-04-28 NOTE — PROGRESS NOTES
Hospitalist Progress Note      PCP: EUGENIE Hernandez - CNP    Date of Admission: 4/22/2022    Chief Complaint: [facility] Course:    71-year-old female with past medical history of hypertension, diabetes mellitus presented from the group home due to fall. Being treated for sepsis UTI. Subjective:  her bowels today- d/w RN stool was brown and soft, no gross blood. Patient has not had colonoscopy in the past 5 years. She is tolerating diet well. Lower back pain is improving.      Medications:  Reviewed    Infusion Medications    sodium chloride Stopped (04/27/22 2059)    dextrose       Scheduled Medications    hydrocortisone-aloe   Topical BID    sennosides-docusate sodium  2 tablet Oral BID    polyethylene glycol  17 g Oral Daily    folic acid  1 mg Oral Daily    metoprolol succinate  25 mg Oral BID    insulin glargine  26 Units SubCUTAneous Nightly    insulin lispro  6 Units SubCUTAneous TID WC    levofloxacin  750 mg IntraVENous Q24H    miconazole   Topical BID    Venelex   Topical BID    gabapentin  600 mg Oral TID    pantoprazole  40 mg Oral Daily    sertraline  150 mg Oral Daily    busPIRone  15 mg Oral TID    buPROPion  200 mg Oral BID    QUEtiapine  300 mg Oral Nightly    hydrOXYzine  100 mg Oral BID    ferrous sulfate  325 mg Oral Daily with breakfast    aspirin  81 mg Oral Daily    rosuvastatin  20 mg Oral Nightly    hydroCHLOROthiazide  25 mg Oral Daily    losartan  100 mg Oral Daily    sodium chloride flush  5-40 mL IntraVENous 2 times per day    enoxaparin  40 mg SubCUTAneous Daily    insulin lispro  0-12 Units SubCUTAneous TID WC    insulin lispro  0-6 Units SubCUTAneous Nightly     PRN Meds: medicated lip ointment, diphenhydrAMINE, sodium chloride flush, sodium chloride, ondansetron **OR** ondansetron, polyethylene glycol, acetaminophen **OR** acetaminophen, glucagon (rDNA), dextrose, dextrose bolus (hypoglycemia) **OR** dextrose bolus (hypoglycemia), glucose      Intake/Output Summary (Last 24 hours) at 4/28/2022 1112  Last data filed at 4/28/2022 0955  Gross per 24 hour   Intake 966.65 ml   Output 2650 ml   Net -1683.35 ml       Physical Exam Performed:    BP (!) 136/57   Pulse 81   Temp 98.6 °F (37 °C) (Oral)   Resp 16   Wt 265 lb 6.9 oz (120.4 kg)   SpO2 94%   BMI 44.17 kg/m²     General appearance: No apparent distress, appears stated age and cooperative. Respiratory:  Normal respiratory effort. Clear to auscultation, bilaterally without Rales/Wheezes/Rhonchi. Cardiovascular: Regular rate and rhythm with normal S1/S2 without murmurs, rubs or gallops. Abdomen: Soft, non-tender, non-distended with normal bowel sounds. Musculoskeletal: No clubbing, cyanosis or edema bilaterally. Full range of motion without deformity. Skin: Skin color, texture, turgor normal.  No rashes or lesions. Neurologic:  Neurovascularly intact without any focal sensory/motor deficits. Cranial nerves: II-XII intact, grossly non-focal.  Psychiatric: Alert and oriented, thought content appropriate, normal insight  Capillary Refill: Brisk,3 seconds, normal   Peripheral Pulses: +2 palpable, equal bilaterally       Labs:   Recent Labs     04/26/22  0812 04/27/22  0852 04/28/22  0725   WBC 5.8  --   --    HGB 7.9* 8.4* 8.5*   HCT 24.1* 25.5* 25.8*     --   --      Recent Labs     04/26/22  0812 04/27/22  0852 04/28/22  0725    133* 139   K 4.2 4.3 4.0    98* 99   CO2 24 26 30   BUN 15 14 18   CREATININE 0.9 0.9 1.0   CALCIUM 8.9 9.2 9.2     No results for input(s): AST, ALT, BILIDIR, BILITOT, ALKPHOS in the last 72 hours. No results for input(s): INR in the last 72 hours. No results for input(s): Promise Yuli in the last 72 hours.     Urinalysis:      Lab Results   Component Value Date    NITRU POSITIVE 04/22/2022    WBCUA  04/22/2022    BACTERIA 3+ 04/22/2022    RBCUA 0-2 04/22/2022    BLOODU Negative 04/22/2022    SPECGRAV 1.025 04/22/2022 GLUCOSEU Negative 04/22/2022       Radiology:  CT ABDOMEN PELVIS W IV CONTRAST Additional Contrast? None   Final Result         1. Limited CT evaluation of kidneys due to motion artifact. However, there is heterogeneous enhancement of the left kidney raising possibility for pyelonephritis. There is urothelial thickening considered inflammatory, but no evidence for urinary    calculus. No perinephric fluid collection. Increased retroperitoneal stranding at the level of Gerota's fascia and extending inferiorly into the pelvis as well. 2. Mild splenomegaly   3. Chronic right spigelian hernia containing loops of distal ileum and proximal colon, and 2 additional ventral hernias containing bowel loops without evidence for bowel obstruction or strangulation and a small supraumbilical hernia containing fat      CT Head WO Contrast   Final Result   1. No acute intracranial abnormality. XR SHOULDER LEFT (MIN 2 VIEWS)   Final Result      CHEST: Mild right basilar atelectasis. Lungs are otherwise clear. No pneumothorax. Normal cardiomediastinal silhouette. 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 No fracture or dislocation. XR CHEST PORTABLE   Final Result      CHEST: Mild right basilar atelectasis. Lungs are otherwise clear. No pneumothorax. Normal cardiomediastinal silhouette. 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 No fracture or dislocation. Assessment/Plan:    Active Hospital Problems    Diagnosis     SHAYLA (acute kidney injury) ([facility] [address] [N17.9]      Priority: Medium     Urinary tract infection  E. coli bacteremia  - Blood cultures positive for E. Coli  - repeat blood cx negative   - on levaquin plan for 10 more days on dc  - ID following    Anemia:  without gross bleeding. Hgb stable. Has iron and folate deficiency- supplementing. Last record of colonoscopy in 2006.  Occult stool is positive. Patient is already on PPI- will continue. Will consult GI for recommendations on GI work up inpatient vs outpatient.      Acute kidney injury  - resolved with IVF  - continue with oral hydration  - creatinine remains stable    Diabetes mellitus  - Continue Lantus, insulin sliding scale     Generalized weakness  Mechanical falls  - PT/OT  - needs SNF on dc, precert pending     Hypertension  - improved after resuming all her meds  - cont with metoprolol, losartan, HCTZ      GERD  - continue protonix        DVT Prophylaxis: Lovenox  Diet: ADULT DIET;  Regular; 3 carb choices (45 gm/meal)  ADULT ORAL NUTRITION SUPPLEMENT; Lunch; Diabetic Oral Supplement  Code Status: Full Code    PT/OT Eval Status: Pending    Dispo -pending IG eval for occult positive stool with anemia    Mario Fountain MD

## 2022-04-28 NOTE — CARE COORDINATION
Case Management Assessment            Discharge Note                    Date / Time of Note: 4/28/2022 11:15 AM                  Discharge Note Completed by: KEYONA Merchant, NASIRW    Patient Name: Marybeth Naylor   YOB: 1960  Diagnosis: Acute pyelonephritis [N10]  Elevated troponin [R77.8]  SHAYLA (acute kidney injury) ([facility] [address] [N17.9]  Leukocytosis, unspecified type [D72.829]  Hypotension due to hypovolemia [I95.89, E86.1]   Date / Time: 4/22/2022  9:51 AM    Current PCP: Orin Barnes, [place] patient: No    Hospitalization in the last 30 days: No    Advance Directives:  Code Status: Full Code  [place] DNR form completed and on chart: Yes    Financial:  Payor: Elvis Santillan / Plan: Filemon Cook / Product Type: *No Product type* /      Pharmacy:    [facility], [place] [address] [address]  Phone: [phone] Fax: [phone]      Assistance purchasing medications?:    Assistance provided by Case Management: None at this time    Does patient want to participate in local refill/ meds to beds program?:      Meds To Beds General Rules:  1. Can ONLY be done Monday- Friday between 8:30am-5pm  2. Prescription(s) must be in pharmacy by 3pm to be filled same day  3. Copy of patient's insurance/ prescription drug card and patient face sheet must be sent along with the prescription(s)  4. Cost of Rx cannot be added to hospital bill. If financial assistance is needed, please contact unit  or ;  or  CANNOT provide pharmacy voucher for patients co-pays  5.  Patients can then  the prescription on their way out of the hospital at discharge, or pharmacy can deliver to the bedside if staff is available. (payment due at time of pick-up or delivery - cash, check, or card accepted)     Able to afford home medications/ co-pay costs: Yes    ADLS:  Current PT AM-PAC Score: 13 /24  Current OT AM-PAC Score: 14 /24      DISCHARGE Disposition: Located within [facility] (SNF): [facility] Phone: [phone] Fax: [phone]    LOC at discharge: Tabitha Holt Completed: Yes    Notification completed in HENS/PAS?:  Yes : CM has completed HENS online through secure website for SNF admission at [address] [place] [address]. Document ID #: [id number]    IMM Completed:   Not Indicated    Transportation:  Transportation PLAN for discharge: EMS transportation   Mode of Transport: Ambulance stretcher - BLS  Reason for medical transport: Bed confined: Meets the following criteria 1) unable to get out of bed without assistance or ambulate, 2) unable to safely sit up in a wheelchair, 3) unable to maintain erect seating position in a chair for time needed for transport  Name of [address] [place] [address] [address]: [email or web address]   Phone: [phone]  Time of Transport: 5:30PM    Transport form completed: Yes    Home Oxygen and Respiratory Equipment:  Oxygen needed at discharge?: No    Dialysis:  Dialysis patient: No      Referrals made at [facility] for outpatient continued care:  Not Applicable    Additional CM Notes:  Pt will DC to [address] [place] [address] today. CM notified Valentino Line in admissions [phone], pt and RN, and pt's contacts of transport time. No other needs at this time. The Plan for Transition of Care is related to the following treatment goals of Acute pyelonephritis [N10]  Elevated troponin [R77.8]  SHAYLA (acute kidney injury) ([facility] [N17.9]  Leukocytosis, unspecified type [D72.829]  Hypotension due to hypovolemia [I95.89, E86.1]    The Patient and/or patient representative Shade Robertson and her family were provided with a choice of provider and agrees with the discharge plan Yes    Freedom of choice list was provided with basic dialogue that supports the patient's individualized plan of care/goals and shares the quality data associated with the providers.  Yes    Care Transitions patient: KEYONA Larson, [facility]  Case Management Department  Ph: [phone]  Fax: [phone]

## 2022-04-29 ENCOUNTER — ANESTHESIA EVENT (OUTPATIENT)
Dept: ENDOSCOPY | Age: 62
DRG: 720 | End: 2022-04-29
Payer: MEDICAID

## 2022-04-29 ENCOUNTER — ANESTHESIA (OUTPATIENT)
Dept: ENDOSCOPY | Age: 62
DRG: 720 | End: 2022-04-29
Payer: MEDICAID

## 2022-04-29 VITALS — DIASTOLIC BLOOD PRESSURE: 51 MMHG | SYSTOLIC BLOOD PRESSURE: 85 MMHG | OXYGEN SATURATION: 98 %

## 2022-04-29 VITALS
WEIGHT: 265.43 LBS | DIASTOLIC BLOOD PRESSURE: 68 MMHG | SYSTOLIC BLOOD PRESSURE: 126 MMHG | HEART RATE: 81 BPM | RESPIRATION RATE: 18 BRPM | BODY MASS INDEX: 44.17 KG/M2 | TEMPERATURE: 98.1 F | OXYGEN SATURATION: 94 %

## 2022-04-29 PROBLEM — R77.8 ELEVATED TROPONIN: Status: ACTIVE | Noted: 2022-04-29

## 2022-04-29 PROBLEM — N12 PYELONEPHRITIS: Status: ACTIVE | Noted: 2022-04-29

## 2022-04-29 PROBLEM — K27.9 PUD (PEPTIC ULCER DISEASE): Status: ACTIVE | Noted: 2022-04-29

## 2022-04-29 LAB
ANION GAP SERPL CALCULATED.3IONS-SCNC: 12 MMOL/L (ref 3–16)
BUN BLDV-MCNC: 30 MG/DL (ref 7–20)
CALCIUM SERPL-MCNC: 9.2 MG/DL (ref 8.3–10.6)
CHLORIDE BLD-SCNC: 99 MMOL/L (ref 99–110)
CO2: 26 MMOL/L (ref 21–32)
CREAT SERPL-MCNC: 1.1 MG/DL (ref 0.6–1.2)
CULTURE, BLOOD 2: NORMAL
GFR AFRICAN AMERICAN: >60
GFR NON-AFRICAN AMERICAN: 51
GLUCOSE BLD-MCNC: 160 MG/DL (ref 70–99)
GLUCOSE BLD-MCNC: 168 MG/DL (ref 70–99)
GLUCOSE BLD-MCNC: 177 MG/DL (ref 70–99)
HCT VFR BLD CALC: 28.3 % (ref 36–48)
HEMOGLOBIN: 9 G/DL (ref 12–16)
PERFORMED ON: ABNORMAL
PERFORMED ON: ABNORMAL
POTASSIUM REFLEX MAGNESIUM: 4 MMOL/L (ref 3.5–5.1)
SODIUM BLD-SCNC: 137 MMOL/L (ref 136–145)

## 2022-04-29 PROCEDURE — 7100000011 HC PHASE II RECOVERY - ADDTL 15 MIN: Performed by: INTERNAL MEDICINE

## 2022-04-29 PROCEDURE — 88305 TISSUE EXAM BY PATHOLOGIST: CPT

## 2022-04-29 PROCEDURE — 85018 HEMOGLOBIN: CPT

## 2022-04-29 PROCEDURE — 6360000002 HC RX W HCPCS: Performed by: STUDENT IN AN ORGANIZED HEALTH CARE EDUCATION/TRAINING PROGRAM

## 2022-04-29 PROCEDURE — 6370000000 HC RX 637 (ALT 250 FOR IP): Performed by: INTERNAL MEDICINE

## 2022-04-29 PROCEDURE — C9113 INJ PANTOPRAZOLE SODIUM, VIA: HCPCS | Performed by: STUDENT IN AN ORGANIZED HEALTH CARE EDUCATION/TRAINING PROGRAM

## 2022-04-29 PROCEDURE — 6360000002 HC RX W HCPCS: Performed by: NURSE ANESTHETIST, CERTIFIED REGISTERED

## 2022-04-29 PROCEDURE — 2709999900 HC NON-CHARGEABLE SUPPLY: Performed by: INTERNAL MEDICINE

## 2022-04-29 PROCEDURE — 2580000003 HC RX 258: Performed by: INTERNAL MEDICINE

## 2022-04-29 PROCEDURE — 7100000010 HC PHASE II RECOVERY - FIRST 15 MIN: Performed by: INTERNAL MEDICINE

## 2022-04-29 PROCEDURE — 85014 HEMATOCRIT: CPT

## 2022-04-29 PROCEDURE — 36415 COLL VENOUS BLD VENIPUNCTURE: CPT

## 2022-04-29 PROCEDURE — 2500000003 HC RX 250 WO HCPCS: Performed by: NURSE ANESTHETIST, CERTIFIED REGISTERED

## 2022-04-29 PROCEDURE — 80048 BASIC METABOLIC PNL TOTAL CA: CPT

## 2022-04-29 PROCEDURE — 3700000001 HC ADD 15 MINUTES (ANESTHESIA): Performed by: INTERNAL MEDICINE

## 2022-04-29 PROCEDURE — 3609012400 HC EGD TRANSORAL BIOPSY SINGLE/MULTIPLE: Performed by: INTERNAL MEDICINE

## 2022-04-29 PROCEDURE — 0DB98ZX EXCISION OF DUODENUM, VIA NATURAL OR ARTIFICIAL OPENING ENDOSCOPIC, DIAGNOSTIC: ICD-10-PCS | Performed by: INTERNAL MEDICINE

## 2022-04-29 PROCEDURE — 0DB68ZX EXCISION OF STOMACH, VIA NATURAL OR ARTIFICIAL OPENING ENDOSCOPIC, DIAGNOSTIC: ICD-10-PCS | Performed by: INTERNAL MEDICINE

## 2022-04-29 PROCEDURE — 3700000000 HC ANESTHESIA ATTENDED CARE: Performed by: INTERNAL MEDICINE

## 2022-04-29 RX ORDER — PROPOFOL 10 MG/ML
INJECTION, EMULSION INTRAVENOUS CONTINUOUS PRN
Status: DISCONTINUED | OUTPATIENT
Start: 2022-04-29 | End: 2022-04-29 | Stop reason: SDUPTHER

## 2022-04-29 RX ORDER — PANTOPRAZOLE SODIUM 40 MG/1
40 TABLET, DELAYED RELEASE ORAL 2 TIMES DAILY
Qty: 60 TABLET | Refills: 3 | Status: SHIPPED | OUTPATIENT
Start: 2022-04-29

## 2022-04-29 RX ORDER — LIDOCAINE HYDROCHLORIDE 20 MG/ML
INJECTION, SOLUTION EPIDURAL; INFILTRATION; INTRACAUDAL; PERINEURAL PRN
Status: DISCONTINUED | OUTPATIENT
Start: 2022-04-29 | End: 2022-04-29 | Stop reason: SDUPTHER

## 2022-04-29 RX ORDER — PROPOFOL 10 MG/ML
INJECTION, EMULSION INTRAVENOUS PRN
Status: DISCONTINUED | OUTPATIENT
Start: 2022-04-29 | End: 2022-04-29 | Stop reason: SDUPTHER

## 2022-04-29 RX ORDER — PANTOPRAZOLE SODIUM 40 MG/1
40 TABLET, DELAYED RELEASE ORAL
Status: DISCONTINUED | OUTPATIENT
Start: 2022-04-29 | End: 2022-04-29 | Stop reason: HOSPADM

## 2022-04-29 RX ORDER — LEVOFLOXACIN 750 MG/1
750 TABLET ORAL DAILY
Qty: 10 TABLET | Refills: 0 | Status: SHIPPED | OUTPATIENT
Start: 2022-04-29 | End: 2022-05-09

## 2022-04-29 RX ORDER — BENZOCAINE/MENTHOL 6 MG-10 MG
LOZENGE MUCOUS MEMBRANE 2 TIMES DAILY
Qty: 1 EACH | Refills: 0 | Status: SHIPPED | OUTPATIENT
Start: 2022-04-29

## 2022-04-29 RX ORDER — FOLIC ACID 1 MG/1
1 TABLET ORAL DAILY
Qty: 30 TABLET | Refills: 3 | Status: SHIPPED | OUTPATIENT
Start: 2022-04-30

## 2022-04-29 RX ORDER — POLYETHYLENE GLYCOL 3350 17 G/17G
17 POWDER, FOR SOLUTION ORAL DAILY PRN
Qty: 527 G | Refills: 1 | Status: SHIPPED | OUTPATIENT
Start: 2022-04-29 | End: 2022-05-29

## 2022-04-29 RX ORDER — SODIUM CHLORIDE 9 MG/ML
INJECTION, SOLUTION INTRAVENOUS ONCE
Status: COMPLETED | OUTPATIENT
Start: 2022-04-29 | End: 2022-04-29

## 2022-04-29 RX ORDER — CASTOR OIL AND BALSAM, PERU 788; 87 MG/G; MG/G
OINTMENT TOPICAL 2 TIMES DAILY
Qty: 1 EACH | Refills: 0 | Status: SHIPPED | OUTPATIENT
Start: 2022-04-29 | End: 2022-05-31 | Stop reason: CLARIF

## 2022-04-29 RX ADMIN — BUSPIRONE HYDROCHLORIDE 15 MG: 10 TABLET ORAL at 10:02

## 2022-04-29 RX ADMIN — HYDROCHLOROTHIAZIDE 25 MG: 25 TABLET ORAL at 10:00

## 2022-04-29 RX ADMIN — LIDOCAINE HYDROCHLORIDE 60 MG: 20 INJECTION, SOLUTION EPIDURAL; INFILTRATION; INTRACAUDAL; PERINEURAL at 12:57

## 2022-04-29 RX ADMIN — SODIUM CHLORIDE: 9 INJECTION, SOLUTION INTRAVENOUS at 12:47

## 2022-04-29 RX ADMIN — Medication: at 10:04

## 2022-04-29 RX ADMIN — BUSPIRONE HYDROCHLORIDE 15 MG: 10 TABLET ORAL at 13:53

## 2022-04-29 RX ADMIN — POLYETHYLENE GLYCOL 3350 17 G: 17 POWDER, FOR SOLUTION ORAL at 10:03

## 2022-04-29 RX ADMIN — FOLIC ACID 1 MG: 1 TABLET ORAL at 10:01

## 2022-04-29 RX ADMIN — GABAPENTIN 600 MG: 300 CAPSULE ORAL at 10:00

## 2022-04-29 RX ADMIN — FERROUS SULFATE TAB 325 MG (65 MG ELEMENTAL FE) 325 MG: 325 (65 FE) TAB at 10:00

## 2022-04-29 RX ADMIN — MICONAZOLE NITRATE: 20 POWDER TOPICAL at 10:04

## 2022-04-29 RX ADMIN — HYDROXYZINE HYDROCHLORIDE 100 MG: 25 TABLET, FILM COATED ORAL at 10:01

## 2022-04-29 RX ADMIN — SERTRALINE HYDROCHLORIDE 150 MG: 100 TABLET ORAL at 10:01

## 2022-04-29 RX ADMIN — LOSARTAN POTASSIUM 100 MG: 50 TABLET, FILM COATED ORAL at 10:01

## 2022-04-29 RX ADMIN — ACETAMINOPHEN 650 MG: 325 TABLET ORAL at 10:07

## 2022-04-29 RX ADMIN — SODIUM CHLORIDE, PRESERVATIVE FREE 10 ML: 5 INJECTION INTRAVENOUS at 10:02

## 2022-04-29 RX ADMIN — GABAPENTIN 600 MG: 300 CAPSULE ORAL at 13:53

## 2022-04-29 RX ADMIN — PROPOFOL 50 MCG/KG/MIN: 10 INJECTION, EMULSION INTRAVENOUS at 12:57

## 2022-04-29 RX ADMIN — PROPOFOL 50 MG: 10 INJECTION, EMULSION INTRAVENOUS at 12:57

## 2022-04-29 RX ADMIN — BUPROPION HYDROCHLORIDE 200 MG: 100 TABLET, EXTENDED RELEASE ORAL at 00:11

## 2022-04-29 RX ADMIN — METOPROLOL SUCCINATE 25 MG: 25 TABLET, FILM COATED, EXTENDED RELEASE ORAL at 10:01

## 2022-04-29 RX ADMIN — PANTOPRAZOLE SODIUM 40 MG: 40 TABLET, DELAYED RELEASE ORAL at 14:41

## 2022-04-29 RX ADMIN — PANTOPRAZOLE SODIUM 40 MG: 40 INJECTION, POWDER, LYOPHILIZED, FOR SOLUTION INTRAVENOUS at 10:13

## 2022-04-29 RX ADMIN — BUPROPION HYDROCHLORIDE 200 MG: 100 TABLET, EXTENDED RELEASE ORAL at 10:03

## 2022-04-29 RX ADMIN — HYDROCORTISONE: 0.01 CREAM TOPICAL at 10:05

## 2022-04-29 ASSESSMENT — PULMONARY FUNCTION TESTS
PIF_VALUE: 1

## 2022-04-29 ASSESSMENT — PAIN SCALES - GENERAL
PAINLEVEL_OUTOF10: 4
PAINLEVEL_OUTOF10: 0
PAINLEVEL_OUTOF10: 7
PAINLEVEL_OUTOF10: 0

## 2022-04-29 ASSESSMENT — PAIN - FUNCTIONAL ASSESSMENT
PAIN_FUNCTIONAL_ASSESSMENT: 0-10
PAIN_FUNCTIONAL_ASSESSMENT: PREVENTS OR INTERFERES SOME ACTIVE ACTIVITIES AND ADLS

## 2022-04-29 ASSESSMENT — PAIN DESCRIPTION - DESCRIPTORS: DESCRIPTORS: ACHING

## 2022-04-29 NOTE — PROGRESS NOTES
rn attempted to call report to Jaylon Jackson ([facility]) spring twice, no answer.  Will try again, pt to dc to facility at [phone]

## 2022-04-29 NOTE — PROGRESS NOTES
Discharge order received. Patient informed of discharge order. IV and telemetry removed. All patient belongings packed and sent with patient upon discharge. Patient left and was transported to [facility] via stretcher.

## 2022-04-29 NOTE — PROCEDURES
[address] [place] [address]  Endoscopy Note    Patient: Stevo Bradshaw  : 1960  Acct#:     Procedure: Esophagogastroduodenoscopy with biopsy    Date:  2022     Surgeon:  Oskar Mi MD      Anesthesia:    IV propofol, per anesthesia       EBL: <50 mL    Indications:  Iron deficiency anemia    Description of Procedure:    Informed consent was obtained from the patient after explanation of indications, benefits and possible risks and complications of the procedure. The patient was then taken to the endoscopy suite, placed in the left lateral decubitus position and the above IV sedation was administrered. The Olympus videoendoscope (GIF-H190) was placed in the patient's mouth and under direct visualization passed into the esophagus. The scope was then advanced into the stomach and to the second portion of the duodenum. A retroflexed exam of the gastric cardia and fundus was performed. The scope was then withdrawn back into the stomach, it was decompressed, and the scope was completely withdrawn. Findings:  Normal first and second portion of the duodenum. Biopsies were obtained evaluate for celiac disease. A 5 mm clean-based cratered ulcer was found in the gastric antrum. Multiple erosions in the gastric antrum. A large area of shallow ulcerated mucosa measuring approximately 5 cm in greatest diameter was found in the gastric body. There was an adherent white exudate covering the abnormal mucosa which could be removed with vigorous irrigation. The area was friable with contact bleeding. Biopsies were obtained of the gastric antrum and body evaluate for H. pylori. Normal esophagus       The patient tolerated the procedure well and was taken to the post anesthesia care unit in good condition. Biopsies: Yes. Impression:    1. Normal first and second portion of the duodenum. Biopsies were obtained evaluate for celiac disease.   2. A 5 mm clean-based cratered ulcer was found in the gastric antrum. 3. Multiple erosions in the gastric antrum. 4. A large area of shallow ulcerated mucosa measuring approximately 5 cm in greatest diameter was found in the gastric body. There was an adherent white exudate covering the abnormal mucosa which could be removed with vigorous irrigation. The area was friable with contact bleeding. Biopsies were obtained of the gastric antrum and body evaluate for H. pylori.   5. Normal esophagus    Recommendations:   Await biopsy results  PPI twice daily      ARSLAN Barrientos MD  [facility]

## 2022-04-29 NOTE — PROGRESS NOTES
Hospitalist Progress Note      PCP: Denise Vee, EUGENIE - CNP    Date of Admission: 4/22/2022    Chief Complaint: [facility] Course:    71-year-old female with past medical history of hypertension, diabetes mellitus presented from the group home due to fall. Being treated for sepsis UTI. Had dark stools, FOBT positive. GI consulted, going for EGD. Subjective: no acute events overnight. Reports chronic lower back pain. No chest pain, no dyspnea. Was tolerating PO prior to NPO.      Medications:  Reviewed    Infusion Medications    sodium chloride Stopped (04/28/22 2145)    dextrose       Scheduled Medications    hydrocortisone-aloe   Topical BID    pantoprazole  40 mg IntraVENous BID    polyethylene glycol  17 g Oral Daily    folic acid  1 mg Oral Daily    metoprolol succinate  25 mg Oral BID    insulin glargine  26 Units SubCUTAneous Nightly    insulin lispro  6 Units SubCUTAneous TID WC    levofloxacin  750 mg IntraVENous Q24H    miconazole   Topical BID    Venelex   Topical BID    gabapentin  600 mg Oral TID    sertraline  150 mg Oral Daily    busPIRone  15 mg Oral TID    buPROPion  200 mg Oral BID    QUEtiapine  300 mg Oral Nightly    hydrOXYzine  100 mg Oral BID    ferrous sulfate  325 mg Oral Daily with breakfast    [Held by provider] aspirin  81 mg Oral Daily    rosuvastatin  20 mg Oral Nightly    hydroCHLOROthiazide  25 mg Oral Daily    losartan  100 mg Oral Daily    sodium chloride flush  5-40 mL IntraVENous 2 times per day    [Held by provider] enoxaparin  40 mg SubCUTAneous Daily    insulin lispro  0-12 Units SubCUTAneous TID WC    insulin lispro  0-6 Units SubCUTAneous Nightly     PRN Meds: sennosides-docusate sodium, medicated lip ointment, diphenhydrAMINE, sodium chloride flush, sodium chloride, ondansetron **OR** ondansetron, polyethylene glycol, acetaminophen **OR** acetaminophen, glucagon (rDNA), dextrose, dextrose bolus (hypoglycemia) **OR** dextrose bolus (hypoglycemia), glucose      Intake/Output Summary (Last 24 hours) at 4/29/2022 0921  Last data filed at 4/29/2022 0734  Gross per 24 hour   Intake 669.23 ml   Output 1625 ml   Net -955.77 ml       Physical Exam Performed:    /64   Pulse 84   Temp 98.6 °F (37 °C) (Oral)   Resp 16   Wt 265 lb 6.9 oz (120.4 kg)   SpO2 94%   BMI 44.17 kg/m²     General appearance: No apparent distress, appears stated age and cooperative. Respiratory:  Normal respiratory effort. Clear to auscultation, bilaterally without Rales/Wheezes/Rhonchi. Cardiovascular: Regular rate and rhythm with normal S1/S2 without murmurs, rubs or gallops. Abdomen: Soft, non-tender, non-distended with normal bowel sounds. Musculoskeletal: No clubbing, cyanosis or edema bilaterally. Full range of motion without deformity. Skin: erythema around tape on left forearm  Neurologic:  Neurovascularly intact without any focal sensory/motor deficits. Cranial nerves: II-XII intact, grossly non-focal.  Psychiatric: Alert and oriented, thought content appropriate, normal insight  Capillary Refill: Brisk,3 seconds, normal   Peripheral Pulses: +2 palpable, equal bilaterally       Labs:   Recent Labs     04/27/22  0852 04/28/22  0725 04/29/22  0744   HGB 8.4* 8.5* 9.0*   HCT 25.5* 25.8* 28.3*     Recent Labs     04/27/22  0852 04/28/22  0725 04/29/22  0744   * 139 137   K 4.3 4.0 4.0   CL 98* 99 99   CO2 26 30 26   BUN 14 18 30*   CREATININE 0.9 1.0 1.1   CALCIUM 9.2 9.2 9.2     No results for input(s): AST, ALT, BILIDIR, BILITOT, ALKPHOS in the last 72 hours. No results for input(s): INR in the last 72 hours. No results for input(s): Verlena Axel in the last 72 hours.     Urinalysis:      Lab Results   Component Value Date    NITRU POSITIVE 04/22/2022    WBCUA  04/22/2022    BACTERIA 3+ 04/22/2022    RBCUA 0-2 04/22/2022    BLOODU Negative 04/22/2022    SPECGRAV 1.025 04/22/2022    GLUCOSEU Negative 04/22/2022       Radiology:  CT ABDOMEN PELVIS W IV CONTRAST Additional Contrast? None   Final Result         1. Limited CT evaluation of kidneys due to motion artifact. However, there is heterogeneous enhancement of the left kidney raising possibility for pyelonephritis. There is urothelial thickening considered inflammatory, but no evidence for urinary    calculus. No perinephric fluid collection. Increased retroperitoneal stranding at the level of Gerota's fascia and extending inferiorly into the pelvis as well. 2. Mild splenomegaly   3. Chronic right spigelian hernia containing loops of distal ileum and proximal colon, and 2 additional ventral hernias containing bowel loops without evidence for bowel obstruction or strangulation and a small supraumbilical hernia containing fat      CT Head WO Contrast   Final Result   1. No acute intracranial abnormality. XR SHOULDER LEFT (MIN 2 VIEWS)   Final Result      CHEST: Mild right basilar atelectasis. Lungs are otherwise clear. No pneumothorax. Normal cardiomediastinal silhouette. 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 No fracture or dislocation. XR CHEST PORTABLE   Final Result      CHEST: Mild right basilar atelectasis. Lungs are otherwise clear. No pneumothorax. Normal cardiomediastinal silhouette. 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 No fracture or dislocation. Assessment/Plan:    Active Hospital Problems    Diagnosis     SHAYLA (acute kidney injury) ([facility] [address] [N17.9]      Priority: Medium     Urinary tract infection  E. coli bacteremia  - Blood cultures positive for E. Coli  - repeat blood cx negative   - on levaquin plan for 10 more days on dc  - ID following    Anemia:  without gross bleeding. Hgb stable. Has iron and folate deficiency- supplementing. Occult stool is positive. GI consulted. Plan for inpatient EGD and outpatient colonoscopy. On PPI.      Acute kidney injury  - resolved with IVF  - continue with oral hydration  - creatinine remains stable    Diabetes mellitus  - Continue Lantus, insulin sliding scale     Generalized weakness  Mechanical falls  - PT/OT  - needs SNF on dc, precert pending     Hypertension  - improved after resuming all her meds  - cont with metoprolol, losartan, HCTZ      GERD  - continue protonix        DVT Prophylaxis: Lovenox  Diet: Diet NPO  Code Status: Full Code    PT/OT Eval Status: Pending    Dispo -pending IG eval for occult positive stool with anemia, EGD. Has precert to SNF.      Bj Walton MD

## 2022-04-29 NOTE — ANESTHESIA POSTPROCEDURE EVALUATION
Department of Anesthesiology  Postprocedure Note    Patient: Stephen Kaur  MRN: [id number]  YOB: 1960  Date of evaluation: 4/29/2022  Time:  2:26 PM     Procedure Summary     Date: 04/29/22 Room / Location: [address] [place] [address] / [facility]    Anesthesia Start: [address] Anesthesia Stop: 1316    Procedure: EGD BIOPSY (N/A ) Diagnosis: (Anemia)    Surgeons: Xu Wasserman MD Responsible Provider: Scar Lemus MD    Anesthesia Type: MAC ASA Status: 3          Anesthesia Type: MAC    Negar Phase I: Negar Score: 10    Negar Phase II: Negar Score: 10    Last vitals: Reviewed and per EMR flowsheets.        Anesthesia Post Evaluation    Patient location during evaluation: PACU  Level of consciousness: awake  Complications: no  Multimodal analgesia pain management approach

## 2022-04-29 NOTE — DISCHARGE SUMMARY
Hospital Medicine Discharge Summary      Patient ID: Kelli Valderrama      Patient's PCP: EUGENIE Silveira - CNP    Admit Date: 4/22/2022     Discharge Date:   4/29/2022    Admitting Physician: [facility], MD    Discharge Physician: Tal Fong MD     Discharge Diagnoses: Active Hospital Problems    Diagnosis Date Noted    Sepsis due to Escherichia coli [facility]) [A41.51] 04/29/2022     Priority: Medium    Acute pyelonephritis [N10] 04/29/2022     Priority: Medium    Elevated troponin [R77.8] 04/29/2022     Priority: Medium    Iron deficiency anemia [D50.9] 04/29/2022     Priority: Medium    PUD (peptic ulcer disease) [K27.9] 04/29/2022     Priority: Medium    SHAYLA (acute kidney injury) ([facility] [address] [N17.9] 04/22/2022     Priority: Medium         The patient was seen and examined on day of discharge and this discharge summary is in conjunction with any daily progress note from day of discharge. Hospital Course:     Patient is a 59-year-old female with past medical history of hypertension, diabetes mellitus presented from the [facility] home due to fall and weakness. In the ER patient was hypotensive but responded to IVF. Admitted with sepsis due to UTI. Hospital course:    Sepsis due to E. coli bacteremia, pyelonephritis   - Blood cultures positive for E. Coli  -  repeat blood cx negative   - on levaquin plan for 10 more days on dc  - seen by ID, no follow up needed on dc     Anemia:  without gross bleeding. Hgb stable. Has iron and folate deficiency- supplementing. Occult stool is positive. GI consulted. Plan for inpatient EGD and outpatient colonoscopy. On PPI. had EGD on 4/29- found to have clean based ulcer and erosions. Biopsies were obtained. Plan for colonoscopy as outpatient. Stopped aspirin and NSAIDS on dc.  On PPI BID.     Acute kidney injury  - resolved with IVF  - continue with oral hydration  - creatinine remains stable     Diabetes mellitus  - Continue Lantus, insulin sliding scale     Generalized weakness  Mechanical falls  - PT/OT  - needs SNF on dc     Hypertension  - improved after resuming all her meds  - cont with metoprolol, losartan, HCTZ      GERD  - continue protonix    Consults:     IP CONSULT TO HOSPITALIST  PHARMACY TO DOSE VANCOMYCIN  IP CONSULT TO INFECTIOUS DISEASES  IP CONSULT TO GI    Significant Diagnostic Studies: as above, and as follows:    EGD 4/29:  Impression:    1. Normal first and second portion of the duodenum. Biopsies were obtained evaluate for celiac disease. 2. A 5 mm clean-based cratered ulcer was found in the gastric antrum. 3. Multiple erosions in the gastric antrum. 4. A large area of shallow ulcerated mucosa measuring approximately 5 cm in greatest diameter was found in the gastric body. There was an adherent white exudate covering the abnormal mucosa which could be removed with vigorous irrigation. The area was friable with contact bleeding. Biopsies were obtained of the gastric antrum and body evaluate for H. pylori. 5. Normal esophagus     Recommendations:   Await biopsy results  PPI twice daily    Treatments: as above    Disposition: SNF    Discharged Condition: Stable    Code Status: Full Code    Activity: activity as tolerated    Diet: cardiac diet and diabetic diet    Follow Up: Primary Care Physician in one week after rehab discharge. GI follow u in 1-2 weeks for colonoscpy and biopsy results. Serial blood work was ordered for nursing facility. Exam:     General appearance: No apparent distress, appears stated age and cooperative. Lungs: Clear to ascultation, bilaterally without Rales/Wheezes/Rhonchi with good respiratory effort. Heart: Regular rate and rhythm with Normal S1/S2 without  murmurs, rubs or gallops, point of maximum impulse non-displaced  Abdomen: Soft, non-tender or non-distended without rigidity or guarding and positive bowel sounds all four quadrants. Extremities: No clubbing, cyanosis, or edema bilaterally. Skin: Skin color, texture, turgor normal.    Neurologic: Alert and oriented X 3,   grossly non-focal.  Mental status: Alert, oriented, thought content appropriate      Labs: For convenience and continuity at follow-up the following most recent labs are provided:    CBC:   Lab Results   Component Value Date    WBC 5.8 04/26/2022    HGB 9.0 04/29/2022    HCT 28.3 04/29/2022     04/26/2022       RENAL:   Lab Results   Component Value Date     04/29/2022    K 4.0 04/29/2022    CL 99 04/29/2022    CO2 26 04/29/2022    BUN 30 04/29/2022    CREATININE 1.1 04/29/2022           Discharge Medications:   Current Discharge Medication List           Details   hydrocortisone-aloe 1 % CREA cream Apply topically 2 times daily Apply to left arm rash. Qty: 1 each, Refills: 0      Balsam [place]-Castor Oil (VENELEX) OINT ointment Apply topically 2 times daily Apply to abd pannus & gluteal folds  Qty: 1 each, Refills: 0      levoFLOXacin (LEVAQUIN) 750 MG tablet Take 1 tablet by mouth daily for 10 days  Qty: 10 tablet, Refills: 0      folic acid (FOLVITE) 1 MG tablet Take 1 tablet by mouth daily  Qty: 30 tablet, Refills: 3      polyethylene glycol (GLYCOLAX) 17 g packet Take 17 g by mouth daily as needed for Constipation  Qty: 527 g, Refills: 1              Details   pantoprazole (PROTONIX) 40 MG tablet Take 1 tablet by mouth in the morning and at bedtime  Qty: 60 tablet, Refills: 3              Details   metoprolol succinate (TOPROL XL) 25 MG extended release tablet Take 1 tablet by mouth in the morning and at bedtime  Qty: 30 tablet, Refills: 3      rosuvastatin (CRESTOR) 20 MG tablet Take 1 tablet by mouth nightly  Qty: 30 tablet, Refills: 3      Liraglutide (VICTOZA) 18 MG/3ML SOPN SC injection Inject 3 mg into the skin daily      nystatin (MYCOSTATIN) 083567 UNIT/GM cream Apply topically 2 times daily Apply topically 2 times daily.       hydroCHLOROthiazide (HYDRODIURIL) 25 MG tablet Take 25 mg by mouth daily ferrous sulfate (IRON 325) 325 (65 Fe) MG tablet Take 1 tablet by mouth daily (with breakfast)  Qty: 30 tablet, Refills: 3      QUEtiapine (SEROQUEL) 300 MG tablet Take 300 mg by mouth nightly      acetaminophen (TYLENOL) 500 MG tablet Take 1,000 mg by mouth every 6 hours as needed for Pain      gabapentin (NEURONTIN) 300 MG capsule Take 600 mg by mouth 3 times daily. hydrOXYzine (ATARAX) 50 MG tablet Take 100 mg by mouth in the morning and at bedtime       losartan (COZAAR) 100 MG tablet Take 100 mg by mouth daily       sertraline (ZOLOFT) 100 MG tablet Take 150 mg by mouth daily       busPIRone (BUSPAR) 15 MG tablet Take 15 mg by mouth 3 times daily       buPROPion (WELLBUTRIN SR) 200 MG extended release tablet Take 200 mg by mouth 2 times daily                 Time Spent on discharge is more than 30 minutes in the examination, evaluation, counseling and review of medications and discharge plan. Signed:  Lucia Philippe MD   4/29/2022      Thank you EUGENIE Alvarez CNP for the opportunity to be involved in this patient's care. If you have any questions or concerns please feel free to contact me at [phone].

## 2022-04-29 NOTE — PLAN OF CARE
Problem: Pain  Goal: Verbalizes/displays adequate comfort level or baseline comfort level  4/29/2022 0310 by Sharad Snider RN  Outcome: Progressing     Problem: Skin/Tissue Integrity  Goal: Absence of new skin breakdown  Description: 1.   Monitor for areas of redness and/or skin breakdown   Outcome: Progressing     Problem: Safety - Adult  Goal: Free from fall injury  Outcome: Progressing

## 2022-04-29 NOTE — ANESTHESIA PRE PROCEDURE
Historical Provider, MD   pantoprazole (PROTONIX) 40 MG tablet Take 40 mg by mouth daily    Historical Provider, MD   sertraline (ZOLOFT) 100 MG tablet Take 150 mg by mouth daily     Historical Provider, MD   busPIRone (BUSPAR) 15 MG tablet Take 15 mg by mouth 3 times daily  9/8/21   Historical Provider, MD   buPROPion [facility]) 200 MG extended release tablet Take 200 mg by mouth 2 times daily  10/4/21   Historical Provider, MD       Current medications:    Current Facility-Administered Medications   Medication Dose Route Frequency Provider Last Rate Last Admin    hydrocortisone-aloe 1 % cream   Topical BID Esperanza Winchester MD   Given at 04/29/22 1005    sennosides-docusate sodium (SENOKOT-S) 8.6-50 MG tablet 2 tablet  2 tablet Oral BID PRN Esperanza Winchester MD        pantoprazole (PROTONIX) injection 40 mg  40 mg IntraVENous BID Burgess Grace MD   40 mg at 04/29/22 1013    polyethylene glycol (GLYCOLAX) packet 17 g  17 g Oral Daily Esperanza Winchester MD   17 g at 77/59/79 0938    folic acid (FOLVITE) tablet 1 mg  1 mg Oral Daily Esperanza Winchester MD   1 mg at 04/29/22 1001    metoprolol succinate (TOPROL XL) extended release tablet 25 mg  25 mg Oral BID Esperanza Winchester MD   25 mg at 04/29/22 1001    insulin glargine (LANTUS;BASAGLAR) injection pen 26 Units  26 Units SubCUTAneous Nightly Esperanza Winchester MD   26 Units at 04/28/22 2225    insulin lispro (1 Unit Dial) 6 Units  6 Units SubCUTAneous TID WC Esperanza Winchester MD   6 Units at 04/28/22 1820    levoFLOXacin (LEVAQUIN) 750 MG/150ML infusion 750 mg  750 mg IntraVENous Q24H Radha Medina MD   Stopped at 04/28/22 1949    miconazole (MICOTIN) 2 % powder   Topical BID Helena Huber DO   Given at 04/29/22 1004    Venelex ointment   Topical BID Esperanza Winchester MD   Given at 04/29/22 1004    medicated lip ointment (BLISTEX)   Topical PRN Esperanza Winchester MD   7.5 g at 04/25/22 2354    gabapentin (NEURONTIN) capsule 600 mg  600 mg Oral TID Adri Sheppard MD 600 mg at 04/29/22 1000    diphenhydrAMINE (BENADRYL) tablet 25 mg  25 mg Oral Q6H PRN Arden Hill DO   25 mg at 04/25/22 0831    sertraline (ZOLOFT) tablet 150 mg  150 mg Oral Daily David Ennis MD   150 mg at 04/29/22 1001    busPIRone (BUSPAR) tablet 15 mg  15 mg Oral TID David Ennis MD   15 mg at 04/29/22 1002    buPROPion [facility]) extended release tablet 200 mg  200 mg Oral BID David Ennis MD   200 mg at 04/29/22 1003    QUEtiapine (SEROQUEL) tablet 300 mg  300 mg Oral Nightly David Ennis MD   300 mg at 04/28/22 2226    hydrOXYzine (ATARAX) tablet 100 mg  100 mg Oral BID David Ennis MD   100 mg at 04/29/22 1001    ferrous sulfate (IRON 325) tablet 325 mg  325 mg Oral Daily with breakfast David Ennis MD   325 mg at 04/29/22 1000    [Held by provider] aspirin chewable tablet 81 mg  81 mg Oral Daily David Ennis MD   81 mg at 04/28/22 0900    rosuvastatin (CRESTOR) tablet 20 mg  20 mg Oral Nightly David Ennis MD   20 mg at 04/28/22 2226    hydroCHLOROthiazide (HYDRODIURIL) tablet 25 mg  25 mg Oral Daily David Ennis MD   25 mg at 04/29/22 1000    losartan (COZAAR) tablet 100 mg  100 mg Oral Daily David Ennis MD   100 mg at 04/29/22 1001    sodium chloride flush 0.9 % injection 5-40 mL  5-40 mL IntraVENous 2 times per day David Ennis MD   10 mL at 04/29/22 1002    sodium chloride flush 0.9 % injection 5-40 mL  5-40 mL IntraVENous PRN David Ennis MD        0.9 % sodium chloride infusion   IntraVENous PRN David Ennis MD   Stopped at 04/28/22 2145    [Held by provider] enoxaparin (LOVENOX) injection 40 mg  40 mg SubCUTAneous Daily David Ennis MD   40 mg at 04/28/22 0901    ondansetron (ZOFRAN-ODT) disintegrating tablet 4 mg  4 mg Oral Q8H PRN David Ennis MD        Or    ondansetron [facility]) injection 4 mg  4 mg IntraVENous Q6H PRN David Ennis MD   4 mg at 04/23/22 1025    polyethylene glycol (GLYCOLAX) packet 17 g  17 g Oral Daily PRN David Ennis MD       [facility] acetaminophen (TYLENOL) tablet 650 mg  650 mg Oral Q6H PRN Gavin Mohan MD   650 mg at 04/29/22 1007    Or    acetaminophen (TYLENOL) suppository 650 mg  650 mg Rectal Q6H PRN Gavin Mohan MD   650 mg at 04/27/22 0615    glucagon (rDNA) injection 1 mg  1 mg IntraMUSCular PRN Gavin Mohan MD        dextrose 5 % solution  100 mL/hr IntraVENous PRN Gavin Mohan MD        insulin lispro (1 Unit Dial) 0-12 Units  0-12 Units SubCUTAneous TID [facility] Gavin Mohan MD   2 Units at 04/28/22 1405    insulin lispro (1 Unit Dial) 0-6 Units  0-6 Units SubCUTAneous Nightly Gavin Mohan MD   1 Units at 04/27/22 2110    dextrose bolus (hypoglycemia) 10% 125 mL  125 mL IntraVENous PRN Gavin Mohan MD        Or    dextrose bolus (hypoglycemia) 10% 250 mL  250 mL IntraVENous PRN Gavin Mohan MD        glucose chewable tablet 16 g  4 tablet Oral PRN Gavin Mohan MD           Allergies:     Allergies   Allergen Reactions    Azithromycin      Other reaction(s): Itching, Swelling    Metronidazole Hives and Itching    Clindamycin     Tetanus Immune Globulin     Codeine Rash    Penicillin G Rash     Other reaction(s): Unknown    Penicillin V Rash       Problem List:    Patient Active Problem List   Diagnosis Code    Syncope and collapse R55    Refractory migraine with aura G43.919    Psychogenic headache F45.41    Neck pain M54.2    Intentional drug overdose ([facility] [address] T50.902A    Hypertension I10    Hyperplastic polyp of intestine K63.5    Head problem R68.89    Depressive disorder F32.9    Severe sepsis (HCC) A41.9, R65.20    Type 2 diabetes mellitus (HCC) E11.9    Morbid (severe) obesity due to excess calories ([facility]) E66.01    Chest pain R07.9    SHAYLA (acute kidney injury) ([facility]) N17.9       Past Medical History:        Diagnosis Date    Anxiety     Diabetes mellitus ([facility] [address]     Diabetic polyneuropathy (HCC)     Hernia, abdominal     Hyperlipidemia     Hypertension     PTSD (post-traumatic stress disorder) Past Surgical History:        Procedure Laterality Date    CHOLECYSTECTOMY      COLECTOMY      HYSTERECTOMY      REVISION COLOSTOMY         Social History:    Social History     Tobacco Use    Smoking status: Never Smoker    Smokeless tobacco: Never Used   Substance Use Topics    Alcohol use: Never                                Counseling given: Not Answered      Vital Signs (Current):   Vitals:    04/29/22 0022 04/29/22 0451 04/29/22 0736 04/29/22 1141   BP: 111/83 115/68 133/64 103/83   Pulse: 81 79 84 76   Resp: 18 18 16 20   Temp: 98.7 °F (37.1 °C) 98.6 °F (37 °C) 98.6 °F (37 °C) 97.4 °F (36.3 °C)   TempSrc: Oral Oral Oral Temporal   SpO2: 97% 94% 94% 95%   Weight:                                                  BP Readings from Last 3 Encounters:   04/29/22 103/83   03/20/22 112/63   02/25/22 (!) 143/84       NPO Status:                                                                                 BMI:   Wt Readings from Last 3 Encounters:   04/26/22 265 lb 6.9 oz (120.4 kg)   03/20/22 260 lb (117.9 kg)   02/25/22 253 lb 12.8 oz (115.1 kg)     Body mass index is 44.17 kg/m².     CBC:   Lab Results   Component Value Date    WBC 5.8 04/26/2022    RBC 2.89 04/26/2022    HGB 9.0 04/29/2022    HCT 28.3 04/29/2022    MCV 83.2 04/26/2022    RDW 15.1 04/26/2022     04/26/2022       CMP:   Lab Results   Component Value Date     04/29/2022    K 4.0 04/29/2022    CL 99 04/29/2022    CO2 26 04/29/2022    BUN 30 04/29/2022    CREATININE 1.1 04/29/2022    GFRAA >60 04/29/2022    AGRATIO 1.3 06/20/2021    LABGLOM 51 04/29/2022    GLUCOSE 160 04/29/2022    PROT 7.0 03/20/2022    CALCIUM 9.2 04/29/2022    BILITOT 0.4 03/20/2022    ALKPHOS 84 03/20/2022    AST 13 03/20/2022    ALT 14 03/20/2022       POC Tests:   Recent Labs     04/29/22  0751   POCGLU 177*       Coags:   Lab Results   Component Value Date    PROTIME 14.6 12/23/2021    INR 1.28 12/23/2021       HCG (If Applicable): No results found for: PREGTESTUR, PREGSERUM, HCG, HCGQUANT     ABGs: No results found for: PHART, PO2ART, BXI3QTX, TYG8HOJ, BEART, R8VHBUCF     Type & Screen (If Applicable):  No results found for: LABABO, LABRH    Drug/Infectious Status (If Applicable):  No results found for: HIV, HEPCAB    COVID-19 Screening (If Applicable):   Lab Results   Component Value Date    COVID19 NOT DETECTED 03/20/2022           Anesthesia Evaluation    Airway: Mallampati: II  TM distance: >3 FB   Neck ROM: full  Mouth opening: > = 3 FB Dental:          Pulmonary:                              Cardiovascular:    (+) hypertension:,         Rhythm: regular  Rate: normal                    Neuro/Psych:   (+) neuromuscular disease:, headaches:, psychiatric history:            GI/Hepatic/Renal:             Endo/Other:    (+) Diabetes, . Abdominal:             Vascular: Other Findings:             Anesthesia Plan      MAC     ASA 3       Induction: intravenous. Anesthetic plan and risks discussed with patient. Plan discussed with CRNA.                   Grant Ibrahim MD   4/29/2022

## 2022-04-29 NOTE — PROGRESS NOTES
rn spoke with gi dr. Randy Abbott per dr. King Villafana request to see if pt is ok to dc from gi standpoint. Per Dr. Jae Haywood pt can dc but should continue BID PPI.

## 2022-04-29 NOTE — CARE COORDINATION
DAMASO  Confirmed  D/C  With  Dr Villalpando with [facility]  at  [facility]  That she was cleared  To D/C  . DAMASO faxed  [phone] Kelsi Graff RN to call report  . Jessie Aguayo [phone]. 's contact    Was  Provided an update as well. DAMASO spoke with daughter, Brice Bence [phone] to provide update       Electronically signed by Lovell Favre, RN on 4/29/2022 at 3:21 PM      +++++++++++++++++++++++++++++++++++++++++++++++          Case Management Assessment            Discharge Note                    Date / Time of Note: 4/29/2022 10:40 AM                  Discharge Note Completed by: Lovell Favre, RN    Patient Name: Virginia Duong   YOB: 1960  Diagnosis: Acute pyelonephritis [N10]  Elevated troponin [R77.8]  SHAYLA (acute kidney injury) ([facility] [address] [N17.9]  Leukocytosis, unspecified type [D72.829]  Hypotension due to hypovolemia [I95.89, E86.1]   Date / Time: 4/22/2022  9:51 AM    Current PCP: La Tayjameel patient: No    Hospitalization in the last 30 days: No    Advance Directives:  Code Status: Full Code  [place] DNR form completed and on chart: Yes    Financial:  Payor: Shira Beyer / Plan: Paulette Ready / Product Type: *No Product type* /      Pharmacy:    [address] [place] [id number]  Phone: [phone] Fax: [phone]      Assistance purchasing medications?:    Assistance provided by Case Management: None at this time    Does patient want to participate in local refill/ meds to beds program?:      Meds To Beds General Rules:  1. Can ONLY be done Monday- Friday between 8:30am-5pm  2. Prescription(s) must be in pharmacy by 3pm to be filled same day  3. Copy of patient's insurance/ prescription drug card and patient face sheet must be sent along with the prescription(s)  4. Cost of Rx cannot be added to hospital bill.  If financial assistance is needed, please contact unit  or ;  or  CANNOT provide pharmacy voucher for patients co-pays  5. Patients can then  the prescription on their way out of the hospital at discharge, or pharmacy can deliver to the bedside if staff is available. (payment due at time of pick-up or delivery - cash, check, or card accepted)     Able to afford home medications/ co-pay costs: Yes    ADLS:  Current PT AM-PAC Score: 13 /24  Current OT AM-PAC Score: 14 /24      DISCHARGE Disposition: [facility] (SNF): [facility] Phone: [phone] Fax: [phone]    LOC at discharge: 0 [address] Completed: Yes    Notification completed in HENS/PAS?:  Yes : CM has completed HENS online through secure website for SNF admission at [address] [facility]. Document ID #: [id number]    IMM Completed:   Not Indicated    Transportation:  Transportation PLAN for discharge: EMS transportation   Mode of Transport: Ambulance stretcher - BLS  Reason for medical transport: Bed confined: Meets the following criteria 1) unable to get out of bed without assistance or ambulate, 2) unable to safely sit up in a wheelchair, 3) unable to maintain erect seating position in a chair for time needed for transport  Name of [address] [place] [address]   [phone]  Time of Transport: 5:30PM    Transport form completed: Yes    Home Oxygen and Respiratory Equipment:  Oxygen needed at discharge?: No    Dialysis:  Dialysis patient: No      Referrals made at [facility] for outpatient continued care:  Not Applicable    Additional CM Notes:     CM confirmed  D/C plan  To  SNF  Pending  GI clearance  After  EGD today at 1200 :    -  Dr Daniel Gomes  Will follow up with GI after  And  If  [id number] Misty Ayers will d/c  This afternoon :      CM notified Cas Lawrence in admissions [phone],EVG aware:     Patient and RN Chey Gould  Also aware  of transport time. RN to call report .  [phone]    CM faxed  [address]ff To  Facility  [phone]      No other needs at this time. The Plan for Transition of Care is related to the following treatment goals of Acute pyelonephritis [N10]  Elevated troponin [R77.8]  SHAYLA (acute kidney injury) ([facility] [address] [N17.9]  Leukocytosis, unspecified type [D72.829]  Hypotension due to hypovolemia [I95.89, E86.1]    The Patient and/or patient representative Parviz Reyna and her family were provided with a choice of provider and agrees with the discharge plan Yes    Freedom of choice list was provided with basic dialogue that supports the patient's individualized plan of care/goals and shares the quality data associated with the providers.  Yes    Care Transitions patient: No    Asia Silva RN  [facility]  Case Management Department  Ph: [phone]  Fax: [phone]

## 2022-05-29 PROBLEM — R77.8 ELEVATED TROPONIN: Status: RESOLVED | Noted: 2022-04-29 | Resolved: 2022-05-29

## 2022-05-31 ENCOUNTER — HOSPITAL ENCOUNTER (INPATIENT)
Age: 62
LOS: 6 days | Discharge: PSYCHIATRIC OR SUBSTANCE USE FACILITY | DRG: 469 | End: 2022-06-06
Attending: EMERGENCY MEDICINE | Admitting: INTERNAL MEDICINE
Payer: MEDICAID

## 2022-05-31 ENCOUNTER — APPOINTMENT (OUTPATIENT)
Dept: GENERAL RADIOLOGY | Age: 62
DRG: 469 | End: 2022-05-31
Payer: MEDICAID

## 2022-05-31 DIAGNOSIS — N17.9 ACUTE RENAL FAILURE, UNSPECIFIED ACUTE RENAL FAILURE TYPE (HCC): Primary | ICD-10-CM

## 2022-05-31 DIAGNOSIS — E86.0 DEHYDRATION: ICD-10-CM

## 2022-05-31 LAB
ANION GAP SERPL CALCULATED.3IONS-SCNC: 18 MMOL/L (ref 3–16)
BACTERIA: ABNORMAL /HPF
BASE EXCESS VENOUS: 1.6 MMOL/L (ref -2–3)
BASOPHILS ABSOLUTE: 0 K/UL (ref 0–0.2)
BASOPHILS RELATIVE PERCENT: 0.5 %
BILIRUBIN URINE: NEGATIVE
BLOOD, URINE: NEGATIVE
BUN BLDV-MCNC: 61 MG/DL (ref 7–20)
CALCIUM SERPL-MCNC: 10 MG/DL (ref 8.3–10.6)
CARBOXYHEMOGLOBIN: 1.2 % (ref 0–1.5)
CHLORIDE BLD-SCNC: 100 MMOL/L (ref 99–110)
CLARITY: CLEAR
CO2: 23 MMOL/L (ref 21–32)
COLOR: YELLOW
CREAT SERPL-MCNC: 2.7 MG/DL (ref 0.6–1.2)
EKG ATRIAL RATE: 83 BPM
EKG DIAGNOSIS: NORMAL
EKG P AXIS: 56 DEGREES
EKG P-R INTERVAL: 170 MS
EKG Q-T INTERVAL: 400 MS
EKG QRS DURATION: 100 MS
EKG QTC CALCULATION (BAZETT): 470 MS
EKG R AXIS: -6 DEGREES
EKG T AXIS: 16 DEGREES
EKG VENTRICULAR RATE: 83 BPM
EOSINOPHILS ABSOLUTE: 0.5 K/UL (ref 0–0.6)
EOSINOPHILS RELATIVE PERCENT: 5.7 %
EPITHELIAL CELLS, UA: ABNORMAL /HPF (ref 0–5)
GFR AFRICAN AMERICAN: 22
GFR NON-AFRICAN AMERICAN: 18
GLUCOSE BLD-MCNC: 130 MG/DL (ref 70–99)
GLUCOSE BLD-MCNC: 130 MG/DL (ref 70–99)
GLUCOSE BLD-MCNC: 179 MG/DL (ref 70–99)
GLUCOSE URINE: NEGATIVE MG/DL
HCO3 VENOUS: 27.1 MMOL/L (ref 24–28)
HCT VFR BLD CALC: 34 % (ref 36–48)
HEMOGLOBIN, VEN, REDUCED: 67.3 %
HEMOGLOBIN: 10.9 G/DL (ref 12–16)
INR BLD: 1.32 (ref 0.87–1.14)
KETONES, URINE: NEGATIVE MG/DL
LACTIC ACID, SEPSIS: 0.7 MMOL/L (ref 0.4–1.9)
LACTIC ACID, SEPSIS: 2.2 MMOL/L (ref 0.4–1.9)
LEUKOCYTE ESTERASE, URINE: ABNORMAL
LYMPHOCYTES ABSOLUTE: 1.3 K/UL (ref 1–5.1)
LYMPHOCYTES RELATIVE PERCENT: 14.4 %
MCH RBC QN AUTO: 26.7 PG (ref 26–34)
MCHC RBC AUTO-ENTMCNC: 31.9 G/DL (ref 31–36)
MCV RBC AUTO: 83.5 FL (ref 80–100)
METHEMOGLOBIN VENOUS: 0.4 % (ref 0–1.5)
MICROSCOPIC EXAMINATION: YES
MONOCYTES ABSOLUTE: 0.5 K/UL (ref 0–1.3)
MONOCYTES RELATIVE PERCENT: 5.4 %
NEUTROPHILS ABSOLUTE: 6.9 K/UL (ref 1.7–7.7)
NEUTROPHILS RELATIVE PERCENT: 74 %
NITRITE, URINE: POSITIVE
O2 SAT, VEN: 32 %
PCO2, VEN: 45.7 MMHG (ref 41–51)
PDW BLD-RTO: 13.4 % (ref 12.4–15.4)
PERFORMED ON: ABNORMAL
PERFORMED ON: ABNORMAL
PH UA: 6 (ref 5–8)
PH VENOUS: 7.38 (ref 7.35–7.45)
PLATELET # BLD: 195 K/UL (ref 135–450)
PMV BLD AUTO: 8.2 FL (ref 5–10.5)
PO2, VEN: <30 MMHG (ref 25–40)
POTASSIUM REFLEX MAGNESIUM: 4.7 MMOL/L (ref 3.5–5.1)
PROTEIN UA: NEGATIVE MG/DL
PROTHROMBIN TIME: 16.4 SEC (ref 11.7–14.5)
RBC # BLD: 4.07 M/UL (ref 4–5.2)
RBC UA: ABNORMAL /HPF (ref 0–4)
SODIUM BLD-SCNC: 141 MMOL/L (ref 136–145)
SODIUM URINE: 71 MMOL/L
SPECIFIC GRAVITY UA: >=1.03 (ref 1–1.03)
TCO2 CALC VENOUS: 29 MMOL/L
TROPONIN: 0.06 NG/ML
URINE TYPE: ABNORMAL
UROBILINOGEN, URINE: 0.2 E.U./DL
WBC # BLD: 9.4 K/UL (ref 4–11)
WBC UA: ABNORMAL /HPF (ref 0–5)

## 2022-05-31 PROCEDURE — 99285 EMERGENCY DEPT VISIT HI MDM: CPT

## 2022-05-31 PROCEDURE — 81001 URINALYSIS AUTO W/SCOPE: CPT

## 2022-05-31 PROCEDURE — 2580000003 HC RX 258: Performed by: INTERNAL MEDICINE

## 2022-05-31 PROCEDURE — 6370000000 HC RX 637 (ALT 250 FOR IP): Performed by: INTERNAL MEDICINE

## 2022-05-31 PROCEDURE — 96361 HYDRATE IV INFUSION ADD-ON: CPT

## 2022-05-31 PROCEDURE — 2060000000 HC ICU INTERMEDIATE R&B

## 2022-05-31 PROCEDURE — 83605 ASSAY OF LACTIC ACID: CPT

## 2022-05-31 PROCEDURE — 84300 ASSAY OF URINE SODIUM: CPT

## 2022-05-31 PROCEDURE — 87040 BLOOD CULTURE FOR BACTERIA: CPT

## 2022-05-31 PROCEDURE — 93005 ELECTROCARDIOGRAM TRACING: CPT | Performed by: EMERGENCY MEDICINE

## 2022-05-31 PROCEDURE — 6360000002 HC RX W HCPCS: Performed by: EMERGENCY MEDICINE

## 2022-05-31 PROCEDURE — 80048 BASIC METABOLIC PNL TOTAL CA: CPT

## 2022-05-31 PROCEDURE — 2580000003 HC RX 258: Performed by: EMERGENCY MEDICINE

## 2022-05-31 PROCEDURE — 36415 COLL VENOUS BLD VENIPUNCTURE: CPT

## 2022-05-31 PROCEDURE — 6360000002 HC RX W HCPCS: Performed by: INTERNAL MEDICINE

## 2022-05-31 PROCEDURE — 85025 COMPLETE CBC W/AUTO DIFF WBC: CPT

## 2022-05-31 PROCEDURE — 82803 BLOOD GASES ANY COMBINATION: CPT

## 2022-05-31 PROCEDURE — 87186 SC STD MICRODIL/AGAR DIL: CPT

## 2022-05-31 PROCEDURE — 71045 X-RAY EXAM CHEST 1 VIEW: CPT

## 2022-05-31 PROCEDURE — 84484 ASSAY OF TROPONIN QUANT: CPT

## 2022-05-31 PROCEDURE — 87077 CULTURE AEROBIC IDENTIFY: CPT

## 2022-05-31 PROCEDURE — 87086 URINE CULTURE/COLONY COUNT: CPT

## 2022-05-31 PROCEDURE — 96365 THER/PROPH/DIAG IV INF INIT: CPT

## 2022-05-31 PROCEDURE — 85610 PROTHROMBIN TIME: CPT

## 2022-05-31 RX ORDER — POLYETHYLENE GLYCOL 3350 17 G/17G
17 POWDER, FOR SOLUTION ORAL NIGHTLY
COMMUNITY

## 2022-05-31 RX ORDER — ACETAMINOPHEN 325 MG/1
650 TABLET ORAL EVERY 6 HOURS PRN
Status: DISCONTINUED | OUTPATIENT
Start: 2022-05-31 | End: 2022-06-05

## 2022-05-31 RX ORDER — METOPROLOL SUCCINATE 25 MG/1
25 TABLET, EXTENDED RELEASE ORAL 2 TIMES DAILY
Status: DISCONTINUED | OUTPATIENT
Start: 2022-05-31 | End: 2022-06-06 | Stop reason: HOSPADM

## 2022-05-31 RX ORDER — FOLIC ACID 1 MG/1
1 TABLET ORAL DAILY
Status: DISCONTINUED | OUTPATIENT
Start: 2022-05-31 | End: 2022-06-06 | Stop reason: HOSPADM

## 2022-05-31 RX ORDER — PREGABALIN 25 MG/1
25 CAPSULE ORAL 2 TIMES DAILY
COMMUNITY

## 2022-05-31 RX ORDER — SODIUM CHLORIDE 9 MG/ML
INJECTION, SOLUTION INTRAVENOUS ONCE
Status: COMPLETED | OUTPATIENT
Start: 2022-05-31 | End: 2022-05-31

## 2022-05-31 RX ORDER — SODIUM CHLORIDE, SODIUM LACTATE, POTASSIUM CHLORIDE, AND CALCIUM CHLORIDE .6; .31; .03; .02 G/100ML; G/100ML; G/100ML; G/100ML
30 INJECTION, SOLUTION INTRAVENOUS ONCE
Status: COMPLETED | OUTPATIENT
Start: 2022-05-31 | End: 2022-05-31

## 2022-05-31 RX ORDER — HEPARIN SODIUM 5000 [USP'U]/ML
5000 INJECTION, SOLUTION INTRAVENOUS; SUBCUTANEOUS 3 TIMES DAILY
Status: DISCONTINUED | OUTPATIENT
Start: 2022-05-31 | End: 2022-06-06 | Stop reason: HOSPADM

## 2022-05-31 RX ORDER — INSULIN LISPRO 100 [IU]/ML
0-6 INJECTION, SOLUTION INTRAVENOUS; SUBCUTANEOUS
Status: DISCONTINUED | OUTPATIENT
Start: 2022-05-31 | End: 2022-06-06 | Stop reason: HOSPADM

## 2022-05-31 RX ORDER — ASPIRIN 81 MG/1
81 TABLET, CHEWABLE ORAL DAILY
COMMUNITY

## 2022-05-31 RX ORDER — ACETAMINOPHEN 650 MG/1
650 SUPPOSITORY RECTAL EVERY 6 HOURS PRN
Status: DISCONTINUED | OUTPATIENT
Start: 2022-05-31 | End: 2022-06-05

## 2022-05-31 RX ORDER — SODIUM CHLORIDE, SODIUM LACTATE, POTASSIUM CHLORIDE, CALCIUM CHLORIDE 600; 310; 30; 20 MG/100ML; MG/100ML; MG/100ML; MG/100ML
INJECTION, SOLUTION INTRAVENOUS CONTINUOUS
Status: DISCONTINUED | OUTPATIENT
Start: 2022-05-31 | End: 2022-06-04

## 2022-05-31 RX ORDER — SULFAMETHOXAZOLE AND TRIMETHOPRIM 800; 160 MG/1; MG/1
1 TABLET ORAL 2 TIMES DAILY
Status: ON HOLD | COMMUNITY
Start: 2022-05-27 | End: 2022-06-04 | Stop reason: HOSPADM

## 2022-05-31 RX ORDER — NAPROXEN 500 MG/1
500 TABLET ORAL EVERY 12 HOURS PRN
Status: ON HOLD | COMMUNITY
End: 2022-06-14 | Stop reason: HOSPADM

## 2022-05-31 RX ORDER — SODIUM CHLORIDE 0.9 % (FLUSH) 0.9 %
5-40 SYRINGE (ML) INJECTION EVERY 12 HOURS SCHEDULED
Status: DISCONTINUED | OUTPATIENT
Start: 2022-05-31 | End: 2022-06-06 | Stop reason: HOSPADM

## 2022-05-31 RX ORDER — INSULIN LISPRO 100 [IU]/ML
0-3 INJECTION, SOLUTION INTRAVENOUS; SUBCUTANEOUS NIGHTLY
Status: DISCONTINUED | OUTPATIENT
Start: 2022-05-31 | End: 2022-06-06 | Stop reason: HOSPADM

## 2022-05-31 RX ORDER — SODIUM CHLORIDE 0.9 % (FLUSH) 0.9 %
5-40 SYRINGE (ML) INJECTION PRN
Status: DISCONTINUED | OUTPATIENT
Start: 2022-05-31 | End: 2022-06-06 | Stop reason: HOSPADM

## 2022-05-31 RX ORDER — SODIUM CHLORIDE 9 MG/ML
INJECTION, SOLUTION INTRAVENOUS PRN
Status: DISCONTINUED | OUTPATIENT
Start: 2022-05-31 | End: 2022-06-06 | Stop reason: HOSPADM

## 2022-05-31 RX ORDER — PANTOPRAZOLE SODIUM 40 MG/1
40 TABLET, DELAYED RELEASE ORAL
Status: DISCONTINUED | OUTPATIENT
Start: 2022-05-31 | End: 2022-06-06 | Stop reason: HOSPADM

## 2022-05-31 RX ORDER — ONDANSETRON 4 MG/1
4 TABLET, ORALLY DISINTEGRATING ORAL EVERY 8 HOURS PRN
Status: DISCONTINUED | OUTPATIENT
Start: 2022-05-31 | End: 2022-06-06 | Stop reason: HOSPADM

## 2022-05-31 RX ORDER — ATORVASTATIN CALCIUM 40 MG/1
40 TABLET, FILM COATED ORAL NIGHTLY
COMMUNITY

## 2022-05-31 RX ORDER — ONDANSETRON 2 MG/ML
4 INJECTION INTRAMUSCULAR; INTRAVENOUS EVERY 6 HOURS PRN
Status: DISCONTINUED | OUTPATIENT
Start: 2022-05-31 | End: 2022-06-06 | Stop reason: HOSPADM

## 2022-05-31 RX ORDER — DEXTROSE MONOHYDRATE 50 MG/ML
100 INJECTION, SOLUTION INTRAVENOUS PRN
Status: DISCONTINUED | OUTPATIENT
Start: 2022-05-31 | End: 2022-06-06 | Stop reason: HOSPADM

## 2022-05-31 RX ADMIN — SODIUM CHLORIDE, POTASSIUM CHLORIDE, SODIUM LACTATE AND CALCIUM CHLORIDE: 600; 310; 30; 20 INJECTION, SOLUTION INTRAVENOUS at 18:26

## 2022-05-31 RX ADMIN — SODIUM CHLORIDE, POTASSIUM CHLORIDE, SODIUM LACTATE AND CALCIUM CHLORIDE: 600; 310; 30; 20 INJECTION, SOLUTION INTRAVENOUS at 16:23

## 2022-05-31 RX ADMIN — FOLIC ACID 1 MG: 1 TABLET ORAL at 18:26

## 2022-05-31 RX ADMIN — CEFTRIAXONE 1000 MG: 1 INJECTION, POWDER, FOR SOLUTION INTRAMUSCULAR; INTRAVENOUS at 13:29

## 2022-05-31 RX ADMIN — QUETIAPINE FUMARATE 300 MG: 100 TABLET ORAL at 21:05

## 2022-05-31 RX ADMIN — BUSPIRONE HYDROCHLORIDE 15 MG: 10 TABLET ORAL at 21:05

## 2022-05-31 RX ADMIN — SODIUM CHLORIDE, PRESERVATIVE FREE 10 ML: 5 INJECTION INTRAVENOUS at 21:11

## 2022-05-31 RX ADMIN — METOPROLOL SUCCINATE 25 MG: 25 TABLET, EXTENDED RELEASE ORAL at 21:05

## 2022-05-31 RX ADMIN — PANTOPRAZOLE SODIUM 40 MG: 40 TABLET, DELAYED RELEASE ORAL at 18:26

## 2022-05-31 RX ADMIN — HEPARIN SODIUM 5000 UNITS: 5000 INJECTION INTRAVENOUS; SUBCUTANEOUS at 21:05

## 2022-05-31 RX ADMIN — SODIUM CHLORIDE: 9 INJECTION, SOLUTION INTRAVENOUS at 12:50

## 2022-05-31 RX ADMIN — SODIUM CHLORIDE, POTASSIUM CHLORIDE, SODIUM LACTATE AND CALCIUM CHLORIDE 1410.5 ML: 600; 310; 30; 20 INJECTION, SOLUTION INTRAVENOUS at 15:18

## 2022-05-31 ASSESSMENT — PAIN DESCRIPTION - LOCATION: LOCATION: HEAD

## 2022-05-31 ASSESSMENT — PAIN SCALES - GENERAL
PAINLEVEL_OUTOF10: 10
PAINLEVEL_OUTOF10: 0
PAINLEVEL_OUTOF10: 0

## 2022-05-31 ASSESSMENT — PAIN DESCRIPTION - DESCRIPTORS: DESCRIPTORS: ACHING

## 2022-05-31 ASSESSMENT — PAIN DESCRIPTION - ORIENTATION: ORIENTATION: LEFT

## 2022-05-31 ASSESSMENT — PAIN DESCRIPTION - FREQUENCY: FREQUENCY: CONTINUOUS

## 2022-05-31 ASSESSMENT — PAIN DESCRIPTION - PAIN TYPE: TYPE: ACUTE PAIN

## 2022-05-31 ASSESSMENT — PAIN - FUNCTIONAL ASSESSMENT: PAIN_FUNCTIONAL_ASSESSMENT: 0-10

## 2022-05-31 NOTE — ED TRIAGE NOTES
Pt brought in via EMS from [facility] with concerns for AMS. Pt also shaking/tremoring of which EMS states that the nursing staff says that patient is shaking more than usual and is asking inappropriate questions and not making sense. Pt observed to be A+O to self and environment, however, confused to date,time, and reason for hospital emergency visit. Follows commands without difficulty. Placed on cardiac monitor. VSS.

## 2022-05-31 NOTE — PROGRESS NOTES
Patient up from ED around 1750, pleasantly confused, alert to self and place, RA, SR on the monitor, LR fluids restarted, avasys camera on, bed locked in lowest position, call light in reach, pt educated on staying in bed.

## 2022-05-31 NOTE — ED PROVIDER NOTES
[facility]  EMERGENCY DEPARTMENT ENCOUNTER          ATTENDING PHYSICIAN NOTE       Date of evaluation: 5/31/2022    Chief Complaint     Altered Mental Status      History of Present Illness     Micheline Lin is a 62 y.o. female who presents to the emergency department because of altered mental status. Looking through the old record she had a recent admission for UTI with sepsis. According to the life squad note she had shaking and tremoring which they stated nursing staff says patient is shaking more than usual and not making sense and asking inappropriate questions. When I walk in the room I asked her why she was here and she was nonsensical in her answer. No further history obtained  Review of Systems     Review of Systems  Due to mental status no review of systems were obtained  Past Medical, Surgical, Family, and Social History     She has a past medical history of Anxiety, Diabetes mellitus ([address] Diabetic polyneuropathy ([facility] [address] Hernia, abdominal, Hyperlipidemia, Hypertension, and PTSD (post-traumatic stress disorder). She has a past surgical history that includes Hysterectomy; Cholecystectomy; colectomy; Revision Colostomy; and Upper gastrointestinal endoscopy (N/A, 4/29/2022). Her family history is not on file. She reports that she has never smoked. She has never used smokeless tobacco. She reports that she does not drink alcohol and does not use drugs.     Medications     Previous Medications    ACETAMINOPHEN (TYLENOL) 500 MG TABLET    Take 1,000 mg by mouth every 6 hours as needed for Pain    ASPIRIN 81 MG CHEWABLE TABLET    Take 81 mg by mouth daily    ATORVASTATIN (LIPITOR) 40 MG TABLET    Take 40 mg by mouth at bedtime    BUPROPION (WELLBUTRIN SR) 200 MG EXTENDED RELEASE TABLET    Take 200 mg by mouth 2 times daily     BUSPIRONE (BUSPAR) 15 MG TABLET    Take 15 mg by mouth 3 times daily     FERROUS SULFATE (IRON 325) 325 (65 FE) MG TABLET    Take 1 tablet by mouth daily (with breakfast) FOLIC ACID (FOLVITE) 1 MG TABLET    Take 1 tablet by mouth daily    GABAPENTIN (NEURONTIN) 300 MG CAPSULE    Take 600 mg by mouth 3 times daily. HYDROCHLOROTHIAZIDE (HYDRODIURIL) 25 MG TABLET    Take 25 mg by mouth daily    HYDROCORTISONE-ALOE 1 % CREA CREAM    Apply topically 2 times daily Apply to left arm rash. HYDROXYZINE (ATARAX) 50 MG TABLET    Take 100 mg by mouth in the morning and at bedtime     LIRAGLUTIDE (VICTOZA) 18 MG/3ML SOPN SC INJECTION    Inject 3 mg into the skin daily    LOSARTAN (COZAAR) 100 MG TABLET    Take 100 mg by mouth daily     METOPROLOL SUCCINATE (TOPROL XL) 25 MG EXTENDED RELEASE TABLET    Take 1 tablet by mouth in the morning and at bedtime    NAPROXEN (NAPROSYN) 500 MG TABLET    Take 500 mg by mouth every 12 hours as needed    NYSTATIN (MYCOSTATIN) 864664 UNIT/GM CREAM    Apply topically 2 times daily Apply topically 2 times daily for 2 weeks    PANTOPRAZOLE (PROTONIX) 40 MG TABLET    Take 1 tablet by mouth in the morning and at bedtime    POLYETHYLENE GLYCOL (GLYCOLAX) 17 G PACKET    Take 17 g by mouth at bedtime    PREGABALIN (LYRICA) 25 MG CAPSULE    Take 25 mg by mouth 2 times daily. QUETIAPINE (SEROQUEL) 300 MG TABLET    Take 300 mg by mouth nightly    SERTRALINE (ZOLOFT) 100 MG TABLET    Take 150 mg by mouth daily     SULFAMETHOXAZOLE-TRIMETHOPRIM (BACTRIM DS;SEPTRA DS) 800-160 MG PER TABLET    Take 1 tablet by mouth 2 times daily       Allergies     She is allergic to azithromycin, metronidazole, clindamycin, tetanus immune globulin, codeine, penicillin g, and penicillin v.    Physical Exam     INITIAL VITALS: BP: 104/88, Temp: 98.2 °F (36.8 °C),  , Resp: 17, SpO2: 98 %   Physical Exam  Vitals and nursing note reviewed. Constitutional:       General: She is not in acute distress. Appearance: She is well-developed. She is obese. HENT:      Head: Normocephalic and atraumatic.    Eyes:      Conjunctiva/sclera: Conjunctivae normal.      Pupils: Pupils are equal, round, and reactive to light. Cardiovascular:      Rate and Rhythm: Regular rhythm. Pulmonary:      Effort: Pulmonary effort is normal. No respiratory distress. Breath sounds: No stridor. No wheezing. Abdominal:      Palpations: Abdomen is soft. Tenderness: There is no guarding or rebound. Musculoskeletal:         General: Normal range of motion. Cervical back: Normal range of motion. No rigidity. Skin:     General: Skin is warm and dry. Neurological:      Mental Status: She is alert. Comments:  With all 4 extremities but not oriented to place time         Diagnostic Results         RADIOLOGY:  XR CHEST PORTABLE   Final Result      No acute radiographic abnormality of the chest.          LABS:   Results for orders placed or performed during the hospital encounter of 05/31/22   CBC with Auto Differential   Result Value Ref Range    WBC 9.4 4.0 - 11.0 K/uL    RBC 4.07 4.00 - 5.20 M/uL    Hemoglobin 10.9 (L) 12.0 - 16.0 g/dL    Hematocrit 34.0 (L) 36.0 - 48.0 %    MCV 83.5 80.0 - 100.0 fL    MCH 26.7 26.0 - 34.0 pg    MCHC 31.9 31.0 - 36.0 g/dL    RDW 13.4 12.4 - 15.4 %    Platelets 115 422 - 661 K/uL    MPV 8.2 5.0 - 10.5 fL    Neutrophils % 74.0 %    Lymphocytes % 14.4 %    Monocytes % 5.4 %    Eosinophils % 5.7 %    Basophils % 0.5 %    Neutrophils Absolute 6.9 1.7 - 7.7 K/uL    Lymphocytes Absolute 1.3 1.0 - 5.1 K/uL    Monocytes Absolute 0.5 0.0 - 1.3 K/uL    Eosinophils Absolute 0.5 0.0 - 0.6 K/uL    Basophils Absolute 0.0 0.0 - 0.2 K/uL   Basic Metabolic Panel w/ Reflex to MG   Result Value Ref Range    Sodium 141 136 - 145 mmol/L    Potassium reflex Magnesium 4.7 3.5 - 5.1 mmol/L    Chloride 100 99 - 110 mmol/L    CO2 23 21 - 32 mmol/L    Anion Gap 18 (H) 3 - 16    Glucose 179 (H) 70 - 99 mg/dL    BUN 61 (H) 7 - 20 mg/dL    CREATININE 2.7 (H) 0.6 - 1.2 mg/dL    GFR Non-African American 18 (A) >60    GFR  22 (A) >60    Calcium 10.0 8.3 - 10.6 mg/dL   Lactate, Sepsis   Result Value Ref Range    Lactic Acid, Sepsis 2.2 (H) 0.4 - 1.9 mmol/L   Protime-INR   Result Value Ref Range    Protime 16.4 (H) 11.7 - 14.5 sec    INR 1.32 (H) 0.87 - 1.14   Blood gas, venous   Result Value Ref Range    pH, Ru 7.381 7.350 - 7.450    pCO2, Ru 45.7 41.0 - 51.0 mmHg    pO2, Ru <30.0 25.0 - 40.0 mmHg    HCO3, Venous 27.1 24.0 - 28.0 mmol/L    Base Excess, Ru 1.6 -2.0 - 3.0 mmol/L    O2 Sat, Ru 32 Not established %    Carboxyhemoglobin 1.2 0.0 - 1.5 %    MetHgb, Ru 0.4 0.0 - 1.5 %    TC02 (Calc), Ru 29 mmol/L    Hemoglobin, Ru, Reduced 67.30 %   Troponin   Result Value Ref Range    Troponin 0.06 (H) <0.01 ng/mL   EKG 12 lead   Result Value Ref Range    Ventricular Rate 83 BPM    Atrial Rate 83 BPM    P-R Interval 170 ms    QRS Duration 100 ms    Q-T Interval 400 ms    QTc Calculation (Bazett) 470 ms    P Axis 56 degrees    R Axis -6 degrees    T Axis 16 degrees    Diagnosis       EKG performed in ER and to be interpreted by ER physician. Confirmed by MD, ER ([id number]),  Simran Lester [phone]) on 5/31/2022 12:48:12 PM       RECENT VITALS:  BP: 104/88,Temp: 98.2 °F (36.8 °C),  , Resp: 17, SpO2: 98 %     Procedures         ED Course     Nursing Notes, Past Medical Hx, Past Surgical Hx, Social Hx,Allergies, and Family Hx were reviewed. patient was given the following medications:  Orders Placed This Encounter   Medications    0.9 % sodium chloride infusion    cefTRIAXone (ROCEPHIN) 1000 mg IVPB in 50 mL D5W minibag     Order Specific Question:   Antimicrobial Indications     Answer:   Urinary Tract Infection    lactated ringers bolus     Order Specific Question:   Was full 30mL/kg fluid bolus ordered? Answer:   Yes       CONSULTS:  Yuliya Roach HOSPITALIST    MEDICAL DECISIONMAKING / Nae Vanessa / Vin Merrill is a 62 y.o. female who  . The emergency department with he does have decubiti on her gluteal area.   She was found to be in renal failure with a BUN of 61 and creatinine 2.7 which is a likely source for her altered mental status with a BUN. Her white count was 9400 with an H&H of 11 and 34 h. Lactic acid was slightly elevated. She had a 30 mill per kilo ideal body weight bolus ordered in addition to Rocephin after cultures. Nursing straight cathing the patient know to get the urine sample and calls been placed to the hospitalist.      Was ordered due to previous culture results that she was resistant to ciprofloxacin and had been on Levaquin. Case was discussed in detail with pharmacy. Critical Care:  Due to the immediate potential for life-threatening deterioration due to her mental status and renal failure, I spent 35 minutes providing critical care. This time excludes time spent performing procedures but includes time spent on direct patient care, history retrieval, review of the chart, and discussions with patient, family, and consultant(s). Clinical Impression     1. Acute renal failure, unspecified acute renal failure type ([facility] [address]    2. Dehydration        Disposition     PATIENT REFERRED TO:  No follow-up provider specified.     DISCHARGE MEDICATIONS:  New Prescriptions    No medications on file       Christopher Bettencourt MD  05/31/22 9847

## 2022-05-31 NOTE — PROGRESS NOTES
4 Eyes Admission Assessment     I agree as the admission nurse that 2 RN's have performed a thorough Head to Toe Skin Assessment on the patient. ALL assessment sites listed below have been assessed on admission. Areas assessed by both nurses:   [x]   Head, Face, and Ears   [x]   Shoulders, Back, and Chest  [x]   Arms, Elbows, and Hands   [x]   Coccyx, Sacrum, and Ischium  [x]   Legs, Feet, and Heels    Ulceration umbilical area, fissure between buttocks, multiple areas of scabbing, moisture with open skin underneath abdominal pannus, redness underneath bilateral breasts  Does the Patient have Skin Breakdown?   Yes a wound was noted on the Admission Assessment and an LDA was Initiated documentation include the Radha-wound, Wound Assessment, Measurements, Dressing Treatment, Drainage, and Color\",         Regan Prevention initiated:  Yes   Wound Care Orders initiated:  Yes      [address]  nurse consulted for Pressure Injury (Stage 3,4, Unstageable, DTI, NWPT, and Complex wounds) or Regan score 18 or lower:  Yes      Nurse 1 eSignature: Electronically signed by Blair Harris RN on 5/31/22 at 6:17 PM EDT    **SHARE this note so that the co-signing nurse is able to place an eSignature**    Nurse 2 eSignature: Electronically signed by Maurice Mahmood RN on 5/31/22 at 6:18 PM EDT

## 2022-05-31 NOTE — H&P
Hospital Medicine History & Physical      PCP: Laura Still, EUGENIE - CNP    Date of Admission: 5/31/2022    Date of Service: Pt seen/examined on 05/31/22  and Admitted to Inpatient with expected LOS greater than two midnights due to medical therapy. Chief Complaint:  Shakes in extremities, confusion      History Of Present Illness:     62 y.o. female Holli Gaines  - hx of obesity (BMI 43), HTN, GERD, depression, with multiple drug allergies  - recent hospital admission details below  - Sent from [facility] with concerns for AMS. Pt also shaking/tremoring of which EMS states that the nursing staff says that patient is shaking more than usual and is asking inappropriate questions and not making sense. - Work up in the ED with BP 100s, HR 70s, with Cr elevated to 2.7, BUN 61, LA 2.2  - Concern for possible sepsis with confusion and recent UTI given IVF bolus and admission for further disla. Review of records  Recent admission 04/22 - 04/29 -- presented from the group home due to fall and weakness, admitted for sepsis due to E. coli bacteremia, pyelonephritis. For Anemia underwent EGD on 4/29- found to have clean based ulcer and erosions, stopped aspirin and NSAIDS on dc. On PPI BID.     Past Medical History:          Diagnosis Date    Anxiety     Diabetes mellitus ([address]     Diabetic polyneuropathy (HCC)     Hernia, abdominal     Hyperlipidemia     Hypertension     PTSD (post-traumatic stress disorder)        Past Surgical History:          Procedure Laterality Date    CHOLECYSTECTOMY      COLECTOMY      HYSTERECTOMY      REVISION COLOSTOMY      UPPER GASTROINTESTINAL ENDOSCOPY N/A 4/29/2022    EGD BIOPSY performed by Hailey Cline MD at [facility] ENDOSCOPY       Medications Prior to Admission:      Prior to Admission medications    Medication Sig Start Date End Date Taking?  Authorizing Provider   aspirin 81 MG chewable tablet Take 81 mg by mouth daily   Yes Historical Provider, MD   atorvastatin (LIPITOR) 40 MG tablet Take 40 mg by mouth at bedtime   Yes Historical Provider, MD   sulfamethoxazole-trimethoprim (BACTRIM DS;SEPTRA DS) 800-160 MG per tablet Take 1 tablet by mouth 2 times daily 5/27/22 6/6/22 Yes Historical Provider, MD   pregabalin (LYRICA) 25 MG capsule Take 25 mg by mouth 2 times daily. Yes Historical Provider, MD   naproxen (NAPROSYN) 500 MG tablet Take 500 mg by mouth every 12 hours as needed   Yes Historical Provider, MD   polyethylene glycol (GLYCOLAX) 17 g packet Take 17 g by mouth at bedtime   Yes Historical Provider, MD   hydrocortisone-aloe 1 % CREA cream Apply topically 2 times daily Apply to left arm rash. 4/29/22   Igor Castro MD   folic acid (FOLVITE) 1 MG tablet Take 1 tablet by mouth daily 4/30/22   Igor Castro MD   pantoprazole (PROTONIX) 40 MG tablet Take 1 tablet by mouth in the morning and at bedtime 4/29/22   Igor Castro MD   metoprolol succinate (TOPROL XL) 25 MG extended release tablet Take 1 tablet by mouth in the morning and at bedtime 2/25/22   Virginia Leyva MD   Liraglutide (VICTOZA) 18 MG/3ML SOPN SC injection Inject 3 mg into the skin daily    Historical Provider, MD   nystatin (MYCOSTATIN) 720838 UNIT/GM cream Apply topically 2 times daily Apply topically 2 times daily for 2 weeks 5/20/22 6/3/22  Historical Provider, MD   hydroCHLOROthiazide (HYDRODIURIL) 25 MG tablet Take 25 mg by mouth daily    Historical Provider, MD   ferrous sulfate (IRON 325) 325 (65 Fe) MG tablet Take 1 tablet by mouth daily (with breakfast) 12/28/21   William Noriega MD   QUEtiapine (SEROQUEL) 300 MG tablet Take 300 mg by mouth nightly    Historical Provider, MD   acetaminophen (TYLENOL) 500 MG tablet Take 1,000 mg by mouth every 6 hours as needed for Pain    Historical Provider, MD   gabapentin (NEURONTIN) 300 MG capsule Take 600 mg by mouth 3 times daily.      Historical Provider, MD   hydrOXYzine (ATARAX) 50 MG tablet Take 100 mg by mouth in the morning and at bedtime     Historical intact without any focal sensory/motor deficits. Cranial nerves: II-XII intact, grossly non-focal.  Psychiatric:  Alert and oriented to self  Capillary Refill: Brisk,< 3 seconds   Peripheral Pulses: +2 palpable, equal bilaterally       Labs:     Recent Labs     05/31/22  1246   WBC 9.4   HGB 10.9*   HCT 34.0*        Recent Labs     05/31/22  1247      K 4.7      CO2 23   BUN 61*   CREATININE 2.7*   CALCIUM 10.0     No results for input(s): AST, ALT, BILIDIR, BILITOT, ALKPHOS in the last 72 hours.   Recent Labs     05/31/22  1247   INR 1.32*     Recent Labs     05/31/22  1247   TROPONINI 0.06*       Urinalysis:      Lab Results   Component Value Date    NITRU POSITIVE 05/31/2022    WBCUA 21-50 05/31/2022    BACTERIA 4+ 05/31/2022    RBCUA 0-2 05/31/2022    BLOODU Negative 05/31/2022    SPECGRAV >=1.030 05/31/2022    GLUCOSEU Negative 05/31/2022       Radiology:     CXR: I have reviewed the CXR with the following interpretation: no consolidation, effusion, pneumothorax  EKG:  I have reviewed the EKG with the following interpretation: n/a    XR CHEST PORTABLE   Final Result      No acute radiographic abnormality of the chest.          ASSESSMENT/PLAN:    Active Hospital Problems    Diagnosis Date Noted    SHAYLA (acute kidney injury) ([facility] [address] [N17.9] 04/22/2022     Priority: Medium     Possible sepsis with recent UTI sepsis, LA 2.2  S/p IVF bolus LA now wnl  Continue maintenance IVF  Follow up Blood Cx results  Check UA    Acute metabolic encephalopathy due to UTI,  concern for unintentional gabapentin toxicity with underlying SHAYLA    Shaking/tremors - concern for unintentional gabapentin toxicity with underlying SHAYLA  - hold gabapentin for now    SHAYLA, no baseline CKD hx  Cr 2.7 on admission, baseline is normal  Continue Maintenance IVF  Bladder scan prn with straigth cath parameters  Avoid NSAIDs (Ibuprofen, Aleve, Motrin, Naproxen, Toradol etc), Fleet enemas, IV contrast Dye and other nephrotoxins! Diabetes mellitus -   Hold lantus for now with SHAYLA  - SSI, hypoglycemia protocol, carb controlled diet     Hypertension hx, allow permissive hypertension    GERD - continue protonix    Morbid obesity due to excess calories Body mass index is 42.95 kg/m². - Complicating assessment and treatment. Placing patient at risk for multiple co-morbidities as well as early death and contributing to the patient's presentation.  on weight loss when appropriate.       DVT Prophylaxis: Lovenox  Diet: Diabetic diet  Code Status: Full Code    PT/OT Eval Status: ordered    Dispo - Admit as inpatient. I anticipate hospitalization spanning more than two midnights for investigation and treatment of the above medically necessary diagnoses. Salomon Keith MD   Internal Medicine Hospitalist    Thank you EUGENIE Hurtado CNP for the opportunity to be involved in this patient's care. If you have any questions or concerns please feel free to contact me at [phone].

## 2022-05-31 NOTE — ED NOTES
Clinical Pharmacy Progress Note  Medication History     List of of current medications patient is taking is complete. Home Medication list in EPIC updated to reflect changes noted below. Source of information: NH Medication List    Changes made to medication list:   Medications removed (no longer taking):  · Rosuvastatin     Medications added:   · Bactrim - until 6/6/22  · Lyrica  · Atorvastatin  · Aspirin   · Naproxen  · Polyethylene glycol    Other notes:   · Patient states she did not take any medications yet. Unable to reach NH so far to confirm. Complete Home Medication List:  Current Outpatient Medications   Medication Instructions    acetaminophen (TYLENOL) 1,000 mg, Oral, EVERY 6 HOURS PRN    aspirin 81 mg, Oral, DAILY    atorvastatin (LIPITOR) 40 mg, Oral, Nightly    buPROPion (WELLBUTRIN SR) 200 mg, Oral, 2 TIMES DAILY    busPIRone (BUSPAR) 15 mg, Oral, 3 TIMES DAILY    ferrous sulfate (IRON 325) 325 mg, Oral, DAILY WITH BREAKFAST    folic acid (FOLVITE) 1 mg, Oral, DAILY    gabapentin (NEURONTIN) 600 mg, Oral, 3 TIMES DAILY    hydroCHLOROthiazide (HYDRODIURIL) 25 mg, Oral, DAILY    hydrocortisone-aloe 1 % CREA cream Topical, 2 TIMES DAILY, Apply to left arm rash.     hydrOXYzine (ATARAX) 100 mg, Oral, 2 times daily    losartan (COZAAR) 100 mg, Oral, DAILY    metoprolol succinate (TOPROL XL) 25 mg, Oral, 2 times daily    naproxen (NAPROSYN) 500 mg, Oral, EVERY 12 HOURS PRN    nystatin (MYCOSTATIN) 798556 UNIT/GM cream Topical, 2 TIMES DAILY, Apply topically 2 times daily for 2 weeks    pantoprazole (PROTONIX) 40 mg, Oral, 2 times daily    polyethylene glycol (GLYCOLAX) 17 g, Oral, Nightly    pregabalin (LYRICA) 25 mg, Oral, 2 TIMES DAILY    QUEtiapine (SEROQUEL) 300 mg, Oral, NIGHTLY    sertraline (ZOLOFT) 150 mg, Oral, DAILY    sulfamethoxazole-trimethoprim (BACTRIM DS;SEPTRA DS) 800-160 MG per tablet 1 tablet, Oral, 2 TIMES DAILY    Victoza 3 mg, SubCUTAneous, DAILY     Please call with any questions.   Regina Sean, PharmD  Main Pharmacy: [id number]  5/31/2022 2:07 PM

## 2022-06-01 LAB
ALBUMIN SERPL-MCNC: 3.9 G/DL (ref 3.4–5)
ANION GAP SERPL CALCULATED.3IONS-SCNC: 16 MMOL/L (ref 3–16)
BASOPHILS ABSOLUTE: 0 K/UL (ref 0–0.2)
BASOPHILS RELATIVE PERCENT: 0.4 %
BUN BLDV-MCNC: 45 MG/DL (ref 7–20)
CALCIUM SERPL-MCNC: 9.3 MG/DL (ref 8.3–10.6)
CHLORIDE BLD-SCNC: 105 MMOL/L (ref 99–110)
CO2: 20 MMOL/L (ref 21–32)
CREAT SERPL-MCNC: 1.9 MG/DL (ref 0.6–1.2)
EOSINOPHILS ABSOLUTE: 0.5 K/UL (ref 0–0.6)
EOSINOPHILS RELATIVE PERCENT: 8.8 %
GFR AFRICAN AMERICAN: 33
GFR NON-AFRICAN AMERICAN: 27
GLUCOSE BLD-MCNC: 117 MG/DL (ref 70–99)
GLUCOSE BLD-MCNC: 126 MG/DL (ref 70–99)
GLUCOSE BLD-MCNC: 130 MG/DL (ref 70–99)
GLUCOSE BLD-MCNC: 163 MG/DL (ref 70–99)
GLUCOSE BLD-MCNC: 163 MG/DL (ref 70–99)
HCT VFR BLD CALC: 27.9 % (ref 36–48)
HEMOGLOBIN: 9.1 G/DL (ref 12–16)
LYMPHOCYTES ABSOLUTE: 1.5 K/UL (ref 1–5.1)
LYMPHOCYTES RELATIVE PERCENT: 29 %
MCH RBC QN AUTO: 27.1 PG (ref 26–34)
MCHC RBC AUTO-ENTMCNC: 32.7 G/DL (ref 31–36)
MCV RBC AUTO: 82.9 FL (ref 80–100)
MONOCYTES ABSOLUTE: 0.4 K/UL (ref 0–1.3)
MONOCYTES RELATIVE PERCENT: 7.3 %
NEUTROPHILS ABSOLUTE: 2.8 K/UL (ref 1.7–7.7)
NEUTROPHILS RELATIVE PERCENT: 54.5 %
ORGANISM: ABNORMAL
PDW BLD-RTO: 13.5 % (ref 12.4–15.4)
PERFORMED ON: ABNORMAL
PHOSPHORUS: 2.8 MG/DL (ref 2.5–4.9)
PLATELET # BLD: 139 K/UL (ref 135–450)
PMV BLD AUTO: 7.7 FL (ref 5–10.5)
POTASSIUM SERPL-SCNC: 3.8 MMOL/L (ref 3.5–5.1)
RBC # BLD: 3.36 M/UL (ref 4–5.2)
SODIUM BLD-SCNC: 141 MMOL/L (ref 136–145)
URINE CULTURE, ROUTINE: ABNORMAL
WBC # BLD: 5.2 K/UL (ref 4–11)

## 2022-06-01 PROCEDURE — 6370000000 HC RX 637 (ALT 250 FOR IP): Performed by: INTERNAL MEDICINE

## 2022-06-01 PROCEDURE — 36415 COLL VENOUS BLD VENIPUNCTURE: CPT

## 2022-06-01 PROCEDURE — 2580000003 HC RX 258: Performed by: INTERNAL MEDICINE

## 2022-06-01 PROCEDURE — 80069 RENAL FUNCTION PANEL: CPT

## 2022-06-01 PROCEDURE — 2060000000 HC ICU INTERMEDIATE R&B

## 2022-06-01 PROCEDURE — 85025 COMPLETE CBC W/AUTO DIFF WBC: CPT

## 2022-06-01 PROCEDURE — 6360000002 HC RX W HCPCS: Performed by: INTERNAL MEDICINE

## 2022-06-01 RX ORDER — BUPROPION HYDROCHLORIDE 100 MG/1
100 TABLET, EXTENDED RELEASE ORAL DAILY
Status: DISCONTINUED | OUTPATIENT
Start: 2022-06-01 | End: 2022-06-03

## 2022-06-01 RX ADMIN — SODIUM CHLORIDE, POTASSIUM CHLORIDE, SODIUM LACTATE AND CALCIUM CHLORIDE: 600; 310; 30; 20 INJECTION, SOLUTION INTRAVENOUS at 07:35

## 2022-06-01 RX ADMIN — SODIUM CHLORIDE, PRESERVATIVE FREE 10 ML: 5 INJECTION INTRAVENOUS at 08:30

## 2022-06-01 RX ADMIN — SODIUM CHLORIDE: 9 INJECTION, SOLUTION INTRAVENOUS at 08:46

## 2022-06-01 RX ADMIN — SODIUM CHLORIDE, POTASSIUM CHLORIDE, SODIUM LACTATE AND CALCIUM CHLORIDE: 600; 310; 30; 20 INJECTION, SOLUTION INTRAVENOUS at 17:42

## 2022-06-01 RX ADMIN — CEFTRIAXONE 1000 MG: 1 INJECTION, POWDER, FOR SOLUTION INTRAMUSCULAR; INTRAVENOUS at 08:46

## 2022-06-01 RX ADMIN — SODIUM CHLORIDE, PRESERVATIVE FREE 10 ML: 5 INJECTION INTRAVENOUS at 21:18

## 2022-06-01 RX ADMIN — BUSPIRONE HYDROCHLORIDE 15 MG: 10 TABLET ORAL at 14:00

## 2022-06-01 RX ADMIN — SERTRALINE 150 MG: 100 TABLET, FILM COATED ORAL at 08:31

## 2022-06-01 RX ADMIN — BUSPIRONE HYDROCHLORIDE 15 MG: 10 TABLET ORAL at 08:31

## 2022-06-01 RX ADMIN — PANTOPRAZOLE SODIUM 40 MG: 40 TABLET, DELAYED RELEASE ORAL at 15:07

## 2022-06-01 RX ADMIN — METOPROLOL SUCCINATE 25 MG: 25 TABLET, EXTENDED RELEASE ORAL at 21:18

## 2022-06-01 RX ADMIN — HEPARIN SODIUM 5000 UNITS: 5000 INJECTION INTRAVENOUS; SUBCUTANEOUS at 08:30

## 2022-06-01 RX ADMIN — BUPROPION HYDROCHLORIDE 100 MG: 100 TABLET, FILM COATED, EXTENDED RELEASE ORAL at 15:07

## 2022-06-01 RX ADMIN — METOPROLOL SUCCINATE 25 MG: 25 TABLET, EXTENDED RELEASE ORAL at 08:31

## 2022-06-01 RX ADMIN — BUSPIRONE HYDROCHLORIDE 15 MG: 10 TABLET ORAL at 21:17

## 2022-06-01 RX ADMIN — INSULIN LISPRO 1 UNITS: 100 INJECTION, SOLUTION INTRAVENOUS; SUBCUTANEOUS at 09:45

## 2022-06-01 RX ADMIN — ACETAMINOPHEN 650 MG: 325 TABLET ORAL at 08:40

## 2022-06-01 RX ADMIN — INSULIN LISPRO 1 UNITS: 100 INJECTION, SOLUTION INTRAVENOUS; SUBCUTANEOUS at 11:53

## 2022-06-01 RX ADMIN — HEPARIN SODIUM 5000 UNITS: 5000 INJECTION INTRAVENOUS; SUBCUTANEOUS at 21:17

## 2022-06-01 RX ADMIN — HEPARIN SODIUM 5000 UNITS: 5000 INJECTION INTRAVENOUS; SUBCUTANEOUS at 14:00

## 2022-06-01 RX ADMIN — FOLIC ACID 1 MG: 1 TABLET ORAL at 08:31

## 2022-06-01 RX ADMIN — QUETIAPINE FUMARATE 300 MG: 100 TABLET ORAL at 21:18

## 2022-06-01 RX ADMIN — PANTOPRAZOLE SODIUM 40 MG: 40 TABLET, DELAYED RELEASE ORAL at 06:02

## 2022-06-01 RX ADMIN — ACETAMINOPHEN 650 MG: 325 TABLET ORAL at 15:06

## 2022-06-01 ASSESSMENT — PAIN DESCRIPTION - DESCRIPTORS
DESCRIPTORS: ACHING
DESCRIPTORS: POUNDING
DESCRIPTORS: ACHING
DESCRIPTORS: ACHING

## 2022-06-01 ASSESSMENT — PAIN SCALES - GENERAL
PAINLEVEL_OUTOF10: 0
PAINLEVEL_OUTOF10: 6
PAINLEVEL_OUTOF10: 8
PAINLEVEL_OUTOF10: 0
PAINLEVEL_OUTOF10: 9
PAINLEVEL_OUTOF10: 0
PAINLEVEL_OUTOF10: 0
PAINLEVEL_OUTOF10: 7
PAINLEVEL_OUTOF10: 0
PAINLEVEL_OUTOF10: 7

## 2022-06-01 ASSESSMENT — PAIN DESCRIPTION - PAIN TYPE
TYPE: ACUTE PAIN

## 2022-06-01 ASSESSMENT — PAIN DESCRIPTION - LOCATION
LOCATION: BACK;NECK
LOCATION: BACK;ABDOMEN;FOOT
LOCATION: NECK;BACK
LOCATION: LEG;BACK

## 2022-06-01 ASSESSMENT — PAIN - FUNCTIONAL ASSESSMENT
PAIN_FUNCTIONAL_ASSESSMENT: PREVENTS OR INTERFERES SOME ACTIVE ACTIVITIES AND ADLS

## 2022-06-01 ASSESSMENT — PAIN DESCRIPTION - FREQUENCY
FREQUENCY: CONTINUOUS

## 2022-06-01 ASSESSMENT — PAIN SCALES - WONG BAKER
WONGBAKER_NUMERICALRESPONSE: 0
WONGBAKER_NUMERICALRESPONSE: 0

## 2022-06-01 NOTE — CARE COORDINATION
Case Management Assessment           Initial Evaluation                Date / Time of Evaluation: 6/1/2022 11:42 AM                 Assessment Completed by: Kurt Linn RN    Patient Name: Dagoberto Bajwa     YOB: 1960  Diagnosis: Dehydration [E86.0]  SHAYLA (acute kidney injury) ([facility] [address] [facility] [N17.9]  Acute renal failure, unspecified acute renal failure type [facility]) [N17.9]     Date / Time: 5/31/2022 12:19 PM    Patient Admission Status: Inpatient    If patient is discharged prior to next notation, then this note serves as note for discharge by case management. Current PCP: [facility], [address] [place] [address] Patient: No    Chart Reviewed: Yes  Patient/ Family Interviewed: Yes    Initial assessment completed at bedside with: patient in room, and daughter over the phone    Hospitalization in the last 30 days: No    Emergency Contacts:  Extended Emergency Contact Information  Primary Emergency Contact: Tawanda Vanegas, [address] [place] [address] Phone: [phone]  Relation: Child  Preferred language: English   needed? No  Secondary Emergency Contact: Zakiya Ramirez  Mobile Phone: [phone]  Relation: Other    Advance Directives:   Code Status: Full Code    Healthcare Power of : No    Financial  Payor: Lenora Arredondo / Plan: Arianne Mcnally / Product Type: *No Product type* /     Pre-cert required for SNF: Yes    [address] [address]  Phone: [phone] Fax: [phone]      Potential assistance Purchasing Medications: Potential Assistance Purchasing Medications: No  Does Patient want to participate in local refill/ meds to beds program?:      Meds To Beds General Rules:  1. Can ONLY be done Monday- Friday between 8:30am-5pm  2. Prescription(s) must be in pharmacy by 3pm to be filled same day  3. Copy of patient's insurance/ prescription drug card and patient face sheet must be sent along with the prescription(s)  4. Cost of Rx cannot be added to hospital bill. If financial assistance is needed, please contact unit  or ;  or  CANNOT provide pharmacy voucher for patients co-pays  5. Patients can then  the prescription on their way out of the hospital at discharge, or pharmacy can deliver to the bedside if staff is available. (payment due at time of pick-up or delivery - cash, check, or card accepted)     Able to afford home medications/ co-pay costs: Yes    ADLS  Support Systems: Friends/Neighbors    PT AM-PAC:   /24  OT AM-PAC:   /24    [place]: From a group home, was recently at [address] [place] [address] and plan is to return   Steps: no    Plans to RETURN to current housing: Yes  Barriers to RETURNING to current housing: medical complications    Melody Barry 79  Currently ACTIVE with [address]: No  [address]: Not Applicable    DISCHARGE PLAN:  Disposition: [facility] ([facility]): [facility]  Phone: [phone] Fax: [phone]    Transportation PLAN for discharge: EMS transportation     Factors facilitating achievement of predicted outcomes: Family support and Caregiver support    Barriers to discharge: Medical complications and Medication managment    Additional Case Management Notes: Pt is from [address] [place] [address] and plans to return. CM spoke with pt's daughter over the phone who stated pt does not have a Guardian or Qiana Mandie, daughter is main decision maker.      The Plan for Transition of Care is related to the following treatment goals of Dehydration [E86.0]  SHAYLA (acute kidney injury) ([address] [N17.9]  Acute renal failure, unspecified acute renal failure type ([address] [N17.9]    The Patient and/or patient representative Bambi Winchester and her family were provided with a choice of provider and agrees with the discharge plan Yes    Freedom of choice list was provided with basic dialogue that supports the patient's individualized plan of care/goals and shares the quality data associated with the providers.  Yes    Care Transition patient: No    Marie Clarke RN  [facility]  Case Management Department  Ph: [phone]   Fax: [phone]

## 2022-06-01 NOTE — PLAN OF CARE
Problem: Neurosensory - Adult  Goal: Achieves stable or improved neurological status  Outcome: Not Progressing  Flowsheets  Taken 5/31/2022 2210  Achieves stable or improved neurological status: Assess for and report changes in neurological status  Taken 5/31/2022 2009  Achieves stable or improved neurological status: Assess for and report changes in neurological status  Patient reoriented several times. Patient with hallucinations.       Problem: Safety - Adult  Goal: Free from fall injury  Outcome: Progressing   Bed alarms and camera on patient

## 2022-06-01 NOTE — PLAN OF CARE
Pt received at 0700 awake, alert and oriented x3. Pt is intermittently confused. There is observed episodes of visual and tactile hallucinations; more evident at around 1400. Dr. Bustos Spotted notified and pt is started on bupropion. Pt is re-directable. Pt endorses pain in lower extremities. Tylenol given with some relief. All vitals are within normal limits on RA. Pt is 1:1 feed but appetite remains minimal. Blood sugar checks ACHS. Inuslin given per sliding scale. Harolyn Messing in place with good urine output. Multiple bowel movements today. Lactated ringer's infusing at 100 ml/hr as ordered. Frequent re-orientation provided. Safety maintained.      Plan: continue IV fluids     Problem: Neurosensory - Adult  Goal: Achieves stable or improved neurological status  Outcome: Not Progressing  Flowsheets  Taken 6/1/2022 1200 by Quan Armando RN  Achieves stable or improved neurological status: Assess for and report changes in neurological status  Taken 6/1/2022 0800 by Quan Armando RN  Achieves stable or improved neurological status:   Assess for and report changes in neurological status   Initiate measures to prevent increased intracranial pressure    Problem: Neurosensory - Adult  Goal: Achieves maximal functionality and self care  Outcome: Not Progressing  Flowsheets  Taken 6/1/2022 1200 by Quan Armando RN  Achieves maximal functionality and self care:   Monitor swallowing and airway patency with patient fatigue and changes in neurological status   Encourage and assist patient to increase activity and self care with guidance from physical therapy/occupational therapy  Taken 6/1/2022 0800 by Quan Armando RN  Problem: Discharge Planning  Goal: Discharge to home or other facility with appropriate resources  Outcome: Progressing  Flowsheets  Taken 6/1/2022 1200 by Quan Armando RN  Discharge to home or other facility with appropriate resources: Identify barriers to discharge with patient and caregiver  Taken 6/1/2022 0800 by Smiley Mcgill RN  Discharge to home or other facility with appropriate resources: Identify barriers to discharge with patient and caregiver    Problem: Safety - Adult  Goal: Free from fall injury  Outcome: Progressing  Flowsheets (Taken 6/1/2022 1024)  Free From Fall Injury:   Instruct family/caregiver on patient safety   Based on caregiver fall risk screen, instruct family/caregiver to ask for assistance with transferring infant if caregiver noted to have fall risk factors     Problem: Skin/Tissue Integrity  Goal: Absence of new skin breakdown  Description: 1. Monitor for areas of redness and/or skin breakdown  2. Assess vascular access sites hourly  3. Every 4-6 hours minimum:  Change oxygen saturation probe site  4. Every 4-6 hours:  If on nasal continuous positive airway pressure, respiratory therapy assess nares and determine need for appliance change or resting period.   Outcome: Progressing     Problem: ABCDS Injury Assessment  Goal: Absence of physical injury  Outcome: Progressing  Flowsheets (Taken 6/1/2022 1024)  Absence of Physical Injury: Implement safety measures based on patient assessment     Problem: Chronic Conditions and Co-morbidities  Goal: Patient's chronic conditions and co-morbidity symptoms are monitored and maintained or improved  Outcome: Progressing  Flowsheets  Taken 6/1/2022 1200  Care Plan - Patient's Chronic Conditions and Co-Morbidity Symptoms are Monitored and Maintained or Improved:   Monitor and assess patient's chronic conditions and comorbid symptoms for stability, deterioration, or improvement   Collaborate with multidisciplinary team to address chronic and comorbid conditions and prevent exacerbation or deterioration   Update acute care plan with appropriate goals if chronic or comorbid symptoms are exacerbated and prevent overall improvement and discharge  Taken 6/1/2022 0800  Care Plan - Patient's Chronic Conditions and Co-Morbidity Symptoms are Monitored and Maintained or Improved:   Monitor and assess patient's chronic conditions and comorbid symptoms for stability, deterioration, or improvement   Collaborate with multidisciplinary team to address chronic and comorbid conditions and prevent exacerbation or deterioration   Update acute care plan with appropriate goals if chronic or comorbid symptoms are exacerbated and prevent overall improvement and discharge     Problem: Pain  Goal: Verbalizes/displays adequate comfort level or baseline comfort level  Outcome: Progressing  Flowsheets  Taken 6/1/2022 1151  Verbalizes/displays adequate comfort level or baseline comfort level:   Encourage patient to monitor pain and request assistance   Assess pain using appropriate pain scale   Administer analgesics based on type and severity of pain and evaluate response   Implement non-pharmacological measures as appropriate and evaluate response  Taken 6/1/2022 0825  Verbalizes/displays adequate comfort level or baseline comfort level:   Encourage patient to monitor pain and request assistance   Administer analgesics based on type and severity of pain and evaluate response   Assess pain using appropriate pain scale   Implement non-pharmacological measures as appropriate and evaluate response   Achieves maximal functionality and self care:   Monitor swallowing and airway patency with patient fatigue and changes in neurological status   Encourage and assist patient to increase activity and self care with guidance from physical therapy/occupational therapy

## 2022-06-01 NOTE — PROGRESS NOTES
Hospitalist Progress Note      PCP: Laine Bloch, APRN - CNP    Date of Admission: 5/31/2022    Chief Complaint: Shakes in extremities, confusion    Hospital Course:   62 y.o. female Kavin Gaines  - hx of obesity (BMI 43), HTN, GERD, depression, with multiple drug allergies   - Sent from [facility] with concerns for AMS. Pt also shaking/tremoring of which EMS states that the nursing staff says that patient is shaking more than usual and is asking inappropriate questions and not making sense.   - Work up showed SHAYLA and admitted for further management    Subjective:   Patient seen and examined  Says she went to the [employer] this morning  Waxing and waning mental status  Discussed with nursing  No acute complaints overnight    Medications:  Reviewed    Infusion Medications    lactated ringers 100 mL/hr at 06/01/22 0735    sodium chloride Stopped (06/01/22 0948)    dextrose       Scheduled Medications    cefTRIAXone (ROCEPHIN) IV  1,000 mg IntraVENous Q24H    busPIRone  15 mg Oral TID    folic acid  1 mg Oral Daily    metoprolol succinate  25 mg Oral BID    pantoprazole  40 mg Oral BID AC    QUEtiapine  300 mg Oral Nightly    sertraline  150 mg Oral Daily    sodium chloride flush  5-40 mL IntraVENous 2 times per day    heparin (porcine)  5,000 Units SubCUTAneous TID    insulin lispro  0-6 Units SubCUTAneous TID WC    insulin lispro  0-3 Units SubCUTAneous Nightly     PRN Meds: sodium chloride flush, sodium chloride, ondansetron **OR** ondansetron, acetaminophen **OR** acetaminophen, glucose, dextrose bolus **OR** dextrose bolus, glucagon (rDNA), dextrose      Intake/Output Summary (Last 24 hours) at 6/1/2022 1140  Last data filed at 6/1/2022 1000  Gross per 24 hour   Intake 3859.64 ml   Output 250 ml   Net 3609.64 ml       Physical Exam Performed:    BP (!) 125/53   Pulse 88   Temp 98.9 °F (37.2 °C) (Oral)   Resp 14   Ht 5' 1\" (1.549 m)   Wt 227 lb 5 oz (103.1 kg)   SpO2 100%   BMI 42.95 kg/m² General appearance: No acute distress  HEENT: Pupils equal, round, and reactive to light. Conjunctivae/corneas clear. Neck: Supple, with full range of motion. No jugular venous distention. Trachea midline. Respiratory:  Normal respiratory effort. Clear to auscultation, bilaterally without Rales/Wheezes/Rhonchi. Cardiovascular: Regular rate and rhythm with normal S1/S2 without murmurs, rubs or gallops. Abdomen: Soft, non-tender, non-distended with normal bowel sounds. Musculoskeletal: No clubbing, cyanosis or edema bilaterally. Full range of motion without deformity. Skin: Skin color, texture, turgor normal.  No rashes or lesions. Neurologic: Moving all extremities, no focal deficits  Psychiatric: Vaccine declining mental status, she is alert, states she is at the [employer], knows her name  Capillary Refill: Brisk,< 3 seconds   Peripheral Pulses: +2 palpable, equal bilaterally       Labs:   Recent Labs     05/31/22  1246 06/01/22  0442   WBC 9.4 5.2   HGB 10.9* 9.1*   HCT 34.0* 27.9*    139     Recent Labs     05/31/22  1247 06/01/22  0442    141   K 4.7 3.8    105   CO2 23 20*   BUN 61* 45*   CREATININE 2.7* 1.9*   CALCIUM 10.0 9.3   PHOS  --  2.8     No results for input(s): AST, ALT, BILIDIR, BILITOT, ALKPHOS in the last 72 hours.   Recent Labs     05/31/22  1247   INR 1.32*     Recent Labs     05/31/22  1247   TROPONINI 0.06*       Urinalysis:      Lab Results   Component Value Date    NITRU POSITIVE 05/31/2022    WBCUA 21-50 05/31/2022    BACTERIA 4+ 05/31/2022    RBCUA 0-2 05/31/2022    BLOODU Negative 05/31/2022    SPECGRAV >=1.030 05/31/2022    GLUCOSEU Negative 05/31/2022       Radiology:  XR CHEST PORTABLE   Final Result      No acute radiographic abnormality of the chest.              Assessment/Plan:    Active Hospital Problems    Diagnosis Date Noted    SH[facility] [N17.9] 04/22/2022     Priority: Medium     Possible sepsis with recent UTI sepsis, LA 2.2  S/p IVF bolus LA now wnl  Continue maintenance IVF  Follow up Blood Cx results  UA with +ve LE, Nitrite, 4+ bacteria   Urine culture with greater than 50,000 E. coli, sensitivities pending  Continue Rocephin    Acute metabolic encephalopathy due to UTI,  concern for unintentional gabapentin toxicity with underlying SHAYLA  Improving, waxing and waning mental status     Shaking/tremors - concern for unintentional gabapentin toxicity with underlying SHAYLA. hold gabapentin for now  Overall improving     SHAYLA, no baseline CKD hx  Cr 2.7 on admission, baseline is normal  Continue Maintenance IVF  Bladder scan prn with straigth cath parameters  Avoid NSAIDs (Ibuprofen, Aleve, Motrin, Naproxen, Toradol etc), Fleet enemas, IV contrast Dye and other nephrotoxins!     Diabetes mellitus - Hold lantus for now with SHAYLA  - SSI, hypoglycemia protocol, carb controlled diet     Hypertension hx, allow permissive hypertension     GERD - continue protonix     Morbid obesity due to excess calories Body mass index is 42.95 kg/m². - Complicating assessment and treatment. Placing patient at risk for multiple co-morbidities as well as early death and contributing to the patient's presentation.  on weight loss when appropriate. DVT Prophylaxis: Heparin  Diet: ADULT DIET; Regular; 4 carb choices (60 gm/meal)  Code Status: Full Code    PT/OT Eval Status: ordered    Dispo - continue inpatient care, monitor 1 to 2 days, likely discharge Friday.     Jose Lewis MD   Internal Medicine Hospitalist

## 2022-06-02 LAB
ALBUMIN SERPL-MCNC: 4.3 G/DL (ref 3.4–5)
ALP BLD-CCNC: 89 U/L (ref 40–129)
ALT SERPL-CCNC: 13 U/L (ref 10–40)
AMMONIA: 24 UMOL/L (ref 11–51)
ANION GAP SERPL CALCULATED.3IONS-SCNC: 17 MMOL/L (ref 3–16)
AST SERPL-CCNC: 19 U/L (ref 15–37)
BASOPHILS ABSOLUTE: 0 K/UL (ref 0–0.2)
BASOPHILS RELATIVE PERCENT: 0.4 %
BILIRUB SERPL-MCNC: 0.3 MG/DL (ref 0–1)
BILIRUBIN DIRECT: <0.2 MG/DL (ref 0–0.3)
BILIRUBIN, INDIRECT: NORMAL MG/DL (ref 0–1)
BUN BLDV-MCNC: 22 MG/DL (ref 7–20)
CALCIUM SERPL-MCNC: 9.6 MG/DL (ref 8.3–10.6)
CHLORIDE BLD-SCNC: 102 MMOL/L (ref 99–110)
CO2: 22 MMOL/L (ref 21–32)
CREAT SERPL-MCNC: 1.2 MG/DL (ref 0.6–1.2)
EOSINOPHILS ABSOLUTE: 0.4 K/UL (ref 0–0.6)
EOSINOPHILS RELATIVE PERCENT: 8.5 %
GFR AFRICAN AMERICAN: 55
GFR NON-AFRICAN AMERICAN: 46
GLUCOSE BLD-MCNC: 116 MG/DL (ref 70–99)
GLUCOSE BLD-MCNC: 133 MG/DL (ref 70–99)
GLUCOSE BLD-MCNC: 144 MG/DL (ref 70–99)
GLUCOSE BLD-MCNC: 150 MG/DL (ref 70–99)
GLUCOSE BLD-MCNC: 152 MG/DL (ref 70–99)
HCT VFR BLD CALC: 28.9 % (ref 36–48)
HEMOGLOBIN: 9.3 G/DL (ref 12–16)
LYMPHOCYTES ABSOLUTE: 1.2 K/UL (ref 1–5.1)
LYMPHOCYTES RELATIVE PERCENT: 24.6 %
MCH RBC QN AUTO: 26.9 PG (ref 26–34)
MCHC RBC AUTO-ENTMCNC: 32.4 G/DL (ref 31–36)
MCV RBC AUTO: 83.2 FL (ref 80–100)
MONOCYTES ABSOLUTE: 0.3 K/UL (ref 0–1.3)
MONOCYTES RELATIVE PERCENT: 7.2 %
NEUTROPHILS ABSOLUTE: 2.8 K/UL (ref 1.7–7.7)
NEUTROPHILS RELATIVE PERCENT: 59.3 %
ORGANISM: ABNORMAL
PDW BLD-RTO: 13.4 % (ref 12.4–15.4)
PERFORMED ON: ABNORMAL
PHOSPHORUS: 3.3 MG/DL (ref 2.5–4.9)
PLATELET # BLD: 153 K/UL (ref 135–450)
PMV BLD AUTO: 7.8 FL (ref 5–10.5)
POTASSIUM SERPL-SCNC: 4.2 MMOL/L (ref 3.5–5.1)
RBC # BLD: 3.47 M/UL (ref 4–5.2)
SODIUM BLD-SCNC: 141 MMOL/L (ref 136–145)
TOTAL PROTEIN: 7.1 G/DL (ref 6.4–8.2)
URINE CULTURE, ROUTINE: ABNORMAL
WBC # BLD: 4.7 K/UL (ref 4–11)

## 2022-06-02 PROCEDURE — 6360000002 HC RX W HCPCS: Performed by: INTERNAL MEDICINE

## 2022-06-02 PROCEDURE — 97166 OT EVAL MOD COMPLEX 45 MIN: CPT

## 2022-06-02 PROCEDURE — 97162 PT EVAL MOD COMPLEX 30 MIN: CPT

## 2022-06-02 PROCEDURE — 2060000000 HC ICU INTERMEDIATE R&B

## 2022-06-02 PROCEDURE — 36415 COLL VENOUS BLD VENIPUNCTURE: CPT

## 2022-06-02 PROCEDURE — 80076 HEPATIC FUNCTION PANEL: CPT

## 2022-06-02 PROCEDURE — 97530 THERAPEUTIC ACTIVITIES: CPT

## 2022-06-02 PROCEDURE — 2580000003 HC RX 258: Performed by: INTERNAL MEDICINE

## 2022-06-02 PROCEDURE — 85025 COMPLETE CBC W/AUTO DIFF WBC: CPT

## 2022-06-02 PROCEDURE — 6370000000 HC RX 637 (ALT 250 FOR IP): Performed by: INTERNAL MEDICINE

## 2022-06-02 PROCEDURE — 80069 RENAL FUNCTION PANEL: CPT

## 2022-06-02 PROCEDURE — 82140 ASSAY OF AMMONIA: CPT

## 2022-06-02 PROCEDURE — 97535 SELF CARE MNGMENT TRAINING: CPT

## 2022-06-02 RX ORDER — PREGABALIN 25 MG/1
25 CAPSULE ORAL 2 TIMES DAILY
Status: DISCONTINUED | OUTPATIENT
Start: 2022-06-02 | End: 2022-06-06 | Stop reason: HOSPADM

## 2022-06-02 RX ORDER — GABAPENTIN 100 MG/1
100 CAPSULE ORAL 3 TIMES DAILY
Status: DISCONTINUED | OUTPATIENT
Start: 2022-06-02 | End: 2022-06-06 | Stop reason: HOSPADM

## 2022-06-02 RX ADMIN — FOLIC ACID 1 MG: 1 TABLET ORAL at 08:18

## 2022-06-02 RX ADMIN — GABAPENTIN 100 MG: 100 CAPSULE ORAL at 22:57

## 2022-06-02 RX ADMIN — SODIUM CHLORIDE: 9 INJECTION, SOLUTION INTRAVENOUS at 08:24

## 2022-06-02 RX ADMIN — PREGABALIN 25 MG: 25 CAPSULE ORAL at 11:57

## 2022-06-02 RX ADMIN — INSULIN LISPRO 1 UNITS: 100 INJECTION, SOLUTION INTRAVENOUS; SUBCUTANEOUS at 11:53

## 2022-06-02 RX ADMIN — PANTOPRAZOLE SODIUM 40 MG: 40 TABLET, DELAYED RELEASE ORAL at 05:15

## 2022-06-02 RX ADMIN — HEPARIN SODIUM 5000 UNITS: 5000 INJECTION INTRAVENOUS; SUBCUTANEOUS at 08:19

## 2022-06-02 RX ADMIN — SERTRALINE 150 MG: 100 TABLET, FILM COATED ORAL at 08:18

## 2022-06-02 RX ADMIN — SODIUM CHLORIDE, PRESERVATIVE FREE 10 ML: 5 INJECTION INTRAVENOUS at 08:25

## 2022-06-02 RX ADMIN — INSULIN LISPRO 1 UNITS: 100 INJECTION, SOLUTION INTRAVENOUS; SUBCUTANEOUS at 09:07

## 2022-06-02 RX ADMIN — SODIUM CHLORIDE, POTASSIUM CHLORIDE, SODIUM LACTATE AND CALCIUM CHLORIDE: 600; 310; 30; 20 INJECTION, SOLUTION INTRAVENOUS at 05:22

## 2022-06-02 RX ADMIN — ACETAMINOPHEN 650 MG: 325 TABLET ORAL at 16:27

## 2022-06-02 RX ADMIN — PANTOPRAZOLE SODIUM 40 MG: 40 TABLET, DELAYED RELEASE ORAL at 16:21

## 2022-06-02 RX ADMIN — HEPARIN SODIUM 5000 UNITS: 5000 INJECTION INTRAVENOUS; SUBCUTANEOUS at 22:58

## 2022-06-02 RX ADMIN — HEPARIN SODIUM 5000 UNITS: 5000 INJECTION INTRAVENOUS; SUBCUTANEOUS at 14:52

## 2022-06-02 RX ADMIN — ACETAMINOPHEN 650 MG: 325 TABLET ORAL at 22:58

## 2022-06-02 RX ADMIN — METOPROLOL SUCCINATE 25 MG: 25 TABLET, EXTENDED RELEASE ORAL at 22:58

## 2022-06-02 RX ADMIN — METOPROLOL SUCCINATE 25 MG: 25 TABLET, EXTENDED RELEASE ORAL at 08:18

## 2022-06-02 RX ADMIN — QUETIAPINE FUMARATE 300 MG: 100 TABLET ORAL at 22:57

## 2022-06-02 RX ADMIN — PREGABALIN 25 MG: 25 CAPSULE ORAL at 22:58

## 2022-06-02 RX ADMIN — SODIUM CHLORIDE, POTASSIUM CHLORIDE, SODIUM LACTATE AND CALCIUM CHLORIDE: 600; 310; 30; 20 INJECTION, SOLUTION INTRAVENOUS at 16:21

## 2022-06-02 RX ADMIN — GABAPENTIN 100 MG: 100 CAPSULE ORAL at 14:52

## 2022-06-02 RX ADMIN — SODIUM CHLORIDE, PRESERVATIVE FREE 10 ML: 5 INJECTION INTRAVENOUS at 22:58

## 2022-06-02 RX ADMIN — INSULIN LISPRO 1 UNITS: 100 INJECTION, SOLUTION INTRAVENOUS; SUBCUTANEOUS at 22:59

## 2022-06-02 RX ADMIN — BUSPIRONE HYDROCHLORIDE 15 MG: 10 TABLET ORAL at 08:18

## 2022-06-02 RX ADMIN — BUSPIRONE HYDROCHLORIDE 15 MG: 10 TABLET ORAL at 22:57

## 2022-06-02 RX ADMIN — BUPROPION HYDROCHLORIDE 100 MG: 100 TABLET, FILM COATED, EXTENDED RELEASE ORAL at 08:18

## 2022-06-02 RX ADMIN — BUSPIRONE HYDROCHLORIDE 15 MG: 10 TABLET ORAL at 14:52

## 2022-06-02 RX ADMIN — CEFTRIAXONE 1000 MG: 1 INJECTION, POWDER, FOR SOLUTION INTRAMUSCULAR; INTRAVENOUS at 08:25

## 2022-06-02 RX ADMIN — ACETAMINOPHEN 650 MG: 325 TABLET ORAL at 09:04

## 2022-06-02 RX ADMIN — FLUCONAZOLE 200 MG: 200 INJECTION, SOLUTION INTRAVENOUS at 09:05

## 2022-06-02 ASSESSMENT — PAIN - FUNCTIONAL ASSESSMENT
PAIN_FUNCTIONAL_ASSESSMENT: PREVENTS OR INTERFERES SOME ACTIVE ACTIVITIES AND ADLS

## 2022-06-02 ASSESSMENT — PAIN SCALES - GENERAL
PAINLEVEL_OUTOF10: 9
PAINLEVEL_OUTOF10: 0
PAINLEVEL_OUTOF10: 6
PAINLEVEL_OUTOF10: 5
PAINLEVEL_OUTOF10: 7
PAINLEVEL_OUTOF10: 5
PAINLEVEL_OUTOF10: 0

## 2022-06-02 ASSESSMENT — PAIN DESCRIPTION - FREQUENCY
FREQUENCY: INTERMITTENT
FREQUENCY: CONTINUOUS
FREQUENCY: INTERMITTENT

## 2022-06-02 ASSESSMENT — PAIN DESCRIPTION - ORIENTATION
ORIENTATION: LOWER
ORIENTATION: RIGHT;LEFT

## 2022-06-02 ASSESSMENT — PAIN SCALES - WONG BAKER
WONGBAKER_NUMERICALRESPONSE: 0

## 2022-06-02 ASSESSMENT — PAIN DESCRIPTION - LOCATION
LOCATION: BACK
LOCATION: BACK;HIP
LOCATION: BACK

## 2022-06-02 ASSESSMENT — PAIN DESCRIPTION - DESCRIPTORS
DESCRIPTORS: SHARP
DESCRIPTORS: ACHING
DESCRIPTORS: ACHING

## 2022-06-02 ASSESSMENT — PAIN DESCRIPTION - PAIN TYPE
TYPE: ACUTE PAIN
TYPE: ACUTE PAIN
TYPE: CHRONIC PAIN

## 2022-06-02 NOTE — PROGRESS NOTES
Physical Therapy  Facility/Department: [place] [address] [facility]  Physical Therapy Initial Assessment and Treatment    Name: Qiana Tellez  : 1960  MRN: [id number]  Date of Service: 2022    Discharge Recommendations:    Qiana Tellez scored a  on the AM-PAC short mobility form. Current research shows that an AM-PAC score of 17 or less is typically not associated with a discharge to the patient's home setting. Based on the patient's AM-PAC score and their current functional mobility deficits, it is recommended that the patient have 3-5 sessions per week of Physical Therapy at d/c to increase the patient's independence. Please see assessment section for further patient specific details. If patient discharges prior to next session this note will serve as a discharge summary. Please see below for the latest assessment towards goals. PT Equipment Recommendations  Equipment Needed:  (defer)      Patient Diagnosis(es): The primary encounter diagnosis was Acute renal failure, unspecified acute renal failure type ([address] A diagnosis of Dehydration was also pertinent to this visit. Past Medical History:  has a past medical history of Anxiety, Diabetes mellitus ([address] Diabetic polyneuropathy ([address] Hernia, abdominal, Hyperlipidemia, Hypertension, and PTSD (post-traumatic stress disorder). Past Surgical History:  has a past surgical history that includes Hysterectomy; Cholecystectomy; colectomy; Revision Colostomy; and Upper gastrointestinal endoscopy (N/A, 2022). Assessment   Body Structures, Functions, Activity Limitations Requiring Skilled Therapeutic Intervention: Decreased functional mobility ; Decreased endurance; Increased pain  Assessment: Pt currently disoriented to place, time, situation. Per chart, pt admitted from [facility]. Normally lives in group home and is independent with mobility. Currently appears very anxious. Needing min to SBA with bed mobility.   Having increased pain once sitting EOB and lays back down almost immediately. Unable to stand or get OOB to chair 2/2 c/o pain. Rec cont skilled PT to maximize mobility and independence  Treatment Diagnosis: impaired functional mobility 2/2 decreased endurance  Decision Making: Medium Complexity  Requires PT Follow-Up: Yes     Plan   Plan  Plan:  (2-5)  Current Treatment Recommendations: Functional mobility training,Safety education & training,Patient/Caregiver education & training  Plan Comment: assess standing/gt as able  Safety Devices  Type of Devices: Call light within reach,Bed alarm in place,Nurse notified,Left in bed     Restrictions  Position Activity Restriction  Other position/activity restrictions: up with assist     Subjective   General  Chart Reviewed: Yes  Additional Pertinent Hx: Pt is a 62 y.o. female adm 5/31 with dehydration. Presented to ED with AMS. CXR: neg. PMH:Anxiety, Diabetes mellitus ([facility] Diabetic polyneuropathy ([facility] Hernia, abdominal, Hyperlipidemia, Hypertension, and PTSD (post-traumatic stress disorder). She has a past surgical history that includes Hysterectomy; Cholecystectomy; colectomy; Revision Colostomy; and Upper gastrointestinal endoscopy (N/A, 4/29/2022). Referring Practitioner: Venancio Agrawal MD  Referral Date : 06/01/22  Subjective  Subjective: Pt found supine. Agreeable to PT initially. Upon sitting EOB pt stating \"I can't. \"  Pt c/o pain in arms, legs, sides of chest.  Not rated         Social/Functional History  Social/Functional History  Type of Home: Facility ([facility])  Has the patient had two or more falls in the past year or any fall with injury in the past year?:  (Reports frequent falls.   Last fall was just a few days ago)  ADL Assistance: Needs assistance  Homemaking Assistance: Needs assistance  Ambulation Assistance: Needs assistance (with rolling walker)  Transfer Assistance: Needs assistance  Additional Comments: Pt poor historian - unable to provide above information. Per notes in Epic - pt was in hospital recently and d/c'd to [facility]. Prior to recent hospitalizations, pt was living in group home and was independent with ambulation with walker. Vision/Hearing  Vision  Vision: Within Functional Limits  Hearing  Hearing: Within functional limits    Cognition   Orientation  Overall Orientation Status: Impaired (confused with conversation, tangential)  Orientation Level: Oriented to person;Disoriented to place; Disoriented to time;Disoriented to situation     Objective                 AROM RLE (degrees)  RLE AROM: WFL  AROM LLE (degrees)  LLE AROM : WFL  Strength RLE  Strength RLE: WFL  Strength LLE  Strength LLE: WFL           Bed mobility  Supine to Sit: Stand by assistance (3 trials - SBA 2 trials and min assist 1 trial)  Sit to Supine: Stand by assistance  Bed Mobility Comments: Upon sitting EOB, pt c/o pain in various places and would quickly lay back down.            Balance  Sitting - Static:  (SBA at EOB)        Treatment included: bed mobility, sitting balance, pt education      OutComes Score                                                  AM-PAC Score  -PAC Inpatient Mobility Raw Score : 12 (06/02/22 0925)  AM-PAC Inpatient T-Scale Score : 35.33 (06/02/22 0925)  Mobility Inpatient CMS 0-100% Score: 68.66 (06/02/22 0925)  Mobility Inpatient CMS G-Code Modifier : CL (06/02/22 5305)          Goals  Short Term Goals  Time Frame for Short term goals: By discharge  Short term goal 1: Sup to sit modified independent  Short term goal 2: Pt will tolerate transfer OOB to chair       Education  Patient Education  Education Given To: Patient  Education Provided: Role of Therapy;Plan of Care  Education Method: Demonstration  Education Outcome: Continued education needed      Therapy Time   Individual Concurrent Group Co-treatment   Time In [phone]         Time Out 0855         Minutes 38               Timed Code Treatment Minutes:23       Total Treatment Minutes:  [address], [address]

## 2022-06-02 NOTE — CONSULTS
[facility] Wound Ostomy Continence Nurse  Consult Note       NAME:  Lisy Cabrera [facility]  MEDICAL RECORD NUMBER:  [id number]  AGE: 62 y.o. GENDER: female  : 1960  TODAY'S DATE:  2022    Subjective   Reason for WOCN Evaluation and Assessment: Mid Lower Abdomen - traumatic (pt picking at old scab - opened wound)  Michele Bashircel - IASD      Jordi Israel is a 62 y.o. female referred by:   [] Physician  [x] Nursing  [] Other:     Wound Identification:  Wound Type: traumatic, IASD  Contributing Factors: diabetes, chronic pressure, decreased mobility, shear force, obesity, incontinence of stool and incontinence of urine    Wound History: 62 y.o. female Cheyenne Gaines  - hx of obesity (BMI 43), HTN, GERD, depression, with multiple drug allergies  - recent hospital admission details below  - Sent from [facility] with concerns for AMS. Pt also shaking/tremoring of which EMS states that the nursing staff says that patient is shaking more than usual and is asking inappropriate questions and not making sense. - Work up in the ED with BP 100s, HR 70s, with Cr elevated to 2.7, BUN 61, LA 2.2  - Concern for possible sepsis with confusion and recent UTI given IVF bolus and admission for further disla. Current Wound Care Treatment: Mid Lower Abdomen - foam  Radha-area - zinc paste    Patient Goal of Care:  [x] Wound Healing  [] Odor Control  [] Palliative Care  [] Pain Control   [] Other:         PAST MEDICAL HISTORY        Diagnosis Date    Anxiety     Diabetes mellitus ([address]     [facility] polyneuropathy [facility]     Hernia, abdominal     Hyperlipidemia     Hypertension     PTSD (post-traumatic stress disorder)        PAST SURGICAL HISTORY    Past Surgical History:   Procedure Laterality Date    CHOLECYSTECTOMY      COLECTOMY      HYSTERECTOMY      REVISION COLOSTOMY      UPPER GASTROINTESTINAL ENDOSCOPY N/A 2022    EGD BIOPSY performed by Niko Modi MD at [address] [facility]    History reviewed.  No pertinent family history. SOCIAL HISTORY    Social History     Tobacco Use    Smoking status: Never Smoker    Smokeless tobacco: Never Used   Substance Use Topics    Alcohol use: Never    Drug use: Never       ALLERGIES    Allergies   Allergen Reactions    Azithromycin      Other reaction(s): Itching, Swelling    Metronidazole Hives and Itching    Clindamycin     Tetanus Immune Globulin     Codeine Rash    Penicillin G Rash     Other reaction(s): Unknown    Penicillin V Rash       MEDICATIONS    No current facility-administered medications on file prior to encounter. Current Outpatient Medications on File Prior to Encounter   Medication Sig Dispense Refill    aspirin 81 MG chewable tablet Take 81 mg by mouth daily      atorvastatin (LIPITOR) 40 MG tablet Take 40 mg by mouth at bedtime      sulfamethoxazole-trimethoprim (BACTRIM DS;SEPTRA DS) 800-160 MG per tablet Take 1 tablet by mouth 2 times daily      pregabalin (LYRICA) 25 MG capsule Take 25 mg by mouth 2 times daily.  naproxen (NAPROSYN) 500 MG tablet Take 500 mg by mouth every 12 hours as needed      polyethylene glycol (GLYCOLAX) 17 g packet Take 17 g by mouth at bedtime      hydrocortisone-aloe 1 % CREA cream Apply topically 2 times daily Apply to left arm rash.  1 each 0    folic acid (FOLVITE) 1 MG tablet Take 1 tablet by mouth daily 30 tablet 3    pantoprazole (PROTONIX) 40 MG tablet Take 1 tablet by mouth in the morning and at bedtime 60 tablet 3    metoprolol succinate (TOPROL XL) 25 MG extended release tablet Take 1 tablet by mouth in the morning and at bedtime 30 tablet 3    Liraglutide (VICTOZA) 18 MG/3ML SOPN SC injection Inject 3 mg into the skin daily      nystatin (MYCOSTATIN) 214822 UNIT/GM cream Apply topically 2 times daily Apply topically 2 times daily for 2 weeks      hydroCHLOROthiazide (HYDRODIURIL) 25 MG tablet Take 25 mg by mouth daily      ferrous sulfate (IRON 325) 325 (65 Fe) MG tablet Take 1 tablet ([facility] N17.9    Sepsis due to Escherichia coli ([facility]) A41.51    Acute pyelonephritis N10    Iron deficiency anemia D50.9    PUD (peptic ulcer disease) K27.9       Measurements:  Wound 06/02/22 Abdomen Mid;Lower (Active)   Wound Image   06/02/22 1146   Wound Etiology Traumatic 06/02/22 1146   Dressing Status New drainage noted;New dressing applied 06/02/22 1146   Wound Cleansed Other (Comment) 06/02/22 1146   Dressing/Treatment Foam 06/02/22 1146   Dressing Change Due 06/05/22 06/02/22 1146   Wound Length (cm) 1 cm 06/02/22 1146   Wound Width (cm) 1 cm 06/02/22 1146   Wound Depth (cm) 0.2 cm 06/02/22 1146   Wound Surface Area (cm^2) 1 cm^2 06/02/22 1146   Wound Volume (cm^3) 0.2 cm^3 06/02/22 1146   Wound Assessment Pink/red 06/02/22 1146   Drainage Amount Scant 06/02/22 1146   Drainage Description Sanguinous 06/02/22 1146   Odor None 06/02/22 1146   Radha-wound Assessment Dry/flaky; Intact 06/02/22 1146   Margins Attached edges; Defined edges 06/02/22 1146   Wound Thickness Description not for Pressure Injury Partial thickness 06/02/22 1146   Number of days: 0   Mid Lower Abdomen:      Radha Area:      Response to treatment:  Well tolerated by patient. Pain Assessment:  Severity:  0 / 10  Quality of pain: N/A  Wound Pain Timing/Severity: none  Premedicated: No    Plan   Plan of Care: Wound 06/02/22 Abdomen Mid;Lower-Dressing/Treatment: Foam   Recommendation: Mid Lower Abdomen - clean with NS, cover wound with foam, change every 3 days  Radha-area - clean with barrier wipes, apply zinc paste after each soiling  Call Wound Care for deterioration [phone]    Specialty Bed Required : No   [] Low Air Loss   [] Pressure Redistribution  [] Fluid Immersion  [] Bariatric  [] Total Pressure Relief  [] Other:     Current Diet: ADULT DIET;  Regular; 4 carb choices (60 gm/meal)  Dietician consult:  No    Discharge Plan:  Placement for patient upon discharge: skilled nursing    Patient appropriate for Outpatient Wound Care Center: No    Referrals:  [x]   [] [address]  [] Supplies  [] Other    Patient/Caregiver Teaching:  Level of patient/caregiver understanding able to:   [] Indicates understanding       [] Needs reinforcement  [] Unsuccessful      [] Verbal Understanding  [] Demonstrated understanding       [] No evidence of learning  [] Refused teaching         [] N/A       Electronically signed by Ariela Klein RN, CWOCN on 6/2/2022 at 11:47 AM

## 2022-06-02 NOTE — PROGRESS NOTES
Hospitalist Progress Note      PCP: EUGENIE Waters - CNP    Date of Admission: 5/31/2022    Chief Complaint: Shakes in extremities, confusion    Hospital Course:   62 y.o. female Alejandra Gaines  - hx of obesity (BMI 43), HTN, GERD, depression, with multiple drug allergies   - Sent from [facility] with concerns for AMS. Pt also shaking/tremoring of which EMS states that the nursing staff says that patient is shaking more than usual and is asking inappropriate questions and not making sense.   - Work up showed SHAYLA and admitted for further management    Subjective:   Patient seen and examined  In bed says her baby in the bed needs her  Overnight with hallucinations, agitation and delusions     Medications:  Reviewed    Infusion Medications    lactated ringers 100 mL/hr at 06/02/22 0522    sodium chloride Stopped (06/01/22 0948)    dextrose       Scheduled Medications    fluconazole  200 mg IntraVENous Q24H    buPROPion  100 mg Oral Daily    cefTRIAXone (ROCEPHIN) IV  1,000 mg IntraVENous Q24H    busPIRone  15 mg Oral TID    folic acid  1 mg Oral Daily    metoprolol succinate  25 mg Oral BID    pantoprazole  40 mg Oral BID AC    QUEtiapine  300 mg Oral Nightly    sertraline  150 mg Oral Daily    sodium chloride flush  5-40 mL IntraVENous 2 times per day    heparin (porcine)  5,000 Units SubCUTAneous TID    insulin lispro  0-6 Units SubCUTAneous TID WC    insulin lispro  0-3 Units SubCUTAneous Nightly     PRN Meds: sodium chloride flush, sodium chloride, ondansetron **OR** ondansetron, acetaminophen **OR** acetaminophen, glucose, dextrose bolus **OR** dextrose bolus, glucagon (rDNA), dextrose      Intake/Output Summary (Last 24 hours) at 6/2/2022 0818  Last data filed at 6/2/2022 0800  Gross per 24 hour   Intake 2613.18 ml   Output 1250 ml   Net 1363.18 ml       Physical Exam Performed:    BP (!) 169/54   Pulse 93   Temp 99.4 °F (37.4 °C) (Oral)   Resp 16   Ht 5' 1\" (1.549 m)   Wt 227 lb 5 oz (103.1 kg)   SpO2 99%   BMI 42.95 kg/m²     General appearance: No acute distress  HEENT: Pupils equal, round, and reactive to light. Conjunctivae/corneas clear. Neck: Supple, with full range of motion. No jugular venous distention. Trachea midline. Respiratory:  Normal respiratory effort. Clear to auscultation, bilaterally without Rales/Wheezes/Rhonchi. Cardiovascular: Regular rate and rhythm with normal S1/S2 without murmurs, rubs or gallops. Abdomen: Soft, non-tender, non-distended with normal bowel sounds. Musculoskeletal: No clubbing, cyanosis or edema bilaterally. Full range of motion without deformity. Skin: Skin color, texture, turgor normal.  No rashes or lesions. Neurologic: Moving all extremities, no focal deficits  Psychiatric: alert, oriented to place and time  Capillary Refill: Brisk,< 3 seconds   Peripheral Pulses: +2 palpable, equal bilaterally       Labs:   Recent Labs     05/31/22  1246 06/01/22  0442   WBC 9.4 5.2   HGB 10.9* 9.1*   HCT 34.0* 27.9*    139     Recent Labs     05/31/22  1247 06/01/22  0442    141   K 4.7 3.8    105   CO2 23 20*   BUN 61* 45*   CREATININE 2.7* 1.9*   CALCIUM 10.0 9.3   PHOS  --  2.8     No results for input(s): AST, ALT, BILIDIR, BILITOT, ALKPHOS in the last 72 hours.   Recent Labs     05/31/22  1247   INR 1.32*     Recent Labs     05/31/22  1247   TROPONINI 0.06*       Urinalysis:      Lab Results   Component Value Date    NITRU POSITIVE 05/31/2022    WBCUA 21-50 05/31/2022    BACTERIA 4+ 05/31/2022    RBCUA 0-2 05/31/2022    BLOODU Negative 05/31/2022    SPECGRAV >=1.030 05/31/2022    GLUCOSEU Negative 05/31/2022       Radiology:  XR CHEST PORTABLE   Final Result      No acute radiographic abnormality of the chest.              Assessment/Plan:    Active Hospital Problems    Diagnosis Date Noted    SHAYLA (acute kidney injury) ([facility] [N17.9] 04/22/2022     Priority: Medium     Possible sepsis with recent UTI sepsis, LA 2.2  S/p IVF bolus LA now wnl  Continue maintenance IVF  Blood Cx NGTD  Urine Cx with ecoli and candida  Continue rocephin, add fluconazole    Acute metabolic encephalopathy due to UTI, possible concern for unintentional gabapentin toxicity with underlying SHAYLA, underlying psych hx  waxing and waning mental status     Shaking/tremors - concern for unintentional gabapentin toxicity with underlying SHAYLA. hold gabapentin for now. Still there but improving     SHAYLA, no baseline CKD hx  Cr 2.7 on admission, baseline is normal  Continue Maintenance IVF  Bladder scan prn with straigth cath parameters  Avoid NSAIDs (Ibuprofen, Aleve, Motrin, Naproxen, Toradol etc), Fleet enemas, IV contrast Dye and other nephrotoxins!     Diabetes mellitus - Hold lantus for now with SHAYLA  - SSI, hypoglycemia protocol, carb controlled diet    Lower extremity weakness, pain, concern for poly neuropathy  Restart low dose gabapentin     Hypertension hx, allow permissive hypertension     GERD - continue protonix     Morbid obesity due to excess calories Body mass index is 42.95 kg/m². - Complicating assessment and treatment. Placing patient at risk for multiple co-morbidities as well as early death and contributing to the patient's presentation.  on weight loss when appropriate. DVT Prophylaxis: Heparin  Diet: ADULT DIET; Regular; 4 carb choices (60 gm/meal)  Code Status: Full Code    PT/OT Eval Status: ordered    Dispo - continue inpatient care, monitor 1 to 2 days, likely discharge Friday. Discussed with Elijah Barnes, patient's CM  She was not aware that patient was @ [employer] lives in Group home, struggling with her legs  She did have to miss neurology appointments due to recent hospitalizations and being in [place]  Last she talked to toña was May 17th    Case management @ [address] [facility], see psych hx of mental illness, hx of bipolar disorder 2, multiple personality disorder, DARLENE, and PTSD.   She was seeing neurology for issues concern for lower extremity weakness/neuropathy. She was scheduled to see neurosurgeon and had MRI done @ . MRI results from 04/2022 --Large central disc extrusion at C6-C7 with severe spinal canal stenosis and abnormal spinal cord signal at this level, which could represent myelomalacia or edema.      --- Discussed with daughter over the phone, SNF contacted her for c/o confusion, and was told and multiple occasions she was found on the floor, and delusions and that she was [place] a shopping [place] and that's when she was sent to the JEFF Reyna MD   Internal Medicine Hospitalist

## 2022-06-02 NOTE — PLAN OF CARE
Pt received at 0700 awake, alert and oriented x3. Pt is confused, restless, anxious, hallucinating (visually) and yelling out. Dr. Akash Garcia immediately notified. Medications adjusted and psychiatry consult ordered. Pt is re-directable but continues to pull on IV and remove medical devices. Pt endorses hip and back pain. Tylenol given prn with some relief. All vitals are within normal limits on RA. Pt is 1:1 feed but appetite remains minimal. Blood sugar checks ACHS. Inuslin given per sliding scale. Harolyn Messing in place with good urine output with some incontinence episodes. Bladder scan ~250 ml. Multiple bowel movements today. Lactated ringer's infusing at 100 ml/hr as ordered. Antibitoics given/ Frequent re-orientation provided. Safety maintained.       Plan: continue IV fluids; maintain safety    Problem: Neurosensory - Adult  Goal: Achieves stable or improved neurological status  6/2/2022 1512 by Quan Armando RN  Outcome: Progressing  Flowsheets (Taken 6/2/2022 1200)  Achieves stable or improved neurological status:   Assess for and report changes in neurological status   Initiate measures to prevent increased intracranial pressure   Maintain blood pressure and fluid volume within ordered parameters to optimize cerebral perfusion and minimize risk of hemorrhage     Problem: Neurosensory - Adult  Goal: Achieves maximal functionality and self care  6/2/2022 1512 by Quan Armando RN  Outcome: Progressing  Flowsheets (Taken 6/2/2022 1200)  Achieves maximal functionality and self care:   Monitor swallowing and airway patency with patient fatigue and changes in neurological status   Encourage and assist patient to increase activity and self care with guidance from physical therapy/occupational therapy     Problem: Discharge Planning  Goal: Discharge to home or other facility with appropriate resources  6/2/2022 1512 by Quan Armando RN  Outcome: Progressing  Flowsheets (Taken 6/2/2022 1200)  Discharge to home or other facility with appropriate resources: Identify barriers to discharge with patient and caregiver     Problem: Safety - Adult  Goal: Free from fall injury  6/2/2022 1512 by Lisa Scherer RN  Outcome: Progressing     Problem: Skin/Tissue Integrity  Goal: Absence of new skin breakdown  Description: 1. Monitor for areas of redness and/or skin breakdown  2. Assess vascular access sites hourly  3. Every 4-6 hours minimum:  Change oxygen saturation probe site  4. Every 4-6 hours:  If on nasal continuous positive airway pressure, respiratory therapy assess nares and determine need for appliance change or resting period.   6/2/2022 1512 by Lisa Scherer RN  Outcome: Progressing     Problem: ABCDS Injury Assessment  Goal: Absence of physical injury  6/2/2022 1512 by Lisa Scherer RN  Outcome: Progressing     Problem: Chronic Conditions and Co-morbidities  Goal: Patient's chronic conditions and co-morbidity symptoms are monitored and maintained or improved  6/2/2022 1512 by Lisa Scherer RN  Outcome: Progressing  Flowsheets (Taken 6/2/2022 1200)  Care Plan - Patient's Chronic Conditions and Co-Morbidity Symptoms are Monitored and Maintained or Improved:   Monitor and assess patient's chronic conditions and comorbid symptoms for stability, deterioration, or improvement   Collaborate with multidisciplinary team to address chronic and comorbid conditions and prevent exacerbation or deterioration   Update acute care plan with appropriate goals if chronic or comorbid symptoms are exacerbated and prevent overall improvement and discharge     Problem: Pain  Goal: Verbalizes/displays adequate comfort level or baseline comfort level  6/2/2022 1512 by Lisa Scherer RN  Outcome: Progressing  Flowsheets (Taken 6/2/2022 1149)  Verbalizes/displays adequate comfort level or baseline comfort level:   Assess pain using appropriate pain scale   Administer analgesics based on type and severity of pain and evaluate response   Encourage patient to monitor pain and request assistance   Implement non-pharmacological measures as appropriate and evaluate response

## 2022-06-02 NOTE — PROGRESS NOTES
Occupational Therapy  Facility/Department: [place] [address] [facility]  Occupational Therapy Initial Assessment/Treatment    Name: Val Plata  : 1960  MRN: [id number]  Date of Service: 2022    Discharge Recommendations:  Val Plata scored a 14/24 on the AM-PAC ADL Inpatient form. Current research shows that an AM-PAC score of 17 or less is typically not associated with a discharge to the patient's home setting. Based on the patient's AM-PAC score and their current ADL deficits, it is recommended that the patient have 3-5 sessions per week of Occupational Therapy at d/c to increase the patient's independence. Please see assessment section for further patient specific details. If patient discharges prior to next session this note will serve as a discharge summary. Please see below for the latest assessment towards goals. OT Equipment Recommendations  Equipment Needed: No  Other: defer to d/c setting       Patient Diagnosis(es): The primary encounter diagnosis was Acute renal failure, unspecified acute renal failure type ([address] A diagnosis of Dehydration was also pertinent to this visit. Past Medical History:  has a past medical history of Anxiety, Diabetes mellitus ([address] Diabetic polyneuropathy ([address] Hernia, abdominal, Hyperlipidemia, Hypertension, and PTSD (post-traumatic stress disorder). Past Surgical History:  has a past surgical history that includes Hysterectomy; Cholecystectomy; colectomy; Revision Colostomy; and Upper gastrointestinal endoscopy (N/A, 2022). Treatment Diagnosis: Impaired ADLs, mobility, activity tolerance      Assessment   Performance deficits / Impairments: Decreased functional mobility ; Decreased ADL status  Assessment: Pt admitted from SNF, typically lives in group home. Pt confused, poor historian. Pt minimally tolerated activity today- 3 times sat eob less than 10 seconds before laying back down due to anxiety, pain in various places.  Pt unable to complete transfer from bed or ADLs. Pt would benefit from ongoing OT tx at d/c to maximize her safety and independence. Will follow for acute OT  Treatment Diagnosis: Impaired ADLs, mobility, activity tolerance  Prognosis: Fair  Decision Making: Medium Complexity  REQUIRES OT FOLLOW-UP: Yes  Activity Tolerance  Activity Tolerance: Patient limited by pain  Activity Tolerance Comments: anxiety        Plan   Plan  Times per Week: 2-5     Restrictions  Position Activity Restriction  Other position/activity restrictions: up with assist    Subjective   General  Chart Reviewed: Yes  Additional Pertinent Hx: Pt presented from [facility] 5/31 with AMS, shaking/tremors. Admitted for acute metabolic encephalopathy due to UTI,  concern for unintentional gabapentin toxicity with underlying SHAYLA, dehydration. PMHx: HTN, DM, neuropathy, anxiety, PTSD  Family / Caregiver Present: No  Referring Practitioner: Dr. Zeny Sullivan  Diagnosis: dehydration, AMS  Subjective  Subjective: Pt in bed, agreeable to therapy. Very anxious and confused, non-sensical at times. \"Do you know Parley Born? Am I going home with you? \"   Pain: with movement, reported pain in sides, LEs, arms- not rated, RN Aware     Social/Functional History  Social/Functional History  Type of Home: Facility ([facility])  Has the patient had two or more falls in the past year or any fall with injury in the past year?:  (Reports frequent falls. Last fall was just a few days ago)  ADL Assistance: Needs assistance  Homemaking Assistance: Needs assistance  Ambulation Assistance: Needs assistance (with rolling walker)  Transfer Assistance: Needs assistance  Additional Comments: Pt poor historian - unable to provide above information. Per notes in Epic - pt was in hospital recently and d/c'd to [facility]. Prior to recent hospitalizations, pt was living in group home and was independent with ambulation with walker.        Objective   Vision: WFL  Hearing: Within functional limits Safety Devices  Type of Devices: Call light within reach; Bed alarm in place;Nurse notified; Left in bed           AROM: Within functional limits  Strength: Within functional limits     ADL  LE Dressing: Dependent/Total  LE Dressing Skilled Clinical Factors: trialed pt donning socks seated eob but pt limited by pain and anxiety- quickly returned to supine. Total assist to don socks in bed  Toileting: Dependent/Total (purewick in bed)           Bed mobility  Supine to Sit: Stand by assistance (3 trials - SBA 2 trials and min assist 1 trial)  Sit to Supine: Stand by assistance  Bed Mobility Comments: Upon sitting EOB, pt c/o pain in various places and would quickly lay back down. \"I'm doing everything I can. I can't do more\"           Cognition  Overall Cognitive Status: Exceptions  Following Commands:  Follows one step commands consistently  Attention Span: Difficulty attending to directions  Memory: Decreased short term memory;Decreased recall of recent events  Safety Judgement: Decreased awareness of need for assistance  Insights: Decreased awareness of deficits            AM-PAC Score  AM-[facility] Inpatient Daily Activity Raw Score: 14 (06/02/22 1607)  AM-PAC Inpatient ADL T-Scale Score : 33.39 (06/02/22 1607)  ADL Inpatient CMS 0-100% Score: 59.67 (06/02/22 1607)  ADL Inpatient CMS G-Code Modifier : CK (06/02/22 1607)    Goals  Short Term Goals  Time Frame for Short term goals: d/c  Short Term Goal 1: tolerate transfer to chair  Short Term Goal 2: supervision sitting balance at eob during ADLs/therex  Short Term Goal 3: min A LB dressing  Short Term Goal 4: min A toileting       Therapy Time   Individual Concurrent Group Co-treatment   Time In 0817         Time Out 0855         Minutes 38         Timed Code Treatment Minutes: 23 Minutes +15 min ben Carlos, OT

## 2022-06-02 NOTE — CARE COORDINATION
Case Management Assessment           Daily Note                 Date/ Time of Note: 6/2/2022 3:25 PM         Note completed by: Meche Aparicio RN    Patient Name: Cliff Tse  YOB: 1960    Diagnosis:Dehydration [E86.0]  SHAYLA (acute kidney injury) ([facility] [N17.9]  Acute renal failure, unspecified acute renal failure type ([facility] [N17.9]  Patient Admission Status: Inpatient    Date of Admission:5/31/2022 12:19 PM Length of Stay: 2 GLOS:      Current Plan of Care: new psych consult, confusion, IV abx  ________________________________________________________________________________________  PT AM-PAC: 12 / 24 per last evaluation on: 06/02/22    OT AM-PAC:   / 24 per last evaluation on: pending    DME Needs for discharge: deferred  ________________________________________________________________________________________  Discharge Plan: SNF: [facility] (NEEDS PRE-CERT TO RETURN)    Tentative discharge date: tbd    Current barriers to discharge: Medical stability    Referrals completed: Not Applicable    Resources/ information provided: Not indicated at this time  ________________________________________________________________________________________  Case Management Notes: CM continues to follow for DC planning and needs. CM spoke with [place] ([phone]) in admissions with [facility], she confirms patient is there for skilled stay, patient can return at DC and will need pre-cert for return. CM noted new orders for psych evaluation today. CM will continue to follow and work with medical team and family for plans for DC. Gerosn Valentine and her family were provided with choice of provider; she and her family are in agreement with the discharge plan.     Care Transition Patient: Cleo Aparicio RN  [facility] [employer]  Case Management Department  Ph: [phone]  Fax: [phone]

## 2022-06-02 NOTE — PLAN OF CARE
Problem: Neurosensory - Adult  Goal: Achieves stable or improved neurological status  Outcome: Progressing  Goal: Achieves maximal functionality and self care  Outcome: Progressing     Problem: Safety - Adult  Goal: Free from fall injury  Outcome: Progressing  Flowsheets  Taken 6/2/2022 0725 by Sanjuana Claude, RN  Free From Fall Injury:   Instruct family/caregiver on patient safety   Based on caregiver fall risk screen, instruct family/caregiver to ask for assistance with transferring infant if caregiver noted to have fall risk factors  Taken 6/2/2022 0100 by Luis Umana RN  Free From Fall Injury:   Instruct family/caregiver on patient safety   Based on caregiver fall risk screen, instruct family/caregiver to ask for assistance with transferring infant if caregiver noted to have fall risk factors     Problem: Skin/Tissue Integrity  Goal: Absence of new skin breakdown  Description: 1. Monitor for areas of redness and/or skin breakdown  2. Assess vascular access sites hourly  3. Every 4-6 hours minimum:  Change oxygen saturation probe site  4. Every 4-6 hours:  If on nasal continuous positive airway pressure, respiratory therapy assess nares and determine need for appliance change or resting period.   Outcome: Progressing     Problem: ABCDS Injury Assessment  Goal: Absence of physical injury  Outcome: Progressing  Flowsheets  Taken 6/2/2022 0725 by Sanjuana Claude, RN  Absence of Physical Injury: Implement safety measures based on patient assessment  Taken 6/2/2022 0100 by Luis Umana RN  Absence of Physical Injury: Implement safety measures based on patient assessment     Problem: Chronic Conditions and Co-morbidities  Goal: Patient's chronic conditions and co-morbidity symptoms are monitored and maintained or improved  Outcome: Progressing  Flowsheets (Taken 6/1/2022 2045)  Care Plan - Patient's Chronic Conditions and Co-Morbidity Symptoms are Monitored and Maintained or Improved: Monitor and assess patient's chronic conditions and comorbid symptoms for stability, deterioration, or improvement   Collaborate with multidisciplinary team to address chronic and comorbid conditions and prevent exacerbation or deterioration   Update acute care plan with appropriate goals if chronic or comorbid symptoms are exacerbated and prevent overall improvement and discharge     Problem: Pain  Goal: Verbalizes/displays adequate comfort level or baseline comfort level  Outcome: Progressing  Flowsheets  Taken 6/2/2022 0810 by Karuna Kellogg RN  Verbalizes/displays adequate comfort level or baseline comfort level:   Encourage patient to monitor pain and request assistance   Assess pain using appropriate pain scale   Administer analgesics based on type and severity of pain and evaluate response   Implement non-pharmacological measures as appropriate and evaluate response  Taken 6/1/2022 2045 by Kira Hernandez RN  Verbalizes/displays adequate comfort level or baseline comfort level:   Encourage patient to monitor pain and request assistance   Assess pain using appropriate pain scale   Administer analgesics based on type and severity of pain and evaluate response   Implement non-pharmacological measures as appropriate and evaluate response

## 2022-06-03 LAB
GLUCOSE BLD-MCNC: 123 MG/DL (ref 70–99)
GLUCOSE BLD-MCNC: 152 MG/DL (ref 70–99)
GLUCOSE BLD-MCNC: 161 MG/DL (ref 70–99)
GLUCOSE BLD-MCNC: 210 MG/DL (ref 70–99)
PERFORMED ON: ABNORMAL
VITAMIN D 25-HYDROXY: 30.3 NG/ML

## 2022-06-03 PROCEDURE — 6360000002 HC RX W HCPCS: Performed by: INTERNAL MEDICINE

## 2022-06-03 PROCEDURE — 2060000000 HC ICU INTERMEDIATE R&B

## 2022-06-03 PROCEDURE — 6370000000 HC RX 637 (ALT 250 FOR IP): Performed by: INTERNAL MEDICINE

## 2022-06-03 PROCEDURE — 6360000002 HC RX W HCPCS

## 2022-06-03 PROCEDURE — 93005 ELECTROCARDIOGRAM TRACING: CPT | Performed by: NURSE PRACTITIONER

## 2022-06-03 PROCEDURE — 90792 PSYCH DIAG EVAL W/MED SRVCS: CPT | Performed by: NURSE PRACTITIONER

## 2022-06-03 PROCEDURE — 2580000003 HC RX 258: Performed by: INTERNAL MEDICINE

## 2022-06-03 PROCEDURE — 82306 VITAMIN D 25 HYDROXY: CPT

## 2022-06-03 PROCEDURE — 6370000000 HC RX 637 (ALT 250 FOR IP): Performed by: NURSE PRACTITIONER

## 2022-06-03 RX ORDER — BUSPIRONE HYDROCHLORIDE 10 MG/1
10 TABLET ORAL 3 TIMES DAILY
Status: DISCONTINUED | OUTPATIENT
Start: 2022-06-03 | End: 2022-06-06 | Stop reason: HOSPADM

## 2022-06-03 RX ORDER — HALOPERIDOL 1 MG/1
1 TABLET ORAL 2 TIMES DAILY
Status: DISCONTINUED | OUTPATIENT
Start: 2022-06-03 | End: 2022-06-06 | Stop reason: HOSPADM

## 2022-06-03 RX ORDER — SERTRALINE HYDROCHLORIDE 100 MG/1
100 TABLET, FILM COATED ORAL DAILY
Status: DISCONTINUED | OUTPATIENT
Start: 2022-06-04 | End: 2022-06-06 | Stop reason: HOSPADM

## 2022-06-03 RX ORDER — LORAZEPAM 2 MG/ML
INJECTION INTRAMUSCULAR
Status: COMPLETED
Start: 2022-06-03 | End: 2022-06-03

## 2022-06-03 RX ORDER — HALOPERIDOL 5 MG
5 TABLET ORAL 2 TIMES DAILY
Status: DISCONTINUED | OUTPATIENT
Start: 2022-06-03 | End: 2022-06-03

## 2022-06-03 RX ORDER — LORAZEPAM 2 MG/ML
0.5 INJECTION INTRAMUSCULAR ONCE
Status: COMPLETED | OUTPATIENT
Start: 2022-06-03 | End: 2022-06-03

## 2022-06-03 RX ORDER — HALOPERIDOL 5 MG/ML
5 INJECTION INTRAMUSCULAR ONCE
Status: COMPLETED | OUTPATIENT
Start: 2022-06-03 | End: 2022-06-03

## 2022-06-03 RX ADMIN — PREGABALIN 25 MG: 25 CAPSULE ORAL at 20:42

## 2022-06-03 RX ADMIN — ACETAMINOPHEN 650 MG: 325 TABLET ORAL at 15:47

## 2022-06-03 RX ADMIN — SODIUM CHLORIDE, POTASSIUM CHLORIDE, SODIUM LACTATE AND CALCIUM CHLORIDE: 600; 310; 30; 20 INJECTION, SOLUTION INTRAVENOUS at 15:52

## 2022-06-03 RX ADMIN — PANTOPRAZOLE SODIUM 40 MG: 40 TABLET, DELAYED RELEASE ORAL at 10:45

## 2022-06-03 RX ADMIN — CEFTRIAXONE 1000 MG: 1 INJECTION, POWDER, FOR SOLUTION INTRAMUSCULAR; INTRAVENOUS at 10:09

## 2022-06-03 RX ADMIN — SERTRALINE 150 MG: 100 TABLET, FILM COATED ORAL at 10:01

## 2022-06-03 RX ADMIN — GABAPENTIN 100 MG: 100 CAPSULE ORAL at 10:02

## 2022-06-03 RX ADMIN — SODIUM CHLORIDE, PRESERVATIVE FREE 10 ML: 5 INJECTION INTRAVENOUS at 20:42

## 2022-06-03 RX ADMIN — PANTOPRAZOLE SODIUM 40 MG: 40 TABLET, DELAYED RELEASE ORAL at 15:42

## 2022-06-03 RX ADMIN — HEPARIN SODIUM 5000 UNITS: 5000 INJECTION INTRAVENOUS; SUBCUTANEOUS at 20:42

## 2022-06-03 RX ADMIN — BUSPIRONE HYDROCHLORIDE 15 MG: 10 TABLET ORAL at 10:01

## 2022-06-03 RX ADMIN — SODIUM CHLORIDE, PRESERVATIVE FREE 10 ML: 5 INJECTION INTRAVENOUS at 10:03

## 2022-06-03 RX ADMIN — HALOPERIDOL LACTATE 5 MG: 5 INJECTION, SOLUTION INTRAMUSCULAR at 04:49

## 2022-06-03 RX ADMIN — GABAPENTIN 100 MG: 100 CAPSULE ORAL at 20:42

## 2022-06-03 RX ADMIN — HEPARIN SODIUM 5000 UNITS: 5000 INJECTION INTRAVENOUS; SUBCUTANEOUS at 10:02

## 2022-06-03 RX ADMIN — ACETAMINOPHEN 650 MG: 325 TABLET ORAL at 20:54

## 2022-06-03 RX ADMIN — INSULIN LISPRO 1 UNITS: 100 INJECTION, SOLUTION INTRAVENOUS; SUBCUTANEOUS at 10:15

## 2022-06-03 RX ADMIN — BUSPIRONE HYDROCHLORIDE 10 MG: 10 TABLET ORAL at 20:42

## 2022-06-03 RX ADMIN — HALOPERIDOL 1 MG: 1 TABLET ORAL at 21:00

## 2022-06-03 RX ADMIN — METOPROLOL SUCCINATE 25 MG: 25 TABLET, EXTENDED RELEASE ORAL at 10:02

## 2022-06-03 RX ADMIN — INSULIN LISPRO 1 UNITS: 100 INJECTION, SOLUTION INTRAVENOUS; SUBCUTANEOUS at 18:14

## 2022-06-03 RX ADMIN — LORAZEPAM 0.5 MG: 2 INJECTION INTRAMUSCULAR at 01:38

## 2022-06-03 RX ADMIN — FOLIC ACID 1 MG: 1 TABLET ORAL at 10:02

## 2022-06-03 RX ADMIN — INSULIN LISPRO 2 UNITS: 100 INJECTION, SOLUTION INTRAVENOUS; SUBCUTANEOUS at 12:45

## 2022-06-03 RX ADMIN — HALOPERIDOL 1 MG: 1 TABLET ORAL at 13:27

## 2022-06-03 RX ADMIN — LORAZEPAM 0.5 MG: 2 INJECTION INTRAMUSCULAR; INTRAVENOUS at 01:38

## 2022-06-03 RX ADMIN — HEPARIN SODIUM 5000 UNITS: 5000 INJECTION INTRAVENOUS; SUBCUTANEOUS at 15:42

## 2022-06-03 RX ADMIN — BUPROPION HYDROCHLORIDE 100 MG: 100 TABLET, FILM COATED, EXTENDED RELEASE ORAL at 10:02

## 2022-06-03 RX ADMIN — BUSPIRONE HYDROCHLORIDE 10 MG: 10 TABLET ORAL at 13:26

## 2022-06-03 RX ADMIN — PREGABALIN 25 MG: 25 CAPSULE ORAL at 10:02

## 2022-06-03 RX ADMIN — FLUCONAZOLE 200 MG: 200 INJECTION, SOLUTION INTRAVENOUS at 10:47

## 2022-06-03 RX ADMIN — GABAPENTIN 100 MG: 100 CAPSULE ORAL at 13:26

## 2022-06-03 RX ADMIN — METOPROLOL SUCCINATE 25 MG: 25 TABLET, EXTENDED RELEASE ORAL at 20:42

## 2022-06-03 RX ADMIN — SODIUM CHLORIDE: 9 INJECTION, SOLUTION INTRAVENOUS at 10:07

## 2022-06-03 RX ADMIN — ACETAMINOPHEN 650 MG: 325 TABLET ORAL at 10:12

## 2022-06-03 RX ADMIN — SODIUM CHLORIDE, POTASSIUM CHLORIDE, SODIUM LACTATE AND CALCIUM CHLORIDE: 600; 310; 30; 20 INJECTION, SOLUTION INTRAVENOUS at 04:50

## 2022-06-03 ASSESSMENT — PAIN SCALES - WONG BAKER
WONGBAKER_NUMERICALRESPONSE: 0

## 2022-06-03 ASSESSMENT — PAIN SCALES - GENERAL
PAINLEVEL_OUTOF10: 0
PAINLEVEL_OUTOF10: 10
PAINLEVEL_OUTOF10: 0
PAINLEVEL_OUTOF10: 0
PAINLEVEL_OUTOF10: 10
PAINLEVEL_OUTOF10: 9
PAINLEVEL_OUTOF10: 9
PAINLEVEL_OUTOF10: 0
PAINLEVEL_OUTOF10: 0
PAINLEVEL_OUTOF10: 6
PAINLEVEL_OUTOF10: 0

## 2022-06-03 ASSESSMENT — PAIN DESCRIPTION - PAIN TYPE
TYPE: CHRONIC PAIN
TYPE: CHRONIC PAIN

## 2022-06-03 ASSESSMENT — PAIN DESCRIPTION - DESCRIPTORS
DESCRIPTORS: JABBING
DESCRIPTORS: JABBING
DESCRIPTORS: STABBING

## 2022-06-03 ASSESSMENT — PAIN DESCRIPTION - LOCATION
LOCATION: BACK

## 2022-06-03 ASSESSMENT — PAIN DESCRIPTION - FREQUENCY
FREQUENCY: INTERMITTENT
FREQUENCY: INTERMITTENT

## 2022-06-03 ASSESSMENT — PAIN DESCRIPTION - ORIENTATION
ORIENTATION: LOWER
ORIENTATION: LOWER
ORIENTATION: UPPER

## 2022-06-03 NOTE — PROGRESS NOTES
Hospitalist Progress Note      PCP: Juana Philippe, EUGENIE - CNP    Date of Admission: 5/31/2022    Chief Complaint: Shakes in extremities, confusion    Hospital Course:   62 y.o. female Francy Gaines  - hx of obesity (BMI 43), HTN, GERD, depression, with multiple drug allergies   - Sent from [facility] with concerns for AMS. Pt also shaking/tremoring of which EMS states that the nursing staff says that patient is shaking more than usual and is asking inappropriate questions and not making sense. - Work up showed SHAYLA and admitted for further management    Subjective:   Continues to be agitated, and overnight noted to be chewing the tele wired and per RN possibly swallowed the tele sticker.     Medications:  Reviewed    Infusion Medications    lactated ringers 100 mL/hr at 06/03/22 0450    sodium chloride 10 mL/hr at 06/03/22 1007    dextrose       Scheduled Medications    fluconazole  200 mg IntraVENous Q24H    gabapentin  100 mg Oral TID    pregabalin  25 mg Oral BID    buPROPion  100 mg Oral Daily    cefTRIAXone (ROCEPHIN) IV  1,000 mg IntraVENous Q24H    busPIRone  15 mg Oral TID    folic acid  1 mg Oral Daily    metoprolol succinate  25 mg Oral BID    pantoprazole  40 mg Oral BID AC    QUEtiapine  300 mg Oral Nightly    sertraline  150 mg Oral Daily    sodium chloride flush  5-40 mL IntraVENous 2 times per day    heparin (porcine)  5,000 Units SubCUTAneous TID    insulin lispro  0-6 Units SubCUTAneous TID WC    insulin lispro  0-3 Units SubCUTAneous Nightly     PRN Meds: sodium chloride flush, sodium chloride, ondansetron **OR** ondansetron, acetaminophen **OR** acetaminophen, glucose, dextrose bolus **OR** dextrose bolus, glucagon (rDNA), dextrose      Intake/Output Summary (Last 24 hours) at 6/3/2022 1205  Last data filed at 6/3/2022 1000  Gross per 24 hour   Intake 1499.27 ml   Output 850 ml   Net 649.27 ml       Physical Exam Performed:    BP (!) 168/86   Pulse 87   Temp 98.8 °F (37.1 °C) (Oral)   Resp 22   Ht 5' 1\" (1.549 m)   Wt 227 lb 5 oz (103.1 kg)   SpO2 98%   BMI 42.95 kg/m²     General appearance: No acute distress  HEENT: Pupils equal, round, and reactive to light. Conjunctivae/corneas clear. Neck: Supple, with full range of motion. No jugular venous distention. Trachea midline. Respiratory:  Normal respiratory effort. Clear to auscultation, bilaterally without Rales/Wheezes/Rhonchi. Cardiovascular: Regular rate and rhythm with normal S1/S2 without murmurs, rubs or gallops. Abdomen: Soft, non-tender, non-distended with normal bowel sounds. Musculoskeletal: No clubbing, cyanosis or edema bilaterally. Full range of motion without deformity. Skin: Skin color, texture, turgor normal.  No rashes or lesions.   Neurologic: Moving all extremities, no focal deficits  Psychiatric: alert, oriented to place and time  Capillary Refill: Brisk,< 3 seconds   Peripheral Pulses: +2 palpable, equal bilaterally       Labs:   Recent Labs     05/31/22  1246 06/01/22  0442 06/02/22  0814   WBC 9.4 5.2 4.7   HGB 10.9* 9.1* 9.3*   HCT 34.0* 27.9* 28.9*    139 153     Recent Labs     05/31/22  1247 06/01/22  0442 06/02/22  0814    141 141   K 4.7 3.8 4.2    105 102   CO2 23 20* 22   BUN 61* 45* 22*   CREATININE 2.7* 1.9* 1.2   CALCIUM 10.0 9.3 9.6   PHOS  --  2.8 3.3     Recent Labs     06/02/22  0814   AST 19   ALT 13   BILIDIR <0.2   BILITOT 0.3   ALKPHOS 89     Recent Labs     05/31/22  1247   INR 1.32*     Recent Labs     05/31/22  1247   TROPONINI 0.06*       Urinalysis:      Lab Results   Component Value Date    NITRU POSITIVE 05/31/2022    WBCUA 21-50 05/31/2022    BACTERIA 4+ 05/31/2022    RBCUA 0-2 05/31/2022    BLOODU Negative 05/31/2022    SPECGRAV >=1.030 05/31/2022    GLUCOSEU Negative 05/31/2022       Radiology:  XR CHEST PORTABLE   Final Result      No acute radiographic abnormality of the chest.              Assessment/Plan:    Active Hospital Problems    Diagnosis Date Noted    SHAYLA (acute kidney injury) ([facility] [N17.9] 04/22/2022     Priority: Medium     Tele lead sticker ingestion - monitor for signs of abdominal pain  - will check xray in am    UTI, sepsis ruled out  Urine Cx with ecoli and candida  Continue rocephin, fluconazole    Acute metabolic encephalopathy due to UTI, possible concern for unintentional gabapentin toxicity with underlying SHAYLA, underlying psych hx  waxing and waning mental status  Suspect underlying delirium vs exacerbation of bipolar  discussed with Carmen, psych     Shaking/tremors - concern for unintentional gabapentin toxicity with underlying SHAYLA. hold gabapentin for now. Still there but improving     SHAYLA, no baseline CKD hx  Cr 2.7 on admission, baseline is normal  Continue Maintenance IVF  Bladder scan prn with straigth cath parameters  Avoid NSAIDs (Ibuprofen, Aleve, Motrin, Naproxen, Toradol etc), Fleet enemas, IV contrast Dye and other nephrotoxins!     Diabetes mellitus - Hold lantus for now with SHAYLA  - SSI, hypoglycemia protocol, carb controlled diet    Lower extremity weakness, pain, concern for poly neuropathy  Continue low dose gabapentin     Hypertension hx, allow permissive hypertension     GERD - continue protonix     Morbid obesity due to excess calories Body mass index is 42.95 kg/m². - Complicating assessment and treatment. Placing patient at risk for multiple co-morbidities as well as early death and contributing to the patient's presentation.  on weight loss when appropriate. DVT Prophylaxis: Heparin  Diet: ADULT DIET; Regular; 4 carb choices (60 gm/meal)  Code Status: Full Code    PT/OT Eval Status: ordered    Dispo - continue inpatient care, monitor 1 to 2 days, likely discharge Friday.      06/02 -- Discussed with Victor M Adorno, patient's CM  She was not aware that patient was @ [employer] lives in Group home, struggling with her legs  She did have to miss neurology appointments due to recent hospitalizations and being in [address] she talked to toña was May 17th    Case management @ [address] [facility], see psych hx of mental illness, hx of bipolar disorder 2, multiple personality disorder, DARLENE, and PTSD. She was seeing neurology for issues concern for lower extremity weakness/neuropathy. She was scheduled to see neurosurgeon and had MRI done @ . MRI results from 04/2022 --Large central disc extrusion at C6-C7 with severe spinal canal stenosis and abnormal spinal cord signal at this level, which could represent myelomalacia or edema.      --- Discussed with daughter over the phone, SNF contacted her for c/o confusion, and was told and multiple occasions she was found on the floor, and delusions and that she was [place] a shopping [place] and that's when she was sent to the JEFF Reyna MD   Internal Medicine Hospitalist
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
## 2022-06-03 NOTE — PROGRESS NOTES
Patient status remains unchanged, despite being medicated with ativan. She has required staff to continuously place her legs back into bed, as she tries to climb over side rails. hospitalist notified via perfect serve and made of unchanged status. Order received for 5mg haldol IM. Shortly after receiving order for haldol, patient became calm and quiet, so med was held.

## 2022-06-03 NOTE — PROGRESS NOTES
Physician Progress Note      Dru Pemberton  [facility] #:                  [id number]  :                       1960  ADMIT DATE:       2022 12:19 PM  [address] DATE:  RESPONDING  PROVIDER #:        Sophia Radford MD          QUERY TEXT:    Patient admitted with UTI. Noted documentation of possible sepsis in the H&P. In order to support the diagnosis of sepsis, please include additional   clinical indicators in your documentation. Or please document if the   diagnosis of sepsis has been ruled out after further study    The medical record reflects the following:  Risk Factors: 61 yo w/ UTI  Clinical Indicators: Per H&P: Possible sepsis with recent UTI. WBC 9.4 - 5.2   - 4.7. Temps 97.1 - 99.4. Treatment: Lactic acid, lab monitoring, blood cultures, IVF bolus, IV   Rocephin, IV Diflucan  Options provided:  -- Sepsis was ruled out after study  -- Sepsis present as evidenced by, Please document evidence. -- Other - I will add my own diagnosis  -- Disagree - Not applicable / Not valid  -- Disagree - Clinically unable to determine / Unknown  -- Refer to Clinical Documentation Reviewer    PROVIDER RESPONSE TEXT:    Sepsis was ruled out after study.     Query created by: Nicholas Lawrence on 6/3/2022 1:23 PM      Electronically signed by:  Sophia Radford MD 6/3/2022 1:51 PM

## 2022-06-03 NOTE — PROGRESS NOTES
Patient increasingly agitated, and restless. She was medicated with Seroquel, lyrical, and Neurontin as prescribed at 2258 last night. She has pulled PIV out, repeatedly pulls tele leads off, and loudly screams obscenities into the hallway. Staff has been unsuccessful with reorienting, or redirecting her. She was also seen on avasy camera licking, and then eating a tele lead. hospitalist notified via perfect serve and made aware of patient's status. Ativan 0.5mg IVP order, and patient medicated as charted in [facility]

## 2022-06-03 NOTE — PLAN OF CARE
Problem: Neurosensory - Adult  Goal: Achieves stable or improved neurological status  6/3/2022 0330 by Pan Steen RN  Outcome: Progressing  Flowsheets  Taken 6/2/2022 1945 by Pan Steen RN  Achieves stable or improved neurological status:   Assess for and report changes in neurological status   Initiate measures to prevent increased intracranial pressure   Maintain blood pressure and fluid volume within ordered parameters to optimize cerebral perfusion and minimize risk of hemorrhage   Monitor temperature, glucose, and sodium.  Initiate appropriate interventions as ordered  Taken 6/2/2022 1600 by Sherren Earls, RN  Achieves stable or improved neurological status:   Assess for and report changes in neurological status   Initiate measures to prevent increased intracranial pressure  6/2/2022 1512 by Sherren Earls, RN  Outcome: Progressing  Flowsheets (Taken 6/2/2022 1200)  Achieves stable or improved neurological status:   Assess for and report changes in neurological status   Initiate measures to prevent increased intracranial pressure   Maintain blood pressure and fluid volume within ordered parameters to optimize cerebral perfusion and minimize risk of hemorrhage  Goal: Achieves maximal functionality and self care  6/3/2022 0330 by Pan Steen RN  Outcome: Progressing  Flowsheets  Taken 6/2/2022 1945 by Pan Steen RN  Achieves maximal functionality and self care:   Monitor swallowing and airway patency with patient fatigue and changes in neurological status   Encourage and assist patient to increase activity and self care with guidance from physical therapy/occupational therapy  Taken 6/2/2022 1600 by Sherren Earls, RN  Achieves maximal functionality and self care:   Monitor swallowing and airway patency with patient fatigue and changes in neurological status   Encourage and assist patient to increase activity and self care with guidance from physical therapy/occupational therapy  6/2/2022 1512 by Jeet Hguhes RN  Outcome: Progressing  Flowsheets (Taken 6/2/2022 1200)  Achieves maximal functionality and self care:   Monitor swallowing and airway patency with patient fatigue and changes in neurological status   Encourage and assist patient to increase activity and self care with guidance from physical therapy/occupational therapy     Problem: Discharge Planning  Goal: Discharge to home or other facility with appropriate resources  6/3/2022 0330 by Seymour Feliz RN  Outcome: Progressing  Flowsheets  Taken 6/2/2022 1945 by Seymour Feliz RN  Discharge to home or other facility with appropriate resources:   Identify barriers to discharge with patient and caregiver   Arrange for needed discharge resources and transportation as appropriate   Identify discharge learning needs (meds, wound care, etc)  Taken 6/2/2022 1600 by Jeet Hughes RN  Discharge to home or other facility with appropriate resources: Identify barriers to discharge with patient and caregiver  6/2/2022 1512 by Jeet Hughes RN  Outcome: Progressing  Flowsheets (Taken 6/2/2022 1200)  Discharge to home or other facility with appropriate resources: Identify barriers to discharge with patient and caregiver     Problem: Safety - Adult  Goal: Free from fall injury  6/3/2022 0330 by Seymour Feliz RN  Outcome: Progressing  Flowsheets (Taken 6/3/2022 0100)  Free From Fall Injury:   Instruct family/caregiver on patient safety   Based on caregiver fall risk screen, instruct family/caregiver to ask for assistance with transferring infant if caregiver noted to have fall risk factors  6/2/2022 1512 by Jeet Hughes RN  Outcome: Progressing     Problem: Skin/Tissue Integrity  Goal: Absence of new skin breakdown  Description: 1. Monitor for areas of redness and/or skin breakdown  2. Assess vascular access sites hourly  3.   Every 4-6 hours minimum:  Change oxygen saturation probe site  4. Every 4-6 hours:  If on nasal continuous positive airway pressure, respiratory therapy assess nares and determine need for appliance change or resting period.   6/3/2022 0330 by Sherron Corrigan RN  Outcome: Progressing  6/2/2022 1512 by Ervin Gonzalez RN  Outcome: Progressing     Problem: ABCDS Injury Assessment  Goal: Absence of physical injury  6/3/2022 0330 by Sherron Corrigan RN  Outcome: Progressing  Flowsheets (Taken 6/3/2022 0100)  Absence of Physical Injury: Implement safety measures based on patient assessment  6/2/2022 1512 by Ervin Gonzalez RN  Outcome: Progressing     Problem: Chronic Conditions and Co-morbidities  Goal: Patient's chronic conditions and co-morbidity symptoms are monitored and maintained or improved  6/3/2022 0330 by Sherron Corrigan RN  Outcome: Progressing  Flowsheets  Taken 6/2/2022 1945 by Sherron Corrigan RN  Care Plan - Patient's Chronic Conditions and Co-Morbidity Symptoms are Monitored and Maintained or Improved:   Monitor and assess patient's chronic conditions and comorbid symptoms for stability, deterioration, or improvement   Collaborate with multidisciplinary team to address chronic and comorbid conditions and prevent exacerbation or deterioration   Update acute care plan with appropriate goals if chronic or comorbid symptoms are exacerbated and prevent overall improvement and discharge  Taken 6/2/2022 1600 by Cisco Shin 34 - Patient's Chronic Conditions and Co-Morbidity Symptoms are Monitored and Maintained or Improved:   Collaborate with multidisciplinary team to address chronic and comorbid conditions and prevent exacerbation or deterioration   Monitor and assess patient's chronic conditions and comorbid symptoms for stability, deterioration, or improvement   Update acute care plan with appropriate goals if chronic or comorbid symptoms are exacerbated and prevent overall improvement and discharge  6/2/2022 1512 by Theora Grippe, RN  Outcome: Progressing  Flowsheets (Taken 6/2/2022 1200)  Care Plan - Patient's Chronic Conditions and Co-Morbidity Symptoms are Monitored and Maintained or Improved:   Monitor and assess patient's chronic conditions and comorbid symptoms for stability, deterioration, or improvement   Collaborate with multidisciplinary team to address chronic and comorbid conditions and prevent exacerbation or deterioration   Update acute care plan with appropriate goals if chronic or comorbid symptoms are exacerbated and prevent overall improvement and discharge     Problem: Pain  Goal: Verbalizes/displays adequate comfort level or baseline comfort level  6/3/2022 0330 by Kira Hernandez RN  Outcome: Progressing  Flowsheets  Taken 6/2/2022 1945 by Kira Hernandez RN  Verbalizes/displays adequate comfort level or baseline comfort level:   Encourage patient to monitor pain and request assistance   Assess pain using appropriate pain scale   Administer analgesics based on type and severity of pain and evaluate response   Implement non-pharmacological measures as appropriate and evaluate response  Taken 6/2/2022 1619 by Karuna Kellogg RN  Verbalizes/displays adequate comfort level or baseline comfort level:   Encourage patient to monitor pain and request assistance   Assess pain using appropriate pain scale   Administer analgesics based on type and severity of pain and evaluate response   Implement non-pharmacological measures as appropriate and evaluate response  6/2/2022 1512 by Karuna Kellogg RN  Outcome: Progressing  Flowsheets (Taken 6/2/2022 1149)  Verbalizes/displays adequate comfort level or baseline comfort level:   Assess pain using appropriate pain scale   Administer analgesics based on type and severity of pain and evaluate response   Encourage patient to monitor pain and request assistance   Implement non-pharmacological measures as appropriate and evaluate response

## 2022-06-03 NOTE — CONSULTS
Psychiatry Initial Consultation    Patient Name: Cliff Tse  MRN: [id number]  Admission Date: 5/31/2022    Reason for Consult: Recommendations for antipsych, follows [facility],[address] [facility], hx of BAD, bipolar disorder    HPI:   Cliff Tse is a 62 y.o. female who presented to the hospital on 05/31/2022 with a chief complaint of altered mental status. Per ED documentation, she had a recent admission for UTI with sepsis. According to the life squad note she had shaking and tremoring which they stated nursing staff says patient is shaking more than usual and not making sense and asking inappropriate questions. When I walk in the room I asked her why she was here and she was nonsensical in her answer. No further history obtained. Chart review since admission indicates waxing and waning confusion, varying levels of orientation, pleasantly confused at times but has become agitated and combative the past two nights. There is documentation of V/T hallucinations and delusional thinking during her current admission. She was found to have a SHAYLA and suspected unintentional gabapentin toxicity, and is currently on fluconazole for UTI and rocephin for empiric concern for sepsis. TSH drawn 04/24/22 WNL; vitamin B-12 elevated at 1973    Met with Gerson Valentine. She appeared anxious and restless, mood presented as labile. Speech was mumbled and difficult to understand at times; answers were not always appropriate to questions asked. States she has been in the hospital for 1 week and that she came in because she was having difficulty breathing. She was able to identify that she is established with [address] [place] [address],[address] but unable to recall her psychiatric diagnoses or other psychiatric history. Call placed to Eleonora Herman (daughter; [phone]). She reports that Gerson Valentine has lived in a group home for the past few years and around 3-4 weeks ago went to [place] due to falls.  She notes Gerson Valentine has a history of bipolar disorder, multiple PD, depression. States that her cognitive abilities are intact at baseline; she is coherent with what is going on in the world. She denies delusional thinking at baseline but does note that there have been episodes in past of confusion, living in the past. She does become increasingly agitated and combative during these episodes, angry if you don't remember what she is talking about or if you don't agree with her. She has no known history of hallucinations. She does have a history of 3-4 psychiatric hospitalizations in the past, with the most recent being 2 years ago when she attempted suicide. States that Odalys Jenkins has not exhibited any mood changes or verbalized any suicidal ideations recently. She is agreeable to an inpatient psychiatric admission if warranted. Duration: 3-5 days   Severity: Severe   Context: Stress, medical issues   Associated Symptoms: As above    Past Psychiatric History:   Previous Diagnoses: Bipolar II, anxiety, depression, multiple personality disorder, PTSD  Previous Hospitalizations: [facility] (2021)  Outpatient Treatment: Established with the [facility]   Past Medication Trials: Amitriptyline, Buspar, Hydroxyzine, Lamictal, Seroquel, Wellbutrin, Zoloft  Suicidality: Chart review indicates intentional overdose in 2021  History of Violence: Has been combative with staff during this admission     Past Medical History:  Past Medical History:   Diagnosis Date    Anxiety     Diabetes mellitus ([facility]     Diabetic polyneuropathy ([facility]     Hernia, abdominal     Hyperlipidemia     Hypertension     PTSD (post-traumatic stress disorder)      Home Medications:  Prior to Admission medications    Medication Sig Start Date End Date Taking?  Authorizing Provider   aspirin 81 MG chewable tablet Take 81 mg by mouth daily   Yes Historical Provider, MD   atorvastatin (LIPITOR) 40 MG tablet Take 40 mg by mouth at bedtime   Yes Historical Provider, MD   sulfamethoxazole-trimethoprim (BACTRIM DS;SEPTRA DS) 800-160 MG per tablet Take 1 tablet by mouth 2 times daily 5/27/22 6/6/22 Yes Historical Provider, MD   pregabalin (LYRICA) 25 MG capsule Take 25 mg by mouth 2 times daily. Yes Historical Provider, MD   naproxen (NAPROSYN) 500 MG tablet Take 500 mg by mouth every 12 hours as needed   Yes Historical Provider, MD   polyethylene glycol (GLYCOLAX) 17 g packet Take 17 g by mouth at bedtime   Yes Historical Provider, MD   hydrocortisone-aloe 1 % CREA cream Apply topically 2 times daily Apply to left arm rash. 4/29/22   Piyush Murphy MD   folic acid (FOLVITE) 1 MG tablet Take 1 tablet by mouth daily 4/30/22   Piyush Murphy MD   pantoprazole (PROTONIX) 40 MG tablet Take 1 tablet by mouth in the morning and at bedtime 4/29/22   Piyush Murphy MD   metoprolol succinate (TOPROL XL) 25 MG extended release tablet Take 1 tablet by mouth in the morning and at bedtime 2/25/22   Libia Barlow MD   Liraglutide (VICTOZA) 18 MG/3ML SOPN SC injection Inject 3 mg into the skin daily    Historical Provider, MD   nystatin (MYCOSTATIN) 844523 UNIT/GM cream Apply topically 2 times daily Apply topically 2 times daily for 2 weeks 5/20/22 6/3/22  Historical Provider, MD   hydroCHLOROthiazide (HYDRODIURIL) 25 MG tablet Take 25 mg by mouth daily    Historical Provider, MD   ferrous sulfate (IRON 325) 325 (65 Fe) MG tablet Take 1 tablet by mouth daily (with breakfast) 12/28/21   Thi Jackson MD   QUEtiapine (SEROQUEL) 300 MG tablet Take 300 mg by mouth nightly    Historical Provider, MD   acetaminophen (TYLENOL) 500 MG tablet Take 1,000 mg by mouth every 6 hours as needed for Pain    Historical Provider, MD   gabapentin (NEURONTIN) 300 MG capsule Take 600 mg by mouth 3 times daily.      Historical Provider, MD   hydrOXYzine (ATARAX) 50 MG tablet Take 100 mg by mouth in the morning and at bedtime     Historical Provider, MD   losartan (COZAAR) 100 MG tablet Take 100 mg by mouth daily     Historical Provider, MD   sertraline (ZOLOFT) 100 MG tablet Take 150 mg by mouth daily     Historical Provider, MD   busPIRone (BUSPAR) 15 MG tablet Take 15 mg by mouth 3 times daily  9/8/21   Historical Provider, MD   buPROPion [facility]) 200 MG extended release tablet Take 200 mg by mouth 2 times daily  10/4/21   Historical Provider, MD     Chemical Dependency History:   Tobacco: Per Epic, never   Alcohol: Per Epic, never  Illicit: Per Epic, never    Family Hx:    History reviewed. No pertinent family history.      Social Hx:   Marital Status:   Children: One child listed as patient contact in [facility] [address]: Has recently been at [facility] for skilled care  Vocational: Disabled  Abuse/Trauma: None disclosed  Legal: None disclosed    Current Medications Ordered:   fluconazole  200 mg IntraVENous Q24H    gabapentin  100 mg Oral TID    pregabalin  25 mg Oral BID    buPROPion  100 mg Oral Daily    cefTRIAXone (ROCEPHIN) IV  1,000 mg IntraVENous Q24H    busPIRone  15 mg Oral TID    folic acid  1 mg Oral Daily    metoprolol succinate  25 mg Oral BID    pantoprazole  40 mg Oral BID AC    QUEtiapine  300 mg Oral Nightly    sertraline  150 mg Oral Daily    sodium chloride flush  5-40 mL IntraVENous 2 times per day    heparin (porcine)  5,000 Units SubCUTAneous TID    insulin lispro  0-6 Units SubCUTAneous TID WC    insulin lispro  0-3 Units SubCUTAneous Nightly      PRN Meds: sodium chloride flush, sodium chloride, ondansetron **OR** ondansetron, acetaminophen **OR** acetaminophen, glucose, dextrose bolus **OR** dextrose bolus, glucagon (rDNA), dextrose     ROS: See Medical H&PE     PE:    BP (!) 121/91   Pulse 92   Temp 97.5 °F (36.4 °C) (Axillary)   Resp 22   Ht 5' 1\" (1.549 m)   Wt 227 lb 5 oz (103.1 kg)   SpO2 96%   BMI 42.95 kg/m²       Motor / Gait: Observed laying in bed, gait deferred  AIMS: 0    Mental Status Examination:    Appearance: WF, appears stated age, lying in bed, wearing hospital attire, fair grooming and hygiene  Behavior/Attitude Toward Examiner: Difficult to engage in conversation; presented as anxious and restless  Speech: Mumbled, difficult to understand at times   Mood: \"I feel like a bag of crap\", labile, anxious  Affect: Mood congruent   Thought Processes: Confused   Thought Content: No SI/HI verbalized, no delusions voiced to me but chart review indicates + delusions  Perceptions: No AVH verbalized to me but chart review indicates + V/T hallucinations  Attention: Impaired  Cognition: Difficult to fully assess orientation level due to responses not always being appropriate to questions asked, immediate, recent, and remote recall impaired  Insight: Impaired insight   Judgment: Impaired judgment      LAB: Reviewed all labs obtained during admission to date. Dx:   Primary Psychiatric (DSM V) Diagnosis: Psychosis, unspecified (delirium versus possible bipolar exacerbation)  Secondary Psychiatric (DSM V) Diagnoses: Anxiety, multiple personality disorder, PTSD per history  Chemical Dependency Diagnoses: None     Assessment  Reviewed nursing and ancillary staff notes since arrival to hospital, reviewed previous records and records available through [address] [place] [address]. Evaluated medications and assessed for side effects and effectiveness. Assessed patient's educational needs including reviewing plan of care, medications, and diagnosis. Recommendations:    1. It is unclear to me at this time if this is an exacerbation of her bipolar disorder or delirium or a combination of both. She presents with confusion, mood lability, anxiousness, agitation, and combativeness, and I do not feel that she would be safe to discharge back to her ECF at this time. Recommend that Estrellita Wilson be admitted to inpatient psychiatry upon medical stabilization and clearance. 2. Will simplify psychiatric medications as this could be contributing to confusion, possible delirium. These may need to be reintroduced as mental status improves. · Decrease Zoloft to 100 mg (currently on 150 mg QD; this could potentially be contributing if john)  · Decrease Buspar to 10 mg TID (currently 15 mg TID)  · Discontinue Wellbutrin 100 mg daily  · Discontinue Seroquel 300 mg HS; start Haldol 1 mg BID  3. Vitamin D level ordered. 4. EKG done on 05/31/2022 showed QTc of 470. Will order follow-up EKG for tomorrow AM.     Spent >80 minutes face to face with patient of which >50% was spent counseling and providing education regarding diagnosis, treatment options, and prognosis. Thank you for consult. Please do not hesitate to contact provider if there are additional questions regarding patient.     Samia Campuzano, MPH, PMHNP-BC  06/03/22

## 2022-06-03 NOTE — CARE COORDINATION
Patient seen by psych today, recommending transfer to inpatient psych once medically stable. Please call on call SW or CM over the weekend to initiate the transfer once patient has been medically cleared.      Mario Mccauley RN, BSN,   4th [facility]  [phone]

## 2022-06-04 LAB
ALBUMIN SERPL-MCNC: 3.7 G/DL (ref 3.4–5)
ANION GAP SERPL CALCULATED.3IONS-SCNC: 16 MMOL/L (ref 3–16)
BASOPHILS ABSOLUTE: 0 K/UL (ref 0–0.2)
BASOPHILS RELATIVE PERCENT: 0.4 %
BLOOD CULTURE, ROUTINE: NORMAL
BUN BLDV-MCNC: 9 MG/DL (ref 7–20)
CALCIUM SERPL-MCNC: 8.7 MG/DL (ref 8.3–10.6)
CHLORIDE BLD-SCNC: 103 MMOL/L (ref 99–110)
CO2: 20 MMOL/L (ref 21–32)
CREAT SERPL-MCNC: 0.8 MG/DL (ref 0.6–1.2)
CULTURE, BLOOD 2: NORMAL
EKG ATRIAL RATE: 86 BPM
EKG DIAGNOSIS: NORMAL
EKG P AXIS: 69 DEGREES
EKG P-R INTERVAL: 160 MS
EKG Q-T INTERVAL: 384 MS
EKG QRS DURATION: 90 MS
EKG QTC CALCULATION (BAZETT): 459 MS
EKG R AXIS: 13 DEGREES
EKG T AXIS: 43 DEGREES
EKG VENTRICULAR RATE: 86 BPM
EOSINOPHILS ABSOLUTE: 0.4 K/UL (ref 0–0.6)
EOSINOPHILS RELATIVE PERCENT: 9.4 %
GFR AFRICAN AMERICAN: >60
GFR NON-AFRICAN AMERICAN: >60
GLUCOSE BLD-MCNC: 144 MG/DL (ref 70–99)
GLUCOSE BLD-MCNC: 154 MG/DL (ref 70–99)
GLUCOSE BLD-MCNC: 157 MG/DL (ref 70–99)
GLUCOSE BLD-MCNC: 160 MG/DL (ref 70–99)
GLUCOSE BLD-MCNC: 215 MG/DL (ref 70–99)
HCT VFR BLD CALC: 26.6 % (ref 36–48)
HEMOGLOBIN: 8.6 G/DL (ref 12–16)
LYMPHOCYTES ABSOLUTE: 1.1 K/UL (ref 1–5.1)
LYMPHOCYTES RELATIVE PERCENT: 28.3 %
MCH RBC QN AUTO: 26.8 PG (ref 26–34)
MCHC RBC AUTO-ENTMCNC: 32.2 G/DL (ref 31–36)
MCV RBC AUTO: 83.1 FL (ref 80–100)
MONOCYTES ABSOLUTE: 0.3 K/UL (ref 0–1.3)
MONOCYTES RELATIVE PERCENT: 6.7 %
NEUTROPHILS ABSOLUTE: 2.1 K/UL (ref 1.7–7.7)
NEUTROPHILS RELATIVE PERCENT: 55.2 %
PDW BLD-RTO: 13.3 % (ref 12.4–15.4)
PERFORMED ON: ABNORMAL
PHOSPHORUS: 3.2 MG/DL (ref 2.5–4.9)
PLATELET # BLD: 198 K/UL (ref 135–450)
PMV BLD AUTO: 7.7 FL (ref 5–10.5)
POTASSIUM SERPL-SCNC: 3.9 MMOL/L (ref 3.5–5.1)
RBC # BLD: 3.2 M/UL (ref 4–5.2)
SARS-COV-2, NAAT: NOT DETECTED
SODIUM BLD-SCNC: 139 MMOL/L (ref 136–145)
WBC # BLD: 3.8 K/UL (ref 4–11)

## 2022-06-04 PROCEDURE — 6370000000 HC RX 637 (ALT 250 FOR IP): Performed by: INTERNAL MEDICINE

## 2022-06-04 PROCEDURE — 87635 SARS-COV-2 COVID-19 AMP PRB: CPT

## 2022-06-04 PROCEDURE — 2580000003 HC RX 258: Performed by: INTERNAL MEDICINE

## 2022-06-04 PROCEDURE — 94760 N-INVAS EAR/PLS OXIMETRY 1: CPT

## 2022-06-04 PROCEDURE — 85025 COMPLETE CBC W/AUTO DIFF WBC: CPT

## 2022-06-04 PROCEDURE — 93010 ELECTROCARDIOGRAM REPORT: CPT | Performed by: INTERNAL MEDICINE

## 2022-06-04 PROCEDURE — 36415 COLL VENOUS BLD VENIPUNCTURE: CPT

## 2022-06-04 PROCEDURE — 6360000002 HC RX W HCPCS: Performed by: INTERNAL MEDICINE

## 2022-06-04 PROCEDURE — 6370000000 HC RX 637 (ALT 250 FOR IP): Performed by: NURSE PRACTITIONER

## 2022-06-04 PROCEDURE — 80069 RENAL FUNCTION PANEL: CPT

## 2022-06-04 PROCEDURE — 2060000000 HC ICU INTERMEDIATE R&B

## 2022-06-04 RX ORDER — HYDROCHLOROTHIAZIDE 25 MG/1
12.5 TABLET ORAL DAILY
Status: DISCONTINUED | OUTPATIENT
Start: 2022-06-04 | End: 2022-06-06 | Stop reason: HOSPADM

## 2022-06-04 RX ORDER — HALOPERIDOL 1 MG/1
1 TABLET ORAL 2 TIMES DAILY
Qty: 10 TABLET | Refills: 0 | Status: ON HOLD
Start: 2022-06-04 | End: 2022-06-14 | Stop reason: HOSPADM

## 2022-06-04 RX ORDER — FLUCONAZOLE 200 MG/1
200 TABLET ORAL DAILY
Qty: 10 TABLET | Refills: 0 | Status: ON HOLD
Start: 2022-06-05 | End: 2022-06-14 | Stop reason: HOSPADM

## 2022-06-04 RX ORDER — HYDROCODONE BITARTRATE AND ACETAMINOPHEN 5; 325 MG/1; MG/1
1 TABLET ORAL ONCE
Status: COMPLETED | OUTPATIENT
Start: 2022-06-04 | End: 2022-06-04

## 2022-06-04 RX ORDER — HYDROCHLOROTHIAZIDE 25 MG/1
12.5 TABLET ORAL DAILY
Qty: 30 TABLET | Refills: 3
Start: 2022-06-04

## 2022-06-04 RX ORDER — CEFDINIR 300 MG/1
300 CAPSULE ORAL EVERY 12 HOURS SCHEDULED
Status: DISCONTINUED | OUTPATIENT
Start: 2022-06-05 | End: 2022-06-06 | Stop reason: HOSPADM

## 2022-06-04 RX ORDER — SERTRALINE HYDROCHLORIDE 100 MG/1
100 TABLET, FILM COATED ORAL DAILY
Qty: 30 TABLET | Refills: 3
Start: 2022-06-04

## 2022-06-04 RX ORDER — LOSARTAN POTASSIUM 25 MG/1
25 TABLET ORAL DAILY
Status: DISCONTINUED | OUTPATIENT
Start: 2022-06-04 | End: 2022-06-06 | Stop reason: HOSPADM

## 2022-06-04 RX ORDER — FLUCONAZOLE 200 MG/1
200 TABLET ORAL DAILY
Status: DISCONTINUED | OUTPATIENT
Start: 2022-06-05 | End: 2022-06-06 | Stop reason: HOSPADM

## 2022-06-04 RX ORDER — CEFDINIR 300 MG/1
300 CAPSULE ORAL EVERY 12 HOURS SCHEDULED
Qty: 10 CAPSULE | Refills: 0 | Status: ON HOLD
Start: 2022-06-05 | End: 2022-06-14 | Stop reason: HOSPADM

## 2022-06-04 RX ORDER — LOSARTAN POTASSIUM 100 MG/1
50 TABLET ORAL DAILY
Qty: 30 TABLET | Refills: 3
Start: 2022-06-04

## 2022-06-04 RX ADMIN — HEPARIN SODIUM 5000 UNITS: 5000 INJECTION INTRAVENOUS; SUBCUTANEOUS at 21:33

## 2022-06-04 RX ADMIN — SODIUM CHLORIDE, PRESERVATIVE FREE 10 ML: 5 INJECTION INTRAVENOUS at 21:32

## 2022-06-04 RX ADMIN — BUSPIRONE HYDROCHLORIDE 10 MG: 10 TABLET ORAL at 08:41

## 2022-06-04 RX ADMIN — PREGABALIN 25 MG: 25 CAPSULE ORAL at 08:41

## 2022-06-04 RX ADMIN — FLUCONAZOLE 200 MG: 200 INJECTION, SOLUTION INTRAVENOUS at 08:38

## 2022-06-04 RX ADMIN — HALOPERIDOL 1 MG: 1 TABLET ORAL at 23:21

## 2022-06-04 RX ADMIN — INSULIN LISPRO 1 UNITS: 100 INJECTION, SOLUTION INTRAVENOUS; SUBCUTANEOUS at 21:48

## 2022-06-04 RX ADMIN — PANTOPRAZOLE SODIUM 40 MG: 40 TABLET, DELAYED RELEASE ORAL at 16:58

## 2022-06-04 RX ADMIN — HEPARIN SODIUM 5000 UNITS: 5000 INJECTION INTRAVENOUS; SUBCUTANEOUS at 08:41

## 2022-06-04 RX ADMIN — FOLIC ACID 1 MG: 1 TABLET ORAL at 08:41

## 2022-06-04 RX ADMIN — HYDROCODONE BITARTRATE AND ACETAMINOPHEN 1 TABLET: 5; 325 TABLET ORAL at 02:41

## 2022-06-04 RX ADMIN — GABAPENTIN 100 MG: 100 CAPSULE ORAL at 14:34

## 2022-06-04 RX ADMIN — GABAPENTIN 100 MG: 100 CAPSULE ORAL at 21:36

## 2022-06-04 RX ADMIN — HYDROCHLOROTHIAZIDE 12.5 MG: 25 TABLET ORAL at 12:10

## 2022-06-04 RX ADMIN — SODIUM CHLORIDE, PRESERVATIVE FREE 10 ML: 5 INJECTION INTRAVENOUS at 08:40

## 2022-06-04 RX ADMIN — GABAPENTIN 100 MG: 100 CAPSULE ORAL at 08:41

## 2022-06-04 RX ADMIN — INSULIN LISPRO 1 UNITS: 100 INJECTION, SOLUTION INTRAVENOUS; SUBCUTANEOUS at 12:11

## 2022-06-04 RX ADMIN — HALOPERIDOL 1 MG: 1 TABLET ORAL at 08:41

## 2022-06-04 RX ADMIN — BUSPIRONE HYDROCHLORIDE 10 MG: 10 TABLET ORAL at 21:35

## 2022-06-04 RX ADMIN — CEFTRIAXONE 1000 MG: 1 INJECTION, POWDER, FOR SOLUTION INTRAMUSCULAR; INTRAVENOUS at 10:11

## 2022-06-04 RX ADMIN — BUSPIRONE HYDROCHLORIDE 10 MG: 10 TABLET ORAL at 14:34

## 2022-06-04 RX ADMIN — INSULIN LISPRO 2 UNITS: 100 INJECTION, SOLUTION INTRAVENOUS; SUBCUTANEOUS at 16:58

## 2022-06-04 RX ADMIN — ACETAMINOPHEN 650 MG: 325 TABLET ORAL at 02:20

## 2022-06-04 RX ADMIN — LOSARTAN POTASSIUM 25 MG: 25 TABLET, FILM COATED ORAL at 12:10

## 2022-06-04 RX ADMIN — PANTOPRAZOLE SODIUM 40 MG: 40 TABLET, DELAYED RELEASE ORAL at 06:47

## 2022-06-04 RX ADMIN — METOPROLOL SUCCINATE 25 MG: 25 TABLET, EXTENDED RELEASE ORAL at 08:41

## 2022-06-04 RX ADMIN — SERTRALINE 100 MG: 100 TABLET, FILM COATED ORAL at 08:41

## 2022-06-04 RX ADMIN — PREGABALIN 25 MG: 25 CAPSULE ORAL at 21:36

## 2022-06-04 RX ADMIN — METOPROLOL SUCCINATE 25 MG: 25 TABLET, EXTENDED RELEASE ORAL at 21:35

## 2022-06-04 RX ADMIN — HEPARIN SODIUM 5000 UNITS: 5000 INJECTION INTRAVENOUS; SUBCUTANEOUS at 14:34

## 2022-06-04 RX ADMIN — SODIUM CHLORIDE, POTASSIUM CHLORIDE, SODIUM LACTATE AND CALCIUM CHLORIDE: 600; 310; 30; 20 INJECTION, SOLUTION INTRAVENOUS at 02:30

## 2022-06-04 RX ADMIN — INSULIN LISPRO 1 UNITS: 100 INJECTION, SOLUTION INTRAVENOUS; SUBCUTANEOUS at 08:42

## 2022-06-04 RX ADMIN — ACETAMINOPHEN 650 MG: 325 TABLET ORAL at 15:00

## 2022-06-04 ASSESSMENT — PAIN SCALES - GENERAL
PAINLEVEL_OUTOF10: 0
PAINLEVEL_OUTOF10: 0
PAINLEVEL_OUTOF10: 2
PAINLEVEL_OUTOF10: 0

## 2022-06-04 NOTE — CARE COORDINATION
CAMERON Following, DC order noted, CAMERON called [phone] to initiate a transfer for the Pt to go to an inpatient Pysch Unit. The transfer unit informed CAMERON that the Pt needs a resulted COVID test before they will initiate a transfer. CAMERON espinal served Dr. Lavonne Robles for the COVID order. Electronically signed by KEYONA Cardenas LSW on 6/4/2022 at 12:23 PM   [phone]    1:35PM: CAMERON received an update that the Pt's COVID test is resulted, CAMERON called [phone] and initiated the pt's transfer.     Electronically signed by KEYONA Cardenas LSW on 6/4/2022 at 1:36 PM

## 2022-06-04 NOTE — PROGRESS NOTES
Hospitalist Progress Note      PCP: Montana Joy, EUGENIE - CNP    Date of Admission: 5/31/2022    Chief Complaint: Shakes in extremities, confusion    Hospital Course:   62 y.o. female Lawnitish Gaines  - hx of obesity (BMI 43), HTN, GERD, depression, with multiple drug allergies   - Sent from [facility] with concerns for AMS. Pt also shaking/tremoring of which EMS states that the nursing staff says that patient is shaking more than usual and is asking inappropriate questions and not making sense.   - Work up showed SHAYLA and admitted for further management    Subjective:   Doing better today, complains of leg pain    Medications:  Reviewed    Infusion Medications    sodium chloride Stopped (06/03/22 1330)    dextrose       Scheduled Medications    [START ON 6/5/2022] cefdinir  300 mg Oral 2 times per day    [START ON 6/5/2022] fluconazole  200 mg Oral Daily    hydroCHLOROthiazide  12.5 mg Oral Daily    losartan  25 mg Oral Daily    busPIRone  10 mg Oral TID    sertraline  100 mg Oral Daily    haloperidol  1 mg Oral BID    gabapentin  100 mg Oral TID    pregabalin  25 mg Oral BID    folic acid  1 mg Oral Daily    metoprolol succinate  25 mg Oral BID    pantoprazole  40 mg Oral BID AC    sodium chloride flush  5-40 mL IntraVENous 2 times per day    heparin (porcine)  5,000 Units SubCUTAneous TID    insulin lispro  0-6 Units SubCUTAneous TID WC    insulin lispro  0-3 Units SubCUTAneous Nightly     PRN Meds: sodium chloride flush, sodium chloride, ondansetron **OR** ondansetron, acetaminophen **OR** acetaminophen, glucose, dextrose bolus **OR** dextrose bolus, glucagon (rDNA), dextrose      Intake/Output Summary (Last 24 hours) at 6/4/2022 1208  Last data filed at 6/4/2022 1015  Gross per 24 hour   Intake 1654.57 ml   Output 2450 ml   Net -795.43 ml       Physical Exam Performed:    BP (!) 163/96   Pulse 81   Temp 97.9 °F (36.6 °C) (Oral)   Resp 16   Ht 5' 1\" (1.549 m)   Wt 236 lb 12.4 oz (107.4 kg) SpO2 99%   BMI 44.74 kg/m²     General appearance: No acute distress  HEENT: Pupils equal, round, and reactive to light. Conjunctivae/corneas clear. Neck: Supple, with full range of motion. No jugular venous distention. Trachea midline. Respiratory:  Normal respiratory effort. Clear to auscultation, bilaterally without Rales/Wheezes/Rhonchi. Cardiovascular: Regular rate and rhythm with normal S1/S2 without murmurs, rubs or gallops. Abdomen: Soft, non-tender, non-distended with normal bowel sounds. Musculoskeletal: No clubbing, cyanosis or edema bilaterally. Full range of motion without deformity. Skin: Skin color, texture, turgor normal.  No rashes or lesions. Neurologic: Moving all extremities, no focal deficits  Psychiatric: AOX4  Capillary Refill: Brisk,< 3 seconds   Peripheral Pulses: +2 palpable, equal bilaterally       Labs:   Recent Labs     06/02/22  0814   WBC 4.7   HGB 9.3*   HCT 28.9*        Recent Labs     06/02/22  0814      K 4.2      CO2 22   BUN 22*   CREATININE 1.2   CALCIUM 9.6   PHOS 3.3     Recent Labs     06/02/22  0814   AST 19   ALT 13   BILIDIR <0.2   BILITOT 0.3   ALKPHOS 89     No results for input(s): INR in the last 72 hours. No results for input(s): Les Negro in the last 72 hours.     Urinalysis:      Lab Results   Component Value Date    NITRU POSITIVE 05/31/2022    WBCUA 21-50 05/31/2022    BACTERIA 4+ 05/31/2022    RBCUA 0-2 05/31/2022    BLOODU Negative 05/31/2022    SPECGRAV >=1.030 05/31/2022    GLUCOSEU Negative 05/31/2022       Radiology:  XR CHEST PORTABLE   Final Result      No acute radiographic abnormality of the chest.              Assessment/Plan:    Active Hospital Problems    Diagnosis Date Noted    Acute cystitis [N30.00] 06/04/2022     Priority: Medium    SHAYLA (acute kidney injury) ([facility] [address] [N17.9] 04/22/2022     Priority: Medium     Tele lead sticker ingestion - monitor for signs of abdominal pain    UTI, sepsis ruled out   Urine Cx with ecoli and candida   Cefdinir and fluconazole    Acute metabolic encephalopathy due to UTI, possible concern for unintentional gabapentin toxicity with underlying SHAYLA, underlying psych hx  waxing and waning mental status  Suspect underlying delirium vs exacerbation of bipolar  With change of medications doing much better  Will need IP psych     Shaking/tremors - concern for unintentional gabapentin toxicity with underlying SHAYLA. hold gabapentin for now. Resolved     SHAYLA, no baseline CKD hx  Cr 2.7 on admission, baseline is normal  Cr wnl, ok to be off IVF  Bladder scan prn with straigth cath parameters  Avoid NSAIDs (Ibuprofen, Aleve, Motrin, Naproxen, Toradol etc), Fleet enemas, IV contrast Dye and other nephrotoxins!     Diabetes mellitus - Hold lantus for now with SHAYLA  - SSI, hypoglycemia protocol, carb controlled diet    Lower extremity weakness, pain, concern for poly neuropathy  Continue low dose gabapentin     Hypertension hx, allow permissive hypertension     GERD - continue protonix     Morbid obesity due to excess calories Body mass index is 44.74 kg/m². - Complicating assessment and treatment. Placing patient at risk for multiple co-morbidities as well as early death and contributing to the patient's presentation.  on weight loss when appropriate. DVT Prophylaxis: Heparin  Diet: ADULT DIET;  Regular; 4 carb choices (60 gm/meal)  Code Status: Full Code    PT/OT Eval Status: ordered    Dispo -ok to dc to Yoni Fulton MD   Internal Medicine Hospitalist

## 2022-06-04 NOTE — PLAN OF CARE
discharge with patient and caregiver   Identify discharge learning needs (meds, wound care, etc)  Taken 6/3/2022 1600 by Bri Irwin RN  Discharge to home or other facility with appropriate resources: Identify barriers to discharge with patient and caregiver     Problem: Safety - Adult  Goal: Free from fall injury  6/4/2022 1107 by Sharlene Pierson RN  Outcome: Progressing  Flowsheets (Taken 6/4/2022 0827)  Free From Fall Injury: Instruct family/caregiver on patient safety  6/4/2022 0403 by Fabienne Culp RN  Outcome: Progressing  Flowsheets (Taken 6/3/2022 2004)  Free From Fall Injury: Instruct family/caregiver on patient safety     Problem: Skin/Tissue Integrity  Goal: Absence of new skin breakdown  Description: 1. Monitor for areas of redness and/or skin breakdown  2. Assess vascular access sites hourly  3. Every 4-6 hours minimum:  Change oxygen saturation probe site  4. Every 4-6 hours:  If on nasal continuous positive airway pressure, respiratory therapy assess nares and determine need for appliance change or resting period.   6/4/2022 1107 by Sharlene Pierson RN  Outcome: Progressing  6/4/2022 0403 by Fabienne Culp RN  Outcome: Progressing     Problem: ABCDS Injury Assessment  Goal: Absence of physical injury  6/4/2022 1107 by Sharlene Pierson RN  Outcome: Progressing  Flowsheets (Taken 6/4/2022 0827)  Absence of Physical Injury: Implement safety measures based on patient assessment  6/4/2022 0403 by Fabienne Culp RN  Outcome: Progressing  Flowsheets (Taken 6/3/2022 2004)  Absence of Physical Injury: Implement safety measures based on patient assessment     Problem: Chronic Conditions and Co-morbidities  Goal: Patient's chronic conditions and co-morbidity symptoms are monitored and maintained or improved  6/4/2022 1107 by Sharlene Pierson RN  Outcome: Progressing  6/4/2022 0403 by Fabienne Culp RN  Outcome: Progressing  Flowsheets  Taken 6/3/2022 2004 by Cisco Gonzales - Patient's Chronic Conditions and Co-Morbidity Symptoms are Monitored and Maintained or Improved:   Monitor and assess patient's chronic conditions and comorbid symptoms for stability, deterioration, or improvement   Collaborate with multidisciplinary team to address chronic and comorbid conditions and prevent exacerbation or deterioration   Update acute care plan with appropriate goals if chronic or comorbid symptoms are exacerbated and prevent overall improvement and discharge  Taken 6/3/2022 1600 by Cisco Bonner 34 - Patient's Chronic Conditions and Co-Morbidity Symptoms are Monitored and Maintained or Improved:   Monitor and assess patient's chronic conditions and comorbid symptoms for stability, deterioration, or improvement   Collaborate with multidisciplinary team to address chronic and comorbid conditions and prevent exacerbation or deterioration   Update acute care plan with appropriate goals if chronic or comorbid symptoms are exacerbated and prevent overall improvement and discharge     Problem: Pain  Goal: Verbalizes/displays adequate comfort level or baseline comfort level  6/4/2022 1107 by Fagn Mosqueda RN  Outcome: Progressing  6/4/2022 0403 by Terri Fuentes RN  Outcome: Progressing  Flowsheets (Taken 6/3/2022 1553 by Lisa Scherer RN)  Verbalizes/displays adequate comfort level or baseline comfort level:   Encourage patient to monitor pain and request assistance   Assess pain using appropriate pain scale   Administer analgesics based on type and severity of pain and evaluate response   Implement non-pharmacological measures as appropriate and evaluate response

## 2022-06-04 NOTE — PROGRESS NOTES
Patient seen and examined, doing well, she is medically cleared for discharge to inpatient psychiatry unit. EMIL has been completed.  has been updated.

## 2022-06-04 NOTE — PLAN OF CARE
Problem: Neurosensory - Adult  Goal: Achieves stable or improved neurological status  6/4/2022 0403 by Ashok Valderrama RN  Outcome: Progressing  Flowsheets  Taken 6/3/2022 2004 by Ashok Valderrama RN  Achieves stable or improved neurological status: Assess for and report changes in neurological status  Taken 6/3/2022 1600 by Venecia Perry RN  Achieves stable or improved neurological status:   Assess for and report changes in neurological status   Initiate measures to prevent increased intracranial pressure  6/3/2022 1418 by Venecia Perry RN  Outcome: Progressing  Flowsheets  Taken 6/3/2022 1200  Achieves stable or improved neurological status: Assess for and report changes in neurological status  Taken 6/3/2022 0900  Achieves stable or improved neurological status:   Assess for and report changes in neurological status   Initiate measures to prevent increased intracranial pressure  Goal: Achieves maximal functionality and self care  6/4/2022 0403 by Ashok Valderrama RN  Outcome: Progressing  Flowsheets  Taken 6/3/2022 2004 by Ashok Valderrama RN  Achieves maximal functionality and self care: Monitor swallowing and airway patency with patient fatigue and changes in neurological status  Taken 6/3/2022 1600 by Venecia Perry RN  Achieves maximal functionality and self care:   Monitor swallowing and airway patency with patient fatigue and changes in neurological status   Encourage and assist patient to increase activity and self care with guidance from physical therapy/occupational therapy  6/3/2022 1418 by Venecia Perry RN  Outcome: Progressing  Flowsheets  Taken 6/3/2022 1200  Achieves maximal functionality and self care:   Monitor swallowing and airway patency with patient fatigue and changes in neurological status   Encourage and assist patient to increase activity and self care with guidance from physical therapy/occupational therapy  Taken 6/3/2022 0900  Achieves maximal functionality and self care:   Monitor swallowing and airway patency with patient fatigue and changes in neurological status   Encourage and assist patient to increase activity and self care with guidance from physical therapy/occupational therapy     Problem: Discharge Planning  Goal: Discharge to home or other facility with appropriate resources  6/4/2022 0403 by Alaina Mustafa RN  Outcome: Progressing  Flowsheets  Taken 6/3/2022 2004 by Alaina Mustafa RN  Discharge to home or other facility with appropriate resources:   Identify barriers to discharge with patient and caregiver   Identify discharge learning needs (meds, wound care, etc)  Taken 6/3/2022 1600 by Wu Dumas RN  Discharge to home or other facility with appropriate resources: Identify barriers to discharge with patient and caregiver  6/3/2022 1418 by Wu Dumas RN  Outcome: Progressing  Flowsheets  Taken 6/3/2022 1200  Discharge to home or other facility with appropriate resources: Identify barriers to discharge with patient and caregiver  Taken 6/3/2022 0900  Discharge to home or other facility with appropriate resources: Identify barriers to discharge with patient and caregiver     Problem: Safety - Adult  Goal: Free from fall injury  6/4/2022 0403 by Alaina Mustafa RN  Outcome: Progressing  Flowsheets (Taken 6/3/2022 2004)  Free From Fall Injury: Instruct family/caregiver on patient safety  6/3/2022 1418 by Wu Dumas RN  Outcome: Progressing  Flowsheets (Taken 6/3/2022 1034)  Free From Fall Injury:   Instruct family/caregiver on patient safety   Based on caregiver fall risk screen, instruct family/caregiver to ask for assistance with transferring infant if caregiver noted to have fall risk factors     Problem: Skin/Tissue Integrity  Goal: Absence of new skin breakdown  Description: 1. Monitor for areas of redness and/or skin breakdown  2. Assess vascular access sites hourly  3.   Every 4-6 hours minimum:  Change oxygen saturation probe site  4. Every 4-6 hours:  If on nasal continuous positive airway pressure, respiratory therapy assess nares and determine need for appliance change or resting period.   6/4/2022 0403 by Sylvia Ortiz RN  Outcome: Progressing  6/3/2022 1418 by Geoff Seo RN  Outcome: Progressing     Problem: ABCDS Injury Assessment  Goal: Absence of physical injury  6/4/2022 0403 by Sylvia Ortiz RN  Outcome: Progressing  Flowsheets (Taken 6/3/2022 2004)  Absence of Physical Injury: Implement safety measures based on patient assessment  6/3/2022 1418 by Geoff Seo RN  Outcome: Progressing     Problem: Chronic Conditions and Co-morbidities  Goal: Patient's chronic conditions and co-morbidity symptoms are monitored and maintained or improved  6/4/2022 0403 by Sylvia Ortiz RN  Outcome: Progressing  Flowsheets  Taken 6/3/2022 2004 by Sylvia Ortiz RN  Care Plan - Patient's Chronic Conditions and Co-Morbidity Symptoms are Monitored and Maintained or Improved:   Monitor and assess patient's chronic conditions and comorbid symptoms for stability, deterioration, or improvement   Collaborate with multidisciplinary team to address chronic and comorbid conditions and prevent exacerbation or deterioration   Update acute care plan with appropriate goals if chronic or comorbid symptoms are exacerbated and prevent overall improvement and discharge  Taken 6/3/2022 1600 by Geoff Seo RN  Care Plan - Patient's Chronic Conditions and Co-Morbidity Symptoms are Monitored and Maintained or Improved:   Monitor and assess patient's chronic conditions and comorbid symptoms for stability, deterioration, or improvement   Collaborate with multidisciplinary team to address chronic and comorbid conditions and prevent exacerbation or deterioration   Update acute care plan with appropriate goals if chronic or comorbid symptoms are exacerbated and prevent overall improvement and discharge  6/3/2022 1418 by Geoff Seo RN  Outcome: Progressing  Flowsheets  Taken 6/3/2022 1200  Care Plan - Patient's Chronic Conditions and Co-Morbidity Symptoms are Monitored and Maintained or Improved:   Monitor and assess patient's chronic conditions and comorbid symptoms for stability, deterioration, or improvement   Collaborate with multidisciplinary team to address chronic and comorbid conditions and prevent exacerbation or deterioration   Update acute care plan with appropriate goals if chronic or comorbid symptoms are exacerbated and prevent overall improvement and discharge  Taken 6/3/2022 0900  Care Plan - Patient's Chronic Conditions and Co-Morbidity Symptoms are Monitored and Maintained or Improved:   Monitor and assess patient's chronic conditions and comorbid symptoms for stability, deterioration, or improvement   Collaborate with multidisciplinary team to address chronic and comorbid conditions and prevent exacerbation or deterioration   Update acute care plan with appropriate goals if chronic or comorbid symptoms are exacerbated and prevent overall improvement and discharge     Problem: Pain  Goal: Verbalizes/displays adequate comfort level or baseline comfort level  6/4/2022 0403 by Rodney Quiros RN  Outcome: Progressing  Flowsheets (Taken 6/3/2022 1553 by Serena Layton RN)  Verbalizes/displays adequate comfort level or baseline comfort level:   Encourage patient to monitor pain and request assistance   Assess pain using appropriate pain scale   Administer analgesics based on type and severity of pain and evaluate response   Implement non-pharmacological measures as appropriate and evaluate response  6/3/2022 1418 by Serena Layton RN  Outcome: Progressing  Flowsheets  Taken 6/3/2022 1249  Verbalizes/displays adequate comfort level or baseline comfort level:   Encourage patient to monitor pain and request assistance   Assess pain using appropriate pain scale   Administer analgesics based on type and severity of pain and evaluate response   Implement non-pharmacological measures as appropriate and evaluate response  Taken 6/3/2022 0956  Verbalizes/displays adequate comfort level or baseline comfort level:   Encourage patient to monitor pain and request assistance   Assess pain using appropriate pain scale   Administer analgesics based on type and severity of pain and evaluate response   Implement non-pharmacological measures as appropriate and evaluate response

## 2022-06-04 NOTE — DISCHARGE INSTR - COC
Continuity of Care Form    Patient Name: Chinyere Garcia   :  1960  MRN:  [id number]    Admit date:  2022  Discharge date:  ***    Code Status Order: Full Code   Advance Directives:      Admitting Physician:  Virginia Leyva MD  PCP: Kathia Mendoza, EUGENIE - CNP    Discharging Nurse: [facility] Unit/Room#: 3050/2805-58  Discharging Unit Phone Number: ***    Emergency Contact:   Extended Emergency Contact Information  Primary Emergency Contact: Mary Buckckney, [address] [facility] Phone: [phone]  Relation: Child  Preferred language: English   needed?  No  Secondary Emergency Contact: LucieHittite Microwavecel Manager  Mobile Phone: [phone]  Relation: Other    Past Surgical History:  Past Surgical History:   Procedure Laterality Date    CHOLECYSTECTOMY      COLECTOMY      HYSTERECTOMY      REVISION COLOSTOMY      UPPER GASTROINTESTINAL ENDOSCOPY N/A 2022    EGD BIOPSY performed by Mary Monteiro MD at [facility] ENDOSCOPY       Immunization History:   Immunization History   Administered Date(s) Administered    COVID-19, J&J, PF, 0.5 mL 2021    COVID-19, Pfizer Purple top, DILUTE for use, 12+ yrs, 30mcg/0.3mL dose 10/28/2021    Influenza, MDCK Quadv, IM, PF (Flucelvax 2 yrs and older) 10/28/2021       Active Problems:  Patient Active Problem List   Diagnosis Code    Syncope and collapse R55    Refractory migraine with aura G43.919    Psychogenic headache F45.41    Neck pain M54.2    Intentional drug overdose ([address] T50.902A    Hypertension I10    Hyperplastic polyp of intestine K63.5    Head problem R68.89    Depressive disorder F32.9    Severe sepsis (HCC) A41.9, R65.20    Type 2 diabetes mellitus ([address] E11.9    Morbid (severe) obesity due to excess calories (HCC) E66.01    Chest pain R07.9    SHAYLA (acute kidney injury) ([address] N17.9    Sepsis due to Escherichia coli ([facility]) A41.51    Acute pyelonephritis N10    Iron deficiency anemia D50.9    PUD (peptic ulcer disease) K27.9    Acute cystitis N30.00       Isolation/Infection:   Isolation            Contact          Patient Infection Status       Infection Onset Added Last Indicated Last Indicated By Review Planned Expiration Resolved Resolved By    MDRO (multi-drug resistant organism) 12/23/21 12/25/21 03/20/22 Culture, Urine        Resolved    COVID-19 (Rule Out) 03/20/22 03/20/22 03/20/22 COVID-19 & Influenza Combo (Ordered)   03/20/22 Rule-Out Test Resulted    COVID-19 (Rule Out) 12/23/21 12/23/21 12/23/21 COVID-19 (Ordered)   12/24/21 Rule-Out Test Resulted            Nurse Assessment:  Last Vital Signs: BP (!) 163/96   Pulse 81   Temp 97.9 °F (36.6 °C) (Oral)   Resp 16   Ht 5' 1\" (1.549 m)   Wt 236 lb 12.4 oz (107.4 kg)   SpO2 99%   BMI 44.74 kg/m²     Last documented pain score (0-10 scale): Pain Level: 0  Last Weight:   Wt Readings from Last 1 Encounters:   06/04/22 236 lb 12.4 oz (107.4 kg)     Mental Status:  {IP PT MENTAL STATUS:20030}    IV Access:  { EMIL IV ACCESS:399590988}    Nursing Mobility/ADLs:  Walking   {[facility] DME IBIO:517647927}  Transfer  {P DME FGYD:695085855}  Bathing  {P DME QWWP:229079887}  Dressing  {[facility] DME RYKH:389622732}  Toileting  {[facility] DME OMQI:029501768}  Feeding  {[facility] DME CEXH:998193610}  Med Admin  {[facility] DME ULZF:760045098}  Med Delivery   { EMIL MED Delivery:116285153}    Wound Care Documentation and Therapy:  Wound 06/02/22 Abdomen Mid;Lower (Active)   Wound Image   06/02/22 1146   Wound Etiology Traumatic 06/04/22 0827   Dressing Status Clean;Dry; Intact 06/04/22 0827   Wound Cleansed Soap and water 06/04/22 0827   Dressing/Treatment Open to air 06/04/22 0827   Dressing Change Due 06/05/22 06/03/22 0435   Wound Length (cm) 1 cm 06/02/22 1146   Wound Width (cm) 1 cm 06/02/22 1146   Wound Depth (cm) 0.2 cm 06/02/22 1146   Wound Surface Area (cm^2) 1 cm^2 06/02/22 1146   Wound Volume (cm^3) 0.2 cm^3 06/02/22 1146   Wound Assessment Pink/red 06/04/22 0827   Drainage Amount None 06/04/22 0827   Drainage Description Sanguinous 22 0435   Odor None 22   Radha-wound Assessment Dry/flaky; Intact 22   Margins Attached edges; Defined edges 22   Wound Thickness Description not for Pressure Injury Partial thickness 22   Number of days: 2        Elimination:  Continence: Bowel: {YES / IX:05132}  Bladder: {YES / HU:64507}  Urinary Catheter: {Urinary Catheter:604363615}   Colostomy/Ileostomy/Ileal Conduit: {YES / CT:96874}       Date of Last BM: ***    Intake/Output Summary (Last 24 hours) at 2022 1206  Last data filed at 2022 1015  Gross per 24 hour   Intake 1654.57 ml   Output 2450 ml   Net -795.43 ml     I/O last 3 completed shifts: In: 3048.9 [P.O.:170;  I.V.:2728.9; IV Piggyback:150]  Out:  [Urine:]    Safety Concerns:     [facility] Safety Concerns:991878065}    Impairments/Disabilities:      [facility] Impairments/Disabilities:368124249}    Nutrition Therapy:  Current Nutrition Therapy:   [facility] Diet List:019730013}    Routes of Feeding: {[facility] DME Other Feedings:266124341}  Liquids: {Slp liquid thickness:66817}  Daily Fluid Restriction: {CHP DME Yes amt example:879757027}  Last Modified Barium Swallow with Video (Video Swallowing Test): {Done Not Done ATDY:214082791}    Treatments at the Time of Hospital Discharge:   Respiratory Treatments: ***  Oxygen Therapy:  {Therapy; copd oxygen:99948}  Ventilator:    { CC Vent OOJJ:707538136}    Rehab Therapies: {THERAPEUTIC INTERVENTION:5933520028}  Weight Bearing Status/Restrictions: [facility] Weight Bearin}  Other Medical Equipment (for information only, NOT a DME order):  {EQUIPMENT:440349593}  Other Treatments: ***    Patient's personal belongings (please select all that are sent with patient):  {CHP DME Belongings:423341365}    RN SIGNATURE:  {Esignature:656493365}    CASE MANAGEMENT/SOCIAL WORK SECTION    Inpatient Status Date: ***    Readmission Risk Assessment Score:  Readmission Risk              Risk of Unplanned Readmission:  29           Discharging to Facility/ Agency   Name:   Address:  Phone:  Fax:    Dialysis Facility (if applicable)   Name:  Address:  Dialysis Schedule:  Phone:  Fax:    / signature: {Esignature:393458256}    PHYSICIAN SECTION    Prognosis: {Prognosis:3002442849}    Condition at Discharge: Wilber [place] Patient Condition:058642201}    Rehab Potential (if transferring to Rehab): {Prognosis:9660921664}    Recommended Labs or Other Treatments After Discharge: ***  Continue cefdinir x 5 days  Continue fluconazole x 10 days  PT/OT  BP meds reduced, titrate meds as BP tolerated (dropped Losartan from 100 mg to 50 mg and HCTZ from 25 mg to 12.5 mg)  Needs IP psych    Physician Certification: I certify the above information and transfer of Nura Valentino  is necessary for the continuing treatment of the diagnosis listed and that she requires Inpatient psych unit for less 30 days.      Update Admission H&P: No change in H&P    PHYSICIAN SIGNATURE:  Electronically signed by Christ Stephen MD on 6/4/22 at 12:08 PM EDT

## 2022-06-04 NOTE — PROGRESS NOTES
patient is complaining of right thigh pain. she is crying and moaning. I have given tylenol 2 mins ago. I gave her tyleol at 8pm as well. she stated her pain is 7/10. she does have some confusion.

## 2022-06-05 LAB
GLUCOSE BLD-MCNC: 138 MG/DL (ref 70–99)
GLUCOSE BLD-MCNC: 168 MG/DL (ref 70–99)
GLUCOSE BLD-MCNC: 184 MG/DL (ref 70–99)
GLUCOSE BLD-MCNC: 197 MG/DL (ref 70–99)
PERFORMED ON: ABNORMAL

## 2022-06-05 PROCEDURE — 6370000000 HC RX 637 (ALT 250 FOR IP): Performed by: INTERNAL MEDICINE

## 2022-06-05 PROCEDURE — 2580000003 HC RX 258: Performed by: INTERNAL MEDICINE

## 2022-06-05 PROCEDURE — 6370000000 HC RX 637 (ALT 250 FOR IP): Performed by: NURSE PRACTITIONER

## 2022-06-05 PROCEDURE — 94760 N-INVAS EAR/PLS OXIMETRY 1: CPT

## 2022-06-05 PROCEDURE — 2060000000 HC ICU INTERMEDIATE R&B

## 2022-06-05 PROCEDURE — 6360000002 HC RX W HCPCS: Performed by: INTERNAL MEDICINE

## 2022-06-05 RX ORDER — ACETAMINOPHEN 650 MG/1
650 SUPPOSITORY RECTAL EVERY 6 HOURS PRN
Status: DISCONTINUED | OUTPATIENT
Start: 2022-06-05 | End: 2022-06-06 | Stop reason: HOSPADM

## 2022-06-05 RX ORDER — ACETAMINOPHEN 325 MG/1
650 TABLET ORAL EVERY 6 HOURS PRN
Status: DISCONTINUED | OUTPATIENT
Start: 2022-06-05 | End: 2022-06-06 | Stop reason: HOSPADM

## 2022-06-05 RX ADMIN — GABAPENTIN 100 MG: 100 CAPSULE ORAL at 15:06

## 2022-06-05 RX ADMIN — HALOPERIDOL 1 MG: 1 TABLET ORAL at 21:20

## 2022-06-05 RX ADMIN — PANTOPRAZOLE SODIUM 40 MG: 40 TABLET, DELAYED RELEASE ORAL at 16:14

## 2022-06-05 RX ADMIN — GABAPENTIN 100 MG: 100 CAPSULE ORAL at 21:13

## 2022-06-05 RX ADMIN — INSULIN LISPRO 1 UNITS: 100 INJECTION, SOLUTION INTRAVENOUS; SUBCUTANEOUS at 12:32

## 2022-06-05 RX ADMIN — PANTOPRAZOLE SODIUM 40 MG: 40 TABLET, DELAYED RELEASE ORAL at 08:09

## 2022-06-05 RX ADMIN — BUSPIRONE HYDROCHLORIDE 10 MG: 10 TABLET ORAL at 08:09

## 2022-06-05 RX ADMIN — LOSARTAN POTASSIUM 25 MG: 25 TABLET, FILM COATED ORAL at 08:09

## 2022-06-05 RX ADMIN — CEFDINIR 300 MG: 300 CAPSULE ORAL at 08:09

## 2022-06-05 RX ADMIN — SODIUM CHLORIDE, PRESERVATIVE FREE 10 ML: 5 INJECTION INTRAVENOUS at 08:11

## 2022-06-05 RX ADMIN — BUSPIRONE HYDROCHLORIDE 10 MG: 10 TABLET ORAL at 21:13

## 2022-06-05 RX ADMIN — GABAPENTIN 100 MG: 100 CAPSULE ORAL at 08:10

## 2022-06-05 RX ADMIN — INSULIN GLARGINE 5 UNITS: 100 INJECTION, SOLUTION SUBCUTANEOUS at 22:43

## 2022-06-05 RX ADMIN — INSULIN LISPRO 1 UNITS: 100 INJECTION, SOLUTION INTRAVENOUS; SUBCUTANEOUS at 22:43

## 2022-06-05 RX ADMIN — CEFDINIR 300 MG: 300 CAPSULE ORAL at 21:13

## 2022-06-05 RX ADMIN — FOLIC ACID 1 MG: 1 TABLET ORAL at 08:10

## 2022-06-05 RX ADMIN — HEPARIN SODIUM 5000 UNITS: 5000 INJECTION INTRAVENOUS; SUBCUTANEOUS at 08:10

## 2022-06-05 RX ADMIN — PREGABALIN 25 MG: 25 CAPSULE ORAL at 08:09

## 2022-06-05 RX ADMIN — HALOPERIDOL 1 MG: 1 TABLET ORAL at 08:10

## 2022-06-05 RX ADMIN — BUSPIRONE HYDROCHLORIDE 10 MG: 10 TABLET ORAL at 15:06

## 2022-06-05 RX ADMIN — INSULIN LISPRO 1 UNITS: 100 INJECTION, SOLUTION INTRAVENOUS; SUBCUTANEOUS at 09:05

## 2022-06-05 RX ADMIN — SERTRALINE 100 MG: 100 TABLET, FILM COATED ORAL at 08:09

## 2022-06-05 RX ADMIN — PREGABALIN 25 MG: 25 CAPSULE ORAL at 21:14

## 2022-06-05 RX ADMIN — METOPROLOL SUCCINATE 25 MG: 25 TABLET, EXTENDED RELEASE ORAL at 08:10

## 2022-06-05 RX ADMIN — HEPARIN SODIUM 5000 UNITS: 5000 INJECTION INTRAVENOUS; SUBCUTANEOUS at 21:14

## 2022-06-05 RX ADMIN — METOPROLOL SUCCINATE 25 MG: 25 TABLET, EXTENDED RELEASE ORAL at 21:13

## 2022-06-05 RX ADMIN — HYDROCHLOROTHIAZIDE 12.5 MG: 25 TABLET ORAL at 08:10

## 2022-06-05 RX ADMIN — FLUCONAZOLE 200 MG: 200 TABLET ORAL at 08:10

## 2022-06-05 RX ADMIN — HEPARIN SODIUM 5000 UNITS: 5000 INJECTION INTRAVENOUS; SUBCUTANEOUS at 15:06

## 2022-06-05 ASSESSMENT — PAIN SCALES - GENERAL: PAINLEVEL_OUTOF10: 0

## 2022-06-05 NOTE — PROGRESS NOTES
Hospitalist Progress Note      PCP: EUGENIE Olivarez - CNP    Date of Admission: 5/31/2022    Chief Complaint: Shakes in extremities, confusion    Hospital Course:   62 y.o. female Epi Gaines  - hx of obesity (BMI 43), HTN, GERD, depression, with multiple drug allergies   - Sent from [facility] with concerns for AMS. Pt also shaking/tremoring of which EMS states that the nursing staff says that patient is shaking more than usual and is asking inappropriate questions and not making sense.   - Work up showed SHAYLA and admitted for further management    Subjective:   Doing better today, complains of leg pain but tolerable  Tolerating Po intake    Discussed w/ RN overall doing better but intermittently confused    Medications:  Reviewed    Infusion Medications    sodium chloride Stopped (06/03/22 1330)    dextrose       Scheduled Medications    cefdinir  300 mg Oral 2 times per day    fluconazole  200 mg Oral Daily    hydroCHLOROthiazide  12.5 mg Oral Daily    losartan  25 mg Oral Daily    busPIRone  10 mg Oral TID    sertraline  100 mg Oral Daily    haloperidol  1 mg Oral BID    gabapentin  100 mg Oral TID    pregabalin  25 mg Oral BID    folic acid  1 mg Oral Daily    metoprolol succinate  25 mg Oral BID    pantoprazole  40 mg Oral BID AC    sodium chloride flush  5-40 mL IntraVENous 2 times per day    heparin (porcine)  5,000 Units SubCUTAneous TID    insulin lispro  0-6 Units SubCUTAneous TID WC    insulin lispro  0-3 Units SubCUTAneous Nightly     PRN Meds: acetaminophen **OR** acetaminophen, sodium chloride flush, sodium chloride, ondansetron **OR** ondansetron, glucose, dextrose bolus **OR** dextrose bolus, glucagon (rDNA), dextrose      Intake/Output Summary (Last 24 hours) at 6/5/2022 0950  Last data filed at 6/5/2022 0553  Gross per 24 hour   Intake 400 ml   Output 1600 ml   Net -1200 ml       Physical Exam Performed:    BP (!) 178/78   Pulse 96   Temp 98 °F (36.7 °C) (Oral)   Resp 18 Ht 5' 1\" (1.549 m)   Wt 236 lb 12.4 oz (107.4 kg)   SpO2 100%   BMI 44.74 kg/m²     General appearance: No acute distress  HEENT: Pupils equal, round, and reactive to light. Conjunctivae/corneas clear. Neck: Supple, with full range of motion. No jugular venous distention. Trachea midline. Respiratory:  Normal respiratory effort. Clear to auscultation, bilaterally without Rales/Wheezes/Rhonchi. Cardiovascular: Regular rate and rhythm with normal S1/S2 without murmurs, rubs or gallops. Abdomen: Soft, non-tender, non-distended with normal bowel sounds. Musculoskeletal: No clubbing, cyanosis or edema bilaterally. Full range of motion without deformity. Skin: Skin color, texture, turgor normal.  No rashes or lesions. Neurologic: Moving all extremities, no focal deficits  Psychiatric: AOX4  Capillary Refill: Brisk,< 3 seconds   Peripheral Pulses: +2 palpable, equal bilaterally       Labs:   Recent Labs     06/04/22  1029   WBC 3.8*   HGB 8.6*   HCT 26.6*        Recent Labs     06/04/22  1029      K 3.9      CO2 20*   BUN 9   CREATININE 0.8   CALCIUM 8.7   PHOS 3.2     No results for input(s): AST, ALT, BILIDIR, BILITOT, ALKPHOS in the last 72 hours. No results for input(s): INR in the last 72 hours. No results for input(s): Anna Pace in the last 72 hours.     Urinalysis:      Lab Results   Component Value Date    NITRU POSITIVE 05/31/2022    WBCUA 21-50 05/31/2022    BACTERIA 4+ 05/31/2022    RBCUA 0-2 05/31/2022    BLOODU Negative 05/31/2022    SPECGRAV >=1.030 05/31/2022    GLUCOSEU Negative 05/31/2022       Radiology:  XR CHEST PORTABLE   Final Result      No acute radiographic abnormality of the chest.              Assessment/Plan:    Active Hospital Problems    Diagnosis Date Noted    Acute cystitis [N30.00] 06/04/2022     Priority: Medium    SHAYLA (acute kidney injury) ([facility] [N17.9] 04/22/2022     Priority: Medium     UTI, sepsis ruled out   Urine Cx with ecoli and candida Cefdinir and fluconazole    Acute metabolic encephalopathy due to UTI, possible concern for unintentional gabapentin toxicity with underlying SHAYLA, underlying psych hx  waxing and waning mental status, delirium vs exacerbation of bipolar  Improving, Seen by [facility] and will need Inpatient psych unit     Shaking/tremors - concern for unintentional gabapentin toxicity with underlying SHAYLA. Resolved, gabapentin restarted     SHAYLA, no baseline CKD hx  Cr 2.7 on admission, baseline is normal  Cr wnl, ok to be off IVF  Avoid NSAIDs (Ibuprofen, Aleve, Motrin, Naproxen, Toradol etc), Fleet enemas, IV contrast Dye and other nephrotoxins!     Diabetes mellitus - hyperglycemic as eating better, continue SSI, add lantus 5 units nightly,  hypoglycemia protocol, carb controlled diet    Lower extremity weakness, pain, concern for poly neuropathy  Continue low dose gabapentin     Hypertension hx, allow permissive hypertension     GERD - continue protonix     Morbid obesity due to excess calories Body mass index is 44.74 kg/m². - Complicating assessment and treatment. Placing patient at risk for multiple co-morbidities as well as early death and contributing to the patient's presentation.  on weight loss when appropriate. DVT Prophylaxis: Heparin  Diet: ADULT DIET;  Regular; 4 carb choices (60 gm/meal)  Code Status: Full Code    PT/OT Eval Status: following    Dispo - medically optimized for dc, pending placement     Savanna Zepeda MD   Internal Medicine Hospitalist

## 2022-06-05 NOTE — PLAN OF CARE
Problem: Discharge Planning  Goal: Discharge to home or other facility with appropriate resources  Flowsheets (Taken 6/5/2022 0642)  Discharge to home or other facility with appropriate resources: Identify barriers to discharge with patient and caregiver  Note: Case management involved for D/C planning. Problem: Safety - Adult  Goal: Free from fall injury  Flowsheets (Taken 6/5/2022 0812)  Free From Fall Injury: Instruct family/caregiver on patient safety  Note: Fall precautions in place. Bed alarm in use. Bed in locked and low position. SR up x2.        Problem: Chronic Conditions and Co-morbidities  Goal: Patient's chronic conditions and co-morbidity symptoms are monitored and maintained or improved  Recent Flowsheet Documentation  Taken 6/4/2022 2319 by Lorna Arrington RN  Care Plan - Patient's Chronic Conditions and Co-Morbidity Symptoms are Monitored and Maintained or Improved: Monitor and assess patient's chronic conditions and comorbid symptoms for stability, deterioration, or improvement

## 2022-06-06 ENCOUNTER — HOSPITAL ENCOUNTER (INPATIENT)
Age: 62
LOS: 8 days | Discharge: NURSING, REHAB OR LONG-TERM CARE | DRG: 751 | End: 2022-06-14
Attending: PSYCHIATRY & NEUROLOGY | Admitting: PSYCHIATRY & NEUROLOGY
Payer: MEDICAID

## 2022-06-06 VITALS
RESPIRATION RATE: 18 BRPM | TEMPERATURE: 98 F | DIASTOLIC BLOOD PRESSURE: 83 MMHG | HEART RATE: 86 BPM | OXYGEN SATURATION: 99 % | WEIGHT: 236.77 LBS | HEIGHT: 61 IN | BODY MASS INDEX: 44.7 KG/M2 | SYSTOLIC BLOOD PRESSURE: 135 MMHG

## 2022-06-06 LAB
GLUCOSE BLD-MCNC: 174 MG/DL (ref 70–99)
GLUCOSE BLD-MCNC: 185 MG/DL (ref 70–99)
GLUCOSE BLD-MCNC: 193 MG/DL (ref 70–99)
GLUCOSE BLD-MCNC: 242 MG/DL (ref 70–99)
PERFORMED ON: ABNORMAL

## 2022-06-06 PROCEDURE — 6360000002 HC RX W HCPCS: Performed by: INTERNAL MEDICINE

## 2022-06-06 PROCEDURE — 2580000003 HC RX 258: Performed by: INTERNAL MEDICINE

## 2022-06-06 PROCEDURE — 6370000000 HC RX 637 (ALT 250 FOR IP): Performed by: INTERNAL MEDICINE

## 2022-06-06 PROCEDURE — 1240000000 HC EMOTIONAL WELLNESS R&B

## 2022-06-06 PROCEDURE — 6370000000 HC RX 637 (ALT 250 FOR IP): Performed by: NURSE PRACTITIONER

## 2022-06-06 RX ADMIN — INSULIN GLARGINE 5 UNITS: 100 INJECTION, SOLUTION SUBCUTANEOUS at 20:58

## 2022-06-06 RX ADMIN — BUSPIRONE HYDROCHLORIDE 10 MG: 10 TABLET ORAL at 20:53

## 2022-06-06 RX ADMIN — INSULIN LISPRO 1 UNITS: 100 INJECTION, SOLUTION INTRAVENOUS; SUBCUTANEOUS at 08:26

## 2022-06-06 RX ADMIN — FOLIC ACID 1 MG: 1 TABLET ORAL at 08:23

## 2022-06-06 RX ADMIN — HEPARIN SODIUM 5000 UNITS: 5000 INJECTION INTRAVENOUS; SUBCUTANEOUS at 20:53

## 2022-06-06 RX ADMIN — GABAPENTIN 100 MG: 100 CAPSULE ORAL at 13:22

## 2022-06-06 RX ADMIN — GABAPENTIN 100 MG: 100 CAPSULE ORAL at 08:23

## 2022-06-06 RX ADMIN — GABAPENTIN 100 MG: 100 CAPSULE ORAL at 20:53

## 2022-06-06 RX ADMIN — FLUCONAZOLE 200 MG: 200 TABLET ORAL at 08:23

## 2022-06-06 RX ADMIN — SODIUM CHLORIDE, PRESERVATIVE FREE 10 ML: 5 INJECTION INTRAVENOUS at 20:54

## 2022-06-06 RX ADMIN — BUSPIRONE HYDROCHLORIDE 10 MG: 10 TABLET ORAL at 08:23

## 2022-06-06 RX ADMIN — INSULIN LISPRO 1 UNITS: 100 INJECTION, SOLUTION INTRAVENOUS; SUBCUTANEOUS at 20:58

## 2022-06-06 RX ADMIN — INSULIN LISPRO 1 UNITS: 100 INJECTION, SOLUTION INTRAVENOUS; SUBCUTANEOUS at 18:24

## 2022-06-06 RX ADMIN — HEPARIN SODIUM 5000 UNITS: 5000 INJECTION INTRAVENOUS; SUBCUTANEOUS at 08:27

## 2022-06-06 RX ADMIN — HYDROCHLOROTHIAZIDE 12.5 MG: 25 TABLET ORAL at 08:24

## 2022-06-06 RX ADMIN — LOSARTAN POTASSIUM 25 MG: 25 TABLET, FILM COATED ORAL at 08:26

## 2022-06-06 RX ADMIN — HALOPERIDOL 1 MG: 1 TABLET ORAL at 08:26

## 2022-06-06 RX ADMIN — CEFDINIR 300 MG: 300 CAPSULE ORAL at 08:23

## 2022-06-06 RX ADMIN — ACETAMINOPHEN 650 MG: 325 TABLET ORAL at 18:42

## 2022-06-06 RX ADMIN — METOPROLOL SUCCINATE 25 MG: 25 TABLET, EXTENDED RELEASE ORAL at 08:26

## 2022-06-06 RX ADMIN — SODIUM CHLORIDE, PRESERVATIVE FREE 10 ML: 5 INJECTION INTRAVENOUS at 08:28

## 2022-06-06 RX ADMIN — CEFDINIR 300 MG: 300 CAPSULE ORAL at 20:54

## 2022-06-06 RX ADMIN — INSULIN LISPRO 2 UNITS: 100 INJECTION, SOLUTION INTRAVENOUS; SUBCUTANEOUS at 13:21

## 2022-06-06 RX ADMIN — HEPARIN SODIUM 5000 UNITS: 5000 INJECTION INTRAVENOUS; SUBCUTANEOUS at 13:22

## 2022-06-06 RX ADMIN — BUSPIRONE HYDROCHLORIDE 10 MG: 10 TABLET ORAL at 13:22

## 2022-06-06 RX ADMIN — PANTOPRAZOLE SODIUM 40 MG: 40 TABLET, DELAYED RELEASE ORAL at 16:49

## 2022-06-06 RX ADMIN — PREGABALIN 25 MG: 25 CAPSULE ORAL at 08:26

## 2022-06-06 RX ADMIN — PREGABALIN 25 MG: 25 CAPSULE ORAL at 20:53

## 2022-06-06 RX ADMIN — PANTOPRAZOLE SODIUM 40 MG: 40 TABLET, DELAYED RELEASE ORAL at 08:26

## 2022-06-06 RX ADMIN — METOPROLOL SUCCINATE 25 MG: 25 TABLET, EXTENDED RELEASE ORAL at 20:53

## 2022-06-06 RX ADMIN — ACETAMINOPHEN 650 MG: 325 TABLET ORAL at 02:09

## 2022-06-06 RX ADMIN — SERTRALINE 100 MG: 100 TABLET, FILM COATED ORAL at 08:26

## 2022-06-06 ASSESSMENT — PAIN DESCRIPTION - FREQUENCY
FREQUENCY: INTERMITTENT

## 2022-06-06 ASSESSMENT — PAIN DESCRIPTION - PAIN TYPE
TYPE: CHRONIC PAIN

## 2022-06-06 ASSESSMENT — PAIN SCALES - GENERAL
PAINLEVEL_OUTOF10: 9
PAINLEVEL_OUTOF10: 8
PAINLEVEL_OUTOF10: 9
PAINLEVEL_OUTOF10: 8
PAINLEVEL_OUTOF10: 8
PAINLEVEL_OUTOF10: 7
PAINLEVEL_OUTOF10: 8
PAINLEVEL_OUTOF10: 7

## 2022-06-06 ASSESSMENT — PAIN SCALES - WONG BAKER
WONGBAKER_NUMERICALRESPONSE: 0

## 2022-06-06 ASSESSMENT — PAIN DESCRIPTION - LOCATION
LOCATION: LEG
LOCATION: BACK
LOCATION: LEG
LOCATION: BACK
LOCATION: LEG

## 2022-06-06 ASSESSMENT — PAIN DESCRIPTION - DESCRIPTORS
DESCRIPTORS: ACHING
DESCRIPTORS: ACHING;STABBING
DESCRIPTORS: ACHING
DESCRIPTORS: ACHING;STABBING

## 2022-06-06 ASSESSMENT — PAIN DESCRIPTION - ORIENTATION
ORIENTATION: RIGHT;LEFT
ORIENTATION: MID
ORIENTATION: RIGHT;LEFT
ORIENTATION: RIGHT;LEFT
ORIENTATION: MID

## 2022-06-06 ASSESSMENT — PAIN - FUNCTIONAL ASSESSMENT
PAIN_FUNCTIONAL_ASSESSMENT: PREVENTS OR INTERFERES SOME ACTIVE ACTIVITIES AND ADLS

## 2022-06-06 ASSESSMENT — PAIN DESCRIPTION - ONSET
ONSET: ON-GOING
ONSET: ON-GOING

## 2022-06-06 NOTE — CARE COORDINATION
Patient to transfer to [facility] today, transfer center awaiting bed assisgnment and will arrange for transport. Pink slip completed by MD and [facility] faxed as requested by transfer center. Original pink slip to go with patient at DC, transport form for patient left with [facility] for transport once scheduled.      Thank you,  Suhail García RN,   4th Floor Progressive Care Unit  [phone]

## 2022-06-06 NOTE — PROGRESS NOTES
Called report to Paramjit Aviles RN at [facility] to give report on patient. All questions answered at this time.  Patient is not being transferred until 10:30pm.

## 2022-06-06 NOTE — PLAN OF CARE
Problem: Neurosensory - Adult  Goal: Achieves stable or improved neurological status  Outcome: Progressing  Goal: Achieves maximal functionality and self care  Outcome: Progressing     Problem: Safety - Adult  Goal: Free from fall injury  Outcome: Progressing     Problem: Skin/Tissue Integrity  Goal: Absence of new skin breakdown  Description: 1. Monitor for areas of redness and/or skin breakdown  2. Assess vascular access sites hourly  3. Every 4-6 hours minimum:  Change oxygen saturation probe site  4. Every 4-6 hours:  If on nasal continuous positive airway pressure, respiratory therapy assess nares and determine need for appliance change or resting period. Outcome: Progressing     High fall precautions in place including sign, arm band, bed locked in low position, and bed alarm engaged. Call light within reach. Pt instructed to use call light and not to get OOB alone. Skin integrity maintained by frequent repositioning. Incontinence care provided as needed. Minimum layers utilized under pt to reduce friction/shearing and pressure injury.

## 2022-06-07 PROBLEM — F43.10 PTSD (POST-TRAUMATIC STRESS DISORDER): Status: ACTIVE | Noted: 2022-06-07

## 2022-06-07 PROBLEM — F05 DELIRIUM DUE TO ANOTHER MEDICAL CONDITION: Status: ACTIVE | Noted: 2022-06-07

## 2022-06-07 LAB
BASOPHILS ABSOLUTE: 0 K/UL (ref 0–0.2)
BASOPHILS RELATIVE PERCENT: 0.6 %
EOSINOPHILS ABSOLUTE: 0.4 K/UL (ref 0–0.6)
EOSINOPHILS RELATIVE PERCENT: 7.1 %
GLUCOSE BLD-MCNC: 240 MG/DL (ref 70–99)
GLUCOSE BLD-MCNC: 265 MG/DL (ref 70–99)
GLUCOSE BLD-MCNC: 301 MG/DL (ref 70–99)
GLUCOSE BLD-MCNC: 315 MG/DL (ref 70–99)
HCT VFR BLD CALC: 34.8 % (ref 36–48)
HEMOGLOBIN: 11.2 G/DL (ref 12–16)
LYMPHOCYTES ABSOLUTE: 1.5 K/UL (ref 1–5.1)
LYMPHOCYTES RELATIVE PERCENT: 26.3 %
MCH RBC QN AUTO: 26.4 PG (ref 26–34)
MCHC RBC AUTO-ENTMCNC: 32.1 G/DL (ref 31–36)
MCV RBC AUTO: 82.1 FL (ref 80–100)
MONOCYTES ABSOLUTE: 0.6 K/UL (ref 0–1.3)
MONOCYTES RELATIVE PERCENT: 10.6 %
NEUTROPHILS ABSOLUTE: 3.1 K/UL (ref 1.7–7.7)
NEUTROPHILS RELATIVE PERCENT: 55.4 %
PDW BLD-RTO: 13.8 % (ref 12.4–15.4)
PERFORMED ON: ABNORMAL
PLATELET # BLD: 380 K/UL (ref 135–450)
PMV BLD AUTO: 6.8 FL (ref 5–10.5)
RBC # BLD: 4.24 M/UL (ref 4–5.2)
WBC # BLD: 5.6 K/UL (ref 4–11)

## 2022-06-07 PROCEDURE — 97535 SELF CARE MNGMENT TRAINING: CPT

## 2022-06-07 PROCEDURE — 6370000000 HC RX 637 (ALT 250 FOR IP): Performed by: PSYCHIATRY & NEUROLOGY

## 2022-06-07 PROCEDURE — 1240000000 HC EMOTIONAL WELLNESS R&B

## 2022-06-07 PROCEDURE — 83036 HEMOGLOBIN GLYCOSYLATED A1C: CPT

## 2022-06-07 PROCEDURE — 6370000000 HC RX 637 (ALT 250 FOR IP)

## 2022-06-07 PROCEDURE — 99223 1ST HOSP IP/OBS HIGH 75: CPT | Performed by: PSYCHIATRY & NEUROLOGY

## 2022-06-07 PROCEDURE — 36415 COLL VENOUS BLD VENIPUNCTURE: CPT

## 2022-06-07 PROCEDURE — 85025 COMPLETE CBC W/AUTO DIFF WBC: CPT

## 2022-06-07 PROCEDURE — 97530 THERAPEUTIC ACTIVITIES: CPT

## 2022-06-07 PROCEDURE — 97166 OT EVAL MOD COMPLEX 45 MIN: CPT

## 2022-06-07 RX ORDER — INSULIN LISPRO 100 [IU]/ML
0-3 INJECTION, SOLUTION INTRAVENOUS; SUBCUTANEOUS NIGHTLY
Status: DISCONTINUED | OUTPATIENT
Start: 2022-06-07 | End: 2022-06-08

## 2022-06-07 RX ORDER — ATORVASTATIN CALCIUM 40 MG/1
40 TABLET, FILM COATED ORAL NIGHTLY
Status: DISCONTINUED | OUTPATIENT
Start: 2022-06-07 | End: 2022-06-14 | Stop reason: HOSPADM

## 2022-06-07 RX ORDER — CEFDINIR 300 MG/1
300 CAPSULE ORAL EVERY 12 HOURS SCHEDULED
Status: COMPLETED | OUTPATIENT
Start: 2022-06-07 | End: 2022-06-12

## 2022-06-07 RX ORDER — POLYETHYLENE GLYCOL 3350 17 G/17G
17 POWDER, FOR SOLUTION ORAL NIGHTLY
Status: DISCONTINUED | OUTPATIENT
Start: 2022-06-08 | End: 2022-06-14 | Stop reason: HOSPADM

## 2022-06-07 RX ORDER — BUPROPION HYDROCHLORIDE 100 MG/1
200 TABLET, EXTENDED RELEASE ORAL 2 TIMES DAILY
Status: DISCONTINUED | OUTPATIENT
Start: 2022-06-07 | End: 2022-06-14 | Stop reason: HOSPADM

## 2022-06-07 RX ORDER — HYDROCHLOROTHIAZIDE 25 MG/1
12.5 TABLET ORAL DAILY
Status: DISCONTINUED | OUTPATIENT
Start: 2022-06-08 | End: 2022-06-14 | Stop reason: HOSPADM

## 2022-06-07 RX ORDER — PANTOPRAZOLE SODIUM 40 MG/1
40 TABLET, DELAYED RELEASE ORAL 2 TIMES DAILY
Status: DISCONTINUED | OUTPATIENT
Start: 2022-06-07 | End: 2022-06-14 | Stop reason: HOSPADM

## 2022-06-07 RX ORDER — ACETAMINOPHEN 325 MG/1
650 TABLET ORAL EVERY 4 HOURS PRN
Status: DISCONTINUED | OUTPATIENT
Start: 2022-06-07 | End: 2022-06-14 | Stop reason: HOSPADM

## 2022-06-07 RX ORDER — ASPIRIN 81 MG/1
81 TABLET, CHEWABLE ORAL DAILY
Status: DISCONTINUED | OUTPATIENT
Start: 2022-06-07 | End: 2022-06-14 | Stop reason: HOSPADM

## 2022-06-07 RX ORDER — SERTRALINE HYDROCHLORIDE 100 MG/1
100 TABLET, FILM COATED ORAL DAILY
Status: DISCONTINUED | OUTPATIENT
Start: 2022-06-07 | End: 2022-06-14 | Stop reason: HOSPADM

## 2022-06-07 RX ORDER — TRAZODONE HYDROCHLORIDE 50 MG/1
25 TABLET ORAL NIGHTLY PRN
Status: DISCONTINUED | OUTPATIENT
Start: 2022-06-07 | End: 2022-06-07

## 2022-06-07 RX ORDER — DEXTROSE MONOHYDRATE 50 MG/ML
100 INJECTION, SOLUTION INTRAVENOUS PRN
Status: DISCONTINUED | OUTPATIENT
Start: 2022-06-07 | End: 2022-06-14 | Stop reason: HOSPADM

## 2022-06-07 RX ORDER — FOLIC ACID 1 MG/1
1 TABLET ORAL DAILY
Status: DISCONTINUED | OUTPATIENT
Start: 2022-06-07 | End: 2022-06-14 | Stop reason: HOSPADM

## 2022-06-07 RX ORDER — HALOPERIDOL 1 MG/1
0.5 TABLET ORAL EVERY 6 HOURS PRN
Status: DISCONTINUED | OUTPATIENT
Start: 2022-06-07 | End: 2022-06-07

## 2022-06-07 RX ORDER — INSULIN LISPRO 100 [IU]/ML
0-6 INJECTION, SOLUTION INTRAVENOUS; SUBCUTANEOUS
Status: DISCONTINUED | OUTPATIENT
Start: 2022-06-07 | End: 2022-06-08

## 2022-06-07 RX ORDER — DIAPER,BRIEF,INFANT-TODD,DISP
EACH MISCELLANEOUS 2 TIMES DAILY
Status: DISCONTINUED | OUTPATIENT
Start: 2022-06-07 | End: 2022-06-14 | Stop reason: HOSPADM

## 2022-06-07 RX ORDER — MECOBALAMIN 5000 MCG
10 TABLET,DISINTEGRATING ORAL NIGHTLY PRN
Status: DISCONTINUED | OUTPATIENT
Start: 2022-06-07 | End: 2022-06-14 | Stop reason: HOSPADM

## 2022-06-07 RX ORDER — FLUCONAZOLE 100 MG/1
200 TABLET ORAL DAILY
Status: DISCONTINUED | OUTPATIENT
Start: 2022-06-07 | End: 2022-06-14 | Stop reason: HOSPADM

## 2022-06-07 RX ORDER — LOSARTAN POTASSIUM 25 MG/1
50 TABLET ORAL DAILY
Status: DISCONTINUED | OUTPATIENT
Start: 2022-06-07 | End: 2022-06-14 | Stop reason: HOSPADM

## 2022-06-07 RX ORDER — METOPROLOL SUCCINATE 25 MG/1
25 TABLET, EXTENDED RELEASE ORAL 2 TIMES DAILY
Status: DISCONTINUED | OUTPATIENT
Start: 2022-06-07 | End: 2022-06-14 | Stop reason: HOSPADM

## 2022-06-07 RX ORDER — GABAPENTIN 300 MG/1
600 CAPSULE ORAL 3 TIMES DAILY
Status: DISCONTINUED | OUTPATIENT
Start: 2022-06-07 | End: 2022-06-14 | Stop reason: HOSPADM

## 2022-06-07 RX ORDER — FERROUS SULFATE 325(65) MG
325 TABLET ORAL
Status: DISCONTINUED | OUTPATIENT
Start: 2022-06-08 | End: 2022-06-14 | Stop reason: HOSPADM

## 2022-06-07 RX ORDER — BUSPIRONE HYDROCHLORIDE 7.5 MG/1
15 TABLET ORAL 3 TIMES DAILY
Status: DISCONTINUED | OUTPATIENT
Start: 2022-06-07 | End: 2022-06-14 | Stop reason: HOSPADM

## 2022-06-07 RX ORDER — PREGABALIN 25 MG/1
25 CAPSULE ORAL 2 TIMES DAILY
Status: DISCONTINUED | OUTPATIENT
Start: 2022-06-07 | End: 2022-06-14 | Stop reason: HOSPADM

## 2022-06-07 RX ADMIN — METOPROLOL SUCCINATE 25 MG: 25 TABLET, EXTENDED RELEASE ORAL at 20:49

## 2022-06-07 RX ADMIN — CEFDINIR 300 MG: 300 CAPSULE ORAL at 17:13

## 2022-06-07 RX ADMIN — FOLIC ACID 1 MG: 1 TABLET ORAL at 17:14

## 2022-06-07 RX ADMIN — BUPROPION HYDROCHLORIDE 200 MG: 100 TABLET, EXTENDED RELEASE ORAL at 20:51

## 2022-06-07 RX ADMIN — PANTOPRAZOLE SODIUM 40 MG: 40 TABLET, DELAYED RELEASE ORAL at 20:49

## 2022-06-07 RX ADMIN — GABAPENTIN 600 MG: 300 CAPSULE ORAL at 09:06

## 2022-06-07 RX ADMIN — SERTRALINE 100 MG: 100 TABLET, FILM COATED ORAL at 17:14

## 2022-06-07 RX ADMIN — FLUCONAZOLE 200 MG: 100 TABLET ORAL at 17:14

## 2022-06-07 RX ADMIN — GABAPENTIN 600 MG: 300 CAPSULE ORAL at 13:11

## 2022-06-07 RX ADMIN — HYDROCORTISONE: 1 CREAM TOPICAL at 20:52

## 2022-06-07 RX ADMIN — ACETAMINOPHEN 650 MG: 325 TABLET ORAL at 05:18

## 2022-06-07 RX ADMIN — BUSPIRONE HYDROCHLORIDE 15 MG: 7.5 TABLET ORAL at 20:49

## 2022-06-07 RX ADMIN — ATORVASTATIN CALCIUM 40 MG: 40 TABLET, FILM COATED ORAL at 20:48

## 2022-06-07 RX ADMIN — INSULIN LISPRO 2 UNITS: 100 INJECTION, SOLUTION INTRAVENOUS; SUBCUTANEOUS at 13:11

## 2022-06-07 RX ADMIN — PREGABALIN 25 MG: 25 CAPSULE ORAL at 09:06

## 2022-06-07 RX ADMIN — INSULIN LISPRO 2 UNITS: 100 INJECTION, SOLUTION INTRAVENOUS; SUBCUTANEOUS at 20:52

## 2022-06-07 RX ADMIN — GABAPENTIN 600 MG: 300 CAPSULE ORAL at 20:49

## 2022-06-07 RX ADMIN — LOSARTAN POTASSIUM 50 MG: 25 TABLET, FILM COATED ORAL at 17:14

## 2022-06-07 RX ADMIN — PREGABALIN 25 MG: 25 CAPSULE ORAL at 20:48

## 2022-06-07 RX ADMIN — INSULIN LISPRO 4 UNITS: 100 INJECTION, SOLUTION INTRAVENOUS; SUBCUTANEOUS at 17:12

## 2022-06-07 RX ADMIN — ASPIRIN 81 MG: 81 TABLET, CHEWABLE ORAL at 17:14

## 2022-06-07 ASSESSMENT — SLEEP AND FATIGUE QUESTIONNAIRES
DO YOU USE A SLEEP AID: NO
AVERAGE NUMBER OF SLEEP HOURS: 6
DO YOU HAVE DIFFICULTY SLEEPING: NO

## 2022-06-07 ASSESSMENT — PAIN SCALES - GENERAL
PAINLEVEL_OUTOF10: 7
PAINLEVEL_OUTOF10: 8
PAINLEVEL_OUTOF10: 0

## 2022-06-07 ASSESSMENT — LIFESTYLE VARIABLES
HOW OFTEN DO YOU HAVE A DRINK CONTAINING ALCOHOL: NEVER
HOW MANY STANDARD DRINKS CONTAINING ALCOHOL DO YOU HAVE ON A TYPICAL DAY: 1 OR 2

## 2022-06-07 ASSESSMENT — PAIN SCALES - WONG BAKER
WONGBAKER_NUMERICALRESPONSE: 8
WONGBAKER_NUMERICALRESPONSE: 0

## 2022-06-07 ASSESSMENT — PAIN DESCRIPTION - LOCATION
LOCATION: BACK
LOCATION: BACK

## 2022-06-07 ASSESSMENT — PAIN DESCRIPTION - ORIENTATION: ORIENTATION: MID

## 2022-06-07 ASSESSMENT — PAIN DESCRIPTION - DESCRIPTORS: DESCRIPTORS: ACHING;STABBING

## 2022-06-07 ASSESSMENT — PATIENT HEALTH QUESTIONNAIRE - PHQ9: SUM OF ALL RESPONSES TO PHQ QUESTIONS 1-9: 21

## 2022-06-07 NOTE — PLAN OF CARE
Problem: Anxiety  Goal: Will report anxiety at manageable levels  Description: INTERVENTIONS:  1. Administer medication as ordered  2. Teach and rehearse alternative coping skills  3. Provide emotional support with 1:1 interaction with staff  Outcome: Progressing     Problem: Coping  Goal: Pt/Family able to verbalize concerns and demonstrate effective coping strategies  Description: INTERVENTIONS:  1. Assist patient/family to identify coping skills, available support systems and cultural and spiritual values  2. Provide emotional support, including active listening and acknowledgement of concerns of patient and caregivers  3. Reduce environmental stimuli, as able  Outcome: Progressing     Problem: Confusion  Goal: Confusion, delirium, dementia, or psychosis is improved or at baseline  Description: INTERVENTIONS:  1. Assess for possible contributors to thought disturbance, including medications, impaired vision or hearing, underlying metabolic abnormalities, dehydration, psychiatric diagnoses, and notify attending LIP  2. [place] high risk fall precautions, as indicated  3. Provide frequent short contacts to provide reality reorientation, refocusing and direction  Outcome: Progressing     Problem: Depression/Self Harm  Goal: Effect of psychiatric condition will be minimized and patient will be protected from self harm  Description: INTERVENTIONS:  1. Assess impact of patient's symptoms on level of functioning, self care needs and offer support as indicated  2. Assess patient/family knowledge of depression, impact on illness and need for teaching  3. Provide emotional support, presence and reassurance  Outcome: Progressing    Pt is alert and oriented x3. Pt was up out of bed this morning w/ minimal assist and ambulated w/ SBA and walker. Incontinent in the morning but continent throughout the rest of the shift. Pt c/o neuropathic pain this morning.  Dr. Pippa Wilson notified and ordered home medications needed for pain, this was effective. Medication compliant whole without difficulty. Pt was visible on the unit, social w/ peers. Denies SI/HI/AVH, no RTIS noted. Will continue to monitor.

## 2022-06-07 NOTE — PROGRESS NOTES
Senior Purposeful Rounding    Position:Back and Repositions Self    Physical Environment:Room free from clutter, Clear path to bathroom , Adequate lighting, Bed alarm functioning and No safety hazards noted    Pain Rating/ Nonverbal Pain Behaviors:0; rests with eyes closed. Expression calm. Respirations regular and even.      Pain interventions Attempted: None    Patient Toileted:Continent and No- Void

## 2022-06-07 NOTE — PROGRESS NOTES
Senior Purposeful Rounding    Position:Back and Repositions Self    Physical Environment:Room free from clutter, Clear path to bathroom , Adequate lighting, Bed alarm functioning and No safety hazards noted    Pain Rating/ Nonverbal Pain Behaviors:0    Pain interventions Attempted: Positive effects of Tylenol present. Relaxed expression. Respirations regular.      Patient Toileted:No- Caryn Calderon
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
## 2022-06-07 NOTE — PROGRESS NOTES
Senior Purposeful Rounding    Position:Back and Repositions Self    Physical Environment:Room free from clutter, Clear path to bathroom , Adequate lighting, Bed alarm functioning and No safety hazards noted    Pain Rating/ Nonverbal Pain Behaviors:0, rests with eyes closed. Calm expression present. Respirations regular and even.      Pain interventions Attempted: None    Patient Toileted:No- Void

## 2022-06-07 NOTE — PLAN OF CARE
Patient has been seen and evaluated within 30 days by IM provider and medically cleared for admission to [facility]. Please refer to medical H&P from 5/31/2022. Vitals reviewed and stable. Labs reviewed and repeat CBC pending. Orders reviewed. Please contact with IM provider with concerns      Discharge Summary from 6/6/2022    61 y.o. female Cheyenne Gaines  - hx of obesity (BMI 43), HTN, GERD, depression, with multiple drug allergies   - Sent from [facility] with concerns for AMS. Pt also shaking/tremoring of which EMS states that the nursing staff says that patient is shaking more than usual and is asking inappropriate questions and not making sense. - Work up showed SHAYLA and admitted for further management     Problems addressed  UTI, sepsis ruled out, Urine Cx with ecoli and candida, Cefdinir and fluconazole     Acute metabolic encephalopathy due to UTI, possible concern for unintentional gabapentin toxicity with underlying SHAYLA, underlying psych hx  waxing and waning mental status, delirium vs exacerbation of bipolar  Improving, Seen by [facility] and will need Inpatient psych unit     Shaking/tremors - concern for unintentional gabapentin toxicity with underlying SHAYLA. Resolved, gabapentin restarted     SHAYLA, no baseline CKD hx  Cr 2.7 on admission, baseline is normal  Cr wnl, ok to be off IVF  Avoid NSAIDs (Ibuprofen, Aleve, Motrin, Naproxen, Toradol etc), Fleet enemas, IV contrast Dye and other nephrotoxins!     Diabetes mellitus  hyperglycemic as eating better, continue SSI, add lantus 5 units nightly,  hypoglycemia protocol, carb controlled diet     Lower extremity weakness, pain, concern for poly neuropathy  Continue low dose gabapentin     Hypertension hx, allow permissive hypertension     GERD  continue protonix     Morbid obesity due to excess calories Body mass index is 26.09 kg/m². - Complicating assessment and treatment.  Placing patient at risk for multiple co-morbidities as well as early death and contributing to the patient's presentation        Kate De La Rosa  06/07/22  11:20 AM

## 2022-06-07 NOTE — PROGRESS NOTES
Behavioral Services  Medicare Certification Upon Admission    I certify that this patient's inpatient psychiatric hospital admission is medically necessary for:    [x] (1) Treatment which could reasonably be expected to improve this patient's condition,       [x] (2) Or for diagnostic study;     AND     [x](2) The inpatient psychiatric services are provided while the individual is under the care of a physician and are included in the individualized plan of care. Estimated length of stay/service 3 d    Plan for post-hospital care:   [employer]    Electronically signed by Carlyle Duverney, MD on 6/7/2022 at 4:25 PM

## 2022-06-07 NOTE — PROGRESS NOTES
Inpatient Occupational Therapy  Evaluation and Treatment    Unit:   [facility]  Date:  6/7/2022  Patient Name:    Chinyere Garcia  Admitting diagnosis:  Acute psychosis [facility]) [F23]  Admit Date:  6/6/2022  Precautions/Restrictions/WB Status/ Lines/ Wounds/ Oxygen:  Standard [facility] Precautions  Fall Risk  History of Present Illness: per Regine TRAORE's 6/3/22 note, Chinyere Garcia is a 62 y.o. female who presented to the hospital on 05/31/2022 with a chief complaint of altered mental status. Per ED documentation, she had a recent admission for UTI with sepsis.  According to the life squad note she had shaking and tremoring which they stated nursing staff says patient is shaking more than usual and not making sense and asking inappropriate questions.  When I walk in the room I asked her why she was here and she was nonsensical in her answer.  No further history obtained. Chart review since admission indicates waxing and waning confusion, varying levels of orientation, pleasantly confused at times but has become agitated and combative the past two nights. There is documentation of V/T hallucinations and delusional thinking during her current admission. She was found to have a SHAYLA and suspected unintentional gabapentin toxicity, and is currently on fluconazole for UTI and rocephin for empiric concern for sepsis. TSH drawn 04/24/22 WNL; vitamin B-12 elevated at [address] with Liz Alarcon. She appeared anxious and restless, mood presented as labile. Speech was mumbled and difficult to understand at times; answers were not always appropriate to questions asked. States she has been in the hospital for 1 week and that she came in because she was having difficulty breathing. She was able to identify that she is established with [facility] but unable to recall her psychiatric diagnoses or other psychiatric history.     Call placed to [facility] (daughter; [phone]).  She reports that Liz Alarcon has lived in a group home for the past few years and around 3-4 weeks ago went to [address] [place] [address] due to falls. She notes Omkar has a history of bipolar disorder, multiple PD, depression. States that her cognitive abilities are intact at baseline; she is coherent with what is going on in the world. She denies delusional thinking at baseline but does note that there have been episodes in past of confusion, living in the past. She does become increasingly agitated and combative during these episodes, angry if you don't remember what she is talking about or if you don't agree with her. She has no known history of hallucinations. She does have a history of 3-4 psychiatric hospitalizations in the past, with the most recent being 2 years ago when she attempted suicide. States that Omkar has not exhibited any mood changes or verbalized any suicidal ideations recently. She is agreeable to an inpatient psychiatric admission if warranted. Pt has recently seen a neurologist due to the \"giving out\" of her   leg or hand causing her to fall or dropping objects  EMG results from 4/7/22:  Conclusion: This is an abnormal study. 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 Kan Awad is also   electrophysiologic evidence of a subacute on chronic L5-S1   radiculopathy with evidence of ongoing denervation.  Additional   findings in the right upper extremity included a median   mononeuropathy at the wrist which is moderate in severity and a   subacute radial mononeuropathy which is proximal to the take off   of the extensor digitorum communis muscle. Treatment Number:  1    Treatment Time: 796-341  Timed Code Treatment Minutes:   23  minutes   Total Treatment Time:   33   minutes    Staff Recommendations:   Assist of 1 with RW and gait belt for ambulating short distances.     Patient Goals for Therapy:  \" Get stronger-walk better \"    Discharge Recommendations:  SNF    DME needs for discharge:      defer to facility     AM-PAC Score:   16  Home Fair +  Dynamic Standing: Fair     Bed mobility:    Supine to sit:   Modified IND-used bed rail  Sit to supine:   Independent  Scooting to head of bed: Not Tested   Scooting in sitting:  Independent  Rolling:   Not Tested  Bridging:   Not Tested    Transfers:    Sit to stand:   Min A  Stand to sit:   CGA  Bed/Chair to/from toilet: Min A with RW and gait belt    Self Care: Toileting: Min A  Grooming: Min A  Dressing: Not Tested    Exercise / Activities Initiated:   Pt. Educated on role of OT. Pt. Participated in: ADL retraining    Assessment of Deficits:   Pt demonstrated decreased activity tolerance,  decreased strength,  decreased balance,  decreased bed mobility, decreased transfer skills, and decreased ADL/IADL status, decreased coping skills, decreased cognition, limited education    Pt. Limited during evaluation by . . . Decreased balance and activity tolerance, back pain (pt had to lay down on bed several times due to back pain). At end of evaluation, pt. In gathering room. Goal(s) : To be met in 3 Visits:  1). Pt. To complete interest check list.   2). Pt will participate in updated [address] [place] [address] assessment. 3). SBA with bed to toilet transfer using RW    To be met in 5 Visits:  1). Supine to Sit IND  2). Bed to Chair/commode supervision with RW  3). Upper Body Dressing with setup  4). Lower Body Dressing Min  A  5). Pt to keyshawn UE exs x10 reps    Rehabilitation Potential:  Good for goals listed above. Strengths for achieving goals include:  PLOF, Motivation and Med compliance  Barriers to achieving goals include: Decreased cognition and Limited education    Plan: To be seen 2-5x/week while in acute care setting for therapeutic exercises/act, ADL retraining, NMR and patient/caregiver ed/instruction.        Signature and License Number  Mariaa Ennis 88, OTR/L  #[id number]           If patient discharges from this facility prior to next visit, this note will serve as the Discharge Summary

## 2022-06-07 NOTE — PROGRESS NOTES
[facility]  Admission Note     Admission Type:   Admission Type: Voluntary    Reason for admission:  Reason for Admission: Depression and Anxiety    PATIENT STRENGTHS: Family support       Patient Strengths and Limitations: Lack of motivation. Addictive Behavior:   Addictive Behavior  In the Past 3 Months, Have You Felt or Has Someone Told You That You Have a Problem With  : None    Medical Problems:   Past Medical History:   Diagnosis Date    Anxiety     [facility] ([facility]     [facility]     Hernia, abdominal     Hyperlipidemia     Hypertension     PTSD (post-traumatic stress disorder)        Status EXAM:  Mental Status and Behavioral Exam  Normal: No  Level of Assistance: Total assist  Facial Expression: Flat  Affect: Congruent  Level of Consciousness: Alert  Frequency of Checks: 4 times per hour, close  Mood:Normal: No  Mood: Depressed,Sad,Anxious  Motor Activity:Normal: No  Motor Activity: Decreased  Eye Contact: Fair  Observed Behavior: Guarded,Withdrawn,Cooperative  Sexual Misconduct History: Past - no  Preception: [place] to person,[place] to time,[place] to situation,[place] to place  Attention:Normal: No  Attention: Unable to concentrate  Thought Processes: Blocking  Thought Content:Normal: No  Thought Content: Poverty of content,Phobias  Depression Symptoms: Change in energy level,Impaired concentration  Anxiety Symptoms: Generalized  Sharmin Symptoms: Poor judgment  Hallucinations: None  Delusions: No  Memory:Normal: No  Memory: Poor recent  Insight and Judgment: No  Insight and Judgment: Poor judgment,Poor insight    Tobacco Screening:  Practical Counseling, on admission, matilda X, if applicable and completed (first 3 are required if patient doesn't refuse):             ( x)  Recognizing danger situations (included triggers and roadblocks)                    ( x)  Coping skills (new ways to manage stress, exercise, relaxation techniques, changing routine, distraction)                                                           ( x)  Basic information about quitting (benefits of quitting, techniques in how to quit, available resources  ( ) Referral for counseling faxed to [facility]                                           ( ) Patient refused counseling  (x ) Patient has not smoked in the last 30 days    Metabolic Screening:    Lab Results   Component Value Date    LABA1C 8.8 04/22/2022       Lab Results   Component Value Date    CHOL 172 02/24/2022     Lab Results   Component Value Date    TRIG 411 (H) 02/24/2022     Lab Results   Component Value Date    HDL 26 (L) 02/24/2022     No components found for: LDLCAL  Lab Results   Component Value Date    LABVLDL see below 02/24/2022         There is no height or weight on file to calculate BMI. BP Readings from Last 2 Encounters:   06/06/22 (!) 145/74   06/06/22 135/83           Pt admitted with followings belongings:  Dental Appliances: None  Vision - Corrective Lenses: None  Hearing Aid: None  Jewelry: None  Body Piercings Removed: No  Clothing: Shirt,Pants  Other Valuables: At home     Patient's home medications were not brought. Patient oriented to surroundings and program expectations and copy of patient rights given. Received admission packet:  Yes. Consents reviewed, signed yes. Refused No Patient verbalize understanding:  Yes. Patient education on precautions:  Yes                   Jose Hardin RN

## 2022-06-07 NOTE — PROGRESS NOTES
4 Eyes Skin Assessment     The patient is being assessed for  Admission    I agree that 2 RN's have performed a thorough Head to Toe Skin Assessment on the patient. ALL assessment sites listed below have been assessed. Areas assessed for pressure by both nurses:   [x]   Head, Face, and Ears   [x]   Shoulders, Back, and Chest  [x]   Arms, Elbows, and Hands   [x]   Coccyx, Sacrum, and Ischum  [x]   Legs, Feet, and Heels                                Skin Assessed Under all Medical Devices by both nurses: Yes              All Mepilex Borders were peeled back and area peeked at by both nurses:  Yes  Please list where Mepilex Borders are located:  Left elbow                 Does the Patient have Skin Breakdown related to pressure?   No            Regan Prevention initiated:  Yes   Wound Care Orders initiated:  Yes    [address]  nurse consulted for Pressure Injury (Stage 3,4, Unstageable, DTI, NWPT, Complex wounds)and New or Established Ostomies:  No    Nurse 1 eSignature: Electronically signed by Estrella Richter RN on 6/7/22 at 1:27 AM EDT    **SHARE this note so that the co-signing nurse is able to place an eSignature**    Nurse 2 eSignature: Electronically signed by Kasia Benites RN on 6/7/22 at 3:45 AM EDT

## 2022-06-07 NOTE — GROUP NOTE
Group Therapy Note    Date: 6/7/2022    Group Start Time: 1000  Group End Time: 9228  Group Topic: Cognitive Skills    [address] [facility]        Group Therapy Note    Attendees: 4    Group members were given \"Slogans\" from products through the years and had to guess what each slogan belonged to. Notes:  Patient attended in group for dull duration. Patient remained engaged and interacted approrpiately with others in the group. Status After Intervention:  Improved    Participation Level:  Active Listener and Interactive    Participation Quality: Appropriate, Attentive and Sharing      Speech:  normal      Thought Process/Content: Logical      Affective Functioning: Congruent      Mood: euthymic      Level of consciousness:  Alert      Response to Learning: Able to verbalize/acknowledge new learning      Endings: None Reported    Modes of Intervention: Socialization, Exploration and Activity      Discipline Responsible: Behavorial Health Tech      Signature:  KEYONA Oliver
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
## 2022-06-08 PROBLEM — R53.81 PHYSICAL DEBILITY: Status: ACTIVE | Noted: 2022-06-08

## 2022-06-08 PROBLEM — F23 ACUTE PSYCHOSIS (HCC): Status: RESOLVED | Noted: 2022-06-06 | Resolved: 2022-06-08

## 2022-06-08 LAB
ESTIMATED AVERAGE GLUCOSE: 159.9 MG/DL
GLUCOSE BLD-MCNC: 219 MG/DL (ref 70–99)
GLUCOSE BLD-MCNC: 229 MG/DL (ref 70–99)
GLUCOSE BLD-MCNC: 277 MG/DL (ref 70–99)
GLUCOSE BLD-MCNC: 323 MG/DL (ref 70–99)
HBA1C MFR BLD: 7.2 %
PERFORMED ON: ABNORMAL

## 2022-06-08 PROCEDURE — 6370000000 HC RX 637 (ALT 250 FOR IP): Performed by: PSYCHIATRY & NEUROLOGY

## 2022-06-08 PROCEDURE — 97530 THERAPEUTIC ACTIVITIES: CPT

## 2022-06-08 PROCEDURE — 99232 SBSQ HOSP IP/OBS MODERATE 35: CPT | Performed by: PSYCHIATRY & NEUROLOGY

## 2022-06-08 PROCEDURE — 97110 THERAPEUTIC EXERCISES: CPT

## 2022-06-08 PROCEDURE — 97162 PT EVAL MOD COMPLEX 30 MIN: CPT

## 2022-06-08 PROCEDURE — 6370000000 HC RX 637 (ALT 250 FOR IP)

## 2022-06-08 PROCEDURE — 97116 GAIT TRAINING THERAPY: CPT

## 2022-06-08 PROCEDURE — 1240000000 HC EMOTIONAL WELLNESS R&B

## 2022-06-08 RX ORDER — ONDANSETRON 4 MG/1
4 TABLET, ORALLY DISINTEGRATING ORAL EVERY 8 HOURS PRN
Status: DISCONTINUED | OUTPATIENT
Start: 2022-06-08 | End: 2022-06-14 | Stop reason: HOSPADM

## 2022-06-08 RX ORDER — INSULIN LISPRO 100 [IU]/ML
0-18 INJECTION, SOLUTION INTRAVENOUS; SUBCUTANEOUS
Status: DISCONTINUED | OUTPATIENT
Start: 2022-06-08 | End: 2022-06-14 | Stop reason: HOSPADM

## 2022-06-08 RX ORDER — INSULIN LISPRO 100 [IU]/ML
0-9 INJECTION, SOLUTION INTRAVENOUS; SUBCUTANEOUS NIGHTLY
Status: DISCONTINUED | OUTPATIENT
Start: 2022-06-08 | End: 2022-06-14 | Stop reason: HOSPADM

## 2022-06-08 RX ADMIN — LOSARTAN POTASSIUM 50 MG: 25 TABLET, FILM COATED ORAL at 08:19

## 2022-06-08 RX ADMIN — POLYETHYLENE GLYCOL (3350) 17 G: 17 POWDER, FOR SOLUTION ORAL at 20:53

## 2022-06-08 RX ADMIN — FOLIC ACID 1 MG: 1 TABLET ORAL at 08:20

## 2022-06-08 RX ADMIN — SERTRALINE 100 MG: 100 TABLET, FILM COATED ORAL at 08:21

## 2022-06-08 RX ADMIN — INSULIN LISPRO 3 UNITS: 100 INJECTION, SOLUTION INTRAVENOUS; SUBCUTANEOUS at 20:59

## 2022-06-08 RX ADMIN — GABAPENTIN 600 MG: 300 CAPSULE ORAL at 14:26

## 2022-06-08 RX ADMIN — FERROUS SULFATE TAB 325 MG (65 MG ELEMENTAL FE) 325 MG: 325 (65 FE) TAB at 08:20

## 2022-06-08 RX ADMIN — INSULIN LISPRO 4 UNITS: 100 INJECTION, SOLUTION INTRAVENOUS; SUBCUTANEOUS at 13:09

## 2022-06-08 RX ADMIN — INSULIN LISPRO 3 UNITS: 100 INJECTION, SOLUTION INTRAVENOUS; SUBCUTANEOUS at 08:18

## 2022-06-08 RX ADMIN — ATORVASTATIN CALCIUM 40 MG: 40 TABLET, FILM COATED ORAL at 20:54

## 2022-06-08 RX ADMIN — FLUCONAZOLE 200 MG: 100 TABLET ORAL at 08:20

## 2022-06-08 RX ADMIN — PANTOPRAZOLE SODIUM 40 MG: 40 TABLET, DELAYED RELEASE ORAL at 08:20

## 2022-06-08 RX ADMIN — ASPIRIN 81 MG: 81 TABLET, CHEWABLE ORAL at 08:20

## 2022-06-08 RX ADMIN — INSULIN LISPRO 9 UNITS: 100 INJECTION, SOLUTION INTRAVENOUS; SUBCUTANEOUS at 17:03

## 2022-06-08 RX ADMIN — BUPROPION HYDROCHLORIDE 200 MG: 100 TABLET, EXTENDED RELEASE ORAL at 20:54

## 2022-06-08 RX ADMIN — PREGABALIN 25 MG: 25 CAPSULE ORAL at 20:54

## 2022-06-08 RX ADMIN — CEFDINIR 300 MG: 300 CAPSULE ORAL at 20:54

## 2022-06-08 RX ADMIN — HYDROCORTISONE: 1 CREAM TOPICAL at 08:21

## 2022-06-08 RX ADMIN — PANTOPRAZOLE SODIUM 40 MG: 40 TABLET, DELAYED RELEASE ORAL at 20:53

## 2022-06-08 RX ADMIN — METOPROLOL SUCCINATE 25 MG: 25 TABLET, EXTENDED RELEASE ORAL at 20:54

## 2022-06-08 RX ADMIN — GABAPENTIN 600 MG: 300 CAPSULE ORAL at 08:21

## 2022-06-08 RX ADMIN — BUPROPION HYDROCHLORIDE 200 MG: 100 TABLET, EXTENDED RELEASE ORAL at 08:38

## 2022-06-08 RX ADMIN — HYDROCHLOROTHIAZIDE 12.5 MG: 25 TABLET ORAL at 08:20

## 2022-06-08 RX ADMIN — METOPROLOL SUCCINATE 25 MG: 25 TABLET, EXTENDED RELEASE ORAL at 08:20

## 2022-06-08 RX ADMIN — GABAPENTIN 600 MG: 300 CAPSULE ORAL at 20:54

## 2022-06-08 RX ADMIN — BUSPIRONE HYDROCHLORIDE 15 MG: 7.5 TABLET ORAL at 14:26

## 2022-06-08 RX ADMIN — ACETAMINOPHEN 650 MG: 325 TABLET ORAL at 20:56

## 2022-06-08 RX ADMIN — PREGABALIN 25 MG: 25 CAPSULE ORAL at 08:20

## 2022-06-08 RX ADMIN — BUSPIRONE HYDROCHLORIDE 15 MG: 7.5 TABLET ORAL at 08:20

## 2022-06-08 RX ADMIN — BUSPIRONE HYDROCHLORIDE 15 MG: 7.5 TABLET ORAL at 20:54

## 2022-06-08 RX ADMIN — CEFDINIR 300 MG: 300 CAPSULE ORAL at 08:21

## 2022-06-08 ASSESSMENT — PAIN SCALES - GENERAL
PAINLEVEL_OUTOF10: 6
PAINLEVEL_OUTOF10: 4

## 2022-06-08 ASSESSMENT — PAIN DESCRIPTION - LOCATION: LOCATION: BACK;LEG

## 2022-06-08 NOTE — PROGRESS NOTES
Pt had witnessed fall. /82, , O2 98%, R 18. Pt did not hit head. No signs of injury noted.  Clinical and Dr. Duran Duty notified

## 2022-06-08 NOTE — FLOWSHEET NOTE
Senior Purposeful Rounding    Position:Back and Repositions Self    Physical Environment:Room free from clutter, Clear path to bathroom , Adequate lighting and No safety hazards noted    Pain Rating/ Nonverbal Pain Behaviors:0    Pain interventions Attempted: none    Patient Toileted:No- Void

## 2022-06-08 NOTE — PROGRESS NOTES
Senior Purposeful Rounding    Position:Back and Repositions Self    Physical Environment:Room free from clutter, Clear path to bathroom , Adequate lighting and No safety hazards noted    Pain Rating/ Nonverbal Pain Behaviors:0,rests with eyes closed. Respirations regular and even. Calm expression present.     Pain interventions Attempted: None    Patient Toileted:No- Void

## 2022-06-08 NOTE — PROGRESS NOTES
Senior Purposeful Rounding    Position:Back and Repositions Self    Physical Environment:Room free from clutter, Clear path to bathroom , Adequate lighting and No safety hazards noted    Pain Rating/ Nonverbal Pain Behaviors:denies    Pain interventions Attempted: None    Patient Toileted:No- Void

## 2022-06-08 NOTE — PROGRESS NOTES
Senior Purposeful Rounding    Position:Back and Repositions Self    Physical Environment:Room free from clutter, Clear path to bathroom , Adequate lighting and No safety hazards noted    Pain Rating/ Nonverbal Pain Behaviors:denies    Pain interventions Attempted: None    Patient Toileted:Continent and No- Void

## 2022-06-08 NOTE — PLAN OF CARE
Problem: ABCDS Injury Assessment  Goal: Absence of physical injury  6/8/2022 1039 by Jennifer Freire RN  Outcome: Sapphire Pimentel (Taken 6/8/2022 0544 by Cristal Mancera RN)  Absence of Physical Injury: Implement safety measures based on patient assessment  6/7/2022 2229 by Lynn Allen RN  Outcome: Progressing     Problem: Safety - Adult  Goal: Free from fall     Problem: Anxiety  Goal: Will report anxiety at manageable levels  Description: INTERVENTIONS:  1. Administer medication as ordered  2. Teach and rehearse alternative coping skills  3. Provide emotional support with 1:1 interaction with staff  6/8/2022 1039 by Jennifer Freire RN  Outcome: Progressing  6/7/2022 2229 by Lynn Allen RN  Outcome: Progressing     Problem: Coping  Goal: Pt/Family able to verbalize concerns and demonstrate effective coping strategies  Description: INTERVENTIONS:  1. Assist patient/family to identify coping skills, available support systems and cultural and spiritual values  2. Provide emotional support, including active listening and acknowledgement of concerns of patient and caregivers  3. Reduce environmental stimuli, as able  4. Instruct patient/family in relaxation techniques, as appropriate  5. Assess for spiritual pain/suffering and initiate Spiritual Care, Psychosocial Clinical Specialist consults as needed  6/8/2022 1039 by Jennifer Freire RN  Outcome: Progressing  6/7/2022 2229 by Lynn Allen RN  Outcome: Progressing   6/8/2022 1039 by Jennifer Freire RN  Outcome: Not Progressing  Flowsheets (Taken 6/8/2022 0544 by Cristal Mancera RN)  Free From Fall Injury: Instruct family/caregiver on patient safety  6/7/2022 2229 by Lynn Allen RN  Outcome: Progressing       Pt denies any additional pain related to the fall she had last shift. She remain in bed at this time despite encouragement to get up and out of room for the purpose of groups and interacting. FSBS's are elevated related to diet.  Pt is non compliant with dietary restrictions related to DM. She had empty rappers of Oreo and other cookies. As well as empty containers of multiple juices. Educated pt on importance to adhering to dietary recommendations. However   she is not prepared to make the changes. She is needy at times but otherwise pleasant and cooperative. She denies SI/HI/AVH at this time.  No observable RTIS

## 2022-06-08 NOTE — GROUP NOTE
Group Therapy Note    Date: 6/8/2022    Group Start Time: 7601  Group End Time: 1400  Group Topic: Cognitive Skills    [id number] [facility]        Group Therapy Note    Attendees: 4    Group members sat at the table and engaged in conversation about sign language. Group members were shown the alphabet in sign language and signed each others names. Notes:  Patient attended group for most of the time due to meeting with another member of his treatment team. Patient remained engaged and interacted appropriately with other members of the group. Status After Intervention:  Improved    Participation Level:  Active Listener and Interactive    Participation Quality: Appropriate and Attentive      Speech:  normal      Thought Process/Content: Logical      Affective Functioning: Congruent      Mood: Brightened, Engaging       Level of consciousness:  Alert      Response to Learning: Able to verbalize/acknowledge new learning      Endings: None Reported    Modes of Intervention: Education and Exploration      Discipline Responsible: Behavorial Health Tech      Signature:  KEYONA Bailey

## 2022-06-08 NOTE — H&P
Patient has been seen and evaluated within 30 days by IM provider and medically cleared for admission to [facility]. Please refer to medical H&P from 5/31/2022. Vitals reviewed and stable. Labs reviewed and repeat CBC pending. Orders reviewed. Please contact with IM provider with concerns      Discharge Summary from 6/6/2022    61 y.o. female Cheyenne Gaines  - hx of obesity (BMI 43), HTN, GERD, depression, with multiple drug allergies   - Sent from [facility] with concerns for AMS. Pt also shaking/tremoring of which EMS states that the nursing staff says that patient is shaking more than usual and is asking inappropriate questions and not making sense. - Work up showed SHAYLA and admitted for further management     Problems addressed  UTI, sepsis ruled out, Urine Cx with ecoli and candida, Cefdinir and fluconazole     Acute metabolic encephalopathy due to UTI, possible concern for unintentional gabapentin toxicity with underlying SHAYLA, underlying psych hx  waxing and waning mental status, delirium vs exacerbation of bipolar  Improving, Seen by [facility] and will need Inpatient psych unit     Shaking/tremors - concern for unintentional gabapentin toxicity with underlying SHAYLA. Resolved, gabapentin restarted     SHAYLA, no baseline CKD hx  Cr 2.7 on admission, baseline is normal  Cr wnl, ok to be off IVF  Avoid NSAIDs (Ibuprofen, Aleve, Motrin, Naproxen, Toradol etc), Fleet enemas, IV contrast Dye and other nephrotoxins!     Diabetes mellitus - hyperglycemic as eating better, continue SSI, add lantus 5 units nightly,  hypoglycemia protocol, carb controlled diet     Lower extremity weakness, pain, concern for poly neuropathy  Continue low dose gabapentin     Hypertension hx, allow permissive hypertension     GERD - continue protonix     Morbid obesity due to excess calories Body mass index is 84.84 kg/m². - Complicating assessment and treatment.  Placing patient at risk for multiple co-morbidities as well as early death and contributing to the patient's presentation        Kate Corrales  6/7/2022  11:30AM ob check [facility] routine iv antibiotics no sex nothing in vagina no heavy lifting no pushing no straining no strenuous activities  pain medication as needed; stool softener; dulcolax as needed if constipated  walk for exercise: helps recovery   continue prenatal vitamins daily especially whole course of breastfeeding  see your OB in the office for follow up post partum check 4-5weeks call the office to set up an appointment pain management, encourage breast feeding

## 2022-06-08 NOTE — PROGRESS NOTES
Inpatient Geriatric  Physical Therapy Evaluation and Treatment    Unit:  [facility]  Date:  6/8/2022  Patient Name:    Marybeth Naylor  Admitting diagnosis:  Acute psychosis [facility]) [F23]  Admit Date:  6/6/2022  Precautions/Restrictions/Medical Indications    Standard BHI Precautions  History of current Episode: Per Evelio H&P: \"Cheyenne Copeland is a 62year old female with a history of depression and PTSD who has had two serious UTIs in the last two months. She was admitted to [facility] for urinary sepsis just prior to coming to the [facility] and was observed having visual hallucinations and confusion. She was seen by the psychiatric consultant and ultimately sent to the [facility] for psychosis due to the fact that she has a psychiatric history. She admits to having had a lot of anxiety at [facility] because she was not allowed to get out of bed. This caused her to feel paranoid and trapped. She thinks this may have been part of why she was agitated. She is glad that she is now able to get up and walk on her own. \"  Pt has recently seen a neurologist due to the \"giving out\" of her   leg or hand causing her to fall or dropping objects  EMG results from 4/7/22:  Conclusion: This is an abnormal study. 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 Karo Meza is also   electrophysiologic evidence of a subacute on chronic L5-S1   radiculopathy with evidence of ongoing denervation.  Additional   findings in the right upper extremity included a median   mononeuropathy at the wrist which is moderate in severity and a   subacute radial mononeuropathy which is proximal to the take off   of the extensor digitorum communis muscle.      Treatment Time:  13:46-14:38  Treatment Number:  1       Discharge Recommendations from PHYSICAL perspective:                                  24/7 supervision/assist and home PT    DME needs for discharge:     Needs Met     Therapy recommendations for staff:   Stand By Assist with use of rolling walker (RW) for all transfers and ambulation   within room  within community room     Therapy recommendation for EMS Transport: can transport by wheelchair     Home Health S4 Level: Level 1- Standard        AM-PAC Mobility Score         AM-PAC Inpatient Mobility without Stair Climbing Raw Score : 15    Preadmission Environment    Pt. Lives 24/7 Assist Available   Home environment:    [place], group home  Steps to enter first floor: 4 steps to enter and hand rail unilateral  Steps to second floor: N/A  Bathroom: tub/shower unit, grab bars and standard height commode-usually sits on edge of tub  Equipment owned: [address] on order     Preadmission Status:  Pt. Able to drive: Yes but no car  Pt Fully independent with ADLs: Yes  Pt. Required assistance from family for: Cooking and Laundry (roommate does laundry-steps involved)  Pt. independent for transfers and gait and walked with No Device in apartment. Used scooter at [address]  History of falls Yes-multiple. Most recent was 3 wks ago when she rolled out of bed. Subjective:   Pt agreeable to evaluation and treatment as needed. Pleasant and cooperative throughout. Pain   Yes  Rating:moderate  Location: R groin - feels like a strain. This is relatively new for pt and she feels it is related to recent bedrest. And L leg from low back down into toes - feels more like a sting. Pain Medicine Status: Pain med requested and RN notified (requests gabapentin and lyrica). Per RN, not due yet.     Cognition    A&O to x4  Able to follow:  2 step commands    Upper Extremity ROM/Strength  Deferred to OT evaluation: please see OT note    Lower Extremity ROM / Strength (use of 5/5 scale if strength formally assessed)    AROM: WFL    Right LE  quads    4-                                       ant tibs  4-                                                hams          4-                                               iliopsoas   4-                  LEFT LE quads     4-     ant tibs       4-     hams         4-    iliopsoas    4-  Sensation       Not formally tested. Pt reports progressive N/T bilat feet. Coordination Testing:         Not tested. No deficits noted. Tone     WNL     Trunk Control   Good    Vision:   WFL    Hearing:   WFL    Balance  Static Sitting:  Good   Dynamic Sitting: Good   Static Standing:  Good -  (with walker)  Dynamic Standing: Good -    (with walker)    Bed mobility    Rolling to left:    Initially declined due to groin pain. Later, was able to perform with difficulty at SBA. Rolling to right:   Same as above. Scooting in supine:   Not Tested  Scooting in sitting:   Supervision  Supine to sit with HOB flat:  Min A  Sit to supine with HOB flat:  SBA    Transfers    Sit to stand:  Initially trialed x 5-6 reps with EOB maximally lowered. Pt unable to stand without holding both hands on walker and therapist stabilizing walker from tipping. After bed raised ~2 inches, pt able to complete x 3 reps with SBA, and cues for hand placement on bed. Stand to sit:  Supervision  Bed to chair:  Not Tested    Toileting  Sit>stand from toilet (standard height):  Supervision  Stand>sit to toilet (standard height):   Supervision  Radha-care:      Not Tested  Hand hygiene:     Not Tested  LE pants management:   Not Tested    Gait gait completed as indicated below  Deviations (firm surface/linoleum):  forward flexed posture, step through pattern and decreased step length bilaterally and delayed initiation of hip flexion for swing phase on RLE (presumed 2/2 groin pain)  Level of assist:    SBA  Assistive Device Used:    gait belt and rolling walker (RW)  Distance:  40 ft + 150 ft + 40 ft  Cued on: foot clearance     Stair Training deferred, pt unsafe/ not appropriate to complete stairs at this time. Pt reports she has been unable to do stairs at her AL facility due to the back/leg pain.      Activity Tolerance   Pain noted with bed mobility, and cumulative activity over course of session. Positioning Needs   Pt in community room, no alarm needed, positioned in proper neutral alignment and pressure relief provided. Needs within reach. Within line of sight of nursing staff. Therapeutic Exercises Initiated    Exercises in BOLD performed in unit today. Items not bolded are carried forward from prior visits for continuity of the record. Exercise/Equipment Resistance/Repetitions Other comments     Double bent knee fallout to L side  2 x 10 reps      Supine abduction  1 x 10 reps bilat                           Patient/Family Education   Educated on importance of continued strengthening to facilitate functional return  Educated on importance of continued activity   Educated on safety with transfers  Educated on proper gait pattern and RW distance  Educated on proper positioning and posture with functional mobility  Educated on role of PT, POC as well as importance of continued activity    Patients response to evaluation/treatment:   Pt tolerated treatment well    Impairments/ Deficits  Decreased strength, Positive neurological findings, Increased pain, Abnormality of gait, Decreased balance and Decreased functional status below baseline    Assessment of Deficits  Pt is 62year old female presenting within the [facility] behavioral health institute at [facility] with medical diagnosis of Acute psychosis ([facility] [address] [F23]. Presents today with limited ability to perform ADLs, gait abnormality , decreased transfer ability  and poor balance. Would benefit from continued IP PT to address noted impairments, improve pain and restore function. Recommending 24/7 supervision/assist as pt is having inc difficulty managing ADLs in current AL environment 2/2 chronic pain, as well as home PT to address identified neural components of her pain. Goals :   Patient Goals for Therapy: not stated. Agreeable to all evaluation activities. To be met in 3 visits:  1).  Demonstrate [place] in HEP. 2). Trial use of exercise bike, per pt request.    To be met in 6 visits:  1). Supine to/from sit  Independent to promote return to functional ADLs  2). Sit to/from stand Modified Independent to promote return to functional ADLs  3). Gait: Ambulate 150 ft. with Modified Independent and use of LRAD (least restrictive assistive device) demonstrating improved gait pattern  4). Tolerate B LE exercises 3 sets of 10-15 reps to improve strength   5). Tolerated standing balance exercises with improved balance to Good  grade    Rehabilitation Potential    Good  Strengths for achieving goals include:   Pt motivated and Pt cooperative  Barriers to achieving goals include:    None. Plan    To be seen 2-5x/week while in [facility] setting for   Therapeutic Exercise, Neuromuscular Re-Education, Gait training and Therapeutic Activity     Timed Code Treatment Minutes:  42 minutes  Total Treatment Time:   52 minutes    Electronically signed by Jane Hendricks PT on 6/8/22 at 5:27 PM EDT    If patient discharges from this facility prior to next visit, this note will serve as the Discharge Summary.

## 2022-06-08 NOTE — PLAN OF CARE
[facility]  Initial Interdisciplinary Treatment Plan NOTE    Review Date & Time: 6/8/22 0950    Patient was not in treatment team    Admission Type:   Admission Type: Voluntary    Reason for admission:  Reason for Admission: Depression and Anxiety      Estimated Length of Stay Update:  3/5 days  Estimated Discharge Date Update: 6/11/22-6/13/22    EDUCATION:   Learner Progress Toward Treatment Goals: Reviewed results and recommendations of this team    Method: Individual    Outcome: Verbalized understanding    PATIENT GOALS: none expressed    PLAN/TREATMENT RECOMMENDATIONS UPDATE:evaluate and treat    GOALS UPDATE:   Time frame for Short-Term Goals: 2 days    Vel Rincon RN

## 2022-06-08 NOTE — BH NOTE
Spoke with pt's Keenan1 Brock Villatoro and obtained collateral (cell: [phone], office: [phone]). Hernánkev Trujillor stated pt has been in and out of the hospital for various medical conditions, including UTIs, over the last several months. Pt currently lives in an apartment with a roommate and has caregivers who come in to help her daily. The caregivers are not present 24 hrs/day, and pt is struggling to care for herself when they are gone. Pt is incontinent and is unable to provide for her incontinence care and her ADL's due to her mobility issues. She is also unable to manage her medications and care for her diabetes and other medical conditions due to her confusion and anxiety. Jovanyangeles Clark stated they feel very strongly that pt unsafe and unable to provide for her basic needs without 24 hr care. They have discussed Assisted Living/Long-term care with pt, and she is in agreement with needing this level of care for her health and safety.

## 2022-06-08 NOTE — PLAN OF CARE
Problem: Chronic Conditions and Co-morbidities  Goal: Patient's chronic conditions and co-morbidity symptoms are monitored and maintained or improved  Outcome: Progressing     Problem: Skin/Tissue Integrity  Goal: Absence of new skin breakdown  Description: 1. Monitor for areas of redness and/or skin breakdown  2. Assess vascular access sites hourly  3. Every 4-6 hours minimum:  Change oxygen saturation probe site  4. Every 4-6 hours:  If on nasal continuous positive airway pressure, respiratory therapy assess nares and determine need for appliance change or resting period. Outcome: Progressing     Problem: Safety - Adult  Goal: Free from fall injury  Outcome: Progressing     Problem: ABCDS Injury Assessment  Goal: Absence of physical injury  Outcome: Progressing     Problem: Pain  Goal: Verbalizes/displays adequate comfort level or baseline comfort level  Outcome: Progressing     Problem: Anxiety  Goal: Will report anxiety at manageable levels  Description: INTERVENTIONS:  1. Administer medication as ordered  2. Teach and rehearse alternative coping skills  3. Provide emotional support with 1:1 interaction with staff  6/7/2022 2229 by Natali Philip RN  Outcome: Progressing     Problem: Coping  Goal: Pt/Family able to verbalize concerns and demonstrate effective coping strategies  Description: INTERVENTIONS:  1. Assist patient/family to identify coping skills, available support systems and cultural and spiritual values  2. Provide emotional support, including active listening and acknowledgement of concerns of patient and caregivers  3. Reduce environmental stimuli, as able  4. Instruct patient/family in relaxation techniques, as appropriate  5.  Assess for spiritual pain/suffering and initiate Spiritual Care, Psychosocial Clinical Specialist consults as needed  6/7/2022 2229 by Natali Philip RN  Outcome: Progressing     Problem: Confusion  Goal: Confusion, delirium, dementia, or psychosis is improved or at baseline  Description: INTERVENTIONS:  1. Assess for possible contributors to thought disturbance, including medications, impaired vision or hearing, underlying metabolic abnormalities, dehydration, psychiatric diagnoses, and notify attending LIP  2. [place] high risk fall precautions, as indicated  3. Provide frequent short contacts to provide reality reorientation, refocusing and direction  4. Decrease environmental stimuli, including noise as appropriate  5. Monitor and intervene to maintain adequate nutrition, hydration, elimination, sleep and activity  6. If unable to ensure safety without constant attention obtain sitter and review sitter guidelines with assigned personnel  7. Initiate Psychosocial CNS and Spiritual Care consult, as indicated  6/7/2022 2229 by Tristen Baumann RN  Outcome: Progressing     Problem: Behavior  Goal: Pt/Family maintain appropriate behavior and adhere to behavioral management agreement, if implemented  Description: INTERVENTIONS:  1. Assess patient/family's coping skills and  non-compliant behavior (including use of illegal substances)  2. Notify security of behavior or suspected illegal substances which indicate the need for search of the patient and/or belongings  3. Encourage verbalization of thoughts and concerns in a socially appropriate manner  4. Utilize positive, consistent limit setting strategies supporting safety of patient, staff and others  5. Encourage participation in the decision making process about the behavioral management agreement  6. Implement a Health Care Agreement if patient meets criteria  7. If a patient's behavior jeopardizes the safety of the patient, staff, or others refer to organization policy. If a visitor's behavior poses a threat to safety call refer to organization policy. 8. Initiate consult with , Psychosocial CNS, Spiritual Care as appropriate  Outcome: Progressing     Patient free of injury or falls. Occasionally ambulates with use of roller walker. Denies pain. Patient denies SI/HI,VH/AH. Affect flat. Alert and oriented. Pleasant and cooperative with care. Withdrawn to room this evening. Snack and fluids provided. Blood Sugar 301 this evening. Medication compliant.

## 2022-06-08 NOTE — FLOWSHEET NOTE
Senior Purposeful Rounding    Position:Repositions Self    Physical Environment:Room free from clutter, Clear path to bathroom , Adequate lighting and No safety hazards noted    Pain Rating/ Nonverbal Pain Behaviors:0    Pain interventions Attempted: none    Patient Toileted:No- Void

## 2022-06-08 NOTE — PROGRESS NOTES
Department of Psychiatry  AttendingProgress Note    Crista Corey is no longer confused, no longer has hallucinations. She is oriented times 4. She agrees that the mental status changes she was experiencing at [facility] was due to delirium related to her UTI. She is tolerating her meds well. She agrees that she is having trouble taking care of her own ADLs at home and that she would benefit from alternative placement. The treatment team met and discussed the treatment plan. OBJECTIVE    Physical  Vitals:    Blood pressure 139/82, pulse (!) 113, temperature 98.4 °F (36.9 °C), temperature source Oral, resp. rate 18, height 5' 5\" (1.651 m), weight 224 lb (101.6 kg), SpO2 98 %.   General appearance:  []  appears age, [x]  appears older than stated age,     [x]  adequately dressed and groomed, [] disheveled,                [x]  in no acute distress, [] appears mildly distressed,      []  Other:      MUSCULOSKELETAL:   Gait:   [] normal, [x] antalgic, [] unsteady, [] in bed, gait not evaluated   Station:  [] erect, [x] sitting, [] recumbent, [] other        Strength/tone:  [x] muscle strength and tone appear consistent with age and condition     [] atrophy      [] abnormal movements  PSYCHIATRIC:    Relatedness:  [x] cooperative, [] guarded, [] indifferent, [] hostile,      [] sedated  Speech:  [x] normal prosody, [] pressured, [] decreased volume,    [] slurred, [] halting, [] slowed, [] loud     [] echolalia, [] incoherent, [] stuttering   Eye contact:  [x] direct, [] avoidant, [] intense  Kinetics:  [x] normal, [] increased, [] decreased  Mood:   [x] euthymic, [] depressed, [] anxious, [] irritable,     [] labile  Affect:   [x] normal range, [] constricted, [] depressed, [] anxious,     [] angry, [] blunted, [] flat  Hallucinations  [x] denies, [] auditory,  [] visual,  [] olfactory, [] tactile  Delusions  [x] none, [] grandiose,  [] jealous,  [] persecutory,  [] somatic,     [] bizarre  Preoccupations  [] none, [] violence, [] obsessions, [] other     Suicidal ideation  [x] denies, [] endorses  Homicidal ideation [x] denies, [] endorses  Thought process: [x] logical, [] circumstantial, [] tangential     [] concrete, [] disorganized  Associations:  [x] logical and coherent, [] loosening, [] disorganized  Insight:   [x] good, [] fair, [] poor  Judgment:  [x] good, [] fair, [] poor  Attention and concentration:     [x] intact, [] limited, [] able to focus on interview,     [] grossly impaired  Orientation:  [x] person, place, time, situation     [] disoriented to:     Memory:  Remote memory [x] intact, [] impaired     Recent memory  [x] intact, [] impaired          Data  Labs:   Admission on 06/06/2022   Component Date Value Ref Range Status    POC Glucose 06/07/2022 265* 70 - 99 mg/dl Final    Performed on 06/07/2022 ACCU-CHEK   Final    WBC 06/07/2022 5.6  4.0 - 11.0 K/uL Final    RBC 06/07/2022 4.24  4.00 - 5.20 M/uL Final    Hemoglobin 06/07/2022 11.2* 12.0 - 16.0 g/dL Final    Hematocrit 06/07/2022 34.8* 36.0 - 48.0 % Final    MCV 06/07/2022 82.1  80.0 - 100.0 fL Final    MCH 06/07/2022 26.4  26.0 - 34.0 pg Final    MCHC 06/07/2022 32.1  31.0 - 36.0 g/dL Final    RDW 06/07/2022 13.8  12.4 - 15.4 % Final    Platelets 39/82/9694 380  135 - 450 K/uL Final    MPV 06/07/2022 6.8  5.0 - 10.5 fL Final    Neutrophils % 06/07/2022 55.4  % Final    Lymphocytes % 06/07/2022 26.3  % Final    Monocytes % 06/07/2022 10.6  % Final    Eosinophils % 06/07/2022 7.1  % Final    Basophils % 06/07/2022 0.6  % Final    Neutrophils Absolute 06/07/2022 3.1  1.7 - 7.7 K/uL Final    Lymphocytes Absolute 06/07/2022 1.5  1.0 - 5.1 K/uL Final    Monocytes Absolute 06/07/2022 0.6  0.0 - 1.3 K/uL Final    Eosinophils Absolute 06/07/2022 0.4  0.0 - 0.6 K/uL Final    Basophils Absolute 06/07/2022 0.0  0.0 - 0.2 K/uL Final    Hemoglobin A1C 06/07/2022 7.2  See comment % Final    Comment: Comment:  Diagnosis of Diabetes: > or = 6.5%  Increased risk of diabetes (Prediabetes): 5.7-6.4%  Glycemic Control: Nonpregnant Adults: <7.0%                    Pregnant: <6.0%        eAG 06/07/2022 159.9  mg/dL Final    POC Glucose 06/07/2022 240* 70 - 99 mg/dl Final    Performed on 06/07/2022 ACCU-CHEK   Final    POC Glucose 06/07/2022 315* 70 - 99 mg/dl Final    Performed on 06/07/2022 ACCU-CHEK   Final    POC Glucose 06/07/2022 301* 70 - 99 mg/dl Final    Performed on 06/07/2022 ACCU-CHEK   Final    POC Glucose 06/08/2022 277* 70 - 99 mg/dl Final    Performed on 06/08/2022 ACCU-CHEK   Final    POC Glucose 06/08/2022 323* 70 - 99 mg/dl Final    Performed on 06/08/2022 ACCU-CHEK   Final            Medications  Current Facility-Administered Medications: ondansetron (ZOFRAN-ODT) disintegrating tablet 4 mg, 4 mg, Oral, Q8H PRN  insulin lispro (HUMALOG) injection vial 0-18 Units, 0-18 Units, SubCUTAneous, TID WC  insulin lispro (HUMALOG) injection vial 0-9 Units, 0-9 Units, SubCUTAneous, Nightly  acetaminophen (TYLENOL) tablet 650 mg, 650 mg, Oral, Q4H PRN  gabapentin (NEURONTIN) capsule 600 mg, 600 mg, Oral, TID  pregabalin (LYRICA) capsule 25 mg, 25 mg, Oral, BID  glucose chewable tablet 16 g, 4 tablet, Oral, PRN  dextrose bolus 10% 125 mL, 125 mL, IntraVENous, PRN **OR** dextrose bolus 10% 250 mL, 250 mL, IntraVENous, PRN  glucagon (rDNA) injection 1 mg, 1 mg, IntraMUSCular, PRN  dextrose 5 % solution, 100 mL/hr, IntraVENous, PRN  aspirin chewable tablet 81 mg, 81 mg, Oral, Daily  atorvastatin (LIPITOR) tablet 40 mg, 40 mg, Oral, Nightly  buPROPion (WELLBUTRIN SR) extended release tablet 200 mg, 200 mg, Oral, BID  busPIRone (BUSPAR) tablet 15 mg, 15 mg, Oral, TID  cefdinir (OMNICEF) capsule 300 mg, 300 mg, Oral, 2 times per day  ferrous sulfate (IRON 325) tablet 325 mg, 325 mg, Oral, Daily with breakfast  fluconazole (DIFLUCAN) tablet 200 mg, 200 mg, Oral, Daily  folic acid (FOLVITE) tablet 1 mg, 1 mg, Oral, Daily  hydroCHLOROthiazide (HYDRODIURIL) tablet 12.5 mg, 12.5 mg, Oral, Daily  hydrocortisone 1 % cream, , Topical, BID  Liraglutide (VICTOZA) SC injection 3 mg, 3 mg, SubCUTAneous, Daily  losartan (COZAAR) tablet 50 mg, 50 mg, Oral, Daily  metoprolol succinate (TOPROL XL) extended release tablet 25 mg, 25 mg, Oral, BID  pantoprazole (PROTONIX) tablet 40 mg, 40 mg, Oral, BID  polyethylene glycol (GLYCOLAX) packet 17 g, 17 g, Oral, Nightly  sertraline (ZOLOFT) tablet 100 mg, 100 mg, Oral, Daily  melatonin disintegrating tablet 10 mg, 10 mg, Oral, Nightly PRN    ASSESSMENT AND PLAN    Principal Problem:    Delirium due to another medical condition  Active Problems:    PTSD (post-traumatic stress disorder)    Physical debility    Morbid (severe) obesity due to excess calories ([facility] [address]  Resolved Problems:    Acute psychosis ([facility] [address]       1. Patient's symptoms are improving  2. Probable discharge is unclear at this time  3. Discharge planning is incomplete-- await placement planning      Plan:  Continue current meds      Total time with patient was 40 minutes and more than 50 % of that time was spent counseling the patient on their symptoms, treatment and expected goals.

## 2022-06-08 NOTE — PROGRESS NOTES
Senior Purposeful Rounding    Position:Back and Repositions Self    Physical Environment:Room free from clutter, Clear path to bathroom , Adequate lighting, Bed alarm functioning and No safety hazards noted    Pain Rating/ Nonverbal Pain Behaviors:0, rests with eyes closed. Respirations regular and even.     Pain interventions Attempted: None    Patient Toileted:No- Void

## 2022-06-09 LAB
GLUCOSE BLD-MCNC: 160 MG/DL (ref 70–99)
GLUCOSE BLD-MCNC: 222 MG/DL (ref 70–99)
GLUCOSE BLD-MCNC: 243 MG/DL (ref 70–99)
GLUCOSE BLD-MCNC: 251 MG/DL (ref 70–99)
PERFORMED ON: ABNORMAL

## 2022-06-09 PROCEDURE — 99231 SBSQ HOSP IP/OBS SF/LOW 25: CPT

## 2022-06-09 PROCEDURE — 6370000000 HC RX 637 (ALT 250 FOR IP): Performed by: PSYCHIATRY & NEUROLOGY

## 2022-06-09 PROCEDURE — 6370000000 HC RX 637 (ALT 250 FOR IP)

## 2022-06-09 PROCEDURE — 97530 THERAPEUTIC ACTIVITIES: CPT

## 2022-06-09 PROCEDURE — 1240000000 HC EMOTIONAL WELLNESS R&B

## 2022-06-09 PROCEDURE — 99232 SBSQ HOSP IP/OBS MODERATE 35: CPT | Performed by: PSYCHIATRY & NEUROLOGY

## 2022-06-09 RX ADMIN — PREGABALIN 25 MG: 25 CAPSULE ORAL at 20:16

## 2022-06-09 RX ADMIN — PREGABALIN 25 MG: 25 CAPSULE ORAL at 09:32

## 2022-06-09 RX ADMIN — FLUCONAZOLE 200 MG: 100 TABLET ORAL at 09:32

## 2022-06-09 RX ADMIN — INSULIN LISPRO 3 UNITS: 100 INJECTION, SOLUTION INTRAVENOUS; SUBCUTANEOUS at 20:18

## 2022-06-09 RX ADMIN — GABAPENTIN 600 MG: 300 CAPSULE ORAL at 15:25

## 2022-06-09 RX ADMIN — SERTRALINE 100 MG: 100 TABLET, FILM COATED ORAL at 09:32

## 2022-06-09 RX ADMIN — METOPROLOL SUCCINATE 25 MG: 25 TABLET, EXTENDED RELEASE ORAL at 09:34

## 2022-06-09 RX ADMIN — PANTOPRAZOLE SODIUM 40 MG: 40 TABLET, DELAYED RELEASE ORAL at 09:33

## 2022-06-09 RX ADMIN — ASPIRIN 81 MG: 81 TABLET, CHEWABLE ORAL at 09:33

## 2022-06-09 RX ADMIN — CEFDINIR 300 MG: 300 CAPSULE ORAL at 09:34

## 2022-06-09 RX ADMIN — BUSPIRONE HYDROCHLORIDE 15 MG: 7.5 TABLET ORAL at 09:32

## 2022-06-09 RX ADMIN — BUPROPION HYDROCHLORIDE 200 MG: 100 TABLET, EXTENDED RELEASE ORAL at 20:15

## 2022-06-09 RX ADMIN — GABAPENTIN 600 MG: 300 CAPSULE ORAL at 09:34

## 2022-06-09 RX ADMIN — ATORVASTATIN CALCIUM 40 MG: 40 TABLET, FILM COATED ORAL at 20:16

## 2022-06-09 RX ADMIN — FOLIC ACID 1 MG: 1 TABLET ORAL at 09:33

## 2022-06-09 RX ADMIN — CEFDINIR 300 MG: 300 CAPSULE ORAL at 20:16

## 2022-06-09 RX ADMIN — PANTOPRAZOLE SODIUM 40 MG: 40 TABLET, DELAYED RELEASE ORAL at 20:15

## 2022-06-09 RX ADMIN — ACETAMINOPHEN 650 MG: 325 TABLET ORAL at 06:59

## 2022-06-09 RX ADMIN — LOSARTAN POTASSIUM 50 MG: 25 TABLET, FILM COATED ORAL at 09:34

## 2022-06-09 RX ADMIN — BUSPIRONE HYDROCHLORIDE 15 MG: 7.5 TABLET ORAL at 20:16

## 2022-06-09 RX ADMIN — BUPROPION HYDROCHLORIDE 200 MG: 100 TABLET, EXTENDED RELEASE ORAL at 09:36

## 2022-06-09 RX ADMIN — BUSPIRONE HYDROCHLORIDE 15 MG: 7.5 TABLET ORAL at 15:25

## 2022-06-09 RX ADMIN — METOPROLOL SUCCINATE 25 MG: 25 TABLET, EXTENDED RELEASE ORAL at 20:16

## 2022-06-09 RX ADMIN — INSULIN LISPRO 9 UNITS: 100 INJECTION, SOLUTION INTRAVENOUS; SUBCUTANEOUS at 09:34

## 2022-06-09 RX ADMIN — ONDANSETRON 4 MG: 4 TABLET, ORALLY DISINTEGRATING ORAL at 09:33

## 2022-06-09 RX ADMIN — INSULIN LISPRO 3 UNITS: 100 INJECTION, SOLUTION INTRAVENOUS; SUBCUTANEOUS at 17:10

## 2022-06-09 RX ADMIN — GABAPENTIN 600 MG: 300 CAPSULE ORAL at 20:15

## 2022-06-09 RX ADMIN — FERROUS SULFATE TAB 325 MG (65 MG ELEMENTAL FE) 325 MG: 325 (65 FE) TAB at 09:32

## 2022-06-09 RX ADMIN — INSULIN LISPRO 9 UNITS: 100 INJECTION, SOLUTION INTRAVENOUS; SUBCUTANEOUS at 12:30

## 2022-06-09 RX ADMIN — HYDROCHLOROTHIAZIDE 12.5 MG: 25 TABLET ORAL at 09:32

## 2022-06-09 ASSESSMENT — PAIN DESCRIPTION - LOCATION: LOCATION: HEAD

## 2022-06-09 ASSESSMENT — PAIN SCALES - GENERAL
PAINLEVEL_OUTOF10: 6
PAINLEVEL_OUTOF10: 8
PAINLEVEL_OUTOF10: 0

## 2022-06-09 ASSESSMENT — PAIN - FUNCTIONAL ASSESSMENT: PAIN_FUNCTIONAL_ASSESSMENT: ACTIVITIES ARE NOT PREVENTED

## 2022-06-09 ASSESSMENT — PAIN DESCRIPTION - DESCRIPTORS: DESCRIPTORS: ACHING

## 2022-06-09 ASSESSMENT — PAIN SCALES - WONG BAKER: WONGBAKER_NUMERICALRESPONSE: 8

## 2022-06-09 NOTE — PROGRESS NOTES
Department of Psychiatry  AttendingProgress Note    Liz Alarcon has worked with both OT and PT. Conclusion has been that she will require SNF. Liz Alarcon is in good spirits and is looking forward to getting stronger. No hallucinations, no delusions:  Delirium resolved. Tolerating all meds. The treatment team met and discussed the treatment plan. SUBJECTIVE:        Suicidal ideation:  denies suicidal ideation. Patient does not have medication side effects. ROS: Patient has new complaints: no  Sleeping adequately:  Yes   Appetite adequate: Yes  Attending groups: No:       OBJECTIVE    Physical  Vitals:    Blood pressure 120/85, pulse 95, temperature 98.4 °F (36.9 °C), temperature source Oral, resp. rate 16, height 5' 5\" (1.651 m), weight 224 lb (101.6 kg), SpO2 99 %.   General appearance:  []  appears age, [x]  appears older than stated age,     [x]  adequately dressed and groomed, [] disheveled,                [x]  in no acute distress, [] appears mildly distressed,      []  Other:      MUSCULOSKELETAL:   Gait:   [] normal, [] antalgic, [x] unsteady, uses walker [] in bed, gait not evaluated   Station:  [] erect, [x] sitting, [] recumbent, [] other        Strength/tone:  [x] muscle strength and tone appear consistent with age and condition     [] atrophy      [] abnormal movements  PSYCHIATRIC:    Relatedness:  [x] cooperative, [] guarded, [] indifferent, [] hostile,      [] sedated  Speech:  [x] normal prosody, [] pressured, [] decreased volume,    [] slurred, [] halting, [] slowed, [] loud     [] echolalia, [] incoherent, [] stuttering   Eye contact:  [x] direct, [] avoidant, [] intense  Kinetics:  [x] normal, [] increased, [] decreased  Mood:   [x] euthymic, [] depressed, [] anxious, [] irritable,     [] labile  Affect:   [x] normal range, [] constricted, [] depressed, [] anxious,     [] angry, [] blunted, [] flat  Hallucinations  [x] denies, [] auditory,  [] visual,  [] olfactory, [] tactile  Delusions  [x] none, [] grandiose,  [] jealous,  [] persecutory,  [] somatic,     [] bizarre  Preoccupations  [] none, [] violence, [] obsessions, [] other     Suicidal ideation  [x] denies, [] endorses  Homicidal ideation [x] denies, [] endorses  Thought process: [x] logical, [] circumstantial, [] tangential     [] concrete, [] disorganized  Associations:  [x] logical and coherent, [] loosening, [] disorganized  Insight:   [x] good, [] fair, [] poor  Judgment:  [x] good, [] fair, [] poor  Attention and concentration:     [] intact, [x] limited, [] able to focus on interview,     [] grossly impaired  Orientation:  [x] person, place, time, situation     [] disoriented to:     Memory:  Remote memory [x] intact, [] impaired     Recent memory  [] intact, [x] impaired          Data  Labs:   Admission on 06/06/2022, Discharged on 06/14/2022   Component Date Value Ref Range Status    POC Glucose 06/07/2022 265* 70 - 99 mg/dl Final    Performed on 06/07/2022 ACCU-CHEK   Final    WBC 06/07/2022 5.6  4.0 - 11.0 K/uL Final    RBC 06/07/2022 4.24  4.00 - 5.20 M/uL Final    Hemoglobin 06/07/2022 11.2* 12.0 - 16.0 g/dL Final    Hematocrit 06/07/2022 34.8* 36.0 - 48.0 % Final    MCV 06/07/2022 82.1  80.0 - 100.0 fL Final    MCH 06/07/2022 26.4  26.0 - 34.0 pg Final    MCHC 06/07/2022 32.1  31.0 - 36.0 g/dL Final    RDW 06/07/2022 13.8  12.4 - 15.4 % Final    Platelets 85/81/1278 380  135 - 450 K/uL Final    MPV 06/07/2022 6.8  5.0 - 10.5 fL Final    Neutrophils % 06/07/2022 55.4  % Final    Lymphocytes % 06/07/2022 26.3  % Final    Monocytes % 06/07/2022 10.6  % Final    Eosinophils % 06/07/2022 7.1  % Final    Basophils % 06/07/2022 0.6  % Final    Neutrophils Absolute 06/07/2022 3.1  1.7 - 7.7 K/uL Final    Lymphocytes Absolute 06/07/2022 1.5  1.0 - 5.1 K/uL Final    Monocytes Absolute 06/07/2022 0.6  0.0 - 1.3 K/uL Final    Eosinophils Absolute 06/07/2022 0.4  0.0 - 0.6 K/uL Final    Basophils Absolute 06/07/2022 0.0 0.0 - 0.2 K/uL Final    Hemoglobin A1C 06/07/2022 7.2  See comment % Final    Comment: Comment:  Diagnosis of Diabetes: > or = 6.5%  Increased risk of diabetes (Prediabetes): 5.7-6.4%  Glycemic Control: Nonpregnant Adults: <7.0%                    Pregnant: <6.0%        eAG 06/07/2022 159.9  mg/dL Final    POC Glucose 06/07/2022 240* 70 - 99 mg/dl Final    Performed on 06/07/2022 ACCU-CHEK   Final    POC Glucose 06/07/2022 315* 70 - 99 mg/dl Final    Performed on 06/07/2022 ACCU-CHEK   Final    POC Glucose 06/07/2022 301* 70 - 99 mg/dl Final    Performed on 06/07/2022 ACCU-CHEK   Final    POC Glucose 06/08/2022 277* 70 - 99 mg/dl Final    Performed on 06/08/2022 ACCU-CHEK   Final    POC Glucose 06/08/2022 323* 70 - 99 mg/dl Final    Performed on 06/08/2022 ACCU-CHEK   Final    POC Glucose 06/08/2022 229* 70 - 99 mg/dl Final    Performed on 06/08/2022 ACCU-CHEK   Final    POC Glucose 06/08/2022 219* 70 - 99 mg/dl Final    Performed on 06/08/2022 ACCU-CHEK   Final    POC Glucose 06/09/2022 243* 70 - 99 mg/dl Final    Performed on 06/09/2022 ACCU-CHEK   Final    POC Glucose 06/09/2022 251* 70 - 99 mg/dl Final    Performed on 06/09/2022 ACCU-CHEK   Final    POC Glucose 06/09/2022 160* 70 - 99 mg/dl Final    Performed on 06/09/2022 ACCU-CHEK   Final    POC Glucose 06/09/2022 222* 70 - 99 mg/dl Final    Performed on 06/09/2022 ACCU-CHEK   Final    POC Glucose 06/10/2022 291* 70 - 99 mg/dl Final    Performed on 06/10/2022 ACCU-CHEK   Final    POC Glucose 06/10/2022 220* 70 - 99 mg/dl Final    Performed on 06/10/2022 ACCU-CHEK   Final    POC Glucose 06/10/2022 222* 70 - 99 mg/dl Final    Performed on 06/10/2022 ACCU-CHEK   Final    POC Glucose 06/10/2022 226* 70 - 99 mg/dl Final    Performed on 06/10/2022 ACCU-CHEK   Final    POC Glucose 06/11/2022 266* 70 - 99 mg/dl Final    Performed on 06/11/2022 ACCU-CHEK   Final    POC Glucose 06/11/2022 222* 70 - 99 mg/dl Final    Performed on 06/11/2022 ACCU-CHEK   Final    POC Glucose 06/11/2022 320* 70 - 99 mg/dl Final    Performed on 06/11/2022 ACCU-CHEK   Final    POC Glucose 06/11/2022 176* 70 - 99 mg/dl Final    Performed on 06/11/2022 ACCU-CHEK   Final    POC Glucose 06/12/2022 223* 70 - 99 mg/dl Final    Performed on 06/12/2022 ACCU-CHEK   Final    POC Glucose 06/12/2022 218* 70 - 99 mg/dl Final    Performed on 06/12/2022 ACCU-CHEK   Final    POC Glucose 06/12/2022 250* 70 - 99 mg/dl Final    Performed on 06/12/2022 ACCU-CHEK   Final    POC Glucose 06/13/2022 272* 70 - 99 mg/dl Final    Performed on 06/13/2022 ACCU-CHEK   Final    POC Glucose 06/13/2022 177* 70 - 99 mg/dl Final    Performed on 06/13/2022 ACCU-CHEK   Final    POC Glucose 06/13/2022 246* 70 - 99 mg/dl Final    Performed on 06/13/2022 ACCU-CHEK   Final    POC Glucose 06/13/2022 195* 70 - 99 mg/dl Final    Performed on 06/13/2022 ACCU-CHEK   Final    POC Glucose 06/14/2022 262* 70 - 99 mg/dl Final    Performed on 06/14/2022 ACCU-CHEK   Final    POC Glucose 06/14/2022 266* 70 - 99 mg/dl Final    Performed on 06/14/2022 ACCU-CHEK   Final    SARS-CoV-2, NAAT 06/14/2022 Not Detected  Not Detected Final    Comment: Rapid NAAT:   Negative results should be treated as presumptive and,  if inconsistent with clinical signs and symptoms or necessary for  patient management, should be tested with an alternative molecular  assay. Negative results do not preclude SARS-CoV-2 infection and  should not be used as the sole basis for patient management decisions. This test has been authorized by the FDA under an Emergency Use  Authorization (EUA) for use by authorized laboratories.     Fact sheet for Healthcare Providers:  Simon.mike  Fact sheet for Patients: Simon.es    METHODOLOGY: Isothermal Nucleic Acid Amplification      POC Glucose 06/14/2022 263* 70 - 99 mg/dl Final    [facility]            Medications  No current facility-administered medications for this encounter. ASSESSMENT AND PLAN    Active Problems:    PTSD (post-traumatic stress disorder)    Delirium due to another medical condition    Physical debility    Cystitis with hematuria    Morbid (severe) obesity due to excess calories [facility])  Resolved Problems:    Acute psychosis ([facility] [address]    Encounter for routine adult medical examination       1. Patient's symptoms are improving  2. Probable discharge is next week; plan to SNF  3. Discharge planning is progressing      Plan:  Planning SNF placement. [facility] accepted. PASRR and prior auth pending  Continue meds      Total time with patient was 40 minutes and more than 50 % of that time was spent counseling the patient on their symptoms, treatment and expected goals.

## 2022-06-09 NOTE — PROGRESS NOTES
Brief Progress Note    Date: 06/09/22    Initially admitted to medical for AMS, SHAYLA and UTI. She was medically stabalized and cleared for transfer to geriatric psychiatry. She is being evaluated for delirium which psychiatrist believes is related to her UTI. Subjective: States she feels well. Denies complaints. No urinary sxs. Objective:  Vitals:    06/08/22 0554 06/08/22 1630 06/08/22 2054 06/09/22 0845   BP: 139/82 (!) 143/84 127/84 108/68   Pulse: (!) 113 (!) 110 94 87   Resp: 18 18 18 18   Temp: 98.4 °F (36.9 °C) 98.4 °F (36.9 °C) 99 °F (37.2 °C) 98.7 °F (37.1 °C)   TempSrc: Oral Oral Oral Oral   SpO2: 98%  97% 97%   Weight:       Height:           Physical Exam:    Gen: No distress. Alert. Eyes: PERRL. No sclera icterus. No conjunctival injection. ENT: No discharge. Pharynx clear. Neck: No JVD. Trachea midline. Resp: No accessory muscle use. No crackles. No wheezes. No rhonchi. CV: Regular rate. Regular rhythm. No murmur. No rub. No edema. Capillary Refill: Brisk,< 3 seconds   Peripheral Pulses: +2 palpable, equal bilaterally   GI: Non-tender. Non-distended. Normal bowel sounds. Skin: Warm and dry. No nodule on exposed extremities. No rash on exposed extremities. M/S: No cyanosis. No joint deformity. No clubbing. Neuro: Awake. Grossly nonfocal    Psych: Per psychiatry team        Assessment & Plan:  #Delirium 2/2 other medical condition  #PTSD  -Per psychiatric team    #UTI  -admitted with this recently and medically cleared for BHI  -urine cx with ecoli and candida  -continue cefdinir and diflucan  -apparently 2nd in short amount of time.    --informed patient she would likely benefit from urology as OP    #DMII  -BG elevated  -A1c 7.2; will need follow up with PCP for med adjustment  -Victoza is non-formulary  -high dose SSI    #HTN  -BP stable  -continue toprol xl, cozaar, and hctz    #HLD  -continue statin    #GERD  -continue ppi    Pam Flow PA-C   12:17 PM 6/9/2022

## 2022-06-09 NOTE — FLOWSHEET NOTE
Senior Purposeful Rounding    Position:Repositions Self    Physical Environment:Room free from clutter, Clear path to bathroom  and Adequate lighting    Pain Rating/ Nonverbal Pain Behaviors:0    Patient Toileted:No- Void

## 2022-06-09 NOTE — PROGRESS NOTES
Inpatient Occupational Therapy Treatment Note    Unit:  [facility]  Date:  6/9/2022  Patient Name:    Margarette Goetz  Admitting diagnosis:  Acute psychosis [facility]) [F23]  Admit Date:  6/6/2022  Precautions/Restrictions/WB Status/ Lines/ Wounds/ Oxygen:  Standard BHI Precautions  Fall Risk  History of Present Illness:  per Carmen TRAORE's 6/3/22 note, Alejandra Guido a 62 y.o. female who presented to the hospital on 05/31/2022 with a chief complaint of altered mental status. Per ED documentation, she had a recent admission for UTI with sepsis.  According to the life squad note she had shaking and tremoring which they stated nursing staff says patient is shaking more than usual and not making sense and asking inappropriate questions.  When I walk in the room I asked her why she was here and she was nonsensical in her answer.  No further history obtained. Chart review since admission indicates waxing and waning confusion, varying levels of orientation, pleasantly confused at times but has become agitated and combative the past two nights. There is documentation of V/T hallucinations and delusional thinking during her current admission. She was found to have a SHAYLA and suspected unintentional gabapentin toxicity, and is currently on fluconazole for UTI and rocephin for empiric concern for sepsis. TSH drawn 04/24/22 WNL; vitamin B-12 elevated at [address] with Michael Martinezr. She appeared anxious and restless, mood presented as labile. Speech was mumbled and difficult to understand at times; answers were not always appropriate to questions asked. States she has been in the hospital for 1 week and that she came in because she was having difficulty breathing. She was able to identify that she is established with [facility] but unable to recall her psychiatric diagnoses or other psychiatric history.     Call placed to Diego Carballo (daughter; [phone]).  She reports that Michael Bajwa has lived in a group home for the past few years and around 3-4 weeks ago went to [place] due to falls. She notes Michael Bajwa has a history of bipolar disorder, multiple PD, depression. States that her cognitive abilities are intact at baseline; she is coherent with what is going on in the world. She denies delusional thinking at baseline but does note that there have been episodes in past of confusion, living in the past. She does become increasingly agitated and combative during these episodes, angry if you don't remember what she is talking about or if you don't agree with her. She has no known history of hallucinations. She does have a history of 3-4 psychiatric hospitalizations in the past, with the most recent being 2 years ago when she attempted suicide. States that Michael Bajwa has not exhibited any mood changes or verbalized any suicidal ideations recently. She is agreeable to an inpatient psychiatric admission if warranted.      Pt has recently seen a neurologist due to the \"giving out\" of her   leg or hand causing her to fall or dropping objects  EMG results from 4/7/22:  Conclusion: This is an abnormal study. 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 Erik Bonilla is also   electrophysiologic evidence of a subacute on chronic L5-S1   radiculopathy with evidence of ongoing denervation.  Additional   findings in the right upper extremity included a median   mononeuropathy at the wrist which is moderate in severity and a   subacute radial mononeuropathy which is proximal to the take off   of the extensor digitorum communis muscle. Treatment Number:  2    Treatment Time: [id number]  Timed Code Treatment Minutes:   33  minutes   Total Treatment Time:    33  minutes    Staff Recommendations:    Assist of 1 with RW and gait belt for ambulating short distances.     Discharge Recommendations: SNF    DME needs for discharge:  defer to facility    AM-PAC Score: AM-PAC Inpatient Daily Activity Raw Score: 16   Home Health S4 Level: NA    MOCA: Thierry Cognitive Assessment [facility])  (See pt folder behind nurses station for copy of assessment while pt is admitted.)   Total Score: Sum of all subscores listed on the right-hand side. Add one point for an individual who has 12 years or fewer of formal education, for a possible maximum of 30 points. A final total score of 26 and above is considered normal.     Education Level High school     Visuospatial/Executive  2/5   Naming  3/3   Attention  3/6   Language 2/3   Abstraction  2/2   Delayed Recall    MIS = 12/15   3/5   Orientation  5/6   (additional point for <12 grade educations)     Total Score    20/30     Subjective:  Pt reports moderate fatigue and weakness in her LE's after ambulating with PT yesterday. ADLs:  Self Care: Toileting: CGA for balance  Grooming:  Dressing:    Pain   Yes  Rating:moderate  Location: LE pain  Pain Medicine Status: No request made      Cognition    A&O to Person, Place, Time and Situation (stated 7th for the date)  Able to follow:  2 step commands    Balance:     Impaired balance noted with standing from commode. Bed mobility:  Modified IND    Transfer Training:   Sit to stand:   CGA  Stand to sit:  CGA  Bed to Chair:  CGA  Standard toilet:   Min A due to loss of balance upon standing. Activity Tolerance   Pt completed therapy session with No adverse symptoms noted w/activity    Therapeutic Exercise: NA    Patient Education:   Role of OT, ADL retraining, Recommendations for DC and safety training   Educated RN Raul Way and pt regarding Pt's need for assist with all ambulation, including using the bathroom. Both verbalized understanding. Positioning Needs: In common room with needs met    Family Present:  No    Assessment: Increased difficulty noted with transfer today off of commode with loss of balance to her right side. Fatigue also noted and increased assistance needed for transfers and ambulation.   Pt should continue to benefit from skilled OT tx to maximize independence so that she may eventually return home with the least amount of assistance. Recommending SNF upon discharge as patient functioning well below baseline, demonstrates good rehab potential and unable to return home due to limited or no family support, inability to negotiate stairs to enter home/bedroom/bathroom and home environment not conducive to patient recovery. GOALS  To be met in 3 Visits:  1). Pt. To complete interest check list.   2). Pt will participate in updated [address] [place] [address] assessment. 3). SBA with bed to toilet transfer using RW     To be met in 5 Visits:  1). Supine to Sit IND  2). Bed to Chair/commode supervision with RW  3). Upper Body Dressing with setup  4). Lower Body Dressing Min  A  5).  Pt to keyshawn UE exs x10 reps      Plan: cont with [address] MS, OTR/L  #[id number]        If patient discharges from this facility prior to next visit, this note will serve as the Discharge Summary

## 2022-06-09 NOTE — BH NOTE
Incoming call from [address] with [employer] stating pt has been accepted at [facility], and they will start the prior-auth for the SNF stay and let us know once that is completed. Met with pt and discussed acceptance at [facility] and next steps for pursuing placement (SNF & AL) there. She is in favor of pursuing this.

## 2022-06-09 NOTE — GROUP NOTE
Group Therapy Note    Date: 6/9/2022    Group Start Time: 1330  Group End Time: 2360  Group Topic: Recreational    [address]        Group Therapy Note    Attendees: 4    Group members engaged in a game of DANILO. Notes:  Patient attended group for the full duration. Patient remained engaged and interacted approrpiately with other members of the group. Status After Intervention:  Improved    Participation Level:  Active Listener and Interactive    Participation Quality: Appropriate, Attentive and Sharing      Speech:  normal      Thought Process/Content: Logical      Affective Functioning: Congruent      Mood: Brightened, Engaging       Level of consciousness:  Alert, Oriented x4 and Attentive      Response to Learning: Able to verbalize current knowledge/experience      Endings: None Reported    Modes of Intervention: Socialization, Exploration and Activity      Discipline Responsible: Behavorial Health Tech      Signature:  KEYONA Ball

## 2022-06-09 NOTE — FLOWSHEET NOTE
Senior Purposeful Rounding    Position:Sitting    Physical Environment:Room free from clutter, Clear path to bathroom  and Adequate lighting    Pain Rating/ Nonverbal Pain Behaviors:6    Pain interventions Attempted: tylenol, reposition    Patient Toileted:Independent

## 2022-06-09 NOTE — PLAN OF CARE
Problem: Safety - Adult  Goal: Free from fall injury  6/8/2022 2336 by Lizette Ruiz LPN  Outcome: Progressing     Problem: Anxiety  Goal: Will report anxiety at manageable levels  Description: INTERVENTIONS:  1. Administer medication as ordered  2. Teach and rehearse alternative coping skills  3. Provide emotional support with 1:1 interaction with staff  6/8/2022 2336 by iLzette Ruiz LPN  Outcome: Progressing  6/8/2022 1039 by Ash Salamanca RN  Outcome: Progressing   Patient was up watching TV at the beginning of shift. Pleasant and cooperative with staff when approached. Denies all. Social with peers. Does ambulate with a walker and requires little assistance. Did complain of having pain to her back and legs with it being at a level 6. Tylenol given with HS medications. Patient is currently resting in bed with eyes closed. No reports of anxiety this evening. Will continue to monitor patient through shift for safety.

## 2022-06-09 NOTE — PLAN OF CARE
[id number] [facility]  Day 3 Interdisciplinary Treatment Plan NOTE    Review Date & Time: 6/9/22 1000    Patient was not in treatment team    Estimated Length of Stay Update:  3-4 days  Estimated Discharge Date Update: 6/12/22-6/13/22    EDUCATION:   Learner Progress Toward Treatment Goals: Reviewed results and recommendations of this team    Method: Individual    Outcome: Verbalized understanding    PATIENT GOALS: none expressed    PLAN/TREATMENT RECOMMENDATIONS UPDATE:continue treatment    GOALS UPDATE:   Time frame for Short-Term Goals: 2 days      Lynn Alicia RN

## 2022-06-09 NOTE — FLOWSHEET NOTE
Senior Purposeful Rounding    Position:Repositions Self    Physical Environment:Room free from clutter, Clear path to bathroom  and Adequate lighting    Pain Rating/ Nonverbal Pain Behaviors:0    Patient Toileted:Independent

## 2022-06-09 NOTE — FLOWSHEET NOTE
Group Therapy Note    Date: 6/9/2022  Start Time: 1300  End Time:  1400  Number of Participants: 5    Type of Group: Music Group    Notes:  Pt present for most of Music Group. While present, Pt actively participated by making song selections and signing along to music. Participation Level:  Active Listener and Interactive    Participation Quality: Appropriate and Attentive      Speech:  normal      Affective Functioning: Congruent      Endings: None Reported    Modes of Intervention: Support, Socialization, Activity and Media      Discipline Responsible: Chaplain Yonas Bryant       06/09/22 1417   Encounter Summary   Encounter Overview/Reason  Behavioral Health   Service Provided For: Patient   Last Encounter    (6/9 Music Group)   Complexity of Encounter Moderate   Begin Time 1000   End Time  1030   Total Time Calculated 30 min   Behavioral Health    Type  Spirituality Group

## 2022-06-09 NOTE — FLOWSHEET NOTE
Senior Purposeful Rounding    Position:Left Side and Repositions Self    Physical Environment:Room free from clutter, Clear path to bathroom  and No safety hazards noted    Pain Rating/ Nonverbal Pain Behaviors:0    Patient Toileted:Continent

## 2022-06-09 NOTE — FLOWSHEET NOTE
Senior Purposeful Rounding    Position:Back and Repositions Self    Physical Environment:Room free from clutter, Clear path to bathroom  and No safety hazards noted    Pain Rating/ Nonverbal Pain Behaviors:0    Patient Toileted:No- Void

## 2022-06-10 LAB
GLUCOSE BLD-MCNC: 220 MG/DL (ref 70–99)
GLUCOSE BLD-MCNC: 222 MG/DL (ref 70–99)
GLUCOSE BLD-MCNC: 226 MG/DL (ref 70–99)
GLUCOSE BLD-MCNC: 291 MG/DL (ref 70–99)
PERFORMED ON: ABNORMAL

## 2022-06-10 PROCEDURE — 6370000000 HC RX 637 (ALT 250 FOR IP): Performed by: PSYCHIATRY & NEUROLOGY

## 2022-06-10 PROCEDURE — 6370000000 HC RX 637 (ALT 250 FOR IP)

## 2022-06-10 PROCEDURE — 97530 THERAPEUTIC ACTIVITIES: CPT

## 2022-06-10 PROCEDURE — 99231 SBSQ HOSP IP/OBS SF/LOW 25: CPT | Performed by: PSYCHIATRY & NEUROLOGY

## 2022-06-10 PROCEDURE — 97116 GAIT TRAINING THERAPY: CPT

## 2022-06-10 PROCEDURE — 1240000000 HC EMOTIONAL WELLNESS R&B

## 2022-06-10 RX ADMIN — PANTOPRAZOLE SODIUM 40 MG: 40 TABLET, DELAYED RELEASE ORAL at 08:35

## 2022-06-10 RX ADMIN — BUSPIRONE HYDROCHLORIDE 15 MG: 7.5 TABLET ORAL at 08:36

## 2022-06-10 RX ADMIN — INSULIN LISPRO 3 UNITS: 100 INJECTION, SOLUTION INTRAVENOUS; SUBCUTANEOUS at 20:54

## 2022-06-10 RX ADMIN — INSULIN LISPRO 6 UNITS: 100 INJECTION, SOLUTION INTRAVENOUS; SUBCUTANEOUS at 17:07

## 2022-06-10 RX ADMIN — FERROUS SULFATE TAB 325 MG (65 MG ELEMENTAL FE) 325 MG: 325 (65 FE) TAB at 08:36

## 2022-06-10 RX ADMIN — METOPROLOL SUCCINATE 25 MG: 25 TABLET, EXTENDED RELEASE ORAL at 08:36

## 2022-06-10 RX ADMIN — BUSPIRONE HYDROCHLORIDE 15 MG: 7.5 TABLET ORAL at 13:32

## 2022-06-10 RX ADMIN — CEFDINIR 300 MG: 300 CAPSULE ORAL at 08:35

## 2022-06-10 RX ADMIN — PANTOPRAZOLE SODIUM 40 MG: 40 TABLET, DELAYED RELEASE ORAL at 20:55

## 2022-06-10 RX ADMIN — GABAPENTIN 600 MG: 300 CAPSULE ORAL at 08:36

## 2022-06-10 RX ADMIN — BUPROPION HYDROCHLORIDE 200 MG: 100 TABLET, EXTENDED RELEASE ORAL at 20:55

## 2022-06-10 RX ADMIN — HYDROCHLOROTHIAZIDE 12.5 MG: 25 TABLET ORAL at 08:36

## 2022-06-10 RX ADMIN — PREGABALIN 25 MG: 25 CAPSULE ORAL at 08:35

## 2022-06-10 RX ADMIN — INSULIN LISPRO 6 UNITS: 100 INJECTION, SOLUTION INTRAVENOUS; SUBCUTANEOUS at 11:48

## 2022-06-10 RX ADMIN — PREGABALIN 25 MG: 25 CAPSULE ORAL at 20:55

## 2022-06-10 RX ADMIN — ONDANSETRON 4 MG: 4 TABLET, ORALLY DISINTEGRATING ORAL at 13:32

## 2022-06-10 RX ADMIN — ASPIRIN 81 MG: 81 TABLET, CHEWABLE ORAL at 08:35

## 2022-06-10 RX ADMIN — INSULIN LISPRO 9 UNITS: 100 INJECTION, SOLUTION INTRAVENOUS; SUBCUTANEOUS at 08:34

## 2022-06-10 RX ADMIN — SERTRALINE 100 MG: 100 TABLET, FILM COATED ORAL at 08:36

## 2022-06-10 RX ADMIN — BUPROPION HYDROCHLORIDE 200 MG: 100 TABLET, EXTENDED RELEASE ORAL at 08:36

## 2022-06-10 RX ADMIN — LOSARTAN POTASSIUM 50 MG: 25 TABLET, FILM COATED ORAL at 08:35

## 2022-06-10 RX ADMIN — ACETAMINOPHEN 650 MG: 325 TABLET ORAL at 13:32

## 2022-06-10 RX ADMIN — BUSPIRONE HYDROCHLORIDE 15 MG: 7.5 TABLET ORAL at 20:55

## 2022-06-10 RX ADMIN — FOLIC ACID 1 MG: 1 TABLET ORAL at 08:35

## 2022-06-10 RX ADMIN — CEFDINIR 300 MG: 300 CAPSULE ORAL at 20:55

## 2022-06-10 RX ADMIN — GABAPENTIN 600 MG: 300 CAPSULE ORAL at 13:32

## 2022-06-10 RX ADMIN — ATORVASTATIN CALCIUM 40 MG: 40 TABLET, FILM COATED ORAL at 20:56

## 2022-06-10 RX ADMIN — METOPROLOL SUCCINATE 25 MG: 25 TABLET, EXTENDED RELEASE ORAL at 20:55

## 2022-06-10 RX ADMIN — GABAPENTIN 600 MG: 300 CAPSULE ORAL at 20:55

## 2022-06-10 RX ADMIN — FLUCONAZOLE 200 MG: 100 TABLET ORAL at 08:35

## 2022-06-10 NOTE — PROGRESS NOTES
Inpatient Geriatric Physical Therapy Daily Treatment Note    Unit: [facility]  Date:  6/10/2022  Patient Name:    Светлана Marvin  Admitting diagnosis:  Acute psychosis [facility]) [F23]  Admit Date:  6/6/2022  Precautions/Restrictions:  Fall risk and Standard BHI precautions      Discharge Recommendations: SNF  DME needs for discharge: defer to facility       Therapy recommendation for EMS Transport: can transport by wheelchair    Therapy recommendations for staff:   Assist of 1 with use of rolling walker (RW) and gait belt for all transfers and ambulation to/from chair  to/from bathroom  within community room    History of current Episode: Per Evelio H&P: \"Cheyenne Conn is a 62year old female with a history of depression and PTSD who has had two serious UTIs in the last two months.  She was admitted to [facility] for urinary sepsis just prior to coming to the [facility] and was observed having visual hallucinations and confusion.  She was seen by the psychiatric consultant and ultimately sent to the [facility] for psychosis due to the fact that she has a psychiatric history. She admits to having had a lot of anxiety at [facility] because she was not allowed to get out of bed.  This caused her to feel paranoid and trapped. Hawa Altman thinks this may have been part of why she was agitated. Hawa Altman is glad that she is now able to get up and walk on her own. \"  Pt has recently seen a neurologist due to the \"giving out\" of her   leg or hand causing her to fall or dropping objects  EMG results from 4/7/22:  Conclusion: This is an abnormal study.  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 Horacio Gentleangel is also   electrophysiologic evidence of a subacute on chronic L5-S1   radiculopathy with evidence of ongoing denervation.  Additional   findings in the right upper extremity included a median   mononeuropathy at the wrist which is moderate in severity and a   subacute radial mononeuropathy which is proximal to the take off   of the extensor digitorum communis muscle. Home Health S4 Level Recommendation: NA  AM-PAC Mobility Score      AM-PAC Inpatient Mobility without Stair Climbing Raw Score : 69 Blaine Fernandez scored a  /24 on the AM-PAC short mobility form. Current research shows that an AM-PAC score of 17 or less is typically not associated with a discharge to the patient's home setting. If patient discharges prior to next session this note will serve as a discharge summary. Please see below for the latest assessment towards goals. Treatment Time: 17:00-17:30  Treatment number: 2  Timed Code Treatment Minutes:30 minutes  Total Treatment Minutes:  30 minutes    Cognition    A&O orientation not directly assessed. Able to follow 2 step commands    Subjective  Patient up in bathroom with no family present   Pt agreeable to this PT tx.      Pain   Yes  Location: Low back  Rating:    mild/10  Pain Medicine Status: No request made     Bed Mobility   Supine to Sit:    Not Tested  Sit to Supine:   SBA  Rolling:   Not Tested  Scooting:   SBA    Transfer Training     Sit to stand:   CGA from toilet, EOB  Stand to sit:   CGA to EOB  Bed to Chair:   CGA with use of gait belt and rolling walker (RW)    Gait Training gait completed as indicated below  Distance:      50 ft  Deviations (firm surface/linoleum):  forward flexed posture, step to pattern and decreased step length bilaterally  Assistive Device Used:    gait belt and rolling walker (RW)  Level of Assist:    CGA  Comment: no LOB,fatigued quickly, reported R leg felt weak    Therapeutic Exercise all completed bilaterally unless indicated  LAQ x 10 reps  marching x 10 reps  Hip adduction/pillow squeeze: x 10 reps    Balance  Sitting:  Good ; SBA  Comments: donning brief,pericare    Standing: Good - ; CGA  Comments:  Using RW and gait belt    Patient Education      Role of PT, POC, Discharge recommendations, DC recommendations, safety awareness, transfer techniques and calling for assist with mobility. Reminded patient to use call light for assistance OOB. Patient verbalized acknowledgement of this  Positioning Needs       Patient in bed, all needs in reach. Confirmed with nursing staff that bed alarm is not being used with this patient        Activity Tolerance   Pt completed therapy session with Pain noted with post activity. Assessment :  Patient demonstrated good cooperation and progress this date. Recommending SNF upon discharge as patient functioning well below baseline, demonstrates good rehab potential and unable to return home due to burden of care beyond caregiver ability and home environment not conducive to patient recovery. Goals :   Patient Goals for Therapy: not stated. Agreeable to all evaluation activities. To be met in 3 visits:  1). Demonstrate [place] in HEP. 2). Trial use of exercise bike, per pt request.     To be met in 6 visits:  1). Supine to/from sit  Independent to promote return to functional ADLs  2). Sit to/from stand           Modified Independent to promote return to functional ADLs  3). Gait: Ambulate 150 ft. with Modified Independent and use of LRAD (least restrictive assistive device) demonstrating improved gait pattern  4). Tolerate B LE exercises 3 sets of 10-15 reps to improve strength   5). Tolerated standing balance exercises with improved balance to Good  grade    Plan   Continue with plan of care. Signature: Agustin Jeffers, PT #[id number]    If patient discharges from this facility prior to next visit, this note will serve as the Discharge Summary.

## 2022-06-10 NOTE — GROUP NOTE
Group Therapy Note    Date: 6/10/2022    Group Start Time: 1000  Group End Time: 6911  Group Topic: Cognitive Skills    [id number] [facility]        Group Therapy Note    Attendees: 4    Group members listened to 15 second clips of theme songs from shows in the 63's and had to guess what show each theme song belonged to. Notes:  Kishor Pate attended group for the full duration. Kishor Pate remained engaged and interacted appropriately with other members of the group. Status After Intervention:  Improved    Participation Level:  Active Listener and Interactive    Participation Quality: Appropriate and Attentive      Speech:  normal      Thought Process/Content: Logical      Affective Functioning: Congruent      Mood: euthymic      Level of consciousness:  Alert      Response to Learning: Able to verbalize current knowledge/experience      Endings: None Reported    Modes of Intervention: Socialization, Exploration and Activity      Discipline Responsible: Behavorial Health Tech      Signature:  KEYONA Chacon

## 2022-06-10 NOTE — FLOWSHEET NOTE
Senior Purposeful Rounding     Position:Repositions Self     Physical Environment:Room free from clutter, Clear path to bathroom , Adequate lighting, Bed alarm functioning and No safety hazards noted     Pain Rating/ Nonverbal Pain Behaviors:0; asleep     Pain interventions Attempted: n/a     Patient Toileted:No-void

## 2022-06-10 NOTE — PROGRESS NOTES
Department of Psychiatry  AttendingProgress Note    Looking forward to placement in ECF. She has been accepted to the [facility]. PASRR pending, as is precert. She has a lot of questions. The treatment team met and discussed the treatment plan. SUBJECTIVE:        Suicidal ideation:  denies suicidal ideation. Patient does not have medication side effects. ROS: Patient has new complaints: no  Sleeping adequately:  Yes   Appetite adequate: Yes  Attending groups: Yes      OBJECTIVE    Physical  Vitals:    Blood pressure 120/85, pulse 95, temperature 98.4 °F (36.9 °C), temperature source Oral, resp. rate 16, height 5' 5\" (1.651 m), weight 224 lb (101.6 kg), SpO2 99 %.   General appearance:  [x]  appears age, []  appears older than stated age,     [x]  adequately dressed and groomed, [] disheveled,                []  in no acute distress, [] appears mildly distressed,      []  Other:      MUSCULOSKELETAL:   Gait:   [] normal, [] antalgic, [] unsteady, [x] in bed, gait not evaluated   Station:  [] erect, [] sitting, [x] recumbent, [] other        Strength/tone:  [x] muscle strength and tone appear consistent with age and condition     [] atrophy      [] abnormal movements  PSYCHIATRIC:    Relatedness:  [x] cooperative, [] guarded, [] indifferent, [] hostile,      [] sedated  Speech:  [x] normal prosody, [] pressured, [] decreased volume,    [] slurred, [] halting, [] slowed, [] loud     [] echolalia, [] incoherent, [] stuttering   Eye contact:  [x] direct, [] avoidant, [] intense  Kinetics:  [x] normal, [] increased, [] decreased  Mood:   [x] euthymic, [] depressed, [] anxious, [] irritable,     [] labile  Affect:   [x] normal range, [] constricted, [] depressed, [] anxious,     [] angry, [] blunted, [] flat  Hallucinations  [x] denies, [] auditory,  [] visual,  [] olfactory, [] tactile  Delusions  [x] none, [] grandiose,  [] jealous,  [] persecutory,  [] somatic,     [] bizarre  Preoccupations  [] none, [] violence, [] obsessions, [] other     Suicidal ideation  [x] denies, [] endorses  Homicidal ideation [x] denies, [] endorses  Thought process: [x] logical, [] circumstantial, [] tangential     [] concrete, [] disorganized  Associations:  [x] logical and coherent, [] loosening, [] disorganized  Insight:   [x] good, [] fair, [] poor  Judgment:  [x] good, [] fair, [] poor  Attention and concentration:     [x] intact, [] limited, [] able to focus on interview,     [] grossly impaired  Orientation:  [x] person, place, time, situation     [] disoriented to:     Memory:  Remote memory [x] intact, [] impaired     Recent memory  [x] intact, [] impaired          Data  Labs:   Admission on 06/06/2022, Discharged on 06/14/2022   Component Date Value Ref Range Status    POC Glucose 06/07/2022 265* 70 - 99 mg/dl Final    Performed on 06/07/2022 ACCU-CHEK   Final    WBC 06/07/2022 5.6  4.0 - 11.0 K/uL Final    RBC 06/07/2022 4.24  4.00 - 5.20 M/uL Final    Hemoglobin 06/07/2022 11.2* 12.0 - 16.0 g/dL Final    Hematocrit 06/07/2022 34.8* 36.0 - 48.0 % Final    MCV 06/07/2022 82.1  80.0 - 100.0 fL Final    MCH 06/07/2022 26.4  26.0 - 34.0 pg Final    MCHC 06/07/2022 32.1  31.0 - 36.0 g/dL Final    RDW 06/07/2022 13.8  12.4 - 15.4 % Final    Platelets 20/50/9485 380  135 - 450 K/uL Final    MPV 06/07/2022 6.8  5.0 - 10.5 fL Final    Neutrophils % 06/07/2022 55.4  % Final    Lymphocytes % 06/07/2022 26.3  % Final    Monocytes % 06/07/2022 10.6  % Final    Eosinophils % 06/07/2022 7.1  % Final    Basophils % 06/07/2022 0.6  % Final    Neutrophils Absolute 06/07/2022 3.1  1.7 - 7.7 K/uL Final    Lymphocytes Absolute 06/07/2022 1.5  1.0 - 5.1 K/uL Final    Monocytes Absolute 06/07/2022 0.6  0.0 - 1.3 K/uL Final    Eosinophils Absolute 06/07/2022 0.4  0.0 - 0.6 K/uL Final    Basophils Absolute 06/07/2022 0.0  0.0 - 0.2 K/uL Final    Hemoglobin A1C 06/07/2022 7.2  See comment % Final    Comment: Comment:  Diagnosis of Diabetes: > or = 6.5%  Increased risk of diabetes (Prediabetes): 5.7-6.4%  Glycemic Control: Nonpregnant Adults: <7.0%                    Pregnant: <6.0%        eAG 06/07/2022 159.9  mg/dL Final    POC Glucose 06/07/2022 240* 70 - 99 mg/dl Final    Performed on 06/07/2022 ACCU-CHEK   Final    POC Glucose 06/07/2022 315* 70 - 99 mg/dl Final    Performed on 06/07/2022 ACCU-CHEK   Final    POC Glucose 06/07/2022 301* 70 - 99 mg/dl Final    Performed on 06/07/2022 ACCU-CHEK   Final    POC Glucose 06/08/2022 277* 70 - 99 mg/dl Final    Performed on 06/08/2022 ACCU-CHEK   Final    POC Glucose 06/08/2022 323* 70 - 99 mg/dl Final    Performed on 06/08/2022 ACCU-CHEK   Final    POC Glucose 06/08/2022 229* 70 - 99 mg/dl Final    Performed on 06/08/2022 ACCU-CHEK   Final    POC Glucose 06/08/2022 219* 70 - 99 mg/dl Final    Performed on 06/08/2022 ACCU-CHEK   Final    POC Glucose 06/09/2022 243* 70 - 99 mg/dl Final    Performed on 06/09/2022 ACCU-CHEK   Final    POC Glucose 06/09/2022 251* 70 - 99 mg/dl Final    Performed on 06/09/2022 ACCU-CHEK   Final    POC Glucose 06/09/2022 160* 70 - 99 mg/dl Final    Performed on 06/09/2022 ACCU-CHEK   Final    POC Glucose 06/09/2022 222* 70 - 99 mg/dl Final    Performed on 06/09/2022 ACCU-CHEK   Final    POC Glucose 06/10/2022 291* 70 - 99 mg/dl Final    Performed on 06/10/2022 ACCU-CHEK   Final    POC Glucose 06/10/2022 220* 70 - 99 mg/dl Final    Performed on 06/10/2022 ACCU-CHEK   Final    POC Glucose 06/10/2022 222* 70 - 99 mg/dl Final    Performed on 06/10/2022 ACCU-CHEK   Final    POC Glucose 06/10/2022 226* 70 - 99 mg/dl Final    Performed on 06/10/2022 ACCU-CHEK   Final    POC Glucose 06/11/2022 266* 70 - 99 mg/dl Final    Performed on 06/11/2022 ACCU-CHEK   Final    POC Glucose 06/11/2022 222* 70 - 99 mg/dl Final    Performed on 06/11/2022 ACCU-CHEK   Final    POC Glucose 06/11/2022 320* 70 - 99 mg/dl Final    Performed on 06/11/2022 ACCU-CHEK   Final    POC Glucose 06/11/2022 176* 70 - 99 mg/dl Final    Performed on 06/11/2022 ACCU-CHEK   Final    POC Glucose 06/12/2022 223* 70 - 99 mg/dl Final    Performed on 06/12/2022 ACCU-CHEK   Final    POC Glucose 06/12/2022 218* 70 - 99 mg/dl Final    Performed on 06/12/2022 ACCU-CHEK   Final    POC Glucose 06/12/2022 250* 70 - 99 mg/dl Final    Performed on 06/12/2022 ACCU-CHEK   Final    POC Glucose 06/13/2022 272* 70 - 99 mg/dl Final    Performed on 06/13/2022 ACCU-CHEK   Final    POC Glucose 06/13/2022 177* 70 - 99 mg/dl Final    Performed on 06/13/2022 ACCU-CHEK   Final    POC Glucose 06/13/2022 246* 70 - 99 mg/dl Final    Performed on 06/13/2022 ACCU-CHEK   Final    POC Glucose 06/13/2022 195* 70 - 99 mg/dl Final    Performed on 06/13/2022 ACCU-CHEK   Final    POC Glucose 06/14/2022 262* 70 - 99 mg/dl Final    Performed on 06/14/2022 ACCU-CHEK   Final    POC Glucose 06/14/2022 266* 70 - 99 mg/dl Final    Performed on 06/14/2022 ACCU-CHEK   Final    SARS-CoV-2, NAAT 06/14/2022 Not Detected  Not Detected Final    Comment: Rapid NAAT:   Negative results should be treated as presumptive and,  if inconsistent with clinical signs and symptoms or necessary for  patient management, should be tested with an alternative molecular  assay. Negative results do not preclude SARS-CoV-2 infection and  should not be used as the sole basis for patient management decisions. This test has been authorized by the FDA under an Emergency Use  Authorization (EUA) for use by authorized laboratories. Fact sheet for Healthcare Providers:  Annamaria  Fact sheet for Patients: Simon.mike    METHODOLOGY: Isothermal Nucleic Acid Amplification      POC Glucose 06/14/2022 263* 70 - 99 mg/dl Final    Performed on 06/14/2022 ACCU-CHEK   Final            Medications  No current facility-administered medications for this encounter.     ASSESSMENT AND PLAN    Active Problems:    PTSD (post-traumatic stress disorder)    Delirium due to another medical condition    Physical debility    Cystitis with hematuria    Morbid (severe) obesity due to excess calories [facility])  Resolved Problems:    Acute psychosis ([facility] [address]    Encounter for routine adult medical examination       1. Patient's symptoms are improving  2. Probable discharge is next week  3. Discharge planning is incomplete  4. No changes in meds      Total time with patient was 40 minutes and more than 50 % of that time was spent counseling the patient on their symptoms, treatment and expected goals.

## 2022-06-10 NOTE — PLAN OF CARE
Pt a/ox4, pleasant, cooperative. Denies SI/HI/AVH, contracts for safety. Compliant with VS, meds, and accuchecks. Visible on unit socializing and interacting appropriately with peers in milieu    Appetite: no change from normal    Ambulates with front-wheeled walker and one-person assist  Requires moderate assistance with ADLs. /89   Pulse 84   Temp 98.4 °F (36.9 °C) (Oral)   Resp 16   Ht 5' 5\" (1.651 m)   Wt 224 lb (101.6 kg)   SpO2 100%   BMI 37.28 kg/m²       Problem: Safety - Adult  Goal: Free from fall injury  Outcome: Progressing     Problem: ABCDS Injury Assessment  Goal: Absence of physical injury  Outcome: Progressing     Problem: Confusion  Goal: Confusion, delirium, dementia, or psychosis is improved or at baseline  Description: INTERVENTIONS:  1. Assess for possible contributors to thought disturbance, including medications, impaired vision or hearing, underlying metabolic abnormalities, dehydration, psychiatric diagnoses, and notify attending LIP  2. [place] high risk fall precautions, as indicated  3. Provide frequent short contacts to provide reality reorientation, refocusing and direction  4. Decrease environmental stimuli, including noise as appropriate  5. Monitor and intervene to maintain adequate nutrition, hydration, elimination, sleep and activity  6. If unable to ensure safety without constant attention obtain sitter and review sitter guidelines with assigned personnel  7. Initiate Psychosocial CNS and Spiritual Care consult, as indicated  Outcome: Progressing     Problem: Behavior  Goal: Pt/Family maintain appropriate behavior and adhere to behavioral management agreement, if implemented  Description: INTERVENTIONS:  1. Assess patient/family's coping skills and  non-compliant behavior (including use of illegal substances)  2. Notify security of behavior or suspected illegal substances which indicate the need for search of the patient and/or belongings  3.  Encourage verbalization of thoughts and concerns in a socially appropriate manner  4. Utilize positive, consistent limit setting strategies supporting safety of patient, staff and others  5. Encourage participation in the decision making process about the behavioral management agreement  6. Implement a Health Care Agreement if patient meets criteria  7. If a patient's behavior jeopardizes the safety of the patient, staff, or others refer to organization policy. If a visitor's behavior poses a threat to safety call refer to organization policy. 8. Initiate consult with , Psychosocial CNS, Spiritual Care as appropriate  Outcome: Progressing     Problem: Depression/Self Harm  Goal: Effect of psychiatric condition will be minimized and patient will be protected from self harm  Description: INTERVENTIONS:  1. Assess impact of patient's symptoms on level of functioning, self care needs and offer support as indicated  2. Assess patient/family knowledge of depression, impact on illness and need for teaching  3. Provide emotional support, presence and reassurance  4. Assess for possible suicidal thoughts or ideation. If patient expresses suicidal thoughts or statements do not leave alone, initiate Suicide Precautions, move to a room close to the nursing station and obtain sitter  5.  Initiate consults as appropriate with Mental Health Professional, Spiritual Care, Psychosocial CNS, and consider a recommendation to the LIP for a Psychiatric Consultation  Outcome: Progressing

## 2022-06-10 NOTE — FLOWSHEET NOTE
Senior Purposeful Rounding     Position:Repositions Self     Physical Environment:Room free from clutter, Clear path to bathroom , Adequate lighting, Bed alarm functioning and No safety hazards noted     Pain Rating/ Nonverbal Pain Behaviors:0; denies     Pain interventions Attempted: n/a     Patient Toileted:Continent of bladder

## 2022-06-10 NOTE — PLAN OF CARE
Blaze Hinkle was out in the day room watching TV with peers. She has a flat affect and is somewhat guarded with questioning. She is oriented x 3. She denies SI/HI/AVH. Med compliant except declined Glycolax and Hydrocortisone cream. Steady with walker and SBA. Appetite good. FS = 222 tonight. Received 3 units coverage.

## 2022-06-11 LAB
GLUCOSE BLD-MCNC: 176 MG/DL (ref 70–99)
GLUCOSE BLD-MCNC: 222 MG/DL (ref 70–99)
GLUCOSE BLD-MCNC: 266 MG/DL (ref 70–99)
GLUCOSE BLD-MCNC: 320 MG/DL (ref 70–99)
PERFORMED ON: ABNORMAL

## 2022-06-11 PROCEDURE — 6370000000 HC RX 637 (ALT 250 FOR IP): Performed by: PSYCHIATRY & NEUROLOGY

## 2022-06-11 PROCEDURE — 1240000000 HC EMOTIONAL WELLNESS R&B

## 2022-06-11 RX ADMIN — ASPIRIN 81 MG: 81 TABLET, CHEWABLE ORAL at 09:00

## 2022-06-11 RX ADMIN — PREGABALIN 25 MG: 25 CAPSULE ORAL at 20:30

## 2022-06-11 RX ADMIN — FLUCONAZOLE 200 MG: 100 TABLET ORAL at 09:01

## 2022-06-11 RX ADMIN — CEFDINIR 300 MG: 300 CAPSULE ORAL at 20:30

## 2022-06-11 RX ADMIN — BUPROPION HYDROCHLORIDE 200 MG: 100 TABLET, EXTENDED RELEASE ORAL at 13:00

## 2022-06-11 RX ADMIN — FERROUS SULFATE TAB 325 MG (65 MG ELEMENTAL FE) 325 MG: 325 (65 FE) TAB at 09:00

## 2022-06-11 RX ADMIN — PANTOPRAZOLE SODIUM 40 MG: 40 TABLET, DELAYED RELEASE ORAL at 09:00

## 2022-06-11 RX ADMIN — METOPROLOL SUCCINATE 25 MG: 25 TABLET, EXTENDED RELEASE ORAL at 09:00

## 2022-06-11 RX ADMIN — GABAPENTIN 600 MG: 300 CAPSULE ORAL at 20:30

## 2022-06-11 RX ADMIN — BUSPIRONE HYDROCHLORIDE 15 MG: 7.5 TABLET ORAL at 09:00

## 2022-06-11 RX ADMIN — FOLIC ACID 1 MG: 1 TABLET ORAL at 09:01

## 2022-06-11 RX ADMIN — GABAPENTIN 600 MG: 300 CAPSULE ORAL at 09:00

## 2022-06-11 RX ADMIN — SERTRALINE 100 MG: 100 TABLET, FILM COATED ORAL at 09:00

## 2022-06-11 RX ADMIN — LOSARTAN POTASSIUM 50 MG: 25 TABLET, FILM COATED ORAL at 09:00

## 2022-06-11 RX ADMIN — INSULIN LISPRO 2 UNITS: 100 INJECTION, SOLUTION INTRAVENOUS; SUBCUTANEOUS at 20:34

## 2022-06-11 RX ADMIN — INSULIN LISPRO 12 UNITS: 100 INJECTION, SOLUTION INTRAVENOUS; SUBCUTANEOUS at 16:55

## 2022-06-11 RX ADMIN — ATORVASTATIN CALCIUM 40 MG: 40 TABLET, FILM COATED ORAL at 20:30

## 2022-06-11 RX ADMIN — BUSPIRONE HYDROCHLORIDE 15 MG: 7.5 TABLET ORAL at 20:30

## 2022-06-11 RX ADMIN — CEFDINIR 300 MG: 300 CAPSULE ORAL at 09:00

## 2022-06-11 RX ADMIN — PANTOPRAZOLE SODIUM 40 MG: 40 TABLET, DELAYED RELEASE ORAL at 20:30

## 2022-06-11 RX ADMIN — ACETAMINOPHEN 650 MG: 325 TABLET ORAL at 17:05

## 2022-06-11 RX ADMIN — METOPROLOL SUCCINATE 25 MG: 25 TABLET, EXTENDED RELEASE ORAL at 20:30

## 2022-06-11 RX ADMIN — BUSPIRONE HYDROCHLORIDE 15 MG: 7.5 TABLET ORAL at 13:00

## 2022-06-11 RX ADMIN — HYDROCHLOROTHIAZIDE 12.5 MG: 25 TABLET ORAL at 09:00

## 2022-06-11 RX ADMIN — GABAPENTIN 600 MG: 300 CAPSULE ORAL at 13:00

## 2022-06-11 RX ADMIN — PREGABALIN 25 MG: 25 CAPSULE ORAL at 09:00

## 2022-06-11 RX ADMIN — INSULIN LISPRO 6 UNITS: 100 INJECTION, SOLUTION INTRAVENOUS; SUBCUTANEOUS at 13:00

## 2022-06-11 RX ADMIN — BUPROPION HYDROCHLORIDE 200 MG: 100 TABLET, EXTENDED RELEASE ORAL at 20:29

## 2022-06-11 RX ADMIN — INSULIN LISPRO 9 UNITS: 100 INJECTION, SOLUTION INTRAVENOUS; SUBCUTANEOUS at 09:05

## 2022-06-11 ASSESSMENT — PAIN DESCRIPTION - ORIENTATION: ORIENTATION: LOWER

## 2022-06-11 ASSESSMENT — PAIN SCALES - GENERAL: PAINLEVEL_OUTOF10: 7

## 2022-06-11 ASSESSMENT — PAIN DESCRIPTION - LOCATION: LOCATION: BACK

## 2022-06-11 NOTE — FLOWSHEET NOTE
Senior Purposeful Rounding    Position:Back and Repositions Self    Physical Environment:Room free from clutter, Clear path to bathroom  and Adequate lighting    Pain Rating/ Nonverbal Pain Behaviors:0    Patient Toileted:No- Void

## 2022-06-11 NOTE — PLAN OF CARE
Problem: Depression/Self Harm  Goal: Effect of psychiatric condition will be minimized and patient will be protected from self harm  Description: INTERVENTIONS:  1. Assess impact of patient's symptoms on level of functioning, self care needs and offer support as indicated  2. Assess patient/family knowledge of depression, impact on illness and need for teaching  3. Provide emotional support, presence and reassurance  4. Assess for possible suicidal thoughts or ideation. If patient expresses suicidal thoughts or statements do not leave alone, initiate Suicide Precautions, move to a room close to the nursing station and obtain sitter  5. Initiate consults as appropriate with Mental Health Professional, Spiritual Care, Psychosocial CNS, and consider a recommendation to the LIP for a Psychiatric Consultation  6/10/2022 1937 by Radha Martins RN  Outcome: Progressing     Problem: ABCDS Injury Assessment  Goal: Absence of physical injury  6/10/2022 1937 by Radha Martins RN  Outcome: Progressing   Patient was seen out on the unit social with peers and watching TV. Denies any SI or HI. Denies AVH. Stated that she has had some anxiety due to the milieu on the unit. Pleasant and cooperative with care. Declined her hydrocortisone cream stating that the area does not need it any longer, and declined her miralax this evening. Patient continues to ambulate with her walker at a slow steady pace. No injuries or fall have been reported. Patient is currently resting in her bed with her eyes closed, will continue to monitor patient for safety through shift.

## 2022-06-11 NOTE — FLOWSHEET NOTE
Senior Purposeful Rounding    Position:Repositions Self    Physical Environment:Room free from clutter, Clear path to bathroom , Adequate lighting and No safety hazards noted    Pain Rating/ Nonverbal Pain Behaviors:0    Patient Toileted:Independent

## 2022-06-12 LAB
GLUCOSE BLD-MCNC: 218 MG/DL (ref 70–99)
GLUCOSE BLD-MCNC: 223 MG/DL (ref 70–99)
GLUCOSE BLD-MCNC: 250 MG/DL (ref 70–99)
PERFORMED ON: ABNORMAL

## 2022-06-12 PROCEDURE — 6370000000 HC RX 637 (ALT 250 FOR IP): Performed by: PSYCHIATRY & NEUROLOGY

## 2022-06-12 PROCEDURE — 1240000000 HC EMOTIONAL WELLNESS R&B

## 2022-06-12 PROCEDURE — 99233 SBSQ HOSP IP/OBS HIGH 50: CPT | Performed by: PSYCHIATRY & NEUROLOGY

## 2022-06-12 RX ADMIN — INSULIN LISPRO 6 UNITS: 100 INJECTION, SOLUTION INTRAVENOUS; SUBCUTANEOUS at 16:42

## 2022-06-12 RX ADMIN — PANTOPRAZOLE SODIUM 40 MG: 40 TABLET, DELAYED RELEASE ORAL at 08:13

## 2022-06-12 RX ADMIN — HYDROCORTISONE: 1 CREAM TOPICAL at 08:14

## 2022-06-12 RX ADMIN — BUSPIRONE HYDROCHLORIDE 15 MG: 7.5 TABLET ORAL at 13:33

## 2022-06-12 RX ADMIN — FLUCONAZOLE 200 MG: 100 TABLET ORAL at 08:12

## 2022-06-12 RX ADMIN — ATORVASTATIN CALCIUM 40 MG: 40 TABLET, FILM COATED ORAL at 20:58

## 2022-06-12 RX ADMIN — GABAPENTIN 600 MG: 300 CAPSULE ORAL at 08:14

## 2022-06-12 RX ADMIN — Medication 10 MG: at 21:51

## 2022-06-12 RX ADMIN — INSULIN LISPRO 5 UNITS: 100 INJECTION, SOLUTION INTRAVENOUS; SUBCUTANEOUS at 21:09

## 2022-06-12 RX ADMIN — BUSPIRONE HYDROCHLORIDE 15 MG: 7.5 TABLET ORAL at 08:13

## 2022-06-12 RX ADMIN — CEFDINIR 300 MG: 300 CAPSULE ORAL at 08:14

## 2022-06-12 RX ADMIN — ACETAMINOPHEN 650 MG: 325 TABLET ORAL at 07:05

## 2022-06-12 RX ADMIN — PANTOPRAZOLE SODIUM 40 MG: 40 TABLET, DELAYED RELEASE ORAL at 20:58

## 2022-06-12 RX ADMIN — BUPROPION HYDROCHLORIDE 200 MG: 100 TABLET, EXTENDED RELEASE ORAL at 21:51

## 2022-06-12 RX ADMIN — BUSPIRONE HYDROCHLORIDE 15 MG: 7.5 TABLET ORAL at 20:58

## 2022-06-12 RX ADMIN — FOLIC ACID 1 MG: 1 TABLET ORAL at 08:14

## 2022-06-12 RX ADMIN — BUPROPION HYDROCHLORIDE 200 MG: 100 TABLET, EXTENDED RELEASE ORAL at 08:17

## 2022-06-12 RX ADMIN — INSULIN LISPRO 6 UNITS: 100 INJECTION, SOLUTION INTRAVENOUS; SUBCUTANEOUS at 11:41

## 2022-06-12 RX ADMIN — INSULIN LISPRO 9 UNITS: 100 INJECTION, SOLUTION INTRAVENOUS; SUBCUTANEOUS at 08:10

## 2022-06-12 RX ADMIN — FERROUS SULFATE TAB 325 MG (65 MG ELEMENTAL FE) 325 MG: 325 (65 FE) TAB at 08:13

## 2022-06-12 RX ADMIN — METOPROLOL SUCCINATE 25 MG: 25 TABLET, EXTENDED RELEASE ORAL at 20:58

## 2022-06-12 RX ADMIN — METOPROLOL SUCCINATE 25 MG: 25 TABLET, EXTENDED RELEASE ORAL at 08:13

## 2022-06-12 RX ADMIN — ASPIRIN 81 MG: 81 TABLET, CHEWABLE ORAL at 08:14

## 2022-06-12 RX ADMIN — LOSARTAN POTASSIUM 50 MG: 25 TABLET, FILM COATED ORAL at 08:14

## 2022-06-12 RX ADMIN — HYDROCHLOROTHIAZIDE 12.5 MG: 25 TABLET ORAL at 08:13

## 2022-06-12 RX ADMIN — PREGABALIN 25 MG: 25 CAPSULE ORAL at 08:14

## 2022-06-12 RX ADMIN — SERTRALINE 100 MG: 100 TABLET, FILM COATED ORAL at 08:12

## 2022-06-12 RX ADMIN — PREGABALIN 25 MG: 25 CAPSULE ORAL at 21:02

## 2022-06-12 RX ADMIN — GABAPENTIN 600 MG: 300 CAPSULE ORAL at 13:33

## 2022-06-12 RX ADMIN — GABAPENTIN 600 MG: 300 CAPSULE ORAL at 20:58

## 2022-06-12 ASSESSMENT — PAIN DESCRIPTION - LOCATION: LOCATION: HEAD

## 2022-06-12 ASSESSMENT — PAIN SCALES - GENERAL: PAINLEVEL_OUTOF10: 6

## 2022-06-12 NOTE — PLAN OF CARE
Problem: Chronic Conditions and Co-morbidities  Goal: Patient's chronic conditions and co-morbidity symptoms are monitored and maintained or improved  6/12/2022 1207 by Kim Gibbons RN  Outcome: Progressing  6/11/2022 2217 by Cayla Mederos LPN  Outcome: Progressing     Problem: Skin/Tissue Integrity  Goal: Absence of new skin breakdown  Description: 1. Monitor for areas of redness and/or skin breakdown  2. Assess vascular access sites hourly  3. Every 4-6 hours minimum:  Change oxygen saturation probe site  4. Every 4-6 hours:  If on nasal continuous positive airway pressure, respiratory therapy assess nares and determine need for appliance change or resting period. 6/12/2022 1207 by Kim Gibbons RN  Outcome: Progressing  6/11/2022 2217 by Cayla Medeors LPN  Outcome: Progressing     Problem: Safety - Adult  Goal: Free from fall injury  6/12/2022 1207 by Kim Gibbons RN  Outcome: Progressing  Flowsheets (Taken 6/12/2022 1205)  Free From Fall Injury: Instruct family/caregiver on patient safety  6/11/2022 2217 by Cayla Mederos LPN  Outcome: Progressing     Problem: ABCDS Injury Assessment  Goal: Absence of physical injury  6/12/2022 1207 by Kim Gibbons RN  Outcome: Progressing  Flowsheets (Taken 6/12/2022 1205)  Absence of Physical Injury: Implement safety measures based on patient assessment  6/11/2022 2217 by Cayla Mederos LPN  Outcome: Progressing     Problem: Pain  Goal: Verbalizes/displays adequate comfort level or baseline comfort level  6/12/2022 1207 by Kim Gibbons RN  Outcome: Progressing  6/11/2022 2217 by Cayla Mederos LPN  Outcome: Progressing     Problem: Anxiety  Goal: Will report anxiety at manageable levels  Description: INTERVENTIONS:  1. Administer medication as ordered  2. Teach and rehearse alternative coping skills  3.  Provide emotional support with 1:1 interaction with staff  Outcome: Progressing     Problem: Coping  Goal: Pt/Family able to verbalize concerns and demonstrate effective coping strategies  Description: INTERVENTIONS:  1. Assist patient/family to identify coping skills, available support systems and cultural and spiritual values  2. Provide emotional support, including active listening and acknowledgement of concerns of patient and caregivers  3. Reduce environmental stimuli, as able  4. Instruct patient/family in relaxation techniques, as appropriate  5. Assess for spiritual pain/suffering and initiate Spiritual Care, Psychosocial Clinical Specialist consults as needed  Outcome: Progressing     Problem: Confusion  Goal: Confusion, delirium, dementia, or psychosis is improved or at baseline  Description: INTERVENTIONS:  1. Assess for possible contributors to thought disturbance, including medications, impaired vision or hearing, underlying metabolic abnormalities, dehydration, psychiatric diagnoses, and notify attending LIP  2. [place] high risk fall precautions, as indicated  3. Provide frequent short contacts to provide reality reorientation, refocusing and direction  4. Decrease environmental stimuli, including noise as appropriate  5. Monitor and intervene to maintain adequate nutrition, hydration, elimination, sleep and activity  6. If unable to ensure safety without constant attention obtain sitter and review sitter guidelines with assigned personnel  7. Initiate Psychosocial CNS and Spiritual Care consult, as indicated  Outcome: Progressing     Problem: Behavior  Goal: Pt/Family maintain appropriate behavior and adhere to behavioral management agreement, if implemented  Description: INTERVENTIONS:  1. Assess patient/family's coping skills and  non-compliant behavior (including use of illegal substances)  2. Notify security of behavior or suspected illegal substances which indicate the need for search of the patient and/or belongings  3. Encourage verbalization of thoughts and concerns in a socially appropriate manner  4.  Utilize positive, consistent limit setting strategies supporting safety of patient, staff and others  5. Encourage participation in the decision making process about the behavioral management agreement  6. Implement a Health Care Agreement if patient meets criteria  7. If a patient's behavior jeopardizes the safety of the patient, staff, or others refer to organization policy. If a visitor's behavior poses a threat to safety call refer to organization policy. 8. Initiate consult with , Psychosocial CNS, Spiritual Care as appropriate  Outcome: Progressing     Problem: Depression/Self Harm  Goal: Effect of psychiatric condition will be minimized and patient will be protected from self harm  Description: INTERVENTIONS:  1. Assess impact of patient's symptoms on level of functioning, self care needs and offer support as indicated  2. Assess patient/family knowledge of depression, impact on illness and need for teaching  3. Provide emotional support, presence and reassurance  4. Assess for possible suicidal thoughts or ideation. If patient expresses suicidal thoughts or statements do not leave alone, initiate Suicide Precautions, move to a room close to the nursing station and obtain sitter  5. Initiate consults as appropriate with Mental Health Professional, Spiritual Care, Psychosocial CNS, and consider a recommendation to the LIP for a Psychiatric Consultation  Outcome: Progressing  Flowsheets (Taken 6/12/2022 1206)  Effect of psychiatric condition will be minimized and patient will be protected from self harm:   Assess impact of patients symptoms on level of functioning, self care needs and offer support as indicated   Assess for suicidal thoughts or ideation.  If patient expresses suicidal thoughts or statements do not leave alone, initiate Suicide Precautions, move near nurse station, obtain sitter   Provide emotional support, presence and reassurance   Assess patient/family knowledge of depression, impact on illness and need for teaching   Pt alert and oriented x4 denies all. Voices no complaints and able to make needs known. Compliant with medications and shows understanding of safety by waiting for staff to assist with ambulation. Pt states she is here because of UTI from home roommate being dirty. Education provided for how to cleanse self after using bathroom. Pt verbalized understanding.

## 2022-06-12 NOTE — PROGRESS NOTES
Denies all. Social and friendly. Having chronic pain, says doctor is aware. Not much family support, says oldest son was murdered, her daughter lives in another state and stated her other son owes her five thousand dollars and is mad at her.  Med compliant but refused glycolax and hydrocortisone cream.

## 2022-06-12 NOTE — PROGRESS NOTES
Department of Psychiatry  AttendingProgress Note  Chief Complaint: headache  Chelsey Barraza was in bed today and was having a migraine. She later got up and walked to bathroom and came out to eat with walker. Talked about leaving soon and is going to an ECF . No SI. Patient's chart was reviewed and collaborated with  about the treatment plan. SUBJECTIVE:    Patient is feeling worse. Suicidal ideation:  denies suicidal ideation. Patient does not have medication side effects. ROS: Patient has new complaints: no  Sleeping adequately:  Yes   Appetite adequate: Yes  Attending groups: Yes  Visitors:No    OBJECTIVE    Physical  VITALS:  /66   Pulse 80   Temp 98.3 °F (36.8 °C) (Oral)   Resp 18   Ht 5' 5\" (1.651 m)   Wt 224 lb (101.6 kg)   SpO2 100%   BMI 37.28 kg/m²     Mental Status Examination:  Patients appearance was ill-appearing. Thoughts are Goal directed. Homicidal ideations none. No abnormal movements, tics or mannerisms. Memory intact Aims 0. Concentration Good. Alert and oriented X 4. Insight and Judgement impaired insight. Patient was cooperative.  Patient gait abnormal. Mood constricted, affect flat affect Hallucinations Absent, suicidal ideations no specific plan to harm self Speech normal volume  Data  Labs:   Admission on 06/06/2022   Component Date Value Ref Range Status    POC Glucose 06/07/2022 265* 70 - 99 mg/dl Final    Performed on 06/07/2022 ACCU-CHEK   Final    WBC 06/07/2022 5.6  4.0 - 11.0 K/uL Final    RBC 06/07/2022 4.24  4.00 - 5.20 M/uL Final    Hemoglobin 06/07/2022 11.2* 12.0 - 16.0 g/dL Final    Hematocrit 06/07/2022 34.8* 36.0 - 48.0 % Final    MCV 06/07/2022 82.1  80.0 - 100.0 fL Final    MCH 06/07/2022 26.4  26.0 - 34.0 pg Final    MCHC 06/07/2022 32.1  31.0 - 36.0 g/dL Final    RDW 06/07/2022 13.8  12.4 - 15.4 % Final    Platelets 36/40/0097 380  135 - 450 K/uL Final    MPV 06/07/2022 6.8  5.0 - 10.5 fL Final    Neutrophils % 06/07/2022 55.4  % Final    Lymphocytes % 06/07/2022 26.3  % Final    Monocytes % 06/07/2022 10.6  % Final    Eosinophils % 06/07/2022 7.1  % Final    Basophils % 06/07/2022 0.6  % Final    Neutrophils Absolute 06/07/2022 3.1  1.7 - 7.7 K/uL Final    Lymphocytes Absolute 06/07/2022 1.5  1.0 - 5.1 K/uL Final    Monocytes Absolute 06/07/2022 0.6  0.0 - 1.3 K/uL Final    Eosinophils Absolute 06/07/2022 0.4  0.0 - 0.6 K/uL Final    Basophils Absolute 06/07/2022 0.0  0.0 - 0.2 K/uL Final    Hemoglobin A1C 06/07/2022 7.2  See comment % Final    Comment: Comment:  Diagnosis of Diabetes: > or = 6.5%  Increased risk of diabetes (Prediabetes): 5.7-6.4%  Glycemic Control: Nonpregnant Adults: <7.0%                    Pregnant: <6.0%        eAG 06/07/2022 159.9  mg/dL Final    POC Glucose 06/07/2022 240* 70 - 99 mg/dl Final    Performed on 06/07/2022 ACCU-CHEK   Final    POC Glucose 06/07/2022 315* 70 - 99 mg/dl Final    Performed on 06/07/2022 ACCU-CHEK   Final    POC Glucose 06/07/2022 301* 70 - 99 mg/dl Final    Performed on 06/07/2022 ACCU-CHEK   Final    POC Glucose 06/08/2022 277* 70 - 99 mg/dl Final    Performed on 06/08/2022 ACCU-CHEK   Final    POC Glucose 06/08/2022 323* 70 - 99 mg/dl Final    Performed on 06/08/2022 ACCU-CHEK   Final    POC Glucose 06/08/2022 229* 70 - 99 mg/dl Final    Performed on 06/08/2022 ACCU-CHEK   Final    POC Glucose 06/08/2022 219* 70 - 99 mg/dl Final    Performed on 06/08/2022 ACCU-CHEK   Final    POC Glucose 06/09/2022 243* 70 - 99 mg/dl Final    Performed on 06/09/2022 ACCU-CHEK   Final    POC Glucose 06/09/2022 251* 70 - 99 mg/dl Final    Performed on 06/09/2022 ACCU-CHEK   Final    POC Glucose 06/09/2022 160* 70 - 99 mg/dl Final    Performed on 06/09/2022 ACCU-CHEK   Final    POC Glucose 06/09/2022 222* 70 - 99 mg/dl Final    Performed on 06/09/2022 ACCU-CHEK   Final    POC Glucose 06/10/2022 291* 70 - 99 mg/dl Final    Performed on 06/10/2022 ACCU-CHEK   Final    POC Glucose 06/10/2022 220* 70 - 99 mg/dl Final    Performed on 06/10/2022 ACCU-CHEK   Final    POC Glucose 06/10/2022 222* 70 - 99 mg/dl Final    Performed on 06/10/2022 ACCU-CHEK   Final    POC Glucose 06/10/2022 226* 70 - 99 mg/dl Final    Performed on 06/10/2022 ACCU-CHEK   Final    POC Glucose 06/11/2022 266* 70 - 99 mg/dl Final    Performed on 06/11/2022 ACCU-CHEK   Final    POC Glucose 06/11/2022 222* 70 - 99 mg/dl Final    Performed on 06/11/2022 ACCU-CHEK   Final    POC Glucose 06/11/2022 320* 70 - 99 mg/dl Final    Performed on 06/11/2022 ACCU-CHEK   Final    POC Glucose 06/11/2022 176* 70 - 99 mg/dl Final    Performed on 06/11/2022 ACCU-CHEK   Final            Medications  Current Facility-Administered Medications: ondansetron (ZOFRAN-ODT) disintegrating tablet 4 mg, 4 mg, Oral, Q8H PRN  insulin lispro (HUMALOG) injection vial 0-18 Units, 0-18 Units, SubCUTAneous, TID WC  insulin lispro (HUMALOG) injection vial 0-9 Units, 0-9 Units, SubCUTAneous, Nightly  acetaminophen (TYLENOL) tablet 650 mg, 650 mg, Oral, Q4H PRN  gabapentin (NEURONTIN) capsule 600 mg, 600 mg, Oral, TID  pregabalin (LYRICA) capsule 25 mg, 25 mg, Oral, BID  glucose chewable tablet 16 g, 4 tablet, Oral, PRN  dextrose bolus 10% 125 mL, 125 mL, IntraVENous, PRN **OR** dextrose bolus 10% 250 mL, 250 mL, IntraVENous, PRN  glucagon (rDNA) injection 1 mg, 1 mg, IntraMUSCular, PRN  dextrose 5 % solution, 100 mL/hr, IntraVENous, PRN  aspirin chewable tablet 81 mg, 81 mg, Oral, Daily  atorvastatin (LIPITOR) tablet 40 mg, 40 mg, Oral, Nightly  buPROPion (WELLBUTRIN SR) extended release tablet 200 mg, 200 mg, Oral, BID  busPIRone (BUSPAR) tablet 15 mg, 15 mg, Oral, TID  cefdinir (OMNICEF) capsule 300 mg, 300 mg, Oral, 2 times per day  ferrous sulfate (IRON 325) tablet 325 mg, 325 mg, Oral, Daily with breakfast  fluconazole (DIFLUCAN) tablet 200 mg, 200 mg, Oral, Daily  folic acid (FOLVITE) tablet 1 mg, 1 mg, Oral, Daily  hydroCHLOROthiazide (HYDRODIURIL) tablet 12.5 mg, 12.5 mg, Oral, Daily  hydrocortisone 1 % cream, , Topical, BID  losartan (COZAAR) tablet 50 mg, 50 mg, Oral, Daily  metoprolol succinate (TOPROL XL) extended release tablet 25 mg, 25 mg, Oral, BID  pantoprazole (PROTONIX) tablet 40 mg, 40 mg, Oral, BID  polyethylene glycol (GLYCOLAX) packet 17 g, 17 g, Oral, Nightly  sertraline (ZOLOFT) tablet 100 mg, 100 mg, Oral, Daily  melatonin disintegrating tablet 10 mg, 10 mg, Oral, Nightly PRN    ASSESSMENT AND PLAN    Principal Problem:    Delirium due to another medical condition  Active Problems:    PTSD (post-traumatic stress disorder)    Physical debility    Encounter for routine adult medical examination    Morbid (severe) obesity due to excess calories ([address]  Resolved Problems:    Acute psychosis ([facility] [address]       1. Patient s symptoms   are improving  2. Probable discharge is next week  3. Discharge planning is complete  4. Suicidal ideation is none  5. Total time with patient was 40 minutes and more than 50 % of that time was spent counseling the patient on their symptoms, treatment and expected goals.

## 2022-06-12 NOTE — PLAN OF CARE
Problem: Chronic Conditions and Co-morbidities  Goal: Patient's chronic conditions and co-morbidity symptoms are monitored and maintained or improved  Outcome: Progressing     Problem: Skin/Tissue Integrity  Goal: Absence of new skin breakdown  Description: 1. Monitor for areas of redness and/or skin breakdown  2. Assess vascular access sites hourly  3. Every 4-6 hours minimum:  Change oxygen saturation probe site  4. Every 4-6 hours:  If on nasal continuous positive airway pressure, respiratory therapy assess nares and determine need for appliance change or resting period.   Outcome: Progressing     Problem: Safety - Adult  Goal: Free from fall injury  Outcome: Progressing     Problem: ABCDS Injury Assessment  Goal: Absence of physical injury  Outcome: Progressing     Problem: Pain  Goal: Verbalizes/displays adequate comfort level or baseline comfort level  Outcome: Progressing

## 2022-06-13 LAB
GLUCOSE BLD-MCNC: 177 MG/DL (ref 70–99)
GLUCOSE BLD-MCNC: 195 MG/DL (ref 70–99)
GLUCOSE BLD-MCNC: 246 MG/DL (ref 70–99)
GLUCOSE BLD-MCNC: 272 MG/DL (ref 70–99)
PERFORMED ON: ABNORMAL

## 2022-06-13 PROCEDURE — 6370000000 HC RX 637 (ALT 250 FOR IP): Performed by: PSYCHIATRY & NEUROLOGY

## 2022-06-13 PROCEDURE — 97116 GAIT TRAINING THERAPY: CPT

## 2022-06-13 PROCEDURE — 97110 THERAPEUTIC EXERCISES: CPT

## 2022-06-13 PROCEDURE — 1240000000 HC EMOTIONAL WELLNESS R&B

## 2022-06-13 PROCEDURE — 97530 THERAPEUTIC ACTIVITIES: CPT

## 2022-06-13 PROCEDURE — 6370000000 HC RX 637 (ALT 250 FOR IP)

## 2022-06-13 PROCEDURE — 99231 SBSQ HOSP IP/OBS SF/LOW 25: CPT | Performed by: PSYCHIATRY & NEUROLOGY

## 2022-06-13 RX ORDER — DIMETHICONE, OXYBENZONE, AND PADIMATE O 2; 2.5; 6.6 G/100G; G/100G; G/100G
STICK TOPICAL PRN
Status: DISCONTINUED | OUTPATIENT
Start: 2022-06-13 | End: 2022-06-14 | Stop reason: HOSPADM

## 2022-06-13 RX ORDER — LORAZEPAM 0.5 MG/1
0.5 TABLET ORAL EVERY 6 HOURS PRN
Status: DISCONTINUED | OUTPATIENT
Start: 2022-06-13 | End: 2022-06-14 | Stop reason: HOSPADM

## 2022-06-13 RX ADMIN — SERTRALINE 100 MG: 100 TABLET, FILM COATED ORAL at 08:21

## 2022-06-13 RX ADMIN — PREGABALIN 25 MG: 25 CAPSULE ORAL at 08:22

## 2022-06-13 RX ADMIN — BUSPIRONE HYDROCHLORIDE 15 MG: 7.5 TABLET ORAL at 08:21

## 2022-06-13 RX ADMIN — INSULIN LISPRO 2 UNITS: 100 INJECTION, SOLUTION INTRAVENOUS; SUBCUTANEOUS at 20:50

## 2022-06-13 RX ADMIN — LORAZEPAM 0.5 MG: 0.5 TABLET ORAL at 15:28

## 2022-06-13 RX ADMIN — Medication: at 07:20

## 2022-06-13 RX ADMIN — BUPROPION HYDROCHLORIDE 200 MG: 100 TABLET, EXTENDED RELEASE ORAL at 10:37

## 2022-06-13 RX ADMIN — ASPIRIN 81 MG: 81 TABLET, CHEWABLE ORAL at 08:21

## 2022-06-13 RX ADMIN — PANTOPRAZOLE SODIUM 40 MG: 40 TABLET, DELAYED RELEASE ORAL at 20:49

## 2022-06-13 RX ADMIN — BUSPIRONE HYDROCHLORIDE 15 MG: 7.5 TABLET ORAL at 14:10

## 2022-06-13 RX ADMIN — GABAPENTIN 600 MG: 300 CAPSULE ORAL at 08:21

## 2022-06-13 RX ADMIN — INSULIN LISPRO 3 UNITS: 100 INJECTION, SOLUTION INTRAVENOUS; SUBCUTANEOUS at 12:16

## 2022-06-13 RX ADMIN — GABAPENTIN 600 MG: 300 CAPSULE ORAL at 14:10

## 2022-06-13 RX ADMIN — HYDROCHLOROTHIAZIDE 12.5 MG: 25 TABLET ORAL at 08:21

## 2022-06-13 RX ADMIN — METOPROLOL SUCCINATE 25 MG: 25 TABLET, EXTENDED RELEASE ORAL at 20:49

## 2022-06-13 RX ADMIN — INSULIN LISPRO 6 UNITS: 100 INJECTION, SOLUTION INTRAVENOUS; SUBCUTANEOUS at 17:23

## 2022-06-13 RX ADMIN — GABAPENTIN 600 MG: 300 CAPSULE ORAL at 20:49

## 2022-06-13 RX ADMIN — BUPROPION HYDROCHLORIDE 200 MG: 100 TABLET, EXTENDED RELEASE ORAL at 20:48

## 2022-06-13 RX ADMIN — FLUCONAZOLE 200 MG: 100 TABLET ORAL at 08:21

## 2022-06-13 RX ADMIN — PANTOPRAZOLE SODIUM 40 MG: 40 TABLET, DELAYED RELEASE ORAL at 08:22

## 2022-06-13 RX ADMIN — BUSPIRONE HYDROCHLORIDE 15 MG: 7.5 TABLET ORAL at 20:49

## 2022-06-13 RX ADMIN — PREGABALIN 25 MG: 25 CAPSULE ORAL at 20:49

## 2022-06-13 RX ADMIN — POLYETHYLENE GLYCOL (3350) 17 G: 17 POWDER, FOR SOLUTION ORAL at 20:50

## 2022-06-13 RX ADMIN — FERROUS SULFATE TAB 325 MG (65 MG ELEMENTAL FE) 325 MG: 325 (65 FE) TAB at 08:21

## 2022-06-13 RX ADMIN — ATORVASTATIN CALCIUM 40 MG: 40 TABLET, FILM COATED ORAL at 20:49

## 2022-06-13 RX ADMIN — INSULIN LISPRO 9 UNITS: 100 INJECTION, SOLUTION INTRAVENOUS; SUBCUTANEOUS at 08:20

## 2022-06-13 RX ADMIN — METOPROLOL SUCCINATE 25 MG: 25 TABLET, EXTENDED RELEASE ORAL at 08:21

## 2022-06-13 RX ADMIN — FOLIC ACID 1 MG: 1 TABLET ORAL at 08:21

## 2022-06-13 RX ADMIN — LOSARTAN POTASSIUM 50 MG: 25 TABLET, FILM COATED ORAL at 08:21

## 2022-06-13 RX ADMIN — ACETAMINOPHEN 650 MG: 325 TABLET ORAL at 11:31

## 2022-06-13 ASSESSMENT — PAIN SCALES - GENERAL
PAINLEVEL_OUTOF10: 10
PAINLEVEL_OUTOF10: 8
PAINLEVEL_OUTOF10: 4

## 2022-06-13 ASSESSMENT — PAIN DESCRIPTION - PAIN TYPE: TYPE: CHRONIC PAIN

## 2022-06-13 ASSESSMENT — PAIN DESCRIPTION - FREQUENCY: FREQUENCY: CONTINUOUS

## 2022-06-13 ASSESSMENT — PAIN DESCRIPTION - LOCATION: LOCATION: HIP

## 2022-06-13 ASSESSMENT — PAIN DESCRIPTION - ORIENTATION: ORIENTATION: LEFT;RIGHT

## 2022-06-13 ASSESSMENT — PAIN - FUNCTIONAL ASSESSMENT
PAIN_FUNCTIONAL_ASSESSMENT: ACTIVITIES ARE NOT PREVENTED
PAIN_FUNCTIONAL_ASSESSMENT: ACTIVITIES ARE NOT PREVENTED
PAIN_FUNCTIONAL_ASSESSMENT: PREVENTS OR INTERFERES SOME ACTIVE ACTIVITIES AND ADLS

## 2022-06-13 ASSESSMENT — PAIN DESCRIPTION - ONSET: ONSET: ON-GOING

## 2022-06-13 ASSESSMENT — PAIN DESCRIPTION - DESCRIPTORS: DESCRIPTORS: ACHING;STABBING

## 2022-06-13 NOTE — GROUP NOTE
Group Therapy Note    Date: 6/13/2022    Group Start Time: 1000  Group End Time: 7417  Group Topic: Cognitive Skills    [id number] [facility]        Group Therapy Note    Attendees: 5    Group members engaged in trivia. Notes: Patient attended group for the full duration. Patient remained engaged and interacted approrpiately with other members of the group. Status After Intervention:  Improved    Participation Level:  Active Listener and Interactive    Participation Quality: Appropriate, Attentive and Sharing      Speech:  normal      Thought Process/Content: Logical      Affective Functioning: Congruent      Mood: euthymic      Level of consciousness:  Alert      Response to Learning: Able to verbalize current knowledge/experience      Endings: None Reported    Modes of Intervention: Socialization, Exploration and Activity      Discipline Responsible: Behavorial Health Tech      Signature:  KEYONA Oliver

## 2022-06-13 NOTE — PLAN OF CARE
Problem: Anxiety  Goal: Will report anxiety at manageable levels  Description: INTERVENTIONS:  1. Administer medication as ordered  2. Teach and rehearse alternative coping skills  3. Provide emotional support with 1:1 interaction with staff  6/13/2022 0016 by Kait Avalos RN  Outcome: Progressing     Problem: Coping  Goal: Pt/Family able to verbalize concerns and demonstrate effective coping strategies  Description: INTERVENTIONS:  1. Assist patient/family to identify coping skills, available support systems and cultural and spiritual values  2. Provide emotional support, including active listening and acknowledgement of concerns of patient and caregivers  3. Reduce environmental stimuli, as able  4. Instruct patient/family in relaxation techniques, as appropriate  5. Assess for spiritual pain/suffering and initiate Spiritual Care, Psychosocial Clinical Specialist consults as needed  6/13/2022 0016 by Kait Avalos RN  Outcome: Sary Cotton has been out in the day room watching television with peers this shift. Patient denies current SI/HI, denies A/V/H. Patient demonstrating effective coping strategies as evidenced by choosing to go to her room to decrease stimulation when needed. Cooperative with care and pleasant on approach. Medication compliant. PRN Melatonin requested/given for sleep. Medication effective.

## 2022-06-13 NOTE — FLOWSHEET NOTE
PRN Tylenol 650mg PO administered at 1131 per pt request     06/13/22 0808 06/13/22 1131 06/13/22 1201   Pain Assessment   Pain Assessment None - Denies Pain 0-10 0-10   Pain Level  --  8 4   Functional Pain Assessment  --  Activities are not prevented Activities are not prevented   Response to Pain Intervention  --   --  Patient satisfied     Will consider PRN effective.

## 2022-06-13 NOTE — GROUP NOTE
Group Therapy Note    Date: 6/13/2022    Group Start Time: 1100  Group End Time: 4404  Group Topic: Recreational    [address], [place]        Group Therapy Note    Attendees: 3    Participants used the [employer] and socialized and shared their creations with group members and this clinician during group. Notes:  Lamont Panchal attended group and was engaged in socializing while using the Formarum. Lamont Panchal said she loved the way the sand felt and that she just enjoyed squeezing it.      Status After Intervention:  Improved    Participation Level: Interactive    Participation Quality: Appropriate and Attentive      Speech:  normal      Thought Process/Content: Logical      Affective Functioning: Congruent      Mood: euthymic      Level of consciousness:  Alert, Oriented x4 and Attentive      Response to Learning: Progressing to goal      Endings: None Reported    Modes of Intervention: Socialization and Activity      Discipline Responsible: /Counselor      Signature:  LUCERO Cruz

## 2022-06-13 NOTE — PROGRESS NOTES
Inpatient Occupational Therapy Treatment Note    Unit:  [facility]  Date:  6/13/2022  Patient Name:    Fioerlla Grande  Admitting diagnosis:  Acute psychosis [facility]) [F23]  Admit Date:  6/6/2022  Precautions/Restrictions/WB Status/ Lines/ Wounds/ Oxygen:  Standard BHI Precautions  Fall Risk     History of Present Illness:  per Carmen TRAORE's 6/3/22 note, Mireille Rodriguez a 62 y.o. female who presented to the hospital on 05/31/2022 with a chief complaint of altered mental status. Per ED documentation, she had a recent admission for UTI with sepsis.  According to the life squad note she had shaking and tremoring which they stated nursing staff says patient is shaking more than usual and not making sense and asking inappropriate questions.  When I walk in the room I asked her why she was here and she was nonsensical in her answer.  No further history obtained. Chart review since admission indicates waxing and waning confusion, varying levels of orientation, pleasantly confused at times but has become agitated and combative the past two nights. There is documentation of V/T hallucinations and delusional thinking during her current admission. She was found to have a SHAYLA and suspected unintentional gabapentin toxicity, and is currently on fluconazole for UTI and rocephin for empiric concern for sepsis. TSH drawn 04/24/22 WNL; vitamin B-12 elevated at [address] with [facility] [email or web address]. She appeared anxious and restless, mood presented as labile. Speech was mumbled and difficult to understand at times; answers were not always appropriate to questions asked. States she has been in the hospital for 1 week and that she came in because she was having difficulty breathing. She was able to identify that she is established with [facility] but unable to recall her psychiatric diagnoses or other psychiatric history.     Call placed to [place] (daughter; [phone]).  She reports that [email or web address] [email or web address] has lived in a group home for the past few years and around 3-4 weeks ago went to [place] due to falls. She notes Carlyn Flynn has a history of bipolar disorder, multiple PD, depression. States that her cognitive abilities are intact at baseline; she is coherent with what is going on in the world. She denies delusional thinking at baseline but does note that there have been episodes in past of confusion, living in the past. She does become increasingly agitated and combative during these episodes, angry if you don't remember what she is talking about or if you don't agree with her. She has no known history of hallucinations. She does have a history of 3-4 psychiatric hospitalizations in the past, with the most recent being 2 years ago when she attempted suicide. States that Carlyn Flynn has not exhibited any mood changes or verbalized any suicidal ideations recently. She is agreeable to an inpatient psychiatric admission if warranted.      Pt has recently seen a neurologist due to the \"giving out\" of her   leg or hand causing her to fall or dropping objects  EMG results from 4/7/22:  Conclusion: This is an abnormal study. 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 Truong Culp is also   electrophysiologic evidence of a subacute on chronic L5-S1   radiculopathy with evidence of ongoing denervation.  Additional   findings in the right upper extremity included a median   mononeuropathy at the wrist which is moderate in severity and a   subacute radial mononeuropathy which is proximal to the take off   of the extensor digitorum communis muscle.      Treatment Number:  3    Treatment Time: 199-951  Timed Code Treatment Minutes:   43  minutes   Total Treatment Time:    43  minutes    Staff Recommendations:    Assist of 1 with RW and gait belt     Discharge Recommendations: SNF    DME needs for discharge:  defer to facility    AM-PAC Score: AM-PAC Inpatient Daily Activity Raw Score: 17   Home Health S4 Level: NA    MOCA:  [place] Cognitive Assessment [facility])  Completed this date per goals. (See pt folder behind nurses station for copy of assessment while pt is admitted.)   Total Score: Sum of all subscores listed on the right-hand side. Add one point for an individual who has 12 years or fewer of formal education, for a possible maximum of 30 points. A final total score of 26 and above is considered normal.     Education Level 1 year of college     Visuospatial/Executive  0/5   Naming  3/3   Attention  3/6   Language 1/3   Abstraction  1/2   Delayed Recall    MIS = 14/15   4/5   Orientation  6/6   (additional point for <12 grade educations)     Total Score    18/30     Subjective:  Pt reports R groin strain from over the weekend, trying to rest and may apply ice today. Agreeable to therapy. ADLs:  Self Care:  NA    Pain   Yes  Rating:mild  Location: R groin pain  Pain Medicine Status: No request made      Cognition    A&O to Person, Place, Time and Situation   Able to follow:  2 step commands    Balance:     SB-CGA with RW, cues to attend to items on R    Bed mobility:  Modified IND with HOB elevated    Transfer Training:   Sit to stand:   CGA with cues for hand placement  Stand to sit:  CGA with cues for hand placement  Bed to Chair:  CGA with RW    Completed functional mobility into community room for group session. SB-CGA with RW, max cues to attend to items on R (running into wall/chair/etc). Activity Tolerance   Pt completed therapy session with No adverse symptoms noted w/activity    Therapeutic Exercise:   Arm circles at 90 degrees of shoulder flexion: CW and CCW, 10 reps x 2 sets with rest between  Shoulder flexion: 10 reps x 2 sets with rest between    Patient Education:   Role of OT, ADL retraining, Recommendations for DC and UE HEP     Positioning Needs: In common room with needs met    Family Present:  No    Assessment: Making gradual progress toward goals. GOALS  To be met in 3 Visits:  1). Pt. To complete interest check list.   2).  Pt will participate in updated [address] [place] [address] assessment. MET 6/13/22  3). SBA with bed to toilet transfer using RW     To be met in 5 Visits:  1). Supine to Sit IND flat bed  2). Bed to Chair/commode supervision with RW  3). Upper Body Dressing with setup  4). Lower Body Dressing Min  A  5).  Pt to keyshawn UE exs x10 reps MET 6/13/22, continue to reinforce      Plan: cont with [address], OTR/L 2614          If patient discharges from this facility prior to next visit, this note will serve as the Discharge Summary

## 2022-06-13 NOTE — PROGRESS NOTES
Inpatient Geriatric  Behavior Health Physical Therapy Daily Treatment Note    Unit:  [facility]  Date:  6/13/2022  Patient Name:    Marilu Romero  Admitting diagnosis:  Acute psychosis [facility]) [F23]  Admit Date:  6/6/2022  Precautions/Restrictions:    Standard BHI Precautions  Fall Risk  History of current Episode: Per Evelio H&P: \"Cheyenne Arellano is a 62year old female with a history of depression and PTSD who has had two serious UTIs in the last two months.  She was admitted to [place] [address] for urinary sepsis just prior to coming to the [facility] and was observed having visual hallucinations and confusion.  She was seen by the psychiatric consultant and ultimately sent to the [facility] for psychosis due to the fact that she has a psychiatric history. She admits to having had a lot of anxiety at [place] [address] because she was not allowed to get out of bed.  This caused her to feel paranoid and trapped. Harvey Johnson thinks this may have been part of why she was agitated. Harvey Johnson is glad that she is now able to get up and walk on her own. \"  Pt has recently seen a neurologist due to the \"giving out\" of her   leg or hand causing her to fall or dropping objects  EMG results from 4/7/22:  Conclusion: This is an abnormal study. 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 Vishnu Cardoso is also   electrophysiologic evidence of a subacute on chronic L5-S1   radiculopathy with evidence of ongoing denervation.  Additional   findings in the right upper extremity included a median   mononeuropathy at the wrist which is moderate in severity and a   subacute radial mononeuropathy which is proximal to the take off   of the extensor digitorum communis muscle.        Discharge Recommendations from PHYSICAL perspective:                                          SNF    DME needs for discharge:     defer to facility     Therapy recommendations for staff:   Assist of 1 (stand by assist) with use of rolling walker (RW) and gait belt for all transfers and ambulation   to/from chair  to/from bathroom  within room  within halls  Therapy recommendation for EMS Transport: can transport by wheelchair    Home Health S4 Level Recommendation: Level 3- Safety    AM-PAC Mobility Score     AM-PAC Inpatient Mobility Raw Score : 18    Treatment Time: 2427 - 1113  Treatment number:  3    Subjective:    Pt. Agreeable to tx     Cognition  A&O to Person, Place and Situation  Able to follow:  2 step commands    Pain   Yes  Rating:   moderate  Location:   Low back pain radiating to RLE  Pain Medicine Status: Pain med requested      Bed Mobility   Supine to Sit: CGA  Sit to Supine:  CGA  Rolling:  CGA  Scooting:  SBA    Transfer Training   Sit to stand:  Min A to CGA  Stand to sit:  CGA  Bed to Chair:  SBA with use of gait belt and rolling walker (RW)    Gait Training gait completed as indicated below  Deviations (firm surface/linoleum): decreased tameka, Increased EVERARDO, forward flexed posture, antalgic pattern, decreased stance time on right and trendelenburg on right   Level of Assist:    SBA  Assistive Device Used:    gait belt and rolling walker (RW)  Distance:  30 ft + 40 ft  Cued on: Patient needed verbal cueing for sequencing with RW and safety awareness. Patient kept rolling walker far ahead during ambulation with decreased safety awareness and needed verbal cueing at regular intervals. WC Training: N/A    Toileting  Sit>stand from toilet (standard height):  Not Tested  Stand>sit to toilet (standard height):   Not Tested  Radha-care:      Not Tested  Hand hygiene:     Not Tested  LE pants management:    Not Tested  Cues: Therapeutic Exercises   Exercises in BOLD performed in unit today. Items not bolded are carried forward from prior visits for continuity of the record.   Exercise/Equipment Resistance/Repetitions Other comments     Strengthening exercises       Clam shells exercise 10 reps x 2 sets      Neuro gliding exercise in side lying position on the RLE 10 reps x 2 sets    Ankle pumps 20 reps                             Neuromuscular re-ed - balance exercises     Forward lung hold with one UE support 10 sec x 3 reps  Patient could not perform the exercise on the LLE due to increased pain on the lower back                        Balance  Static Sitting:  Fair +  Dynamic Sitting:  Fair +  Static Standing: Fair -  Dynamic Standing: Fair -  See above neuromuscular re-ed exercises    Patient Education       Educated on importance of continued strengthening to facilitate functional return  Educated on importance of continued activity   Educated on safety with transfers  Educated on proper gait pattern and RW distance  Educated on proper positioning and posture with functional mobility  Educated on role of PT, POC as well as importance of continued activity      Positioning Needs       Pt up in chair, no alarm needed due to supervision from staff, positioned in proper neutral alignment and pressure relief provided. Needs within reach. Within line of sight of nursing staff. Response to Treatment and Activity Tolerance  Pt tolerated treatment well  Pt limited by pain  Pain noted with all standing activities      Assessment   Patient needed regular sitting breaks to tolerate the exercises and standing activities. Patient performed well with exercises and neuro re-education with decreased complaints of low back pain (from moderate to mild). Patient was unable to participate in stair ambulation due to increased pain on the RLE during stance phase. Recommending SNF upon discharge as patient functioning well below baseline, demonstrates good rehab potential and unable to return home due to limited or no family support, inability to negotiate stairs to enter home/bedroom/bathroom, burden of care beyond caregiver ability, home environment not conducive to patient recovery and limited safety awareness. Goals (all goals ongoing unless otherwise indicated)  1).  Demonstrate [place] in HEP. 2). Trial use of exercise bike, per pt request.     To be met in 6 visits:  1).  Supine to/from sit  Independent to promote return to functional ADLs  2).  Sit to/from stand           Modified Independent to promote return to functional ADLs  3).  Gait: Ambulate 150 ft. with Modified Independent and use of LRAD (least restrictive assistive device) demonstrating improved gait pattern  4).  Tolerate B LE exercises 3 sets of 10-15 reps to improve strength   5).  Tolerated standing balance exercises with improved balance to [facility] with plan of care. Time Coded Treatment Minutes:   34 minutes    Total Treatment Time:   34 minutes    Electronically signed by Khoa Jensen PT on 6/13/22 at 11:49 AM EDT    If patient discharges from this facility prior to next visit, this note will serve as the Discharge Summary.

## 2022-06-13 NOTE — BH NOTE
Incoming call from Tricia Chase with [facility] stating pt has been officially accepted for admission to [facility] on Tuesday, 6/14/22.

## 2022-06-13 NOTE — PROGRESS NOTES
Behavioral Services                                              Medicare Re-Certification    I certify that the inpatient psychiatric hospital services furnished since the previous certification/re-certification were, and continue to be, medically necessary for;    [x] (1) Treatment which could reasonably be expected to improve the patient's condition,    [x] (2) Or for diagnostic study. Estimated length of stay/service 1 d    Plan for post-hospital care: ECF    This patient continues to need, on a daily basis, active treatment furnished directly by or requiring the supervision of inpatient psychiatric personnel.     Electronically signed by Arlette Omalley MD on 6/13/2022 at 3:07 PM

## 2022-06-13 NOTE — PROGRESS NOTES
Department of Psychiatry  AttendingProgress Note    Looking forward to transfer to [facility]. Worried that the transfer will take a long time and that we will discharge he before her placement-- I reassured her on this point. The treatment team met and discussed the treatment plan. SUBJECTIVE:        Suicidal ideation:  denies suicidal ideation. Patient does not have medication side effects. ROS: Patient has new complaints: no  Sleeping adequately:  Yes   Appetite adequate: Yes  Attending groups: Yes      OBJECTIVE    Physical  Vitals:    Blood pressure 120/85, pulse 95, temperature 98.4 °F (36.9 °C), temperature source Oral, resp. rate 16, height 5' 5\" (1.651 m), weight 224 lb (101.6 kg), SpO2 99 %.   General appearance:  []  appears age, [x]  appears older than stated age,     [x]  adequately dressed and groomed, [] disheveled,                [x]  in no acute distress, [] appears mildly distressed,      []  Other:      MUSCULOSKELETAL:   Gait:   [] normal, [] antalgic, [x] unsteady, [] in bed, gait not evaluated   Station:  [] erect, [x] sitting, [] recumbent, [] other        Strength/tone:  [x] muscle strength and tone appear consistent with age and condition     [] atrophy      [] abnormal movements  PSYCHIATRIC:    Relatedness:  [x] cooperative, [] guarded, [] indifferent, [] hostile,      [] sedated  Speech:  [x] normal prosody, [] pressured, [] decreased volume,    [] slurred, [] halting, [] slowed, [] loud     [] echolalia, [] incoherent, [] stuttering   Eye contact:  [x] direct, [] avoidant, [] intense  Kinetics:  [x] normal, [] increased, [] decreased  Mood:   [] euthymic, [] depressed, [x] anxious, [] irritable,     [] labile  Affect:   [] normal range, [] constricted, [] depressed, [x] anxious,     [] angry, [] blunted, [] flat  Hallucinations  [x] denies, [] auditory,  [] visual,  [] olfactory, [] tactile  Delusions  [] none, [] grandiose,  [] jealous,  [] persecutory,  [] somatic,     [] bizarre  Preoccupations  [] none, [] violence, [] obsessions, [] other     Suicidal ideation  [x] denies, [] endorses  Homicidal ideation [x] denies, [] endorses  Thought process: [x] logical, [] circumstantial, [] tangential     [] concrete, [] disorganized  Associations:  [x] logical and coherent, [] loosening, [] disorganized  Insight:   [x] good, [] fair, [] poor  Judgment:  [x] good, [] fair, [] poor  Attention and concentration:     [x] intact, [] limited, [] able to focus on interview,     [] grossly impaired  Orientation:  [x] person, place, time, situation     [] disoriented to:     Memory:  Remote memory [x] intact, [] impaired     Recent memory  [x] intact, [] impaired          Data  Labs:   Admission on 06/06/2022, Discharged on 06/14/2022   Component Date Value Ref Range Status    POC Glucose 06/07/2022 265* 70 - 99 mg/dl Final    Performed on 06/07/2022 ACCU-CHEK   Final    WBC 06/07/2022 5.6  4.0 - 11.0 K/uL Final    RBC 06/07/2022 4.24  4.00 - 5.20 M/uL Final    Hemoglobin 06/07/2022 11.2* 12.0 - 16.0 g/dL Final    Hematocrit 06/07/2022 34.8* 36.0 - 48.0 % Final    MCV 06/07/2022 82.1  80.0 - 100.0 fL Final    MCH 06/07/2022 26.4  26.0 - 34.0 pg Final    MCHC 06/07/2022 32.1  31.0 - 36.0 g/dL Final    RDW 06/07/2022 13.8  12.4 - 15.4 % Final    Platelets 40/93/7234 380  135 - 450 K/uL Final    MPV 06/07/2022 6.8  5.0 - 10.5 fL Final    Neutrophils % 06/07/2022 55.4  % Final    Lymphocytes % 06/07/2022 26.3  % Final    Monocytes % 06/07/2022 10.6  % Final    Eosinophils % 06/07/2022 7.1  % Final    Basophils % 06/07/2022 0.6  % Final    Neutrophils Absolute 06/07/2022 3.1  1.7 - 7.7 K/uL Final    Lymphocytes Absolute 06/07/2022 1.5  1.0 - 5.1 K/uL Final    Monocytes Absolute 06/07/2022 0.6  0.0 - 1.3 K/uL Final    Eosinophils Absolute 06/07/2022 0.4  0.0 - 0.6 K/uL Final    Basophils Absolute 06/07/2022 0.0  0.0 - 0.2 K/uL Final    Hemoglobin A1C 06/07/2022 7.2  See comment % Final    Performed on 06/11/2022 ACCU-CHEK   Final    POC Glucose 06/11/2022 176* 70 - 99 mg/dl Final    Performed on 06/11/2022 ACCU-CHEK   Final    POC Glucose 06/12/2022 223* 70 - 99 mg/dl Final    Performed on 06/12/2022 ACCU-CHEK   Final    POC Glucose 06/12/2022 218* 70 - 99 mg/dl Final    Performed on 06/12/2022 ACCU-CHEK   Final    POC Glucose 06/12/2022 250* 70 - 99 mg/dl Final    Performed on 06/12/2022 ACCU-CHEK   Final    POC Glucose 06/13/2022 272* 70 - 99 mg/dl Final    Performed on 06/13/2022 ACCU-CHEK   Final    POC Glucose 06/13/2022 177* 70 - 99 mg/dl Final    Performed on 06/13/2022 ACCU-CHEK   Final    POC Glucose 06/13/2022 246* 70 - 99 mg/dl Final    Performed on 06/13/2022 ACCU-CHEK   Final    POC Glucose 06/13/2022 195* 70 - 99 mg/dl Final    Performed on 06/13/2022 ACCU-CHEK   Final    POC Glucose 06/14/2022 262* 70 - 99 mg/dl Final    Performed on 06/14/2022 ACCU-CHEK   Final    POC Glucose 06/14/2022 266* 70 - 99 mg/dl Final    Performed on 06/14/2022 ACCU-CHEK   Final    SARS-CoV-2, NAAT 06/14/2022 Not Detected  Not Detected Final    Comment: Rapid NAAT:   Negative results should be treated as presumptive and,  if inconsistent with clinical signs and symptoms or necessary for  patient management, should be tested with an alternative molecular  assay. Negative results do not preclude SARS-CoV-2 infection and  should not be used as the sole basis for patient management decisions. This test has been authorized by the FDA under an Emergency Use  Authorization (EUA) for use by authorized laboratories.     Fact sheet for Healthcare Providers:  Simon.mike  Fact sheet for Patients: Simon.mike    METHODOLOGY: Isothermal Nucleic Acid Amplification      POC Glucose 06/14/2022 263* 70 - 99 mg/dl Final    Performed on 06/14/2022 ACCU-CHEK   Final            Medications  No current facility-administered medications for this encounter. ASSESSMENT AND PLAN    Active Problems:    PTSD (post-traumatic stress disorder)    Delirium due to another medical condition    Physical debility    Cystitis with hematuria    Morbid (severe) obesity due to excess calories [facility])  Resolved Problems:    Acute psychosis ([address]    Encounter for routine adult medical examination       1. Patient's symptoms are improving  2. Probable discharge is tomorrow  3. Discharge planning is complete  4. Suicidal ideation is absent    Continue Zoloft, Wellbutrin, Zoloft at current doses    Precert complete. Patient will go to [facility] tomorrow    Total time with patient was 40 minutes and more than 50 % of that time was spent counseling the patient on their symptoms, treatment and expected goals.

## 2022-06-14 VITALS
BODY MASS INDEX: 37.32 KG/M2 | WEIGHT: 224 LBS | DIASTOLIC BLOOD PRESSURE: 85 MMHG | HEART RATE: 95 BPM | RESPIRATION RATE: 16 BRPM | TEMPERATURE: 98.4 F | SYSTOLIC BLOOD PRESSURE: 120 MMHG | OXYGEN SATURATION: 99 % | HEIGHT: 65 IN

## 2022-06-14 PROBLEM — A41.9 SEVERE SEPSIS (HCC): Status: RESOLVED | Noted: 2021-12-23 | Resolved: 2022-06-14

## 2022-06-14 PROBLEM — N30.00 ACUTE CYSTITIS: Status: RESOLVED | Noted: 2022-06-04 | Resolved: 2022-06-14

## 2022-06-14 PROBLEM — F05 DELIRIUM DUE TO ANOTHER MEDICAL CONDITION: Status: RESOLVED | Noted: 2022-06-07 | Resolved: 2022-06-14

## 2022-06-14 PROBLEM — F45.41 PSYCHOGENIC HEADACHE: Status: RESOLVED | Noted: 2021-10-11 | Resolved: 2022-06-14

## 2022-06-14 PROBLEM — N10 ACUTE PYELONEPHRITIS: Status: RESOLVED | Noted: 2022-04-29 | Resolved: 2022-06-14

## 2022-06-14 PROBLEM — R68.89 HEAD PROBLEM: Status: RESOLVED | Noted: 2021-10-11 | Resolved: 2022-06-14

## 2022-06-14 PROBLEM — T50.902A INTENTIONAL DRUG OVERDOSE (HCC): Status: RESOLVED | Noted: 2021-03-24 | Resolved: 2022-06-14

## 2022-06-14 LAB
GLUCOSE BLD-MCNC: 262 MG/DL (ref 70–99)
GLUCOSE BLD-MCNC: 263 MG/DL (ref 70–99)
GLUCOSE BLD-MCNC: 266 MG/DL (ref 70–99)
PERFORMED ON: ABNORMAL
SARS-COV-2, NAAT: NOT DETECTED

## 2022-06-14 PROCEDURE — 5130000000 HC BRIDGE APPOINTMENT

## 2022-06-14 PROCEDURE — 99239 HOSP IP/OBS DSCHRG MGMT >30: CPT | Performed by: PSYCHIATRY & NEUROLOGY

## 2022-06-14 PROCEDURE — 6370000000 HC RX 637 (ALT 250 FOR IP): Performed by: PSYCHIATRY & NEUROLOGY

## 2022-06-14 PROCEDURE — 87635 SARS-COV-2 COVID-19 AMP PRB: CPT

## 2022-06-14 RX ORDER — MECOBALAMIN 5000 MCG
10 TABLET,DISINTEGRATING ORAL NIGHTLY PRN
Qty: 30 TABLET | Refills: 0
Start: 2022-06-14

## 2022-06-14 RX ORDER — INSULIN LISPRO 100 [IU]/ML
0-18 INJECTION, SOLUTION INTRAVENOUS; SUBCUTANEOUS
Qty: 1 EACH | Refills: 0
Start: 2022-06-14

## 2022-06-14 RX ORDER — DIMETHICONE, OXYBENZONE, AND PADIMATE O 2; 2.5; 6.6 G/100G; G/100G; G/100G
STICK TOPICAL PRN
Qty: 1 STICK | Refills: 0
Start: 2022-06-14

## 2022-06-14 RX ORDER — INSULIN LISPRO 100 [IU]/ML
0-9 INJECTION, SOLUTION INTRAVENOUS; SUBCUTANEOUS NIGHTLY
Qty: 1 EACH | Refills: 0
Start: 2022-06-14

## 2022-06-14 RX ADMIN — METOPROLOL SUCCINATE 25 MG: 25 TABLET, EXTENDED RELEASE ORAL at 08:53

## 2022-06-14 RX ADMIN — BUSPIRONE HYDROCHLORIDE 15 MG: 7.5 TABLET ORAL at 14:42

## 2022-06-14 RX ADMIN — SERTRALINE 100 MG: 100 TABLET, FILM COATED ORAL at 08:53

## 2022-06-14 RX ADMIN — INSULIN LISPRO 9 UNITS: 100 INJECTION, SOLUTION INTRAVENOUS; SUBCUTANEOUS at 12:20

## 2022-06-14 RX ADMIN — FOLIC ACID 1 MG: 1 TABLET ORAL at 08:57

## 2022-06-14 RX ADMIN — LOSARTAN POTASSIUM 50 MG: 25 TABLET, FILM COATED ORAL at 08:53

## 2022-06-14 RX ADMIN — ACETAMINOPHEN 650 MG: 325 TABLET ORAL at 02:42

## 2022-06-14 RX ADMIN — INSULIN LISPRO 9 UNITS: 100 INJECTION, SOLUTION INTRAVENOUS; SUBCUTANEOUS at 08:52

## 2022-06-14 RX ADMIN — GABAPENTIN 600 MG: 300 CAPSULE ORAL at 08:53

## 2022-06-14 RX ADMIN — BUPROPION HYDROCHLORIDE 200 MG: 100 TABLET, EXTENDED RELEASE ORAL at 14:42

## 2022-06-14 RX ADMIN — PREGABALIN 25 MG: 25 CAPSULE ORAL at 08:53

## 2022-06-14 RX ADMIN — PANTOPRAZOLE SODIUM 40 MG: 40 TABLET, DELAYED RELEASE ORAL at 08:57

## 2022-06-14 RX ADMIN — GABAPENTIN 600 MG: 300 CAPSULE ORAL at 14:42

## 2022-06-14 RX ADMIN — HYDROCHLOROTHIAZIDE 12.5 MG: 25 TABLET ORAL at 08:58

## 2022-06-14 RX ADMIN — BUSPIRONE HYDROCHLORIDE 15 MG: 7.5 TABLET ORAL at 08:53

## 2022-06-14 RX ADMIN — ASPIRIN 81 MG: 81 TABLET, CHEWABLE ORAL at 08:57

## 2022-06-14 RX ADMIN — FERROUS SULFATE TAB 325 MG (65 MG ELEMENTAL FE) 325 MG: 325 (65 FE) TAB at 08:57

## 2022-06-14 RX ADMIN — FLUCONAZOLE 200 MG: 100 TABLET ORAL at 08:53

## 2022-06-14 ASSESSMENT — PAIN SCALES - GENERAL
PAINLEVEL_OUTOF10: 10
PAINLEVEL_OUTOF10: 0

## 2022-06-14 ASSESSMENT — PAIN DESCRIPTION - ORIENTATION: ORIENTATION: LEFT;RIGHT

## 2022-06-14 ASSESSMENT — PAIN DESCRIPTION - LOCATION: LOCATION: HIP

## 2022-06-14 ASSESSMENT — PAIN DESCRIPTION - DESCRIPTORS: DESCRIPTORS: ACHING

## 2022-06-14 NOTE — PROGRESS NOTES
Senior Purposeful Rounding    Position:Back and Repositions Self    Physical Environment:Room free from clutter, Clear path to bathroom , Adequate lighting and No safety hazards noted    Pain Rating/ Nonverbal Pain Behaviors: 0, rests with eyes closed. Calm expression. Respirations regular and even.      Pain interventions Attempted: None    Patient Toileted:Continent and Independent

## 2022-06-14 NOTE — PROGRESS NOTES
Senior Purposeful Rounding    Position:Back and Repositions Self    Physical Environment:Room free from clutter, Clear path to bathroom , Adequate lighting and No safety hazards noted    Pain Rating/ Nonverbal Pain Behaviors: Rests with eyes closed. Calm expression. Pain interventions Attempted: Tylenol 650 mg given previously.      Patient Toileted:Continent and Independent

## 2022-06-14 NOTE — PROGRESS NOTES
Senior Purposeful Rounding    Position:Back and Repositions Self    Physical Environment:Room free from clutter, Clear path to bathroom , Adequate lighting and No safety hazards noted    Pain Rating/ Nonverbal Pain Behaviors:10    Pain interventions Attempted: Repositioning and distraction.     Patient Toileted:Continent and Independent

## 2022-06-14 NOTE — PLAN OF CARE
Problem: Chronic Conditions and Co-morbidities  Goal: Patient's chronic conditions and co-morbidity symptoms are monitored and maintained or improved  Outcome: Progressing     Problem: Skin/Tissue Integrity  Goal: Absence of new skin breakdown  Description: 1. Monitor for areas of redness and/or skin breakdown  2. Assess vascular access sites hourly  3. Every 4-6 hours minimum:  Change oxygen saturation probe site  4. Every 4-6 hours:  If on nasal continuous positive airway pressure, respiratory therapy assess nares and determine need for appliance change or resting period. Outcome: Progressing     Problem: Safety - Adult  Goal: Free from fall injury  Outcome: Progressing     Problem: Pain  Goal: Verbalizes/displays adequate comfort level or baseline comfort level  Outcome: Progressing     Problem: Anxiety  Goal: Will report anxiety at manageable levels  Description: INTERVENTIONS:  1. Administer medication as ordered  2. Teach and rehearse alternative coping skills  3. Provide emotional support with 1:1 interaction with staff  Outcome: Progressing     Problem: Behavior  Goal: Pt/Family maintain appropriate behavior and adhere to behavioral management agreement, if implemented  Description: INTERVENTIONS:  1. Assess patient/family's coping skills and  non-compliant behavior (including use of illegal substances)  2. Notify security of behavior or suspected illegal substances which indicate the need for search of the patient and/or belongings  3. Encourage verbalization of thoughts and concerns in a socially appropriate manner  4. Utilize positive, consistent limit setting strategies supporting safety of patient, staff and others  5. Encourage participation in the decision making process about the behavioral management agreement  6. Implement a Health Care Agreement if patient meets criteria  7. If a patient's behavior jeopardizes the safety of the patient, staff, or others refer to organization policy.  If a visitor's behavior poses a threat to safety call refer to organization policy. 8. Initiate consult with , Psychosocial CNS, Spiritual Care as appropriate  Outcome: Progressing     Problem: Depression/Self Harm  Goal: Effect of psychiatric condition will be minimized and patient will be protected from self harm  Description: INTERVENTIONS:  1. Assess impact of patient's symptoms on level of functioning, self care needs and offer support as indicated  2. Assess patient/family knowledge of depression, impact on illness and need for teaching  3. Provide emotional support, presence and reassurance  4. Assess for possible suicidal thoughts or ideation. If patient expresses suicidal thoughts or statements do not leave alone, initiate Suicide Precautions, move to a room close to the nursing station and obtain sitter  5. Initiate consults as appropriate with Mental Health Professional, Spiritual Care, Psychosocial CNS, and consider a recommendation to the LIP for a Psychiatric Consultation  Outcome: Progressing    Patient denies SI/Hi, AH/VH. Compliant with medication regimen. Calm and cooperative with care. Rates bilateral hip pain as a 10/10 but rests with eyes closed throughout evening. Free of falls or injuries. Free of self harm. No new skin breakdown with resolved skin areas noted. Patient states ability to refrain from picking own skin. Anxiety at manageable levels despite concerns of other patients, \"Those guys are getting on my nerves\". Blood sugar 195 this evening. Patient provided fluids per request.  Independent ambulates with use of roller walker.

## 2022-06-14 NOTE — BH NOTE
Spoke with pt's [facility] Billy, and updated on pt's progress and treatment including DC to [facility], AN AFFILIATE OF [facility]. DC summary and AVS faxed to [facility] for continuity of care.

## 2022-06-14 NOTE — PROGRESS NOTES
Senior Purposeful Rounding    Position:Back and Repositions Self    Physical Environment:Room free from clutter, Clear path to bathroom , Adequate lighting and No safety hazards noted    Pain Rating/ Nonverbal Pain Behaviors:0, rests with eyes closed. Calm expression present. Respirations regular and even.      Pain interventions Attempted: None    Patient Toileted:Continent and Independent

## 2022-06-14 NOTE — BH NOTE
Pt a/ox 4..    Pt has been Anxious, Cooperative and Pleasant. Compliant with VS, meds and accuchecks. Denies SI, HI, AVH  Contracts for safety. Visible on unit socializing and interacting appropriately with peers in milieu returns to room when feeling overwhelmed. ~1500, pt became increasingly anxious and distraught r/t discharge uncertainty and pt reports her  quit. Contacted Dr. Staci Meade for PRN, 0.5mg Ativan PO administered at 1528, pt reports moderately effective. Anxiety persists d/t other pts on unit. Pt c/o  poor sleep. d/t disruptive pts at night  Appetite: no change from normal      Ambulates with front-wheeled walker and one-person assist  Independent with ADLs.     BP (!) 177/96   Pulse (!) 104   Temp 97.8 °F (36.6 °C) (Oral)   Resp 16   Ht 5' 5\" (1.651 m)   Wt 224 lb (101.6 kg)   SpO2 98%   BMI 37.28 kg/m²

## 2022-06-14 NOTE — GROUP NOTE
Group Therapy Note    Date: 6/14/2022    Group Start Time: 1300  Group End Time: 1578  Group Topic: Activity    [facility]    KEYONA Plummer        Group Therapy Note  Clinician met with the group members who wanted to work on putting a puzzle together. Clinician put on mindfulness music and the members reminisced about the memories the songs elicited. Attendees: 2         Patient's Goal:      Notes:  Patient was cooperative and friendly to the other members. She was fully engaged in the activity and conversations. Status After Intervention:  Improved    Participation Level:  Active Listener and Interactive    Participation Quality: Appropriate, Attentive, Sharing and Supportive      Speech:  normal      Thought Process/Content: Logical      Affective Functioning: Congruent      Mood: euthymic      Level of consciousness:  Alert      Response to Learning: Able to retain information      Endings: None Reported    Modes of Intervention: Socialization and Activity      Discipline Responsible: /Counselor      Signature:  KEYONA Plummer

## 2022-06-14 NOTE — GROUP NOTE
Group Therapy Note    Date: 6/14/2022    Group Start Time: 1000  Group End Time: 6886  Group Topic: Cognitive Skills    [id number] [facility]        Group Therapy Note    Attendees: 6    Group members sat at the table engaged in reminiscing therapy. Notes:  Candi Luevano attended group for the full duration. Candi Luevano remained engaged and interacted approrpiately with other members of the group. Status After Intervention:  Improved    Participation Level:  Active Listener and Interactive    Participation Quality: Appropriate, Attentive and Sharing      Speech:  normal      Thought Process/Content: Logical      Affective Functioning: Congruent      Mood: Brightened, Engaging     Level of consciousness:  Alert      Response to Learning: Able to verbalize current knowledge/experience      Endings: None Reported    Modes of Intervention: Socialization, Exploration and Activity      Discipline Responsible: Behavorial Health Tech      Signature:  KEYONA Scott
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
## 2022-06-28 PROBLEM — N30.91 CYSTITIS WITH HEMATURIA: Status: ACTIVE | Noted: 2022-06-28

## 2023-05-15 ENCOUNTER — HOSPITAL ENCOUNTER (EMERGENCY)
Age: 63
Discharge: HOME | End: 2023-05-15
Attending: EMERGENCY MEDICINE
Payer: MEDICAID

## 2023-05-15 ENCOUNTER — APPOINTMENT (OUTPATIENT)
Dept: GENERAL RADIOLOGY | Age: 63
End: 2023-05-15
Payer: MEDICAID

## 2023-05-15 VITALS
HEIGHT: 65 IN | BODY MASS INDEX: 38.15 KG/M2 | HEART RATE: 88 BPM | DIASTOLIC BLOOD PRESSURE: 77 MMHG | RESPIRATION RATE: 18 BRPM | SYSTOLIC BLOOD PRESSURE: 157 MMHG | TEMPERATURE: 99 F | OXYGEN SATURATION: 97 % | WEIGHT: 229 LBS

## 2023-05-15 DIAGNOSIS — T78.3XXA ANGIOEDEMA, INITIAL ENCOUNTER: ICD-10-CM

## 2023-05-15 DIAGNOSIS — R22.0 LIP SWELLING: Primary | ICD-10-CM

## 2023-05-15 LAB
ANION GAP SERPL CALCULATED.3IONS-SCNC: 9 MMOL/L (ref 3–16)
BASOPHILS # BLD: 0 K/UL (ref 0–0.2)
BASOPHILS NFR BLD: 0.2 %
BUN SERPL-MCNC: 9 MG/DL (ref 7–20)
CALCIUM SERPL-MCNC: 9.1 MG/DL (ref 8.3–10.6)
CHLORIDE SERPL-SCNC: 94 MMOL/L (ref 99–110)
CO2 SERPL-SCNC: 31 MMOL/L (ref 21–32)
CREAT SERPL-MCNC: 0.7 MG/DL (ref 0.6–1.2)
DEPRECATED RDW RBC AUTO: 11.8 % (ref 12.4–15.4)
EOSINOPHIL # BLD: 0.2 K/UL (ref 0–0.6)
EOSINOPHIL NFR BLD: 4.5 %
FLUAV RNA RESP QL NAA+PROBE: NOT DETECTED
FLUBV RNA RESP QL NAA+PROBE: NOT DETECTED
GFR SERPLBLD CREATININE-BSD FMLA CKD-EPI: >60 ML/MIN/{1.73_M2}
GLUCOSE SERPL-MCNC: 145 MG/DL (ref 70–99)
HCT VFR BLD AUTO: 39.6 % (ref 36–48)
HGB BLD-MCNC: 13.3 G/DL (ref 12–16)
LYMPHOCYTES # BLD: 1 K/UL (ref 1–5.1)
LYMPHOCYTES NFR BLD: 18.7 %
MCH RBC QN AUTO: 30.1 PG (ref 26–34)
MCHC RBC AUTO-ENTMCNC: 33.5 G/DL (ref 31–36)
MCV RBC AUTO: 89.7 FL (ref 80–100)
MONOCYTES # BLD: 0.6 K/UL (ref 0–1.3)
MONOCYTES NFR BLD: 10.7 %
NEUTROPHILS # BLD: 3.7 K/UL (ref 1.7–7.7)
NEUTROPHILS NFR BLD: 65.9 %
PLATELET # BLD AUTO: 187 K/UL (ref 135–450)
PMV BLD AUTO: 7 FL (ref 5–10.5)
POTASSIUM SERPL-SCNC: 3.8 MMOL/L (ref 3.5–5.1)
RBC # BLD AUTO: 4.41 M/UL (ref 4–5.2)
SARS-COV-2 RNA RESP QL NAA+PROBE: NOT DETECTED
SODIUM SERPL-SCNC: 134 MMOL/L (ref 136–145)
WBC # BLD AUTO: 5.6 K/UL (ref 4–11)

## 2023-05-15 PROCEDURE — A4216 STERILE WATER/SALINE, 10 ML: HCPCS | Performed by: EMERGENCY MEDICINE

## 2023-05-15 PROCEDURE — 2500000003 HC RX 250 WO HCPCS: Performed by: EMERGENCY MEDICINE

## 2023-05-15 PROCEDURE — 96375 TX/PRO/DX INJ NEW DRUG ADDON: CPT

## 2023-05-15 PROCEDURE — 80048 BASIC METABOLIC PNL TOTAL CA: CPT

## 2023-05-15 PROCEDURE — 6360000002 HC RX W HCPCS: Performed by: EMERGENCY MEDICINE

## 2023-05-15 PROCEDURE — 99284 EMERGENCY DEPT VISIT MOD MDM: CPT

## 2023-05-15 PROCEDURE — 2580000003 HC RX 258: Performed by: EMERGENCY MEDICINE

## 2023-05-15 PROCEDURE — 85025 COMPLETE CBC W/AUTO DIFF WBC: CPT

## 2023-05-15 PROCEDURE — 71046 X-RAY EXAM CHEST 2 VIEWS: CPT

## 2023-05-15 PROCEDURE — 96374 THER/PROPH/DIAG INJ IV PUSH: CPT

## 2023-05-15 PROCEDURE — 87636 SARSCOV2 & INF A&B AMP PRB: CPT

## 2023-05-15 PROCEDURE — 6370000000 HC RX 637 (ALT 250 FOR IP): Performed by: EMERGENCY MEDICINE

## 2023-05-15 RX ORDER — DIPHENHYDRAMINE HYDROCHLORIDE 50 MG/ML
25 INJECTION INTRAMUSCULAR; INTRAVENOUS ONCE
Status: COMPLETED | OUTPATIENT
Start: 2023-05-15 | End: 2023-05-15

## 2023-05-15 RX ORDER — ACETAMINOPHEN 325 MG/1
650 TABLET ORAL ONCE
Status: COMPLETED | OUTPATIENT
Start: 2023-05-15 | End: 2023-05-15

## 2023-05-15 RX ADMIN — FAMOTIDINE 20 MG: 10 INJECTION, SOLUTION INTRAVENOUS at 11:23

## 2023-05-15 RX ADMIN — DIPHENHYDRAMINE HYDROCHLORIDE 25 MG: 50 INJECTION, SOLUTION INTRAMUSCULAR; INTRAVENOUS at 09:07

## 2023-05-15 RX ADMIN — ACETAMINOPHEN 650 MG: 325 TABLET ORAL at 14:41

## 2023-05-15 ASSESSMENT — PAIN SCALES - GENERAL: PAINLEVEL_OUTOF10: 5

## 2023-05-15 ASSESSMENT — PAIN - FUNCTIONAL ASSESSMENT: PAIN_FUNCTIONAL_ASSESSMENT: NONE - DENIES PAIN

## 2023-05-15 NOTE — ED NOTES
Discharge instructions reviewed with Pt. Pt verbalizes understanding at this time. Prescriptions/medications reviewed with pt at this time. VS declined by patient at discharge. Pt condition stable at this time. No concerns voiced.        Haylie Box RN  05/15/23 [id number]

## 2023-05-15 NOTE — ED PROVIDER NOTES
needed (sleep), Disp-30 tablet, R-0NO PRINT      !! insulin lispro (HUMALOG) 100 UNIT/ML SOLN injection vial Inject 0-18 Units into the skin 3 times daily (with meals), Disp-1 each, R-0NO PRINT      !! insulin lispro (HUMALOG) 100 UNIT/ML SOLN injection vial Inject 0-9 Units into the skin nightly, Disp-1 each, R-0NO PRINT      sertraline (ZOLOFT) 100 MG tablet Take 1 tablet by mouth daily, Disp-30 tablet, R-3NO PRINT      losartan (COZAAR) 100 MG tablet Take 0.5 tablets by mouth daily, Disp-30 tablet, R-3NO PRINT      hydroCHLOROthiazide (HYDRODIURIL) 25 MG tablet Take 0.5 tablets by mouth daily, Disp-30 tablet, R-3NO PRINT      aspirin 81 MG chewable tablet Take 81 mg by mouth dailyHistorical Med      atorvastatin (LIPITOR) 40 MG tablet Take 40 mg by mouth at bedtimeHistorical Med      pregabalin (LYRICA) 25 MG capsule Take 25 mg by mouth 2 times daily. Historical Med      polyethylene glycol (GLYCOLAX) 17 g packet Take 17 g by mouth at bedtimeHistorical Med      hydrocortisone-aloe 1 % CREA cream Apply topically 2 times daily Apply to left arm rash., Topical, 2 TIMES DAILY Starting Fri 4/29/2022, Disp-1 each, R-0, Print      folic acid (FOLVITE) 1 MG tablet Take 1 tablet by mouth daily, Disp-30 tablet, R-3Print      pantoprazole (PROTONIX) 40 MG tablet Take 1 tablet by mouth in the morning and at bedtime, Disp-60 tablet, R-3Print      metoprolol succinate (TOPROL XL) 25 MG extended release tablet Take 1 tablet by mouth in the morning and at bedtime, Disp-30 tablet, R-3Normal      Liraglutide (VICTOZA) 18 MG/3ML SOPN SC injection Inject 3 mg into the skin dailyHistorical Med      ferrous sulfate (IRON 325) 325 (65 Fe) MG tablet Take 1 tablet by mouth daily (with breakfast), Disp-30 tablet, R-3NO PRINT      acetaminophen (TYLENOL) 500 MG tablet Take 1,000 mg by mouth every 6 hours as needed for PainHistorical Med      gabapentin (NEURONTIN) 300 MG capsule Take 600 mg by mouth 3 times daily.  Historical Med

## 2023-07-03 ENCOUNTER — HOSPITAL ENCOUNTER (EMERGENCY)
Age: 63
Discharge: HOME | End: 2023-07-03
Attending: EMERGENCY MEDICINE
Payer: MEDICAID

## 2023-07-03 VITALS
SYSTOLIC BLOOD PRESSURE: 146 MMHG | WEIGHT: 231.3 LBS | HEART RATE: 90 BPM | BODY MASS INDEX: 38.54 KG/M2 | DIASTOLIC BLOOD PRESSURE: 72 MMHG | TEMPERATURE: 98.7 F | OXYGEN SATURATION: 97 % | HEIGHT: 65 IN | RESPIRATION RATE: 13 BRPM

## 2023-07-03 DIAGNOSIS — T78.3XXA ANGIOEDEMA, INITIAL ENCOUNTER: Primary | ICD-10-CM

## 2023-07-03 PROCEDURE — 96375 TX/PRO/DX INJ NEW DRUG ADDON: CPT

## 2023-07-03 PROCEDURE — 96374 THER/PROPH/DIAG INJ IV PUSH: CPT

## 2023-07-03 PROCEDURE — 6360000002 HC RX W HCPCS: Performed by: PHYSICIAN ASSISTANT

## 2023-07-03 PROCEDURE — 2580000003 HC RX 258: Performed by: PHYSICIAN ASSISTANT

## 2023-07-03 PROCEDURE — 2500000003 HC RX 250 WO HCPCS: Performed by: PHYSICIAN ASSISTANT

## 2023-07-03 PROCEDURE — 99284 EMERGENCY DEPT VISIT MOD MDM: CPT

## 2023-07-03 RX ORDER — DIPHENHYDRAMINE HCL 25 MG
25 CAPSULE ORAL EVERY 6 HOURS PRN
Qty: 12 CAPSULE | Refills: 0 | Status: SHIPPED | OUTPATIENT
Start: 2023-07-03 | End: 2023-07-08

## 2023-07-03 RX ORDER — DIPHENHYDRAMINE HYDROCHLORIDE 50 MG/ML
25 INJECTION INTRAMUSCULAR; INTRAVENOUS ONCE
Status: COMPLETED | OUTPATIENT
Start: 2023-07-03 | End: 2023-07-03

## 2023-07-03 RX ORDER — METHYLPREDNISOLONE 4 MG/1
TABLET ORAL
Qty: 1 KIT | Refills: 0 | Status: SHIPPED | OUTPATIENT
Start: 2023-07-03 | End: 2023-07-09

## 2023-07-03 RX ORDER — FAMOTIDINE 20 MG/1
20 TABLET, FILM COATED ORAL 2 TIMES DAILY
Qty: 10 TABLET | Refills: 0 | Status: SHIPPED | OUTPATIENT
Start: 2023-07-03 | End: 2023-07-08

## 2023-07-03 RX ADMIN — DIPHENHYDRAMINE HYDROCHLORIDE 25 MG: 50 INJECTION, SOLUTION INTRAMUSCULAR; INTRAVENOUS at 14:36

## 2023-07-03 RX ADMIN — SODIUM CHLORIDE, PRESERVATIVE FREE 20 MG: 5 INJECTION INTRAVENOUS at 14:36

## 2023-07-03 RX ADMIN — WATER 125 MG: 1 INJECTION INTRAMUSCULAR; INTRAVENOUS; SUBCUTANEOUS at 14:36

## 2023-07-03 ASSESSMENT — ENCOUNTER SYMPTOMS
GASTROINTESTINAL NEGATIVE: 1
COUGH: 0
SHORTNESS OF BREATH: 0
TROUBLE SWALLOWING: 0
VOICE CHANGE: 0
CHEST TIGHTNESS: 0
RESPIRATORY NEGATIVE: 1

## 2023-07-03 ASSESSMENT — PAIN - FUNCTIONAL ASSESSMENT: PAIN_FUNCTIONAL_ASSESSMENT: NONE - DENIES PAIN

## 2023-07-03 NOTE — DISCHARGE INSTRUCTIONS
-I sent prescriptions for Benadryl, Pepcid and Medrol dose pack to [facility] on [address]  -hold your protonix until you complete your pepcid  -follow up with your PCP and allergist for reevaluation  -return for worsening symptoms such as fevers, worsening swelling, difficulty swallowing saliva or fluids, shortness of breath or other concerns

## 2023-07-03 NOTE — ED PROVIDER NOTES
ED Attending Attestation Note     Date of evaluation: 7/3/2023    This patient was seen by the advance practice provider and student. I have seen and examined the patient, agree with the workup, evaluation, management and diagnosis. The care plan has been discussed. Additional medical decision making: She presents with signs and symptoms consistent with ACE inhibitor angioedema. Medication review shows that she is taking losartan, which can have cross-reactivity. She already has follow-up established. Blood pressure is reasonable here, and we will defer additional medication changes to her primary care physician. Symptoms are stable throughout ED observation, and she shows no evidence of developing airway compromise. I believe she is stable for discharge with close return precautions.      Irene Valencia MD  07/03/23 [id number]

## 2024-02-02 NOTE — DISCHARGE SUMMARY
Hospital Medicine Discharge Summary    Patient ID: Qiana Tellez      Patient's PCP: EUGENIE Coleman CNP    Admit Date: 5/31/2022     Discharge Date:   06/06/22    Admitting Physician: Abel Garza MD     Discharge Physician: Abel Garza MD     Discharge Diagnoses: Active Hospital Problems    Diagnosis Date Noted    Acute cystitis [N30.00] 06/04/2022     Priority: Medium    SHAYLA (acute kidney injury) ([facility] [address] [N17.9] 04/22/2022     Priority: Medium       The patient was seen and examined on day of discharge and this discharge summary is in conjunction with any daily progress note from day of discharge. Hospital Course:   62 y.o. female Cheyenne Gaines  - hx of obesity (BMI 43), HTN, GERD, depression, with multiple drug allergies   - Sent from [facility] with concerns for AMS. Pt also shaking/tremoring of which EMS states that the nursing staff says that patient is shaking more than usual and is asking inappropriate questions and not making sense. - Work up showed SHAYLA and admitted for further management    Problems addressed  UTI, sepsis ruled out, Urine Cx with ecoli and candida, Cefdinir and fluconazole     Acute metabolic encephalopathy due to UTI, possible concern for unintentional gabapentin toxicity with underlying SHAYLA, underlying psych hx  waxing and waning mental status, delirium vs exacerbation of bipolar  Improving, Seen by [facility] and will need Inpatient psych unit     Shaking/tremors - concern for unintentional gabapentin toxicity with underlying SHAYLA.   Resolved, gabapentin restarted     SHAYLA, no baseline CKD hx  Cr 2.7 on admission, baseline is normal  Cr wnl, ok to be off IVF  Avoid NSAIDs (Ibuprofen, Aleve, Motrin, Naproxen, Toradol etc), Fleet enemas, IV contrast Dye and other nephrotoxins!     Diabetes mellitus - hyperglycemic as eating better, continue SSI, add lantus 5 units nightly,  hypoglycemia protocol, carb controlled diet     Lower extremity weakness, pain, concern for poly neuropathy  Continue low dose gabapentin     Hypertension hx, allow permissive hypertension     GERD - continue protonix     Morbid obesity due to excess calories Body mass index is 44.74 kg/m². - Complicating assessment and treatment. Placing patient at risk for multiple co-morbidities as well as early death and contributing to the patient's presentation. Physical Exam Performed:     BP (!) 184/83   Pulse 85   Temp 97.6 °F (36.4 °C) (Oral)   Resp 18   Ht 5' 1\" (1.549 m)   Wt 236 lb 12.4 oz (107.4 kg)   SpO2 99%   BMI 44.74 kg/m²     General appearance: No acute distress  HEENT: Pupils equal, round, and reactive to light. Conjunctivae/corneas clear. Neck: Supple, with full range of motion. No jugular venous distention. Trachea midline. Respiratory:  Normal respiratory effort. Clear to auscultation, bilaterally without Rales/Wheezes/Rhonchi. Cardiovascular: Regular rate and rhythm with normal S1/S2 without murmurs, rubs or gallops. Abdomen: Soft, non-tender, non-distended with normal bowel sounds. Musculoskeletal: No clubbing, cyanosis or edema bilaterally. Full range of motion without deformity. Skin: Skin color, texture, turgor normal.  No rashes or lesions. Neurologic: Moving all extremities, no focal deficits  Psychiatric: AOX4  Capillary Refill: Brisk,< 3 seconds   Peripheral Pulses: +2 palpable, equal bilaterally     Labs:  For convenience and continuity at follow-up the following most recent labs are provided:      CBC:    Lab Results   Component Value Date    WBC 3.8 06/04/2022    HGB 8.6 06/04/2022    HCT 26.6 06/04/2022     06/04/2022       Renal:    Lab Results   Component Value Date     06/04/2022    K 3.9 06/04/2022    K 4.7 05/31/2022     06/04/2022    CO2 20 06/04/2022    BUN 9 06/04/2022    CREATININE 0.8 06/04/2022    CALCIUM 8.7 06/04/2022    PHOS 3.2 06/04/2022         Significant Diagnostic Studies    Radiology:   XR CHEST PORTABLE   Final Result      No acute radiographic abnormality of the chest.             Consults:     IP CONSULT TO HOSPITALIST  IP CONSULT TO PSYCHIATRY    Disposition:  Inpatient Psych unit     Condition at Discharge: Stable    Discharge Instructions/Follow-up:    Needs IP Psych  Pt/Ot    Code Status:  Full Code    Activity: activity as tolerated    Diet: regular diet      Discharge Medications:     Current Discharge Medication List           Details   cefdinir (OMNICEF) 300 MG capsule Take 1 capsule by mouth every 12 hours for 5 days  Qty: 10 capsule, Refills: 0      fluconazole (DIFLUCAN) 200 MG tablet Take 1 tablet by mouth daily for 10 days  Qty: 10 tablet, Refills: 0      haloperidol (HALDOL) 1 MG tablet Take 1 tablet by mouth 2 times daily  Qty: 10 tablet, Refills: 0              Details   sertraline (ZOLOFT) 100 MG tablet Take 1 tablet by mouth daily  Qty: 30 tablet, Refills: 3      losartan (COZAAR) 100 MG tablet Take 0.5 tablets by mouth daily  Qty: 30 tablet, Refills: 3      hydroCHLOROthiazide (HYDRODIURIL) 25 MG tablet Take 0.5 tablets by mouth daily  Qty: 30 tablet, Refills: 3              Details   aspirin 81 MG chewable tablet Take 81 mg by mouth daily      atorvastatin (LIPITOR) 40 MG tablet Take 40 mg by mouth at bedtime      pregabalin (LYRICA) 25 MG capsule Take 25 mg by mouth 2 times daily. naproxen (NAPROSYN) 500 MG tablet Take 500 mg by mouth every 12 hours as needed      polyethylene glycol (GLYCOLAX) 17 g packet Take 17 g by mouth at bedtime      hydrocortisone-aloe 1 % CREA cream Apply topically 2 times daily Apply to left arm rash.   Qty: 1 each, Refills: 0      folic acid (FOLVITE) 1 MG tablet Take 1 tablet by mouth daily  Qty: 30 tablet, Refills: 3      pantoprazole (PROTONIX) 40 MG tablet Take 1 tablet by mouth in the morning and at bedtime  Qty: 60 tablet, Refills: 3      metoprolol succinate (TOPROL XL) 25 MG extended release tablet Take 1 tablet by mouth in the morning and at bedtime  Qty: 30 tablet, Refills: 3 Liraglutide (VICTOZA) 18 MG/3ML SOPN SC injection Inject 3 mg into the skin daily      ferrous sulfate (IRON 325) 325 (65 Fe) MG tablet Take 1 tablet by mouth daily (with breakfast)  Qty: 30 tablet, Refills: 3      acetaminophen (TYLENOL) 500 MG tablet Take 1,000 mg by mouth every 6 hours as needed for Pain      gabapentin (NEURONTIN) 300 MG capsule Take 600 mg by mouth 3 times daily. hydrOXYzine (ATARAX) 50 MG tablet Take 100 mg by mouth in the morning and at bedtime       busPIRone (BUSPAR) 15 MG tablet Take 15 mg by mouth 3 times daily       buPROPion (WELLBUTRIN SR) 200 MG extended release tablet Take 200 mg by mouth 2 times daily              Time Spent on discharge is more than 30 minutes in the examination, evaluation, counseling and review of medications and discharge plan. Signed:    Emelia Marrero MD   6/6/2022      Thank you EUGENIE Alvarez - ELISHA for the opportunity to be involved in this patient's care. If you have any questions or concerns please feel free to contact me at [phone]

## 2024-03-04 NOTE — PLAN OF CARE
Problem: Neurosensory - Adult  Goal: Achieves stable or improved neurological status  Outcome: Progressing  Flowsheets (Taken 6/5/2022 0808)  Achieves stable or improved neurological status: Assess for and report changes in neurological status  Goal: Achieves maximal functionality and self care  Outcome: Progressing  Flowsheets (Taken 6/5/2022 0808)  Achieves maximal functionality and self care: Monitor swallowing and airway patency with patient fatigue and changes in neurological status     Problem: Discharge Planning  Goal: Discharge to home or other facility with appropriate resources  6/5/2022 1102 by Yessi Brunson RN  Outcome: Progressing  6/5/2022 0812 by Simi Dias RN  Flowsheets  Taken 6/5/2022 5265 by Simi Dias RN  Discharge to home or other facility with appropriate resources: Identify barriers to discharge with patient and caregiver  Taken 6/5/2022 0808 by Yessi Brunson RN  Discharge to home or other facility with appropriate resources: Identify barriers to discharge with patient and caregiver  Note: Case management involved for D/C planning. Problem: Safety - Adult  Goal: Free from fall injury  6/5/2022 1102 by Yessi Brunson RN  Outcome: Progressing  6/5/2022 0812 by Simi Dias RN  Flowsheets  Taken 6/5/2022 5238 by Simi Dias RN  Free From Fall Injury: Instruct family/caregiver on patient safety  Taken 6/5/2022 0808 by Yessi Brunson RN  Free From Fall Injury: Instruct family/caregiver on patient safety  Note: Fall precautions in place. Bed alarm in use. Bed in locked and low position. SR up x2. Problem: Skin/Tissue Integrity  Goal: Absence of new skin breakdown  Description: 1. Monitor for areas of redness and/or skin breakdown  2. Assess vascular access sites hourly  3. Every 4-6 hours minimum:  Change oxygen saturation probe site  4.   Every 4-6 hours:  If on nasal continuous positive airway pressure, respiratory therapy assess nares and determine need for appliance change or resting period.   Outcome: Progressing     Problem: ABCDS Injury Assessment  Goal: Absence of physical injury  Outcome: Progressing  Flowsheets (Taken 6/5/2022 0808)  Absence of Physical Injury: Implement safety measures based on patient assessment     Problem: Chronic Conditions and Co-morbidities  Goal: Patient's chronic conditions and co-morbidity symptoms are monitored and maintained or improved  Outcome: Progressing  Flowsheets  Taken 6/5/2022 0808 by Cisco Hernade[address] - Patient's Chronic Conditions and Co-Morbidity Symptoms are Monitored and Maintained or Improved: Monitor and assess patient's chronic conditions and comorbid symptoms for stability, deterioration, or improvement  Taken 6/4/2022 2319 by Franklin Kemp RN  Care Plan - Patient's Chronic Conditions and Co-Morbidity Symptoms are Monitored and Maintained or Improved: Monitor and assess patient's chronic conditions and comorbid symptoms for stability, deterioration, or improvement     Problem: Pain  Goal: Verbalizes/displays adequate comfort level or baseline comfort level  Outcome: Progressing Mild balance deficits.

## 2024-12-05 NOTE — BH NOTE
[facility]  Discharge Note    Pt discharged with followings belongings:   Dental Appliances: None  Vision - Corrective Lenses: None  Hearing Aid: None  Jewelry: None  Body Piercings Removed: No  Clothing: Shirt,Pants  Other Valuables: At home   Valuables sent returned to patient. Patient education on aftercare instructions. Information faxed to [facility], AN AFFILIATE OF [facility] by Lucy Duff  at 4:04 PM .Patient verbalize understanding of AVS.  Report called to Trudi Bhatt LPN at [place] [id number] upon discharge:  Mental Status and Behavioral Exam  Normal: No  Level of Assistance: Independent/Self  Facial Expression: Brightened  Affect: Appropriate  Level of Consciousness: Alert  Frequency of Checks: 4 times per hour, close  Mood:Normal: No  Mood: Anxious  Motor Activity:Normal: Yes  Motor Activity: Decreased  Eye Contact: Good  Observed Behavior: Cooperative,Friendly  Sexual Misconduct History: Current - no  Preception: [place] to person,[place] to time,[place] to place,[place] to situation  Attention:Normal: Yes  Attention: Distractible  Thought Processes: Circumstantial  Thought Content:Normal: Yes  Thought Content: Poverty of content,Preoccupations  Depression Symptoms: Impaired concentration  Anxiety Symptoms: Generalized  Sharmin Symptoms: No problems reported or observed.   Hallucinations: None  Delusions: No  Delusions:  (none noted)  Memory:Normal: Yes  Memory: Poor recent  Insight and Judgment: No  Insight and Judgment: Poor judgment,Poor insight      Metabolic Screening:    Lab Results   Component Value Date    LABA1C 7.2 06/07/2022       Lab Results   Component Value Date    CHOL 172 02/24/2022     Lab Results   Component Value Date    TRIG 411 (H) 02/24/2022     Lab Results   Component Value Date    HDL 26 (L) 02/24/2022     No components found for: [facility] EVALUATION AND TREATMENT [place]  Lab Results   Component Value Date    LABVLDL see below 02/24/2022       Darrell Hassan RN     Bridge Appointment completed: Reviewed Discharge No

## 2025-01-15 NOTE — PLAN OF CARE
Pt received at 0700 sleeping. Pt easily arousable and was anxious, restless and crying out that she was in pain (back and abdomen). Tylenol given with good relief. A&O x2 to person and place. Meals give and tolerated well although appetite is minimal. Pt has been sleeping for most of the shift when not interacting with writer and other healthcare providers All vitals are within normal limits on RA. Psychiatry assessed patient at bedside. Please see their notes. Psychiatric medications adjusted. Blood sugar checks ACHS. Inuslin given per sliding scale. Luwana Novel in place with good urine output. LBM 6/3. Lactated ringer's infusing at 100 ml/hr as ordered. Antibitoics given as ordered. Safety maintained.       Plan: continue IV fluids; maintain safety    Problem: Neurosensory - Adult  Goal: Achieves stable or improved neurological status  6/3/2022 1418 by Matthieu Villela RN  Outcome: Progressing  Flowsheets  Taken 6/3/2022 1200  Achieves stable or improved neurological status: Assess for and report changes in neurological status  Taken 6/3/2022 0900  Achieves stable or improved neurological status:   Assess for and report changes in neurological status   Initiate measures to prevent increased intracranial pressure     Problem: Neurosensory - Adult  Goal: Achieves maximal functionality and self care  6/3/2022 1418 by Matthieu Villela RN  Outcome: Progressing  Flowsheets  Taken 6/3/2022 1200  Achieves maximal functionality and self care:   Monitor swallowing and airway patency with patient fatigue and changes in neurological status   Encourage and assist patient to increase activity and self care with guidance from physical therapy/occupational therapy  Taken 6/3/2022 0900  Achieves maximal functionality and self care:   Monitor swallowing and airway patency with patient fatigue and changes in neurological status   Encourage and assist patient to increase activity and self care with guidance from physical This RN ambulated the patient. Patient walked and had no Ataxia (in coordinate),but c/o stiffening of the right ankle once we returned to the room. He also c/o his hips hurting from front to back.       Nora Cueva RN  01/15/25 [id number]     therapy/occupational therapy     Problem: Discharge Planning  Goal: Discharge to home or other facility with appropriate resources  6/3/2022 1418 by Geoff Seo RN  Outcome: Progressing  Flowsheets  Taken 6/3/2022 1200  Discharge to home or other facility with appropriate resources: Identify barriers to discharge with patient and caregiver  Taken 6/3/2022 0900  Discharge to home or other facility with appropriate resources: Identify barriers to discharge with patient and caregiver     Problem: Safety - Adult  Goal: Free from fall injury  6/3/2022 1418 by Geoff Seo RN  Outcome: Progressing  Flowsheets (Taken 6/3/2022 1034)  Free From Fall Injury:   Instruct family/caregiver on patient safety   Based on caregiver fall risk screen, instruct family/caregiver to ask for assistance with transferring infant if caregiver noted to have fall risk factors     Problem: Skin/Tissue Integrity  Goal: Absence of new skin breakdown  Description: 1. Monitor for areas of redness and/or skin breakdown  2. Assess vascular access sites hourly  3. Every 4-6 hours minimum:  Change oxygen saturation probe site  4. Every 4-6 hours:  If on nasal continuous positive airway pressure, respiratory therapy assess nares and determine need for appliance change or resting period.   6/3/2022 1418 by Geoff Seo RN  Outcome: Progressing     Problem: ABCDS Injury Assessment  Goal: Absence of physical injury  6/3/2022 1418 by Geoff Seo RN  Outcome: Progressing     Problem: Chronic Conditions and Co-morbidities  Goal: Patient's chronic conditions and co-morbidity symptoms are monitored and maintained or improved  6/3/2022 1418 by Geoff Seo RN  Outcome: Progressing  Flowsheets  Taken 6/3/2022 1200  Care Plan - Patient's Chronic Conditions and Co-Morbidity Symptoms are Monitored and Maintained or Improved:   Monitor and assess patient's chronic conditions and comorbid symptoms for stability, deterioration, or improvement Collaborate with multidisciplinary team to address chronic and comorbid conditions and prevent exacerbation or deterioration   Update acute care plan with appropriate goals if chronic or comorbid symptoms are exacerbated and prevent overall improvement and discharge  Taken 6/3/2022 0900  Care Plan - Patient's Chronic Conditions and Co-Morbidity Symptoms are Monitored and Maintained or Improved:   Monitor and assess patient's chronic conditions and comorbid symptoms for stability, deterioration, or improvement   Collaborate with multidisciplinary team to address chronic and comorbid conditions and prevent exacerbation or deterioration   Update acute care plan with appropriate goals if chronic or comorbid symptoms are exacerbated and prevent overall improvement and discharge     Problem: Pain  Goal: Verbalizes/displays adequate comfort level or baseline comfort level  6/3/2022 1418 by Karan Lombardi RN  Outcome: Progressing  Flowsheets  Taken 6/3/2022 1249  Verbalizes/displays adequate comfort level or baseline comfort level:   Encourage patient to monitor pain and request assistance   Assess pain using appropriate pain scale   Administer analgesics based on type and severity of pain and evaluate response   Implement non-pharmacological measures as appropriate and evaluate response  Taken 6/3/2022 0957  Verbalizes/displays adequate comfort level or baseline comfort level:   Encourage patient to monitor pain and request assistance   Assess pain using appropriate pain scale   Administer analgesics based on type and severity of pain and evaluate response   Implement non-pharmacological measures as appropriate and evaluate response

## (undated) DEVICE — FORCEPS BX L240CM JAW DIA2.8MM L CAP W/ NDL MIC MESH TOOTH

## (undated) DEVICE — CANNULA SAMP CO2 AD GRN 7FT CO2 AND 7FT O2 TBNG UNIV CONN